# Patient Record
Sex: FEMALE | Race: WHITE | NOT HISPANIC OR LATINO | Employment: FULL TIME | URBAN - METROPOLITAN AREA
[De-identification: names, ages, dates, MRNs, and addresses within clinical notes are randomized per-mention and may not be internally consistent; named-entity substitution may affect disease eponyms.]

---

## 2017-03-01 ENCOUNTER — TRANSCRIBE ORDERS (OUTPATIENT)
Dept: ADMINISTRATIVE | Facility: HOSPITAL | Age: 57
End: 2017-03-01

## 2017-03-01 DIAGNOSIS — R10.9 ABDOMINAL PAIN, UNSPECIFIED LOCATION: ICD-10-CM

## 2017-03-01 DIAGNOSIS — R14.0 ABDOMINAL DISTENTION: Primary | ICD-10-CM

## 2017-03-10 ENCOUNTER — HOSPITAL ENCOUNTER (OUTPATIENT)
Dept: CT IMAGING | Facility: HOSPITAL | Age: 57
Discharge: HOME | End: 2017-03-10
Attending: INTERNAL MEDICINE | Admitting: INTERNAL MEDICINE

## 2017-03-10 DIAGNOSIS — R10.9 ABDOMINAL PAIN, UNSPECIFIED LOCATION: ICD-10-CM

## 2017-03-10 DIAGNOSIS — R14.0 ABDOMINAL DISTENTION: ICD-10-CM

## 2017-03-10 PROCEDURE — 74176 CT ABD & PELVIS W/O CONTRAST: CPT

## 2017-03-31 ENCOUNTER — OFFICE VISIT (OUTPATIENT)
Dept: SURGERY | Facility: CLINIC | Age: 57
End: 2017-03-31

## 2017-03-31 VITALS
WEIGHT: 163 LBS | RESPIRATION RATE: 14 BRPM | BODY MASS INDEX: 25.58 KG/M2 | OXYGEN SATURATION: 95 % | HEIGHT: 67 IN | DIASTOLIC BLOOD PRESSURE: 68 MMHG | TEMPERATURE: 97.7 F | SYSTOLIC BLOOD PRESSURE: 110 MMHG | HEART RATE: 94 BPM

## 2017-03-31 DIAGNOSIS — R11.2 NAUSEA AND VOMITING, INTRACTABILITY OF VOMITING NOT SPECIFIED, UNSPECIFIED VOMITING TYPE: ICD-10-CM

## 2017-03-31 DIAGNOSIS — R10.32 LEFT LOWER QUADRANT PAIN: Primary | ICD-10-CM

## 2017-03-31 PROCEDURE — 99203 OFFICE O/P NEW LOW 30 MIN: CPT | Performed by: SURGERY

## 2017-03-31 NOTE — PROGRESS NOTES
PATIENT INFORMATION  Akila Amezcua   - 1960    CHIEF COMPLAINT    GB CONS, IMAGING DONE LAST YEAR AT [facility],   VOMITING, DIARRHEA and left lower quadrant abdominal pain ×1 week  Referral from Dr. Castrejon    HISTORY OF PRESENT ILLNESS  HPI  Patient was as the office today for complaints of left lower quadrant abdominal pain, vomiting and diarrhea ×1 week.  She reports the symptoms were acute in onset her pain is in the left lower quadrant radiating to her back associated with nausea and nonbilious emesis.  She denies any fevers but thinks she may have had some chills lately.  Denies any change in bowel movements melena or hematochezia.  Denies urinary symptoms, recent travel or sick contacts.  She reports poor appetite.  She reports she had similar symptoms in 2016 and was seen at [facility] in [place].  She was admitted to the hospital for 3 days and treated for multiple problems.  CT scan abdomen and pelvis with IV contrast done at that time was reviewed by me please note I only have the report I have no axis to the images.  The CT scan shows diffuse thickening of the bronchial walls concerning for bronchitis, pleural nodule, cholelithiasis with a dilated common bile duct of up to 9 mm and bilateral renal cysts and left sided diverticulosis.    Patient has also had ongoing right upper quadrant abdominal pain with indigestion and bloating particularly after eating fried fatty or greasy foods.  However she reports that this pain in the left lower quadrant is certainly different from the one that she experiences from her biliary colic.  She has never had a colonoscopy before.      REVIEW OF SYSTEMS  Review of Systems   Constitutional: Positive for appetite change and chills.   Respiratory: Negative.    Cardiovascular: Negative.    Gastrointestinal: Positive for abdominal pain, nausea and vomiting.   Genitourinary: Negative.    Musculoskeletal: Positive for back pain.   Skin: Negative.   "  Neurological: Negative.    Hematological: Negative.    Psychiatric/Behavioral: Negative.          ACTIVE PROBLEMS  Patient Active Problem List    Diagnosis   • Left lower quadrant pain [R10.32]   • Nausea and vomiting [R11.2]         PAST MEDICAL HISTORY  Past Medical History:   Diagnosis Date   • Arthritis    • DJD (degenerative joint disease)    • Rheumatoid arthritis          SURGICAL HISTORY  Past Surgical History:   Procedure Laterality Date   • APPENDECTOMY     • ESOPHAGEAL ATRESIA REPAIR     • PLEURAL BIOPSY     • PLEURAL SCARIFICATION           FAMILY HISTORY  Family History   Problem Relation Age of Onset   • Lung cancer Father          SOCIAL HISTORY  Social History     Occupational History   • Not on file.     Social History Main Topics   • Smoking status: Current Every Day Smoker     Packs/day: 0.50   • Smokeless tobacco: Not on file   • Alcohol use No   • Drug use: No   • Sexual activity: Yes         CURRENT MEDICATIONS    Current Outpatient Prescriptions:   •  albuterol (PROVENTIL) (2.5 MG/3ML) 0.083% nebulizer solution, Take 2.5 mg by nebulization Every 4 (Four) Hours As Needed for wheezing., Disp: , Rfl:   •  folic acid (FOLVITE) 1 MG tablet, Take 1 mg by mouth Daily., Disp: , Rfl:   •  guaiFENesin (MUCINEX) 600 MG 12 hr tablet, Take 1 tablet by mouth 2 (Two) Times a Day., Disp: 30 tablet, Rfl: 0  •  HYDROcodone-acetaminophen (NORCO) 5-325 MG per tablet, Take 1 tablet by mouth Every 4 (Four) Hours As Needed for moderate pain (4-6)., Disp: 20 tablet, Rfl: 0  •  ipratropium-albuterol (DUO-NEB) 0.5-2.5 mg/mL nebulizer, Take 3 mL by nebulization Every 4 (Four) Hours As Needed for wheezing., Disp: , Rfl:   •  NON FORMULARY, 1 tablet/day. For RA, Disp: , Rfl:   •  NON FORMULARY, 2 L/min. Oxygen per nc prn, Disp: , Rfl:     ALLERGIES  Penicillins    VITALS  Vitals:    03/31/17 1118   BP: 110/68   Pulse: 94   Resp: 14   Temp: 97.7 °F (36.5 °C)   SpO2: 95%   Weight: 163 lb (73.9 kg)   Height: 67\" (170.2 cm) "       LAST RESULTS   Admission on 10/09/2016, Discharged on 10/09/2016   Component Date Value Ref Range Status   • Glucose 10/09/2016 102* 65 - 99 mg/dL Final   • BUN 10/09/2016 9  6 - 20 mg/dL Final   • Creatinine 10/09/2016 0.80  0.57 - 1.00 mg/dL Final   • Sodium 10/09/2016 145  136 - 145 mmol/L Final   • Potassium 10/09/2016 4.0  3.5 - 5.2 mmol/L Final   • Chloride 10/09/2016 104  98 - 107 mmol/L Final   • CO2 10/09/2016 25.5  22.0 - 29.0 mmol/L Final   • Calcium 10/09/2016 9.3  8.6 - 10.5 mg/dL Final   • Total Protein 10/09/2016 6.9  6.0 - 8.5 g/dL Final   • Albumin 10/09/2016 3.90  3.50 - 5.20 g/dL Final   • ALT (SGPT) 10/09/2016 14  5 - 33 U/L Final   • AST (SGOT) 10/09/2016 14  5 - 32 U/L Final   • Alkaline Phosphatase 10/09/2016 81  40 - 129 U/L Final   • Total Bilirubin 10/09/2016 0.5  0.2 - 1.2 mg/dL Final   • eGFR Non African Amer 10/09/2016 75  >60 mL/min/1.73 Final   • Globulin 10/09/2016 3.0  gm/dL Final   • A/G Ratio 10/09/2016 1.3  g/dL Final   • BUN/Creatinine Ratio 10/09/2016 11.3  7.0 - 25.0 Final   • Anion Gap 10/09/2016 15.5  mmol/L Final   • Protime 10/09/2016 13.2  12.1 - 15.0 Seconds Final   • INR 10/09/2016 1.03  0.90 - 1.10 Final   • PTT 10/09/2016 26.6  24.3 - 38.1 seconds Final   • D-Dimer, Quantitative 10/09/2016 <0.30  0.00 - 0.46 MCGFEU/mL Final   • proBNP 10/09/2016 271.6* 5.0 - 125.0 pg/mL Final   • Troponin T 10/09/2016 <0.010  0.000 - 0.030 ng/mL Final   • Blood Culture 10/09/2016 No growth at 5 days   Final   • Lactate 10/09/2016 1.0  0.5 - 2.0 mmol/L Final   • WBC 10/09/2016 10.65  4.80 - 10.80 10*3/mm3 Final   • RBC 10/09/2016 4.67  4.20 - 5.40 10*6/mm3 Final   • Hemoglobin 10/09/2016 14.2  12.0 - 16.0 g/dL Final   • Hematocrit 10/09/2016 43.0  37.0 - 47.0 % Final   • MCV 10/09/2016 92.1  81.0 - 99.0 fL Final   • MCH 10/09/2016 30.4  27.0 - 31.0 pg Final   • MCHC 10/09/2016 33.0  31.0 - 37.0 g/dL Final   • RDW 10/09/2016 15.8* 11.5 - 14.5 % Final   • RDW-SD 10/09/2016 52.6   37.0 - 54.0 fl Final   • MPV 10/09/2016 9.5  7.4 - 10.4 fL Final   • Platelets 10/09/2016 287  140 - 500 10*3/mm3 Final   • Neutrophil % 10/09/2016 63.4  45.0 - 70.0 % Final   • Lymphocyte % 10/09/2016 23.5  20.0 - 45.0 % Final   • Monocyte % 10/09/2016 8.2* 3.0 - 8.0 % Final   • Eosinophil % 10/09/2016 3.6  0.0 - 4.0 % Final   • Basophil % 10/09/2016 0.8  0.0 - 2.0 % Final   • Immature Grans % 10/09/2016 0.5  0.0 - 0.5 % Final   • Neutrophils, Absolute 10/09/2016 6.77  1.50 - 8.30 10*3/mm3 Final   • Lymphocytes, Absolute 10/09/2016 2.50  0.60 - 4.80 10*3/mm3 Final   • Monocytes, Absolute 10/09/2016 0.87  0.00 - 1.00 10*3/mm3 Final   • Eosinophils, Absolute 10/09/2016 0.38* 0.10 - 0.30 10*3/mm3 Final   • Basophils, Absolute 10/09/2016 0.08  0.00 - 0.20 10*3/mm3 Final   • Immature Grans, Absolute 10/09/2016 0.05* 0.00 - 0.03 10*3/mm3 Final   • nRBC 10/09/2016 0.0  0.0 - 0.0 /100 WBC Final   • Influenza A Ag, EIA 10/09/2016 Negative  Negative Final   • Influenza B Ag, EIA 10/09/2016 Negative  Negative Final   • Troponin T 10/09/2016 <0.010  0.000 - 0.030 ng/mL Final     No results found.    PHYSICAL EXAM  Physical Exam   Constitutional: She is oriented to person, place, and time. She appears well-developed and well-nourished.   Eyes: No scleral icterus.   Cardiovascular: Normal rate, regular rhythm and normal heart sounds.    Pulmonary/Chest: Breath sounds normal.   Abdominal:   Soft, nondistended, non-tender,, positive bowel sounds in all 4 quadrants.  No peritoneal signs noted.   Musculoskeletal: She exhibits no edema.   Neurological: She is alert and oriented to person, place, and time.   Skin: Skin is warm and dry.   Psychiatric: She has a normal mood and affect. Her behavior is normal.   Nursing note and vitals reviewed.      ASSESSMENT  Abdominal pain -- left lower quadrant  Diverticulosis-question diverticulitis   Will check a CT scan abdomen and pelvis with oral and IV contrast  Will check a CBC, CMP and  urinalysis.      Patient with known diagnosis of cholelithiasis.  Discussed with her that first we need to evaluate the etiology of the left lower quadrant pain before we consider cholecystectomy.  Additionally on CT scan done in September 2016 CBD was dilated 9 mm - I don't have any other records available from that hospital admission nor  any lab results.  Will try and get those records and may need to consider further evaluation of the gallbladder and biliary system.      PLAN  Follow-up after testing is completed.  Patient was advised to call the office sooner should she have worsening symptoms or go to the nearest emergency room.

## 2017-04-06 ENCOUNTER — APPOINTMENT (OUTPATIENT)
Dept: CT IMAGING | Facility: HOSPITAL | Age: 57
End: 2017-04-06
Attending: SURGERY

## 2017-04-10 ENCOUNTER — HOSPITAL ENCOUNTER (EMERGENCY)
Facility: HOSPITAL | Age: 57
Discharge: HOME | End: 2017-04-10
Attending: EMERGENCY MEDICINE | Admitting: EMERGENCY MEDICINE

## 2017-04-10 ENCOUNTER — APPOINTMENT (OUTPATIENT)
Dept: CT IMAGING | Facility: HOSPITAL | Age: 57
End: 2017-04-10

## 2017-04-10 VITALS
OXYGEN SATURATION: 98 % | SYSTOLIC BLOOD PRESSURE: 144 MMHG | RESPIRATION RATE: 18 BRPM | HEART RATE: 87 BPM | TEMPERATURE: 98.1 F | BODY MASS INDEX: 30.73 KG/M2 | DIASTOLIC BLOOD PRESSURE: 69 MMHG | WEIGHT: 180 LBS | HEIGHT: 64 IN

## 2017-04-10 DIAGNOSIS — K42.9 PERIUMBILICAL HERNIA: ICD-10-CM

## 2017-04-10 DIAGNOSIS — R18.8 CIRRHOSIS OF LIVER WITH ASCITES, UNSPECIFIED HEPATIC CIRRHOSIS TYPE (HCC): ICD-10-CM

## 2017-04-10 DIAGNOSIS — K43.9 VENTRAL HERNIA WITHOUT OBSTRUCTION OR GANGRENE: Primary | ICD-10-CM

## 2017-04-10 DIAGNOSIS — R59.0 LYMPHADENOPATHY, ABDOMINAL: ICD-10-CM

## 2017-04-10 DIAGNOSIS — K74.60 CIRRHOSIS OF LIVER WITH ASCITES, UNSPECIFIED HEPATIC CIRRHOSIS TYPE (HCC): ICD-10-CM

## 2017-04-10 LAB
ALBUMIN SERPL-MCNC: 3.5 G/DL (ref 3.2–4.8)
ALBUMIN/GLOB SERPL: 0.8 G/DL (ref 1.5–2.5)
ALP SERPL-CCNC: 92 U/L (ref 25–100)
ALT SERPL W P-5'-P-CCNC: 19 U/L (ref 7–40)
ANION GAP SERPL CALCULATED.3IONS-SCNC: 3 MMOL/L (ref 3–11)
APTT PPP: 28.1 SECONDS (ref 24–31)
AST SERPL-CCNC: 37 U/L (ref 0–33)
BACTERIA UR QL AUTO: ABNORMAL /HPF
BASOPHILS # BLD AUTO: 0.01 10*3/MM3 (ref 0–0.2)
BASOPHILS NFR BLD AUTO: 0.1 % (ref 0–1)
BILIRUB SERPL-MCNC: 1.9 MG/DL (ref 0.3–1.2)
BILIRUB UR QL STRIP: ABNORMAL
BUN BLD-MCNC: 8 MG/DL (ref 9–23)
BUN/CREAT SERPL: 16 (ref 7–25)
CALCIUM SPEC-SCNC: 8.7 MG/DL (ref 8.7–10.4)
CHLORIDE SERPL-SCNC: 107 MMOL/L (ref 99–109)
CLARITY UR: CLEAR
CO2 SERPL-SCNC: 30 MMOL/L (ref 20–31)
COLOR UR: ABNORMAL
CREAT BLD-MCNC: 0.5 MG/DL (ref 0.6–1.3)
DEPRECATED RDW RBC AUTO: 52.5 FL (ref 37–54)
EOSINOPHIL # BLD AUTO: 0.07 10*3/MM3 (ref 0.1–0.3)
EOSINOPHIL NFR BLD AUTO: 0.9 % (ref 0–3)
ERYTHROCYTE [DISTWIDTH] IN BLOOD BY AUTOMATED COUNT: 14.5 % (ref 11.3–14.5)
GFR SERPL CREATININE-BSD FRML MDRD: 129 ML/MIN/1.73
GLOBULIN UR ELPH-MCNC: 4.6 GM/DL
GLUCOSE BLD-MCNC: 102 MG/DL (ref 70–100)
GLUCOSE UR STRIP-MCNC: NEGATIVE MG/DL
HCT VFR BLD AUTO: 38.4 % (ref 34.5–44)
HGB BLD-MCNC: 12.6 G/DL (ref 11.5–15.5)
HGB UR QL STRIP.AUTO: ABNORMAL
HYALINE CASTS UR QL AUTO: ABNORMAL /LPF
IMM GRANULOCYTES # BLD: 0.02 10*3/MM3 (ref 0–0.03)
IMM GRANULOCYTES NFR BLD: 0.3 % (ref 0–0.6)
INR PPP: 1.16
KETONES UR QL STRIP: NEGATIVE
LEUKOCYTE ESTERASE UR QL STRIP.AUTO: ABNORMAL
LIPASE SERPL-CCNC: 28 U/L (ref 6–51)
LYMPHOCYTES # BLD AUTO: 2.2 10*3/MM3 (ref 0.6–4.8)
LYMPHOCYTES NFR BLD AUTO: 29.1 % (ref 24–44)
MCH RBC QN AUTO: 32.6 PG (ref 27–31)
MCHC RBC AUTO-ENTMCNC: 32.8 G/DL (ref 32–36)
MCV RBC AUTO: 99.5 FL (ref 80–99)
MONOCYTES # BLD AUTO: 0.74 10*3/MM3 (ref 0–1)
MONOCYTES NFR BLD AUTO: 9.8 % (ref 0–12)
NEUTROPHILS # BLD AUTO: 4.51 10*3/MM3 (ref 1.5–8.3)
NEUTROPHILS NFR BLD AUTO: 59.8 % (ref 41–71)
NITRITE UR QL STRIP: NEGATIVE
PH UR STRIP.AUTO: 7 [PH] (ref 5–8)
PLATELET # BLD AUTO: 118 10*3/MM3 (ref 150–450)
PMV BLD AUTO: 10.9 FL (ref 6–12)
POTASSIUM BLD-SCNC: 3.4 MMOL/L (ref 3.5–5.5)
PROT SERPL-MCNC: 8.1 G/DL (ref 5.7–8.2)
PROT UR QL STRIP: ABNORMAL
PROTHROMBIN TIME: 12.7 SECONDS (ref 9.6–11.5)
RBC # BLD AUTO: 3.86 10*6/MM3 (ref 3.89–5.14)
RBC # UR: ABNORMAL /HPF
REF LAB TEST METHOD: ABNORMAL
SODIUM BLD-SCNC: 140 MMOL/L (ref 132–146)
SP GR UR STRIP: 1.02 (ref 1–1.03)
SQUAMOUS #/AREA URNS HPF: ABNORMAL /HPF
UROBILINOGEN UR QL STRIP: ABNORMAL
WBC NRBC COR # BLD: 7.55 10*3/MM3 (ref 3.5–10.8)
WBC UR QL AUTO: ABNORMAL /HPF

## 2017-04-10 PROCEDURE — 99284 EMERGENCY DEPT VISIT MOD MDM: CPT

## 2017-04-10 PROCEDURE — 74177 CT ABD & PELVIS W/CONTRAST: CPT

## 2017-04-10 PROCEDURE — 0 IOPAMIDOL 61 % SOLUTION: Performed by: EMERGENCY MEDICINE

## 2017-04-10 PROCEDURE — 85730 THROMBOPLASTIN TIME PARTIAL: CPT | Performed by: PHYSICIAN ASSISTANT

## 2017-04-10 PROCEDURE — 80053 COMPREHEN METABOLIC PANEL: CPT | Performed by: PHYSICIAN ASSISTANT

## 2017-04-10 PROCEDURE — 85025 COMPLETE CBC W/AUTO DIFF WBC: CPT | Performed by: PHYSICIAN ASSISTANT

## 2017-04-10 PROCEDURE — 83690 ASSAY OF LIPASE: CPT | Performed by: PHYSICIAN ASSISTANT

## 2017-04-10 PROCEDURE — 87086 URINE CULTURE/COLONY COUNT: CPT | Performed by: PHYSICIAN ASSISTANT

## 2017-04-10 PROCEDURE — 81001 URINALYSIS AUTO W/SCOPE: CPT | Performed by: PHYSICIAN ASSISTANT

## 2017-04-10 PROCEDURE — 85610 PROTHROMBIN TIME: CPT | Performed by: PHYSICIAN ASSISTANT

## 2017-04-10 RX ORDER — SODIUM CHLORIDE 0.9 % (FLUSH) 0.9 %
10 SYRINGE (ML) INJECTION AS NEEDED
Status: DISCONTINUED | OUTPATIENT
Start: 2017-04-10 | End: 2017-04-10 | Stop reason: HOSPADM

## 2017-04-10 RX ADMIN — IOPAMIDOL 90 ML: 612 INJECTION, SOLUTION INTRAVENOUS at 13:58

## 2017-04-10 RX ADMIN — Medication 10 ML: at 13:08

## 2017-04-10 NOTE — DISCHARGE INSTRUCTIONS
Rest.  Follow up with Dr. Le and also call for follow up appointment with Dr Salcido.  Return to ER if worse pain, fever, vomiting or other concerns.

## 2017-04-10 NOTE — ED PROVIDER NOTES
Subjective   HPI Comments: 56-year-old female presents to emergency department with ongoing complaints of periumbilical pain and atender fluid collection in the right upper abdomen.  The fluid collection in the right upper abdomen is been present for 1-2 years but is now getting worse. The pain in the periumbilical region is been present for about a month.  The patient states that she's had recent fatigue, intermittent fevers, exertional shortness of breath  The patient had a CT scan of her abdomen and pelvis last month by Dr. Junior, who sees the patient for hidradenitis.  The patient states that she was told that the CT scan was normal.  She states that she wants to know what the source of her pain is and wants to know more about this fluid collection in her right upper abdomen.  Past medical history of cholecystectomy 8 years ago in [place].  She smokes a half pack cigarettes daily.  No alcohol or drug use.  No associated nausea or vomiting.  She has occasional diarrhea but none currently.  No urinary symptoms.    Patient is a 56 y.o. female presenting with abdominal pain.   History provided by:  Patient  Abdominal Pain   Pain location:  Periumbilical and RUQ  Pain quality: aching    Pain radiates to:  Does not radiate  Pain severity:  Moderate  Onset quality:  Gradual  Duration: The fluid collection in the right upper quadrant is been present for 1-2 years.  He.  Umbilical pain is been present for 1 month.  Timing:  Constant  Progression:  Worsening  Chronicity:  Chronic  Context: not alcohol use    Relieved by:  Nothing  Worsened by:  Movement  Ineffective treatments:  None tried  Associated symptoms: chills, diarrhea (occasional), fatigue, fever (intermittent), nausea (Mild) and shortness of breath (exertional)    Associated symptoms: no anorexia, no belching, no chest pain, no cough, no dysuria, no hematuria, no melena, no sore throat and no vomiting        Review of Systems   Constitutional: Positive for  chills, fatigue and fever (intermittent).   HENT: Negative for congestion, ear pain, nosebleeds, rhinorrhea and sore throat.    Eyes: Negative for pain, discharge and visual disturbance.   Respiratory: Positive for shortness of breath (exertional). Negative for cough and wheezing.    Cardiovascular: Negative for chest pain, palpitations and leg swelling.   Gastrointestinal: Positive for abdominal pain, diarrhea (occasional) and nausea (Mild). Negative for anorexia, blood in stool, melena and vomiting.   Endocrine: Negative.    Genitourinary: Negative for dysuria, hematuria and urgency.   Musculoskeletal: Negative for arthralgias and back pain.   Skin: Negative for pallor and rash.   Allergic/Immunologic: Negative for immunocompromised state.   Neurological: Negative for dizziness, speech difficulty, weakness and headaches.   Hematological: Negative for adenopathy. Does not bruise/bleed easily.   Psychiatric/Behavioral: Negative.        Past Medical History:   Diagnosis Date   • Diabetes mellitus    • Diverticulitis    • Fibromyalgia    • Hidradenitis        Allergies   Allergen Reactions   • Penicillins Itching       Past Surgical History:   Procedure Laterality Date   • BREAST BIOPSY     • BREAST CYST ASPIRATION     • CHOLECYSTECTOMY     • HYSTERECTOMY N/A    • WART REMOVAL         Family History   Problem Relation Age of Onset   • Breast cancer Neg Hx    • Endometrial cancer Neg Hx    • Ovarian cancer Neg Hx        Social History     Social History   • Marital status:      Spouse name: N/A   • Number of children: N/A   • Years of education: N/A     Social History Main Topics   • Smoking status: Current Every Day Smoker     Packs/day: 0.50   • Smokeless tobacco: None   • Alcohol use Yes      Comment: socially   • Drug use: None   • Sexual activity: Not Asked     Other Topics Concern   • None     Social History Narrative   • None           Objective   Physical Exam   Constitutional: She is oriented to  person, place, and time. She appears well-developed and well-nourished. No distress.   HENT:   Head: Normocephalic and atraumatic.   Nose: Nose normal.   Mouth/Throat: Oropharynx is clear and moist.   Eyes: Conjunctivae and EOM are normal. Pupils are equal, round, and reactive to light. Left eye exhibits no discharge. No scleral icterus.   Neck: Normal range of motion. Neck supple.   Cardiovascular: Normal rate, regular rhythm, normal heart sounds and intact distal pulses.    No murmur heard.  Pulmonary/Chest: Effort normal and breath sounds normal. No respiratory distress. She has no wheezes. She has no rales. She exhibits no tenderness.   Abdominal: Soft. Bowel sounds are normal. There is tenderness (moderate tenderness on palpation of what appears to be a fluid collection in the right upper abdomen.  Tenderness in the periumbilical region with a small operable umbilical hernia present.  Moderate abdominal distention.  Normal bowel sounds.). There is no rebound and no guarding.   Musculoskeletal: Normal range of motion. She exhibits no edema or tenderness.   Neurological: She is alert and oriented to person, place, and time.   Skin: Skin is warm and dry. No rash noted. She is not diaphoretic.   Psychiatric: She has a normal mood and affect.   Nursing note and vitals reviewed.      Procedures         ED Course  ED Course    3:10 PM  The patient is resting comfortably.  She appears in no acute distress.  CT scan of the abdomen and pelvis with IV contrast shows a ventral hernia in the region of her aforementioned fluid collection, as well as two umbilical hernias.  The hernias are fat containing and show no evidence of strangulation.  There is also some scalloping of her liver margins suggestive of cirrhosis.  There is some abdominal pelvic lymphadenopathy and malignancy cannot be ruled out.  I spoke with the patient about the above findings.  She is relieved to know about the hernias.  She states that she is  scheduled for a colonoscopy with Dr. Amando Le (GI).  I advised her to keep that appointment and also recommended that she speak with general surgery (Dr. Salcido) for follow-up of her hernias and her adenopathy.    Recent Results (from the past 24 hour(s))   Comprehensive Metabolic Panel    Collection Time: 04/10/17  1:08 PM   Result Value Ref Range    Glucose 102 (H) 70 - 100 mg/dL    BUN 8 (L) 9 - 23 mg/dL    Creatinine 0.50 (L) 0.60 - 1.30 mg/dL    Sodium 140 132 - 146 mmol/L    Potassium 3.4 (L) 3.5 - 5.5 mmol/L    Chloride 107 99 - 109 mmol/L    CO2 30.0 20.0 - 31.0 mmol/L    Calcium 8.7 8.7 - 10.4 mg/dL    Total Protein 8.1 5.7 - 8.2 g/dL    Albumin 3.50 3.20 - 4.80 g/dL    ALT (SGPT) 19 7 - 40 U/L    AST (SGOT) 37 (H) 0 - 33 U/L    Alkaline Phosphatase 92 25 - 100 U/L    Total Bilirubin 1.9 (H) 0.3 - 1.2 mg/dL    eGFR Non African Amer 129 >60 mL/min/1.73    Globulin 4.6 gm/dL    A/G Ratio 0.8 (L) 1.5 - 2.5 g/dL    BUN/Creatinine Ratio 16.0 7.0 - 25.0    Anion Gap 3.0 3.0 - 11.0 mmol/L   Lipase    Collection Time: 04/10/17  1:08 PM   Result Value Ref Range    Lipase 28 6 - 51 U/L   Protime-INR    Collection Time: 04/10/17  1:08 PM   Result Value Ref Range    Protime 12.7 (H) 9.6 - 11.5 Seconds    INR 1.16    aPTT    Collection Time: 04/10/17  1:08 PM   Result Value Ref Range    PTT 28.1 24.0 - 31.0 seconds   CBC Auto Differential    Collection Time: 04/10/17  1:08 PM   Result Value Ref Range    WBC 7.55 3.50 - 10.80 10*3/mm3    RBC 3.86 (L) 3.89 - 5.14 10*6/mm3    Hemoglobin 12.6 11.5 - 15.5 g/dL    Hematocrit 38.4 34.5 - 44.0 %    MCV 99.5 (H) 80.0 - 99.0 fL    MCH 32.6 (H) 27.0 - 31.0 pg    MCHC 32.8 32.0 - 36.0 g/dL    RDW 14.5 11.3 - 14.5 %    RDW-SD 52.5 37.0 - 54.0 fl    MPV 10.9 6.0 - 12.0 fL    Platelets 118 (L) 150 - 450 10*3/mm3    Neutrophil % 59.8 41.0 - 71.0 %    Lymphocyte % 29.1 24.0 - 44.0 %    Monocyte % 9.8 0.0 - 12.0 %    Eosinophil % 0.9 0.0 - 3.0 %    Basophil % 0.1 0.0 - 1.0 %     Immature Grans % 0.3 0.0 - 0.6 %    Neutrophils, Absolute 4.51 1.50 - 8.30 10*3/mm3    Lymphocytes, Absolute 2.20 0.60 - 4.80 10*3/mm3    Monocytes, Absolute 0.74 0.00 - 1.00 10*3/mm3    Eosinophils, Absolute 0.07 (L) 0.10 - 0.30 10*3/mm3    Basophils, Absolute 0.01 0.00 - 0.20 10*3/mm3    Immature Grans, Absolute 0.02 0.00 - 0.03 10*3/mm3   Urinalysis With / Culture If Indicated    Collection Time: 04/10/17  1:18 PM   Result Value Ref Range    Color, UA Dark Yellow (A) Yellow, Straw    Appearance, UA Clear Clear    pH, UA 7.0 5.0 - 8.0    Specific Gravity, UA 1.020 1.001 - 1.030    Glucose, UA Negative Negative    Ketones, UA Negative Negative    Bilirubin, UA Small (1+) (A) Negative    Blood, UA Small (1+) (A) Negative    Protein, UA 30 mg/dL (1+) (A) Negative    Leuk Esterase, UA Small (1+) (A) Negative    Nitrite, UA Negative Negative    Urobilinogen, UA 4.0 E.U./dL (A) 0.2 - 1.0 E.U./dL   Urinalysis, Microscopic Only    Collection Time: 04/10/17  1:18 PM   Result Value Ref Range    RBC, UA 13-20 (A) None Seen, 0-2 /HPF    WBC, UA 0-2 (A) None Seen /HPF    Bacteria, UA None Seen None Seen, Trace /HPF    Squamous Epithelial Cells, UA 3-6 (A) None Seen, 0-2 /HPF    Hyaline Casts, UA 0-6 0 - 6 /LPF    Methodology Automated Microscopy      Note: In addition to lab results from this visit, the labs listed above may include labs taken at another facility or during a different encounter within the last 24 hours. Please correlate lab times with ED admission and discharge times for further clarification of the services performed during this visit.    CT Abdomen Pelvis With Contrast    (Results Pending)     Vitals:    04/10/17 1152 04/10/17 1200 04/10/17 1230 04/10/17 1414   BP: 139/81 124/68 111/81 140/81   BP Location:       Patient Position:       Pulse: 90 93 85 84   Resp:  18 18 18   Temp:       TempSrc:       SpO2: 99% 98% 97% 98%   Weight:       Height:         Medications   sodium chloride 0.9 % flush 10 mL (10 mL  Intravenous Given 4/10/17 1308)   iopamidol (ISOVUE-300) 61 % injection 100 mL (90 mL Intravenous Given 4/10/17 1358)     ECG/EMG Results (last 24 hours)     ** No results found for the last 24 hours. **                      [facility]    Final diagnoses:   Ventral hernia without obstruction or gangrene   Periumbilical hernia   Cirrhosis of liver with ascites, unspecified hepatic cirrhosis type   Lymphadenopathy, abdominal            Tobias N RA[facility] Rob  04/10/17 2179

## 2017-04-12 ENCOUNTER — TELEPHONE (OUTPATIENT)
Dept: SURGERY | Facility: CLINIC | Age: 57
End: 2017-04-12

## 2017-04-12 LAB — BACTERIA SPEC AEROBE CULT: NORMAL

## 2017-04-12 NOTE — TELEPHONE ENCOUNTER
Her CT was approved - please co-ordinate with Jolanta and schedule and pt can follow up with me after CT Scan and labs are done. Poli

## 2017-04-17 ENCOUNTER — APPOINTMENT (OUTPATIENT)
Dept: LAB | Facility: HOSPITAL | Age: 57
End: 2017-04-17

## 2017-04-17 LAB
ALBUMIN SERPL-MCNC: 4.1 G/DL (ref 3.5–5.2)
ALBUMIN/GLOB SERPL: 1.3 G/DL
ALP SERPL-CCNC: 72 U/L (ref 40–129)
ALT SERPL W P-5'-P-CCNC: 8 U/L (ref 5–33)
ANION GAP SERPL CALCULATED.3IONS-SCNC: 9.6 MMOL/L
AST SERPL-CCNC: 12 U/L (ref 5–32)
BASOPHILS # BLD AUTO: 0.08 10*3/MM3 (ref 0–0.2)
BASOPHILS NFR BLD AUTO: 1 % (ref 0–2)
BILIRUB SERPL-MCNC: 0.6 MG/DL (ref 0.2–1.2)
BUN BLD-MCNC: 15 MG/DL (ref 6–20)
BUN/CREAT SERPL: 17.6 (ref 7–25)
CALCIUM SPEC-SCNC: 9.7 MG/DL (ref 8.6–10.5)
CHLORIDE SERPL-SCNC: 102 MMOL/L (ref 98–107)
CO2 SERPL-SCNC: 28.4 MMOL/L (ref 22–29)
CREAT BLD-MCNC: 0.85 MG/DL (ref 0.57–1)
DEPRECATED RDW RBC AUTO: 48.1 FL (ref 37–54)
EOSINOPHIL # BLD AUTO: 0.43 10*3/MM3 (ref 0.1–0.3)
EOSINOPHIL NFR BLD AUTO: 5.6 % (ref 0–4)
ERYTHROCYTE [DISTWIDTH] IN BLOOD BY AUTOMATED COUNT: 14.3 % (ref 11.5–14.5)
GFR SERPL CREATININE-BSD FRML MDRD: 69 ML/MIN/1.73
GLOBULIN UR ELPH-MCNC: 3.1 GM/DL
GLUCOSE BLD-MCNC: 97 MG/DL (ref 65–99)
HCT VFR BLD AUTO: 47.9 % (ref 37–47)
HGB BLD-MCNC: 15.6 G/DL (ref 12–16)
IMM GRANULOCYTES # BLD: 0.03 10*3/MM3 (ref 0–0.03)
IMM GRANULOCYTES NFR BLD: 0.4 % (ref 0–0.5)
LYMPHOCYTES # BLD AUTO: 2.01 10*3/MM3 (ref 0.6–4.8)
LYMPHOCYTES NFR BLD AUTO: 26 % (ref 20–45)
MCH RBC QN AUTO: 29.7 PG (ref 27–31)
MCHC RBC AUTO-ENTMCNC: 32.6 G/DL (ref 31–37)
MCV RBC AUTO: 91.1 FL (ref 81–99)
MONOCYTES # BLD AUTO: 0.53 10*3/MM3 (ref 0–1)
MONOCYTES NFR BLD AUTO: 6.9 % (ref 3–8)
NEUTROPHILS # BLD AUTO: 4.64 10*3/MM3 (ref 1.5–8.3)
NEUTROPHILS NFR BLD AUTO: 60.1 % (ref 45–70)
NRBC BLD MANUAL-RTO: 0 /100 WBC (ref 0–0)
PLATELET # BLD AUTO: 265 10*3/MM3 (ref 140–500)
PMV BLD AUTO: 9.3 FL (ref 7.4–10.4)
POTASSIUM BLD-SCNC: 4.8 MMOL/L (ref 3.5–5.2)
PROT SERPL-MCNC: 7.2 G/DL (ref 6–8.5)
RBC # BLD AUTO: 5.26 10*6/MM3 (ref 4.2–5.4)
SODIUM BLD-SCNC: 140 MMOL/L (ref 136–145)
WBC NRBC COR # BLD: 7.72 10*3/MM3 (ref 4.8–10.8)

## 2017-04-17 PROCEDURE — 36415 COLL VENOUS BLD VENIPUNCTURE: CPT | Performed by: SURGERY

## 2017-04-17 PROCEDURE — 80053 COMPREHEN METABOLIC PANEL: CPT | Performed by: SURGERY

## 2017-04-17 PROCEDURE — 85025 COMPLETE CBC W/AUTO DIFF WBC: CPT | Performed by: SURGERY

## 2017-04-26 ENCOUNTER — TELEPHONE (OUTPATIENT)
Dept: SURGERY | Facility: CLINIC | Age: 57
End: 2017-04-26

## 2017-06-07 ENCOUNTER — OFFICE VISIT (OUTPATIENT)
Dept: SURGERY | Facility: CLINIC | Age: 57
End: 2017-06-07

## 2017-06-07 VITALS
BODY MASS INDEX: 26.37 KG/M2 | HEIGHT: 67 IN | DIASTOLIC BLOOD PRESSURE: 70 MMHG | WEIGHT: 168 LBS | SYSTOLIC BLOOD PRESSURE: 116 MMHG

## 2017-06-07 DIAGNOSIS — Z12.11 COLON CANCER SCREENING: ICD-10-CM

## 2017-06-07 DIAGNOSIS — R19.7 DIARRHEA, UNSPECIFIED TYPE: ICD-10-CM

## 2017-06-07 DIAGNOSIS — R10.31 RIGHT LOWER QUADRANT ABDOMINAL PAIN: ICD-10-CM

## 2017-06-07 DIAGNOSIS — R10.11 RIGHT UPPER QUADRANT ABDOMINAL PAIN: Primary | ICD-10-CM

## 2017-06-07 DIAGNOSIS — R11.14 BILIOUS VOMITING WITH NAUSEA: ICD-10-CM

## 2017-06-07 PROCEDURE — 99213 OFFICE O/P EST LOW 20 MIN: CPT | Performed by: SURGERY

## 2017-06-07 NOTE — H&P
PATIENT INFORMATION  Akila Amezcua    - 1960    CHIEF COMPLAINT  RUQ AND RLQ ABDOMINAL PAIN, NAUSEA, VOMITING      HISTORY OF PRESENT ILLNESS  HPI  Patient presents to the office today for follow-up.  She is complaining of right upper quadrant and right lower quadrant abdominal pain.  She reports the pain is constant with acute exacerbation which in that situation it becomes sharp and stabbing.  It radiates to her right flank and right lower quadrant.  This associated with nausea and vomiting.  It is worse with spicy foods and spaghetti.  She reports symptoms have been ongoing for 2 months.  She had similar symptoms last year and was admitted at [facility].  CT scan was done during the hospitalization - I do not have the images to review but I have reviewed the report.  There is cholelithiasis, dilated CBD 9 mm and diverticulosis noted.  She was scheduled to have further testing including CT scan done through my office but her insurance company denied it.  She was able to get her labs done.  LFTs were normal.  She reports her bowel movements are regular however when she does have the flareup of pain she will experience diarrhea.  She denies melena or hematochezia.  She has an extensive pulmonary history.  She has had an appendectomy.  She's never had a colonoscopy before.  She's unaware of family history of colon cancer      REVIEW OF SYSTEMS  Review of Systems   Constitutional: Negative.    Respiratory: Negative.    Cardiovascular: Negative.    Gastrointestinal: Positive for abdominal pain, diarrhea, nausea and vomiting.   Musculoskeletal: Positive for back pain.   Neurological: Negative.    Hematological: Negative.    Psychiatric/Behavioral: Negative.          ACTIVE PROBLEMS  Patient Active Problem List    Diagnosis   • Right upper quadrant abdominal pain [R10.11]   • Left lower quadrant pain [R10.32]   • Nausea and vomiting [R11.2]         PAST MEDICAL HISTORY  Past Medical History:   Diagnosis  "Date   • Arthritis    • DJD (degenerative joint disease)    • Rheumatoid arthritis          SURGICAL HISTORY  Past Surgical History:   Procedure Laterality Date   • APPENDECTOMY     • ESOPHAGEAL ATRESIA REPAIR     • PLEURAL BIOPSY     • PLEURAL SCARIFICATION           FAMILY HISTORY  Family History   Problem Relation Age of Onset   • Lung cancer Father          SOCIAL HISTORY  Social History     Occupational History   • Not on file.     Social History Main Topics   • Smoking status: Current Every Day Smoker     Packs/day: 0.50   • Smokeless tobacco: Not on file   • Alcohol use No   • Drug use: No   • Sexual activity: Yes         CURRENT MEDICATIONS    Current Outpatient Prescriptions:   •  albuterol (PROVENTIL) (2.5 MG/3ML) 0.083% nebulizer solution, Take 2.5 mg by nebulization Every 4 (Four) Hours As Needed for wheezing., Disp: , Rfl:   •  folic acid (FOLVITE) 1 MG tablet, Take 1 mg by mouth Daily., Disp: , Rfl:   •  guaiFENesin (MUCINEX) 600 MG 12 hr tablet, Take 1 tablet by mouth 2 (Two) Times a Day., Disp: 30 tablet, Rfl: 0  •  HYDROcodone-acetaminophen (NORCO) 5-325 MG per tablet, Take 1 tablet by mouth Every 4 (Four) Hours As Needed for moderate pain (4-6)., Disp: 20 tablet, Rfl: 0  •  ipratropium-albuterol (DUO-NEB) 0.5-2.5 mg/mL nebulizer, Take 3 mL by nebulization Every 4 (Four) Hours As Needed for wheezing., Disp: , Rfl:   •  NON FORMULARY, 1 tablet/day. For RA, Disp: , Rfl:   •  NON FORMULARY, 2 L/min. Oxygen per nc prn, Disp: , Rfl:     ALLERGIES  Penicillins    VITALS  Vitals:    06/07/17 1159   BP: 116/70   Weight: 168 lb (76.2 kg)   Height: 67\" (170.2 cm)       LAST RESULTS   Office Visit on 03/31/2017   Component Date Value Ref Range Status   • Glucose 04/17/2017 97  65 - 99 mg/dL Final   • BUN 04/17/2017 15  6 - 20 mg/dL Final   • Creatinine 04/17/2017 0.85  0.57 - 1.00 mg/dL Final   • Sodium 04/17/2017 140  136 - 145 mmol/L Final   • Potassium 04/17/2017 4.8  3.5 - 5.2 mmol/L Final   • Chloride " 04/17/2017 102  98 - 107 mmol/L Final   • CO2 04/17/2017 28.4  22.0 - 29.0 mmol/L Final   • Calcium 04/17/2017 9.7  8.6 - 10.5 mg/dL Final   • Total Protein 04/17/2017 7.2  6.0 - 8.5 g/dL Final   • Albumin 04/17/2017 4.10  3.50 - 5.20 g/dL Final   • ALT (SGPT) 04/17/2017 8  5 - 33 U/L Final   • AST (SGOT) 04/17/2017 12  5 - 32 U/L Final   • Alkaline Phosphatase 04/17/2017 72  40 - 129 U/L Final   • Total Bilirubin 04/17/2017 0.6  0.2 - 1.2 mg/dL Final   • eGFR Non African Amer 04/17/2017 69  >60 mL/min/1.73 Final   • Globulin 04/17/2017 3.1  gm/dL Final   • A/G Ratio 04/17/2017 1.3  g/dL Final   • BUN/Creatinine Ratio 04/17/2017 17.6  7.0 - 25.0 Final   • Anion Gap 04/17/2017 9.6  mmol/L Final   • WBC 04/17/2017 7.72  4.80 - 10.80 10*3/mm3 Final   • RBC 04/17/2017 5.26  4.20 - 5.40 10*6/mm3 Final   • Hemoglobin 04/17/2017 15.6  12.0 - 16.0 g/dL Final   • Hematocrit 04/17/2017 47.9* 37.0 - 47.0 % Final   • MCV 04/17/2017 91.1  81.0 - 99.0 fL Final   • MCH 04/17/2017 29.7  27.0 - 31.0 pg Final   • MCHC 04/17/2017 32.6  31.0 - 37.0 g/dL Final   • RDW 04/17/2017 14.3  11.5 - 14.5 % Final   • RDW-SD 04/17/2017 48.1  37.0 - 54.0 fl Final   • MPV 04/17/2017 9.3  7.4 - 10.4 fL Final   • Platelets 04/17/2017 265  140 - 500 10*3/mm3 Final   • Neutrophil % 04/17/2017 60.1  45.0 - 70.0 % Final   • Lymphocyte % 04/17/2017 26.0  20.0 - 45.0 % Final   • Monocyte % 04/17/2017 6.9  3.0 - 8.0 % Final   • Eosinophil % 04/17/2017 5.6* 0.0 - 4.0 % Final   • Basophil % 04/17/2017 1.0  0.0 - 2.0 % Final   • Immature Grans % 04/17/2017 0.4  0.0 - 0.5 % Final   • Neutrophils, Absolute 04/17/2017 4.64  1.50 - 8.30 10*3/mm3 Final   • Lymphocytes, Absolute 04/17/2017 2.01  0.60 - 4.80 10*3/mm3 Final   • Monocytes, Absolute 04/17/2017 0.53  0.00 - 1.00 10*3/mm3 Final   • Eosinophils, Absolute 04/17/2017 0.43* 0.10 - 0.30 10*3/mm3 Final   • Basophils, Absolute 04/17/2017 0.08  0.00 - 0.20 10*3/mm3 Final   • Immature Grans, Absolute 04/17/2017  0.03  0.00 - 0.03 10*3/mm3 Final   • nRBC 04/17/2017 0.0  0.0 - 0.0 /100 WBC Final     No results found.    PHYSICAL EXAM  Physical Exam   Constitutional: She is oriented to person, place, and time. She appears well-developed and well-nourished.   HENT:   Head: Normocephalic and atraumatic.   Eyes: No scleral icterus.   Neck: Normal range of motion. Neck supple.   Cardiovascular: Normal rate, regular rhythm and normal heart sounds.    Pulmonary/Chest: Breath sounds normal.   Abdominal:   Soft, nondistended, subjective tenderness right upper quadrant and right lower quadrant.  Positive bowel sounds in all 4 quadrants.  Well-healed scars.   Musculoskeletal: She exhibits no edema.   Neurological: She is alert and oriented to person, place, and time.   Skin: Skin is warm and dry.   Psychiatric: She has a normal mood and affect. Her behavior is normal.   Nursing note and vitals reviewed.      ASSESSMENT  Abdominal pain-right upper quadrant and right lower quadrant  Nausea and vomiting  Diarrhea    Abnormal CT scan (2016) with diverticulosis cholelithiasis and dilated CBD 9 mm      PLAN  Will check ultrasound gallbladder    I have recommended to the patient that we should proceed with colonoscopy as part of her workup.  Procedure, risks, benefits, complications including but not limited to risk of bleeding, post-polypectomy syndrome, perforation requiring emergent additional procedures as well as cardiopulmonary complications have thoroughly been discussed with the patient understands and gives verbal informed consent.  Colonoscopy will be scheduled at [facility].  Bowel prep instructions were provided to the patient.  She'll have to stay off all NSAIDs and blood thinners for at least 5 days prior to the procedure.    Follow-up after colonoscopy and ultrasound.  Patient was advised to stay on a low fat, low acid bland diet.  The patient was advised to call the office sooner should she have worsening  symptoms or go to the nearest emergency room.

## 2017-06-07 NOTE — PROGRESS NOTES
PATIENT INFORMATION  Akila Amezcua    - 1960    CHIEF COMPLAINT  RUQ AND RLQ ABDOMINAL PAIN, NAUSEA, VOMITING      HISTORY OF PRESENT ILLNESS  HPI  Patient presents to the office today for follow-up.  She is complaining of right upper quadrant and right lower quadrant abdominal pain.  She reports the pain is constant with acute exacerbation which in that situation it becomes sharp and stabbing.  It radiates to her right flank and right lower quadrant.  This associated with nausea and vomiting.  It is worse with spicy foods and spaghetti.  She reports symptoms have been ongoing for 2 months.  She had similar symptoms last year and was admitted at [facility].  CT scan was done during the hospitalization - I do not have the images to review but I have reviewed the report.  There is cholelithiasis, dilated CBD 9 mm and diverticulosis noted.  She was scheduled to have further testing including CT scan done through my office but her insurance company denied it.  She was able to get her labs done.  LFTs were normal.  She reports her bowel movements are regular however when she does have the flareup of pain she will experience diarrhea.  She denies melena or hematochezia.  She has an extensive pulmonary history.  She has had an appendectomy.  She's never had a colonoscopy before.  She's unaware of family history of colon cancer      REVIEW OF SYSTEMS  Review of Systems   Constitutional: Negative.    Respiratory: Negative.    Cardiovascular: Negative.    Gastrointestinal: Positive for abdominal pain, diarrhea, nausea and vomiting.   Musculoskeletal: Positive for back pain.   Neurological: Negative.    Hematological: Negative.    Psychiatric/Behavioral: Negative.          ACTIVE PROBLEMS  Patient Active Problem List    Diagnosis   • Right upper quadrant abdominal pain [R10.11]   • Left lower quadrant pain [R10.32]   • Nausea and vomiting [R11.2]         PAST MEDICAL HISTORY  Past Medical History:   Diagnosis  "Date   • Arthritis    • DJD (degenerative joint disease)    • Rheumatoid arthritis          SURGICAL HISTORY  Past Surgical History:   Procedure Laterality Date   • APPENDECTOMY     • ESOPHAGEAL ATRESIA REPAIR     • PLEURAL BIOPSY     • PLEURAL SCARIFICATION           FAMILY HISTORY  Family History   Problem Relation Age of Onset   • Lung cancer Father          SOCIAL HISTORY  Social History     Occupational History   • Not on file.     Social History Main Topics   • Smoking status: Current Every Day Smoker     Packs/day: 0.50   • Smokeless tobacco: Not on file   • Alcohol use No   • Drug use: No   • Sexual activity: Yes         CURRENT MEDICATIONS    Current Outpatient Prescriptions:   •  albuterol (PROVENTIL) (2.5 MG/3ML) 0.083% nebulizer solution, Take 2.5 mg by nebulization Every 4 (Four) Hours As Needed for wheezing., Disp: , Rfl:   •  folic acid (FOLVITE) 1 MG tablet, Take 1 mg by mouth Daily., Disp: , Rfl:   •  guaiFENesin (MUCINEX) 600 MG 12 hr tablet, Take 1 tablet by mouth 2 (Two) Times a Day., Disp: 30 tablet, Rfl: 0  •  HYDROcodone-acetaminophen (NORCO) 5-325 MG per tablet, Take 1 tablet by mouth Every 4 (Four) Hours As Needed for moderate pain (4-6)., Disp: 20 tablet, Rfl: 0  •  ipratropium-albuterol (DUO-NEB) 0.5-2.5 mg/mL nebulizer, Take 3 mL by nebulization Every 4 (Four) Hours As Needed for wheezing., Disp: , Rfl:   •  NON FORMULARY, 1 tablet/day. For RA, Disp: , Rfl:   •  NON FORMULARY, 2 L/min. Oxygen per nc prn, Disp: , Rfl:     ALLERGIES  Penicillins    VITALS  Vitals:    06/07/17 1159   BP: 116/70   Weight: 168 lb (76.2 kg)   Height: 67\" (170.2 cm)       LAST RESULTS   Office Visit on 03/31/2017   Component Date Value Ref Range Status   • Glucose 04/17/2017 97  65 - 99 mg/dL Final   • BUN 04/17/2017 15  6 - 20 mg/dL Final   • Creatinine 04/17/2017 0.85  0.57 - 1.00 mg/dL Final   • Sodium 04/17/2017 140  136 - 145 mmol/L Final   • Potassium 04/17/2017 4.8  3.5 - 5.2 mmol/L Final   • Chloride " 04/17/2017 102  98 - 107 mmol/L Final   • CO2 04/17/2017 28.4  22.0 - 29.0 mmol/L Final   • Calcium 04/17/2017 9.7  8.6 - 10.5 mg/dL Final   • Total Protein 04/17/2017 7.2  6.0 - 8.5 g/dL Final   • Albumin 04/17/2017 4.10  3.50 - 5.20 g/dL Final   • ALT (SGPT) 04/17/2017 8  5 - 33 U/L Final   • AST (SGOT) 04/17/2017 12  5 - 32 U/L Final   • Alkaline Phosphatase 04/17/2017 72  40 - 129 U/L Final   • Total Bilirubin 04/17/2017 0.6  0.2 - 1.2 mg/dL Final   • eGFR Non African Amer 04/17/2017 69  >60 mL/min/1.73 Final   • Globulin 04/17/2017 3.1  gm/dL Final   • A/G Ratio 04/17/2017 1.3  g/dL Final   • BUN/Creatinine Ratio 04/17/2017 17.6  7.0 - 25.0 Final   • Anion Gap 04/17/2017 9.6  mmol/L Final   • WBC 04/17/2017 7.72  4.80 - 10.80 10*3/mm3 Final   • RBC 04/17/2017 5.26  4.20 - 5.40 10*6/mm3 Final   • Hemoglobin 04/17/2017 15.6  12.0 - 16.0 g/dL Final   • Hematocrit 04/17/2017 47.9* 37.0 - 47.0 % Final   • MCV 04/17/2017 91.1  81.0 - 99.0 fL Final   • MCH 04/17/2017 29.7  27.0 - 31.0 pg Final   • MCHC 04/17/2017 32.6  31.0 - 37.0 g/dL Final   • RDW 04/17/2017 14.3  11.5 - 14.5 % Final   • RDW-SD 04/17/2017 48.1  37.0 - 54.0 fl Final   • MPV 04/17/2017 9.3  7.4 - 10.4 fL Final   • Platelets 04/17/2017 265  140 - 500 10*3/mm3 Final   • Neutrophil % 04/17/2017 60.1  45.0 - 70.0 % Final   • Lymphocyte % 04/17/2017 26.0  20.0 - 45.0 % Final   • Monocyte % 04/17/2017 6.9  3.0 - 8.0 % Final   • Eosinophil % 04/17/2017 5.6* 0.0 - 4.0 % Final   • Basophil % 04/17/2017 1.0  0.0 - 2.0 % Final   • Immature Grans % 04/17/2017 0.4  0.0 - 0.5 % Final   • Neutrophils, Absolute 04/17/2017 4.64  1.50 - 8.30 10*3/mm3 Final   • Lymphocytes, Absolute 04/17/2017 2.01  0.60 - 4.80 10*3/mm3 Final   • Monocytes, Absolute 04/17/2017 0.53  0.00 - 1.00 10*3/mm3 Final   • Eosinophils, Absolute 04/17/2017 0.43* 0.10 - 0.30 10*3/mm3 Final   • Basophils, Absolute 04/17/2017 0.08  0.00 - 0.20 10*3/mm3 Final   • Immature Grans, Absolute 04/17/2017  0.03  0.00 - 0.03 10*3/mm3 Final   • nRBC 04/17/2017 0.0  0.0 - 0.0 /100 WBC Final     No results found.    PHYSICAL EXAM  Physical Exam   Constitutional: She is oriented to person, place, and time. She appears well-developed and well-nourished.   HENT:   Head: Normocephalic and atraumatic.   Eyes: No scleral icterus.   Neck: Normal range of motion. Neck supple.   Cardiovascular: Normal rate, regular rhythm and normal heart sounds.    Pulmonary/Chest: Breath sounds normal.   Abdominal:   Soft, nondistended, subjective tenderness right upper quadrant and right lower quadrant.  Positive bowel sounds in all 4 quadrants.  Well-healed scars.   Musculoskeletal: She exhibits no edema.   Neurological: She is alert and oriented to person, place, and time.   Skin: Skin is warm and dry.   Psychiatric: She has a normal mood and affect. Her behavior is normal.   Nursing note and vitals reviewed.      ASSESSMENT  Abdominal pain-right upper quadrant and right lower quadrant  Nausea and vomiting  Diarrhea    Abnormal CT scan (2016) with diverticulosis cholelithiasis and dilated CBD 9 mm      PLAN  Will check ultrasound gallbladder    I have recommended to the patient that we should proceed with colonoscopy as part of her workup.  Procedure, risks, benefits, complications including but not limited to risk of bleeding, post-polypectomy syndrome, perforation requiring emergent additional procedures as well as cardiopulmonary complications have thoroughly been discussed with the patient understands and gives verbal informed consent.  Colonoscopy will be scheduled at [facility].  Bowel prep instructions were provided to the patient.  She'll have to stay off all NSAIDs and blood thinners for at least 5 days prior to the procedure.    Follow-up after colonoscopy and ultrasound.  Patient was advised to stay on a low fat, low acid bland diet.  The patient was advised to call the office sooner should she have worsening  symptoms or go to the nearest emergency room.

## 2017-06-08 ENCOUNTER — HOSPITAL ENCOUNTER (OUTPATIENT)
Facility: HOSPITAL | Age: 57
Setting detail: HOSPITAL OUTPATIENT SURGERY
End: 2017-06-08
Attending: SURGERY | Admitting: SURGERY

## 2017-06-12 ENCOUNTER — APPOINTMENT (OUTPATIENT)
Dept: ULTRASOUND IMAGING | Facility: HOSPITAL | Age: 57
End: 2017-06-12
Attending: SURGERY

## 2017-06-12 RX ORDER — SODIUM CHLORIDE 0.9 % (FLUSH) 0.9 %
1-10 SYRINGE (ML) INJECTION AS NEEDED
Status: CANCELLED | OUTPATIENT
Start: 2017-06-12

## 2017-06-12 RX ORDER — LIDOCAINE HYDROCHLORIDE 10 MG/ML
0.5 INJECTION, SOLUTION EPIDURAL; INFILTRATION; INTRACAUDAL; PERINEURAL ONCE AS NEEDED
Status: CANCELLED | OUTPATIENT
Start: 2017-06-12

## 2017-06-12 RX ORDER — SODIUM CHLORIDE, SODIUM LACTATE, POTASSIUM CHLORIDE, CALCIUM CHLORIDE 600; 310; 30; 20 MG/100ML; MG/100ML; MG/100ML; MG/100ML
9 INJECTION, SOLUTION INTRAVENOUS CONTINUOUS
Status: CANCELLED | OUTPATIENT
Start: 2017-06-12

## 2017-06-13 ENCOUNTER — TELEPHONE (OUTPATIENT)
Dept: SURGERY | Facility: CLINIC | Age: 57
End: 2017-06-13

## 2017-06-13 NOTE — TELEPHONE ENCOUNTER
Patient was a no show for her colonoscopy today.  Please contact the patient and reschedule.  Also it appears that she was a no show for her ultrasound.  Please ask her if she would like to reschedule her ultrasound.

## 2017-06-13 NOTE — TELEPHONE ENCOUNTER
Patient has rescheduled her ultrasound to 6/20/17 and her colonoscopy has been rescheduled to 7/11/17 per patient request. To arrive at 11:00am.

## 2017-06-18 ENCOUNTER — HOSPITAL ENCOUNTER (EMERGENCY)
Facility: HOSPITAL | Age: 57
Discharge: HOME | End: 2017-06-19
Attending: EMERGENCY MEDICINE | Admitting: EMERGENCY MEDICINE

## 2017-06-18 DIAGNOSIS — R42 DIZZINESS: Primary | ICD-10-CM

## 2017-06-18 DIAGNOSIS — R10.11 ABDOMINAL PAIN, RUQ: ICD-10-CM

## 2017-06-18 LAB
ALBUMIN SERPL-MCNC: 3.8 G/DL (ref 3.5–5.2)
ALBUMIN/GLOB SERPL: 1.5 G/DL
ALP SERPL-CCNC: 68 U/L (ref 40–129)
ALT SERPL W P-5'-P-CCNC: 12 U/L (ref 5–33)
ANION GAP SERPL CALCULATED.3IONS-SCNC: 14.3 MMOL/L
AST SERPL-CCNC: 13 U/L (ref 5–32)
BASOPHILS # BLD AUTO: 0.07 10*3/MM3 (ref 0–0.2)
BASOPHILS NFR BLD AUTO: 1 % (ref 0–2)
BILIRUB SERPL-MCNC: 0.4 MG/DL (ref 0.2–1.2)
BUN BLD-MCNC: 6 MG/DL (ref 6–20)
BUN/CREAT SERPL: 8.1 (ref 7–25)
CALCIUM SPEC-SCNC: 8.5 MG/DL (ref 8.6–10.5)
CHLORIDE SERPL-SCNC: 106 MMOL/L (ref 98–107)
CO2 SERPL-SCNC: 23.7 MMOL/L (ref 22–29)
CREAT BLD-MCNC: 0.74 MG/DL (ref 0.57–1)
DEPRECATED RDW RBC AUTO: 46.7 FL (ref 37–54)
EOSINOPHIL # BLD AUTO: 0.32 10*3/MM3 (ref 0.1–0.3)
EOSINOPHIL NFR BLD AUTO: 4.5 % (ref 0–4)
ERYTHROCYTE [DISTWIDTH] IN BLOOD BY AUTOMATED COUNT: 14.6 % (ref 11.5–14.5)
GFR SERPL CREATININE-BSD FRML MDRD: 81 ML/MIN/1.73
GLOBULIN UR ELPH-MCNC: 2.6 GM/DL
GLUCOSE BLD-MCNC: 88 MG/DL (ref 65–99)
HCT VFR BLD AUTO: 43 % (ref 37–47)
HGB BLD-MCNC: 14.5 G/DL (ref 12–16)
IMM GRANULOCYTES # BLD: 0.02 10*3/MM3 (ref 0–0.03)
IMM GRANULOCYTES NFR BLD: 0.3 % (ref 0–0.5)
LIPASE SERPL-CCNC: 39 U/L (ref 13–60)
LYMPHOCYTES # BLD AUTO: 2.75 10*3/MM3 (ref 0.6–4.8)
LYMPHOCYTES NFR BLD AUTO: 38.4 % (ref 20–45)
MCH RBC QN AUTO: 29.8 PG (ref 27–31)
MCHC RBC AUTO-ENTMCNC: 33.7 G/DL (ref 31–37)
MCV RBC AUTO: 88.3 FL (ref 81–99)
MONOCYTES # BLD AUTO: 0.54 10*3/MM3 (ref 0–1)
MONOCYTES NFR BLD AUTO: 7.5 % (ref 3–8)
NEUTROPHILS # BLD AUTO: 3.46 10*3/MM3 (ref 1.5–8.3)
NEUTROPHILS NFR BLD AUTO: 48.3 % (ref 45–70)
NRBC BLD MANUAL-RTO: 0 /100 WBC (ref 0–0)
PLATELET # BLD AUTO: 274 10*3/MM3 (ref 140–500)
PMV BLD AUTO: 9.4 FL (ref 7.4–10.4)
POTASSIUM BLD-SCNC: 4 MMOL/L (ref 3.5–5.2)
PROT SERPL-MCNC: 6.4 G/DL (ref 6–8.5)
RBC # BLD AUTO: 4.87 10*6/MM3 (ref 4.2–5.4)
SODIUM BLD-SCNC: 144 MMOL/L (ref 136–145)
WBC NRBC COR # BLD: 7.16 10*3/MM3 (ref 4.8–10.8)

## 2017-06-18 PROCEDURE — 25010000002 KETOROLAC TROMETHAMINE PER 15 MG: Performed by: EMERGENCY MEDICINE

## 2017-06-18 PROCEDURE — 96374 THER/PROPH/DIAG INJ IV PUSH: CPT

## 2017-06-18 PROCEDURE — 83690 ASSAY OF LIPASE: CPT | Performed by: EMERGENCY MEDICINE

## 2017-06-18 PROCEDURE — 85025 COMPLETE CBC W/AUTO DIFF WBC: CPT | Performed by: EMERGENCY MEDICINE

## 2017-06-18 PROCEDURE — 99284 EMERGENCY DEPT VISIT MOD MDM: CPT

## 2017-06-18 PROCEDURE — 96361 HYDRATE IV INFUSION ADD-ON: CPT

## 2017-06-18 PROCEDURE — 93005 ELECTROCARDIOGRAM TRACING: CPT | Performed by: EMERGENCY MEDICINE

## 2017-06-18 PROCEDURE — 93010 ELECTROCARDIOGRAM REPORT: CPT | Performed by: INTERNAL MEDICINE

## 2017-06-18 PROCEDURE — 80053 COMPREHEN METABOLIC PANEL: CPT | Performed by: EMERGENCY MEDICINE

## 2017-06-18 PROCEDURE — 99282 EMERGENCY DEPT VISIT SF MDM: CPT | Performed by: EMERGENCY MEDICINE

## 2017-06-18 RX ORDER — SODIUM CHLORIDE 0.9 % (FLUSH) 0.9 %
10 SYRINGE (ML) INJECTION AS NEEDED
Status: DISCONTINUED | OUTPATIENT
Start: 2017-06-18 | End: 2017-06-19 | Stop reason: HOSPADM

## 2017-06-18 RX ORDER — KETOROLAC TROMETHAMINE 30 MG/ML
30 INJECTION, SOLUTION INTRAMUSCULAR; INTRAVENOUS ONCE
Status: COMPLETED | OUTPATIENT
Start: 2017-06-18 | End: 2017-06-18

## 2017-06-18 RX ADMIN — KETOROLAC TROMETHAMINE 30 MG: 30 INJECTION INTRAMUSCULAR; INTRAVENOUS at 23:42

## 2017-06-18 RX ADMIN — SODIUM CHLORIDE 1000 ML: 9 INJECTION, SOLUTION INTRAVENOUS at 23:42

## 2017-06-19 VITALS
HEART RATE: 90 BPM | HEIGHT: 66 IN | OXYGEN SATURATION: 85 % | RESPIRATION RATE: 24 BRPM | BODY MASS INDEX: 27 KG/M2 | DIASTOLIC BLOOD PRESSURE: 67 MMHG | TEMPERATURE: 98.4 F | WEIGHT: 168 LBS | SYSTOLIC BLOOD PRESSURE: 124 MMHG

## 2017-06-19 PROCEDURE — 96375 TX/PRO/DX INJ NEW DRUG ADDON: CPT

## 2017-06-19 PROCEDURE — 25010000002 MORPHINE PER 10 MG: Performed by: EMERGENCY MEDICINE

## 2017-06-19 PROCEDURE — 25010000002 ONDANSETRON PER 1 MG: Performed by: EMERGENCY MEDICINE

## 2017-06-19 RX ORDER — ONDANSETRON 4 MG/1
4 TABLET, ORALLY DISINTEGRATING ORAL EVERY 6 HOURS PRN
Qty: 20 TABLET | Refills: 0 | Status: SHIPPED | OUTPATIENT
Start: 2017-06-19 | End: 2019-11-13 | Stop reason: HOSPADM

## 2017-06-19 RX ORDER — ONDANSETRON 2 MG/ML
4 INJECTION INTRAMUSCULAR; INTRAVENOUS ONCE
Status: COMPLETED | OUTPATIENT
Start: 2017-06-19 | End: 2017-06-19

## 2017-06-19 RX ADMIN — MORPHINE SULFATE 4 MG: 4 INJECTION, SOLUTION INTRAMUSCULAR; INTRAVENOUS at 01:00

## 2017-06-19 RX ADMIN — ONDANSETRON 4 MG: 2 INJECTION, SOLUTION INTRAMUSCULAR; INTRAVENOUS at 00:58

## 2017-06-19 NOTE — ED NOTES
Dr. peña aware of pt's O2 sats. 85% - 90's at times. Pt states she usually runs that. Has O2 at home as needed. Pt hx of COPD and cont. To smoke     Catarina Arzola RN  06/19/17 0130

## 2017-06-19 NOTE — ED PROVIDER NOTES
Subjective   Patient is a 57 y.o. female presenting with dizziness.   History provided by:  Patient  Dizziness   Quality:  Head spinning  Severity:  Moderate  Onset quality:  Sudden  Progression:  Resolved  Chronicity:  New  Context: standing up    Context comment:  As described below.  Patient had just got up from sitting on the couch at time of onset of dizziness.  Relieved by:  None tried  Associated symptoms: nausea and vomiting    Associated symptoms: no chest pain, no headaches, no hearing loss, no palpitations, no shortness of breath, no syncope, no vision changes and no weakness    Vomiting:     Vomiting progression: nausea and vomiting or ongoing issue secondary to suspected gallbladder disease.    HPI Narrative:Ms. Amezcua is a 56 yo WF who presents secondary to dizziness. Patient is under the care of Dr. Liliana Monroy secondary to suspected gallstones.  Patient reports that she has not been able to eat or drink anything the past 2 days secondary to abdominal discomfort.  This evening patient stood up to take a shower when she felt dizzy and lost balance.  She fell onto her couch.  She did not sustain any injuries.  There was no loss of consciousness. Patient reports the dizziness is much improved.  She presents for evaluation.        Review of Systems   Constitutional: Negative.  Negative for appetite change, diaphoresis and fever.   HENT: Negative.  Negative for hearing loss.    Eyes: Negative.    Respiratory: Negative for cough, chest tightness, shortness of breath and wheezing.    Cardiovascular: Negative for chest pain, palpitations, leg swelling and syncope.   Gastrointestinal: Positive for nausea and vomiting.   Genitourinary: Negative.  Negative for difficulty urinating, flank pain, frequency and hematuria.   Musculoskeletal: Negative.    Skin: Negative.  Negative for color change, pallor and rash.   Neurological: Positive for dizziness. Negative for seizures, syncope, weakness and headaches.    Psychiatric/Behavioral: Negative.  Negative for agitation, behavioral problems and hallucinations.       Past Medical History:   Diagnosis Date   • Arthritis    • DJD (degenerative joint disease)    • Gallstones    • Injury of back     degenertive disc disease   • Rheumatoid arthritis        Allergies   Allergen Reactions   • Penicillins Anaphylaxis       Past Surgical History:   Procedure Laterality Date   • APPENDECTOMY     • ESOPHAGEAL ATRESIA REPAIR     • PLEURAL BIOPSY     • PLEURAL SCARIFICATION         Family History   Problem Relation Age of Onset   • Lung cancer Father        Social History     Social History   • Marital status:      Spouse name: N/A   • Number of children: N/A   • Years of education: N/A     Social History Main Topics   • Smoking status: Current Every Day Smoker     Packs/day: 1.00   • Smokeless tobacco: None   • Alcohol use Yes      Comment: prn   • Drug use: No   • Sexual activity: Yes     Other Topics Concern   • None     Social History Narrative   • None           Objective   Physical Exam   Constitutional: She is oriented to person, place, and time. She appears well-developed and well-nourished. No distress.   57-year-old white female laying in bed.  She appears in good overall health.  She has multiple tattoos on each forearm.  She complains of ongoing abdominal pain.   HENT:   Head: Normocephalic and atraumatic.   Right Ear: External ear normal.   Left Ear: External ear normal.   Nose: Nose normal.   Mouth/Throat: Oropharynx is clear and moist.   Eyes: Conjunctivae and EOM are normal. Pupils are equal, round, and reactive to light.   Neck: Normal range of motion. Neck supple.   Cardiovascular: Normal rate, regular rhythm, normal heart sounds and intact distal pulses.  Exam reveals no gallop and no friction rub.    No murmur heard.  Pulmonary/Chest: Effort normal and breath sounds normal.   Abdominal: Soft. Bowel sounds are normal. She exhibits no distension. There is  tenderness (mild) in the right upper quadrant. There is no rigidity, no rebound and no guarding.   Musculoskeletal: Normal range of motion.   Neurological: She is alert and oriented to person, place, and time.   Skin: Skin is warm and dry. She is not diaphoretic.   Psychiatric: She has a normal mood and affect. Her behavior is normal.   Nursing note and vitals reviewed.      ECG 12 Lead    Date/Time: 6/18/2017 11:23 PM  Performed by: ROSA M MACKEY  Authorized by: ROSA M MACKEY   Interpreted by physician  Comparison: compared with previous ECG from 10/9/2015  Similar to previous ECG  Rhythm: sinus rhythm  Rate: normal  QRS axis: normal  Conduction: non-specific intraventricular conduction delay  ST Segments: ST segments normal  T flattening: aVL and V2  Other findings comments: RSR' in V2  Clinical impression: non-specific ECG               ED Course  ED Course   Comment By Time   06/19/17  12:50 AM  Patient's dizziness  is greatly improved prior to my evaluation.  Will obtain full set of lab work, EKG and orthostatics.  Patient complains of ongoing abdominal pain as well as chronic back pain. Rosa M Mackey MD 06/19 0050   06/19/17  12:56 AM  Patient's workup is unremarkable.  Lab work and EKG are normal.  Orthostatics were also unremarkable.  I feel patient had postural hypotension.  Patient requesting additional pain medication.  We'll give her 1 dose of morphine and Zofran prior to discharge.  Recommend patient follow-up with Dr. Monroy as scheduled. Rosa M Mackey MD 06/19 0057      Labs Reviewed   COMPREHENSIVE METABOLIC PANEL - Abnormal; Notable for the following:        Result Value    Calcium 8.5 (*)     All other components within normal limits   CBC WITH AUTO DIFFERENTIAL - Abnormal; Notable for the following:     RDW 14.6 (*)     Eosinophil % 4.5 (*)     Eosinophils, Absolute 0.32 (*)     All other components within normal limits   LIPASE - Normal   CBC AND DIFFERENTIAL    Narrative:     The  following orders were created for panel order CBC & Differential.  Procedure                               Abnormality         Status                     ---------                               -----------         ------                     CBC Auto Differential[[id number]]        Abnormal            Final result                 Please view results for these tests on the individual orders.                 MDM  Number of Diagnoses or Management Options  Abdominal pain, RUQ: established and worsening  Dizziness: new and requires workup     Amount and/or Complexity of Data Reviewed  Clinical lab tests: ordered and reviewed  Tests in the medicine section of CPT®: ordered and reviewed    Risk of Complications, Morbidity, and/or Mortality  Presenting problems: low  Diagnostic procedures: moderate  Management options: low    Patient Progress  Patient progress: improved      Final diagnoses:   Dizziness   Abdominal pain, RUQ              Kingsley Fuentes MD  06/19/17 0124       Kingsley Fuentes MD  06/19/17 0224

## 2017-06-19 NOTE — DISCHARGE INSTRUCTIONS
Continue current medications.  Zofran as needed for nausea.  I suspect her dizziness is secondary to postural hypotension.  I have included information on this topic in her discharge paperwork.  I recommend you rise slowly from a sitting or lying position. Make sure you're drinking plenty of fluids.  Follow-up with PMD as above.  Follow-up with Dr. Blackwell is scheduled.  Return to ED for medical emergencies.

## 2017-06-20 ENCOUNTER — HOSPITAL ENCOUNTER (OUTPATIENT)
Dept: ULTRASOUND IMAGING | Facility: HOSPITAL | Age: 57
Discharge: HOME | End: 2017-06-20
Attending: SURGERY | Admitting: SURGERY

## 2017-06-20 ENCOUNTER — TELEPHONE (OUTPATIENT)
Dept: SURGERY | Facility: CLINIC | Age: 57
End: 2017-06-20

## 2017-06-20 ENCOUNTER — PREP FOR SURGERY (OUTPATIENT)
Dept: OTHER | Facility: HOSPITAL | Age: 57
End: 2017-06-20

## 2017-06-20 DIAGNOSIS — K80.00 CALCULUS OF GALLBLADDER WITH ACUTE CHOLECYSTITIS WITHOUT OBSTRUCTION: Primary | ICD-10-CM

## 2017-06-20 PROCEDURE — 76705 ECHO EXAM OF ABDOMEN: CPT

## 2017-06-20 RX ORDER — CLINDAMYCIN PHOSPHATE 900 MG/50ML
900 INJECTION INTRAVENOUS
Status: CANCELLED | OUTPATIENT
Start: 2017-06-20

## 2017-06-20 RX ORDER — LEVOFLOXACIN 5 MG/ML
500 INJECTION, SOLUTION INTRAVENOUS
Status: CANCELLED | OUTPATIENT
Start: 2017-06-20

## 2017-06-21 ENCOUNTER — TELEPHONE (OUTPATIENT)
Dept: SURGERY | Facility: CLINIC | Age: 57
End: 2017-06-21

## 2017-06-22 ENCOUNTER — ANESTHESIA (OUTPATIENT)
Dept: PERIOP | Facility: HOSPITAL | Age: 57
End: 2017-06-22

## 2017-06-22 ENCOUNTER — ANESTHESIA EVENT (OUTPATIENT)
Dept: PERIOP | Facility: HOSPITAL | Age: 57
End: 2017-06-22

## 2017-06-22 ENCOUNTER — APPOINTMENT (OUTPATIENT)
Dept: GENERAL RADIOLOGY | Facility: HOSPITAL | Age: 57
End: 2017-06-22

## 2017-06-22 ENCOUNTER — HOSPITAL ENCOUNTER (OUTPATIENT)
Facility: HOSPITAL | Age: 57
Setting detail: OBSERVATION
Discharge: HOME | End: 2017-06-23
Attending: SURGERY | Admitting: SURGERY

## 2017-06-22 DIAGNOSIS — K80.00 CALCULUS OF GALLBLADDER WITH ACUTE CHOLECYSTITIS WITHOUT OBSTRUCTION: ICD-10-CM

## 2017-06-22 LAB
ALBUMIN SERPL-MCNC: 4.1 G/DL (ref 3.5–5.2)
ALBUMIN/GLOB SERPL: 1.6 G/DL
ALP SERPL-CCNC: 64 U/L (ref 40–129)
ALT SERPL W P-5'-P-CCNC: 12 U/L (ref 5–33)
AMYLASE SERPL-CCNC: 61 U/L (ref 28–100)
ANION GAP SERPL CALCULATED.3IONS-SCNC: 14.5 MMOL/L
AST SERPL-CCNC: 12 U/L (ref 5–32)
BACTERIA UR QL AUTO: ABNORMAL /HPF
BASOPHILS # BLD AUTO: 0.08 10*3/MM3 (ref 0–0.2)
BASOPHILS NFR BLD AUTO: 1 % (ref 0–2)
BILIRUB SERPL-MCNC: 0.7 MG/DL (ref 0.2–1.2)
BILIRUB UR QL STRIP: NEGATIVE
BUN BLD-MCNC: 14 MG/DL (ref 6–20)
BUN/CREAT SERPL: 22.2 (ref 7–25)
CALCIUM SPEC-SCNC: 9 MG/DL (ref 8.6–10.5)
CHLORIDE SERPL-SCNC: 101 MMOL/L (ref 98–107)
CLARITY UR: CLEAR
CO2 SERPL-SCNC: 22.5 MMOL/L (ref 22–29)
COLOR UR: YELLOW
CREAT BLD-MCNC: 0.63 MG/DL (ref 0.57–1)
DEPRECATED RDW RBC AUTO: 46.3 FL (ref 37–54)
EOSINOPHIL # BLD AUTO: 0.26 10*3/MM3 (ref 0.1–0.3)
EOSINOPHIL NFR BLD AUTO: 3.1 % (ref 0–4)
ERYTHROCYTE [DISTWIDTH] IN BLOOD BY AUTOMATED COUNT: 14.5 % (ref 11.5–14.5)
GFR SERPL CREATININE-BSD FRML MDRD: 98 ML/MIN/1.73
GLOBULIN UR ELPH-MCNC: 2.6 GM/DL
GLUCOSE BLD-MCNC: 98 MG/DL (ref 65–99)
GLUCOSE UR STRIP-MCNC: NEGATIVE MG/DL
HCT VFR BLD AUTO: 44.9 % (ref 37–47)
HGB BLD-MCNC: 15.2 G/DL (ref 12–16)
HGB UR QL STRIP.AUTO: ABNORMAL
HYALINE CASTS UR QL AUTO: ABNORMAL /LPF
IMM GRANULOCYTES # BLD: 0.02 10*3/MM3 (ref 0–0.03)
IMM GRANULOCYTES NFR BLD: 0.2 % (ref 0–0.5)
INR PPP: 0.96 (ref 0.9–1.1)
KETONES UR QL STRIP: NEGATIVE
LEUKOCYTE ESTERASE UR QL STRIP.AUTO: ABNORMAL
LIPASE SERPL-CCNC: 37 U/L (ref 13–60)
LYMPHOCYTES # BLD AUTO: 1.92 10*3/MM3 (ref 0.6–4.8)
LYMPHOCYTES NFR BLD AUTO: 22.8 % (ref 20–45)
MCH RBC QN AUTO: 29.6 PG (ref 27–31)
MCHC RBC AUTO-ENTMCNC: 33.9 G/DL (ref 31–37)
MCV RBC AUTO: 87.5 FL (ref 81–99)
MONOCYTES # BLD AUTO: 0.7 10*3/MM3 (ref 0–1)
MONOCYTES NFR BLD AUTO: 8.3 % (ref 3–8)
NEUTROPHILS # BLD AUTO: 5.44 10*3/MM3 (ref 1.5–8.3)
NEUTROPHILS NFR BLD AUTO: 64.6 % (ref 45–70)
NITRITE UR QL STRIP: NEGATIVE
NRBC BLD MANUAL-RTO: 0 /100 WBC (ref 0–0)
PH UR STRIP.AUTO: 6 [PH] (ref 4.5–8)
PLATELET # BLD AUTO: 246 10*3/MM3 (ref 140–500)
PMV BLD AUTO: 9.3 FL (ref 7.4–10.4)
POTASSIUM BLD-SCNC: 4 MMOL/L (ref 3.5–5.2)
PROT SERPL-MCNC: 6.7 G/DL (ref 6–8.5)
PROT UR QL STRIP: NEGATIVE
PROTHROMBIN TIME: 12.8 SECONDS (ref 12.1–15)
RBC # BLD AUTO: 5.13 10*6/MM3 (ref 4.2–5.4)
RBC # UR: ABNORMAL /HPF
REF LAB TEST METHOD: ABNORMAL
SODIUM BLD-SCNC: 138 MMOL/L (ref 136–145)
SP GR UR STRIP: 1.01 (ref 1–1.03)
SQUAMOUS #/AREA URNS HPF: ABNORMAL /HPF
UROBILINOGEN UR QL STRIP: ABNORMAL
WBC NRBC COR # BLD: 8.42 10*3/MM3 (ref 4.8–10.8)
WBC UR QL AUTO: ABNORMAL /HPF

## 2017-06-22 PROCEDURE — 85610 PROTHROMBIN TIME: CPT | Performed by: SURGERY

## 2017-06-22 PROCEDURE — 25010000002 FENTANYL CITRATE (PF) 100 MCG/2ML SOLUTION: Performed by: NURSE ANESTHETIST, CERTIFIED REGISTERED

## 2017-06-22 PROCEDURE — 85025 COMPLETE CBC W/AUTO DIFF WBC: CPT | Performed by: SURGERY

## 2017-06-22 PROCEDURE — 25010000002 HYDROMORPHONE PER 4 MG: Performed by: SURGERY

## 2017-06-22 PROCEDURE — 81001 URINALYSIS AUTO W/SCOPE: CPT | Performed by: SURGERY

## 2017-06-22 PROCEDURE — G0378 HOSPITAL OBSERVATION PER HR: HCPCS

## 2017-06-22 PROCEDURE — 0 IOPAMIDOL 61 % SOLUTION: Performed by: SURGERY

## 2017-06-22 PROCEDURE — 94799 UNLISTED PULMONARY SVC/PX: CPT

## 2017-06-22 PROCEDURE — 83690 ASSAY OF LIPASE: CPT | Performed by: SURGERY

## 2017-06-22 PROCEDURE — 80053 COMPREHEN METABOLIC PANEL: CPT | Performed by: SURGERY

## 2017-06-22 PROCEDURE — 25010000002 LEVOFLOXACIN PER 250 MG: Performed by: SURGERY

## 2017-06-22 PROCEDURE — 87086 URINE CULTURE/COLONY COUNT: CPT | Performed by: SURGERY

## 2017-06-22 PROCEDURE — 47562 LAPAROSCOPIC CHOLECYSTECTOMY: CPT | Performed by: SURGERY

## 2017-06-22 PROCEDURE — 25010000002 PROPOFOL 10 MG/ML EMULSION: Performed by: NURSE ANESTHETIST, CERTIFIED REGISTERED

## 2017-06-22 PROCEDURE — 94640 AIRWAY INHALATION TREATMENT: CPT

## 2017-06-22 PROCEDURE — 82150 ASSAY OF AMYLASE: CPT | Performed by: SURGERY

## 2017-06-22 PROCEDURE — 25010000002 MIDAZOLAM PER 1 MG: Performed by: NURSE ANESTHETIST, CERTIFIED REGISTERED

## 2017-06-22 PROCEDURE — 25010000002 PHENYLEPHRINE PER 1 ML: Performed by: NURSE ANESTHETIST, CERTIFIED REGISTERED

## 2017-06-22 PROCEDURE — 25010000002 SUCCINYLCHOLINE PER 20 MG: Performed by: NURSE ANESTHETIST, CERTIFIED REGISTERED

## 2017-06-22 PROCEDURE — 99202 OFFICE O/P NEW SF 15 MIN: CPT | Performed by: HOSPITALIST

## 2017-06-22 PROCEDURE — C1887 CATHETER, GUIDING: HCPCS | Performed by: SURGERY

## 2017-06-22 PROCEDURE — 71020 HC CHEST PA AND LATERAL: CPT

## 2017-06-22 PROCEDURE — 25010000002 ONDANSETRON PER 1 MG: Performed by: NURSE ANESTHETIST, CERTIFIED REGISTERED

## 2017-06-22 PROCEDURE — 25010000002 NEOSTIGMINE PER 0.5 MG: Performed by: NURSE ANESTHETIST, CERTIFIED REGISTERED

## 2017-06-22 PROCEDURE — 25010000002 DEXAMETHASONE PER 1 MG: Performed by: NURSE ANESTHETIST, CERTIFIED REGISTERED

## 2017-06-22 PROCEDURE — 99024 POSTOP FOLLOW-UP VISIT: CPT | Performed by: SURGERY

## 2017-06-22 RX ORDER — BUDESONIDE AND FORMOTEROL FUMARATE DIHYDRATE 80; 4.5 UG/1; UG/1
2 AEROSOL RESPIRATORY (INHALATION)
Status: DISCONTINUED | OUTPATIENT
Start: 2017-06-22 | End: 2017-06-23 | Stop reason: HOSPADM

## 2017-06-22 RX ORDER — MEPERIDINE HYDROCHLORIDE 25 MG/ML
12.5 INJECTION INTRAMUSCULAR; INTRAVENOUS; SUBCUTANEOUS
Status: DISCONTINUED | OUTPATIENT
Start: 2017-06-22 | End: 2017-06-22 | Stop reason: HOSPADM

## 2017-06-22 RX ORDER — ONDANSETRON 4 MG/1
4 TABLET, ORALLY DISINTEGRATING ORAL EVERY 6 HOURS PRN
Status: DISCONTINUED | OUTPATIENT
Start: 2017-06-22 | End: 2017-06-23 | Stop reason: HOSPADM

## 2017-06-22 RX ORDER — CLINDAMYCIN PHOSPHATE 900 MG/50ML
900 INJECTION INTRAVENOUS
Status: DISCONTINUED | OUTPATIENT
Start: 2017-06-22 | End: 2017-06-22 | Stop reason: HOSPADM

## 2017-06-22 RX ORDER — IBUPROFEN 600 MG/1
600 TABLET ORAL ONCE AS NEEDED
Status: DISCONTINUED | OUTPATIENT
Start: 2017-06-22 | End: 2017-06-22 | Stop reason: HOSPADM

## 2017-06-22 RX ORDER — SUCCINYLCHOLINE CHLORIDE 20 MG/ML
INJECTION INTRAMUSCULAR; INTRAVENOUS AS NEEDED
Status: DISCONTINUED | OUTPATIENT
Start: 2017-06-22 | End: 2017-06-22 | Stop reason: SURG

## 2017-06-22 RX ORDER — ONDANSETRON 2 MG/ML
4 INJECTION INTRAMUSCULAR; INTRAVENOUS EVERY 6 HOURS PRN
Status: DISCONTINUED | OUTPATIENT
Start: 2017-06-22 | End: 2017-06-23 | Stop reason: HOSPADM

## 2017-06-22 RX ORDER — ONDANSETRON 2 MG/ML
INJECTION INTRAMUSCULAR; INTRAVENOUS AS NEEDED
Status: DISCONTINUED | OUTPATIENT
Start: 2017-06-22 | End: 2017-06-22 | Stop reason: SURG

## 2017-06-22 RX ORDER — SODIUM CHLORIDE, SODIUM LACTATE, POTASSIUM CHLORIDE, CALCIUM CHLORIDE 600; 310; 30; 20 MG/100ML; MG/100ML; MG/100ML; MG/100ML
100 INJECTION, SOLUTION INTRAVENOUS CONTINUOUS
Status: DISCONTINUED | OUTPATIENT
Start: 2017-06-22 | End: 2017-06-23

## 2017-06-22 RX ORDER — MAGNESIUM HYDROXIDE 1200 MG/15ML
LIQUID ORAL AS NEEDED
Status: DISCONTINUED | OUTPATIENT
Start: 2017-06-22 | End: 2017-06-22 | Stop reason: HOSPADM

## 2017-06-22 RX ORDER — NALOXONE HCL 0.4 MG/ML
0.1 VIAL (ML) INJECTION
Status: DISCONTINUED | OUTPATIENT
Start: 2017-06-22 | End: 2017-06-23 | Stop reason: HOSPADM

## 2017-06-22 RX ORDER — DOCUSATE SODIUM 100 MG/1
100 CAPSULE, LIQUID FILLED ORAL 2 TIMES DAILY
Status: DISCONTINUED | OUTPATIENT
Start: 2017-06-22 | End: 2017-06-23 | Stop reason: HOSPADM

## 2017-06-22 RX ORDER — KETOROLAC TROMETHAMINE 30 MG/ML
INJECTION, SOLUTION INTRAMUSCULAR; INTRAVENOUS AS NEEDED
Status: DISCONTINUED | OUTPATIENT
Start: 2017-06-22 | End: 2017-06-22

## 2017-06-22 RX ORDER — PROMETHAZINE HYDROCHLORIDE 25 MG/1
25 TABLET ORAL ONCE AS NEEDED
Status: DISCONTINUED | OUTPATIENT
Start: 2017-06-22 | End: 2017-06-22 | Stop reason: HOSPADM

## 2017-06-22 RX ORDER — LEVOFLOXACIN 5 MG/ML
500 INJECTION, SOLUTION INTRAVENOUS
Status: DISCONTINUED | OUTPATIENT
Start: 2017-06-22 | End: 2017-06-22 | Stop reason: HOSPADM

## 2017-06-22 RX ORDER — LEVOFLOXACIN 5 MG/ML
500 INJECTION, SOLUTION INTRAVENOUS EVERY 24 HOURS
Status: COMPLETED | OUTPATIENT
Start: 2017-06-23 | End: 2017-06-23

## 2017-06-22 RX ORDER — MIDAZOLAM HYDROCHLORIDE 1 MG/ML
2 INJECTION INTRAMUSCULAR; INTRAVENOUS
Status: DISCONTINUED | OUTPATIENT
Start: 2017-06-22 | End: 2017-06-22 | Stop reason: HOSPADM

## 2017-06-22 RX ORDER — HYDROMORPHONE HCL 110MG/55ML
0.5 PATIENT CONTROLLED ANALGESIA SYRINGE INTRAVENOUS
Status: DISCONTINUED | OUTPATIENT
Start: 2017-06-22 | End: 2017-06-22 | Stop reason: HOSPADM

## 2017-06-22 RX ORDER — FAMOTIDINE 10 MG/ML
20 INJECTION, SOLUTION INTRAVENOUS
Status: DISCONTINUED | OUTPATIENT
Start: 2017-06-22 | End: 2017-06-22 | Stop reason: HOSPADM

## 2017-06-22 RX ORDER — FOLIC ACID 1 MG/1
1 TABLET ORAL EVERY MORNING
Status: DISCONTINUED | OUTPATIENT
Start: 2017-06-23 | End: 2017-06-23 | Stop reason: HOSPADM

## 2017-06-22 RX ORDER — SODIUM CHLORIDE 9 MG/ML
INJECTION, SOLUTION INTRAVENOUS AS NEEDED
Status: DISCONTINUED | OUTPATIENT
Start: 2017-06-22 | End: 2017-06-22 | Stop reason: HOSPADM

## 2017-06-22 RX ORDER — IPRATROPIUM BROMIDE AND ALBUTEROL SULFATE 2.5; .5 MG/3ML; MG/3ML
3 SOLUTION RESPIRATORY (INHALATION) EVERY 4 HOURS PRN
Status: DISCONTINUED | OUTPATIENT
Start: 2017-06-22 | End: 2017-06-23 | Stop reason: HOSPADM

## 2017-06-22 RX ORDER — FENTANYL CITRATE 50 UG/ML
INJECTION, SOLUTION INTRAMUSCULAR; INTRAVENOUS AS NEEDED
Status: DISCONTINUED | OUTPATIENT
Start: 2017-06-22 | End: 2017-06-22 | Stop reason: SURG

## 2017-06-22 RX ORDER — ALBUTEROL SULFATE 2.5 MG/3ML
2.5 SOLUTION RESPIRATORY (INHALATION) ONCE
Status: COMPLETED | OUTPATIENT
Start: 2017-06-22 | End: 2017-06-22

## 2017-06-22 RX ORDER — LIDOCAINE HYDROCHLORIDE 10 MG/ML
INJECTION, SOLUTION EPIDURAL; INFILTRATION; INTRACAUDAL; PERINEURAL
Status: DISPENSED
Start: 2017-06-22 | End: 2017-06-22

## 2017-06-22 RX ORDER — PROMETHAZINE HYDROCHLORIDE 25 MG/ML
12.5 INJECTION, SOLUTION INTRAMUSCULAR; INTRAVENOUS ONCE AS NEEDED
Status: DISCONTINUED | OUTPATIENT
Start: 2017-06-22 | End: 2017-06-22 | Stop reason: HOSPADM

## 2017-06-22 RX ORDER — ALBUTEROL SULFATE 2.5 MG/3ML
2.5 SOLUTION RESPIRATORY (INHALATION) 4 TIMES DAILY
Status: DISCONTINUED | OUTPATIENT
Start: 2017-06-22 | End: 2017-06-23 | Stop reason: HOSPADM

## 2017-06-22 RX ORDER — EPHEDRINE SULFATE 50 MG/ML
INJECTION, SOLUTION INTRAVENOUS AS NEEDED
Status: DISCONTINUED | OUTPATIENT
Start: 2017-06-22 | End: 2017-06-22 | Stop reason: SURG

## 2017-06-22 RX ORDER — ONDANSETRON 2 MG/ML
4 INJECTION INTRAMUSCULAR; INTRAVENOUS ONCE AS NEEDED
Status: DISCONTINUED | OUTPATIENT
Start: 2017-06-22 | End: 2017-06-22 | Stop reason: HOSPADM

## 2017-06-22 RX ORDER — SODIUM CHLORIDE, SODIUM LACTATE, POTASSIUM CHLORIDE, CALCIUM CHLORIDE 600; 310; 30; 20 MG/100ML; MG/100ML; MG/100ML; MG/100ML
INJECTION, SOLUTION INTRAVENOUS CONTINUOUS PRN
Status: DISCONTINUED | OUTPATIENT
Start: 2017-06-22 | End: 2017-06-22 | Stop reason: SURG

## 2017-06-22 RX ORDER — ACETAMINOPHEN 325 MG/1
650 TABLET ORAL EVERY 6 HOURS PRN
Status: DISCONTINUED | OUTPATIENT
Start: 2017-06-22 | End: 2017-06-23 | Stop reason: HOSPADM

## 2017-06-22 RX ORDER — ROCURONIUM BROMIDE 10 MG/ML
INJECTION, SOLUTION INTRAVENOUS AS NEEDED
Status: DISCONTINUED | OUTPATIENT
Start: 2017-06-22 | End: 2017-06-22 | Stop reason: SURG

## 2017-06-22 RX ORDER — SODIUM CHLORIDE 0.9 % (FLUSH) 0.9 %
1-10 SYRINGE (ML) INJECTION AS NEEDED
Status: DISCONTINUED | OUTPATIENT
Start: 2017-06-22 | End: 2017-06-22 | Stop reason: HOSPADM

## 2017-06-22 RX ORDER — HYDROCODONE BITARTRATE AND ACETAMINOPHEN 5; 325 MG/1; MG/1
1 TABLET ORAL ONCE AS NEEDED
Status: DISCONTINUED | OUTPATIENT
Start: 2017-06-22 | End: 2017-06-22 | Stop reason: HOSPADM

## 2017-06-22 RX ORDER — BUPIVACAINE HYDROCHLORIDE AND EPINEPHRINE 2.5; 5 MG/ML; UG/ML
INJECTION, SOLUTION EPIDURAL; INFILTRATION; INTRACAUDAL; PERINEURAL AS NEEDED
Status: DISCONTINUED | OUTPATIENT
Start: 2017-06-22 | End: 2017-06-22 | Stop reason: HOSPADM

## 2017-06-22 RX ORDER — ONDANSETRON 2 MG/ML
4 INJECTION INTRAMUSCULAR; INTRAVENOUS ONCE AS NEEDED
Status: COMPLETED | OUTPATIENT
Start: 2017-06-22 | End: 2017-06-22

## 2017-06-22 RX ORDER — LIDOCAINE HYDROCHLORIDE 20 MG/ML
INJECTION, SOLUTION INFILTRATION; PERINEURAL AS NEEDED
Status: DISCONTINUED | OUTPATIENT
Start: 2017-06-22 | End: 2017-06-22 | Stop reason: SURG

## 2017-06-22 RX ORDER — DIPHENHYDRAMINE HYDROCHLORIDE 50 MG/ML
12.5 INJECTION INTRAMUSCULAR; INTRAVENOUS
Status: DISCONTINUED | OUTPATIENT
Start: 2017-06-22 | End: 2017-06-22 | Stop reason: HOSPADM

## 2017-06-22 RX ORDER — ONDANSETRON 2 MG/ML
INJECTION INTRAMUSCULAR; INTRAVENOUS AS NEEDED
Status: DISCONTINUED | OUTPATIENT
Start: 2017-06-22 | End: 2017-06-22

## 2017-06-22 RX ORDER — PROPOFOL 10 MG/ML
VIAL (ML) INTRAVENOUS AS NEEDED
Status: DISCONTINUED | OUTPATIENT
Start: 2017-06-22 | End: 2017-06-22 | Stop reason: SURG

## 2017-06-22 RX ORDER — PROMETHAZINE HYDROCHLORIDE 25 MG/1
25 SUPPOSITORY RECTAL ONCE AS NEEDED
Status: DISCONTINUED | OUTPATIENT
Start: 2017-06-22 | End: 2017-06-22 | Stop reason: HOSPADM

## 2017-06-22 RX ORDER — ALBUTEROL SULFATE 2.5 MG/3ML
2.5 SOLUTION RESPIRATORY (INHALATION) ONCE AS NEEDED
Status: DISCONTINUED | OUTPATIENT
Start: 2017-06-22 | End: 2017-06-22 | Stop reason: HOSPADM

## 2017-06-22 RX ORDER — OXYCODONE HYDROCHLORIDE AND ACETAMINOPHEN 5; 325 MG/1; MG/1
1 TABLET ORAL EVERY 4 HOURS PRN
Status: DISCONTINUED | OUTPATIENT
Start: 2017-06-22 | End: 2017-06-23 | Stop reason: HOSPADM

## 2017-06-22 RX ORDER — ALBUTEROL SULFATE 2.5 MG/3ML
2.5 SOLUTION RESPIRATORY (INHALATION) EVERY 4 HOURS PRN
Status: DISCONTINUED | OUTPATIENT
Start: 2017-06-22 | End: 2017-06-22

## 2017-06-22 RX ORDER — DEXAMETHASONE SODIUM PHOSPHATE 4 MG/ML
8 INJECTION, SOLUTION INTRA-ARTICULAR; INTRALESIONAL; INTRAMUSCULAR; INTRAVENOUS; SOFT TISSUE ONCE AS NEEDED
Status: COMPLETED | OUTPATIENT
Start: 2017-06-22 | End: 2017-06-22

## 2017-06-22 RX ORDER — METOCLOPRAMIDE HYDROCHLORIDE 5 MG/ML
10 INJECTION INTRAMUSCULAR; INTRAVENOUS ONCE AS NEEDED
Status: DISCONTINUED | OUTPATIENT
Start: 2017-06-22 | End: 2017-06-22 | Stop reason: HOSPADM

## 2017-06-22 RX ORDER — MIDAZOLAM HYDROCHLORIDE 1 MG/ML
1 INJECTION INTRAMUSCULAR; INTRAVENOUS
Status: DISCONTINUED | OUTPATIENT
Start: 2017-06-22 | End: 2017-06-22 | Stop reason: HOSPADM

## 2017-06-22 RX ORDER — ONDANSETRON 4 MG/1
4 TABLET, FILM COATED ORAL EVERY 6 HOURS PRN
Status: DISCONTINUED | OUTPATIENT
Start: 2017-06-22 | End: 2017-06-23 | Stop reason: HOSPADM

## 2017-06-22 RX ORDER — PANTOPRAZOLE SODIUM 40 MG/10ML
40 INJECTION, POWDER, LYOPHILIZED, FOR SOLUTION INTRAVENOUS
Status: DISCONTINUED | OUTPATIENT
Start: 2017-06-23 | End: 2017-06-23 | Stop reason: HOSPADM

## 2017-06-22 RX ORDER — GLYCOPYRROLATE 0.2 MG/ML
INJECTION INTRAMUSCULAR; INTRAVENOUS AS NEEDED
Status: DISCONTINUED | OUTPATIENT
Start: 2017-06-22 | End: 2017-06-22 | Stop reason: SURG

## 2017-06-22 RX ADMIN — DOCUSATE SODIUM 100 MG: 100 CAPSULE, LIQUID FILLED ORAL at 18:18

## 2017-06-22 RX ADMIN — EPHEDRINE SULFATE 10 MG: 50 INJECTION INTRAMUSCULAR; INTRAVENOUS; SUBCUTANEOUS at 13:06

## 2017-06-22 RX ADMIN — EPHEDRINE SULFATE 10 MG: 50 INJECTION INTRAMUSCULAR; INTRAVENOUS; SUBCUTANEOUS at 13:05

## 2017-06-22 RX ADMIN — PHENYLEPHRINE HYDROCHLORIDE 100 MCG: 10 INJECTION INTRAVENOUS at 13:05

## 2017-06-22 RX ADMIN — EPHEDRINE SULFATE 10 MG: 50 INJECTION INTRAMUSCULAR; INTRAVENOUS; SUBCUTANEOUS at 13:10

## 2017-06-22 RX ADMIN — SODIUM CHLORIDE, POTASSIUM CHLORIDE, SODIUM LACTATE AND CALCIUM CHLORIDE: 600; 310; 30; 20 INJECTION, SOLUTION INTRAVENOUS at 12:40

## 2017-06-22 RX ADMIN — FAMOTIDINE 20 MG: 10 INJECTION INTRAVENOUS at 12:00

## 2017-06-22 RX ADMIN — CLINDAMYCIN IN 5 PERCENT DEXTROSE 900 MG: 18 INJECTION, SOLUTION INTRAVENOUS at 12:54

## 2017-06-22 RX ADMIN — GLYCOPYRROLATE 0.4 MG: 0.2 INJECTION INTRAMUSCULAR; INTRAVENOUS at 14:35

## 2017-06-22 RX ADMIN — HYDROMORPHONE HYDROCHLORIDE 1 MG: 1 INJECTION, SOLUTION INTRAMUSCULAR; INTRAVENOUS; SUBCUTANEOUS at 16:24

## 2017-06-22 RX ADMIN — ONDANSETRON 4 MG: 2 INJECTION, SOLUTION INTRAMUSCULAR; INTRAVENOUS at 13:50

## 2017-06-22 RX ADMIN — SODIUM CHLORIDE, POTASSIUM CHLORIDE, SODIUM LACTATE AND CALCIUM CHLORIDE 100 ML/HR: 600; 310; 30; 20 INJECTION, SOLUTION INTRAVENOUS at 15:58

## 2017-06-22 RX ADMIN — IPRATROPIUM BROMIDE AND ALBUTEROL SULFATE 3 ML: .5; 3 SOLUTION RESPIRATORY (INHALATION) at 23:17

## 2017-06-22 RX ADMIN — FENTANYL CITRATE 50 MCG: 50 INJECTION, SOLUTION INTRAMUSCULAR; INTRAVENOUS at 13:48

## 2017-06-22 RX ADMIN — MIDAZOLAM HYDROCHLORIDE 2 MG: 1 INJECTION, SOLUTION INTRAMUSCULAR; INTRAVENOUS at 12:44

## 2017-06-22 RX ADMIN — DEXAMETHASONE SODIUM PHOSPHATE 8 MG: 4 INJECTION, SOLUTION INTRA-ARTICULAR; INTRALESIONAL; INTRAMUSCULAR; INTRAVENOUS; SOFT TISSUE at 12:00

## 2017-06-22 RX ADMIN — ONDANSETRON 4 MG: 2 INJECTION, SOLUTION INTRAMUSCULAR; INTRAVENOUS at 12:00

## 2017-06-22 RX ADMIN — LIDOCAINE HYDROCHLORIDE 80 MG: 20 INJECTION, SOLUTION INFILTRATION; PERINEURAL at 13:01

## 2017-06-22 RX ADMIN — BUDESONIDE AND FORMOTEROL FUMARATE DIHYDRATE 2 PUFF: 80; 4.5 AEROSOL RESPIRATORY (INHALATION) at 23:17

## 2017-06-22 RX ADMIN — OXYCODONE HYDROCHLORIDE AND ACETAMINOPHEN 1 TABLET: 5; 325 TABLET ORAL at 18:18

## 2017-06-22 RX ADMIN — LEVOFLOXACIN 500 MG: 5 INJECTION, SOLUTION INTRAVENOUS at 12:39

## 2017-06-22 RX ADMIN — ALBUTEROL SULFATE 2.5 MG: 2.5 SOLUTION RESPIRATORY (INHALATION) at 11:50

## 2017-06-22 RX ADMIN — PROPOFOL 200 MG: 10 INJECTION, EMULSION INTRAVENOUS at 13:01

## 2017-06-22 RX ADMIN — METRONIDAZOLE 500 MG: 500 INJECTION, SOLUTION INTRAVENOUS at 17:15

## 2017-06-22 RX ADMIN — OXYCODONE HYDROCHLORIDE AND ACETAMINOPHEN 1 TABLET: 5; 325 TABLET ORAL at 22:43

## 2017-06-22 RX ADMIN — ROCURONIUM BROMIDE 30 MG: 10 INJECTION INTRAVENOUS at 13:01

## 2017-06-22 RX ADMIN — PHENYLEPHRINE HYDROCHLORIDE 100 MCG: 10 INJECTION INTRAVENOUS at 13:12

## 2017-06-22 RX ADMIN — FENTANYL CITRATE 50 MCG: 50 INJECTION, SOLUTION INTRAMUSCULAR; INTRAVENOUS at 14:45

## 2017-06-22 RX ADMIN — HYDROMORPHONE HYDROCHLORIDE 1 MG: 1 INJECTION, SOLUTION INTRAMUSCULAR; INTRAVENOUS; SUBCUTANEOUS at 20:35

## 2017-06-22 RX ADMIN — LEVOFLOXACIN 500 MG: 5 INJECTION, SOLUTION INTRAVENOUS at 11:30

## 2017-06-22 RX ADMIN — SUCCINYLCHOLINE CHLORIDE 100 MG: 20 INJECTION, SOLUTION INTRAMUSCULAR; INTRAVENOUS at 13:01

## 2017-06-22 RX ADMIN — FENTANYL CITRATE 50 MCG: 50 INJECTION, SOLUTION INTRAMUSCULAR; INTRAVENOUS at 13:01

## 2017-06-22 RX ADMIN — NEOSTIGMINE METHYLSULFATE 4 MG: 1 INJECTION, SOLUTION INTRAMUSCULAR; INTRAVENOUS; SUBCUTANEOUS at 14:35

## 2017-06-22 RX ADMIN — FENTANYL CITRATE 50 MCG: 50 INJECTION, SOLUTION INTRAMUSCULAR; INTRAVENOUS at 14:10

## 2017-06-22 NOTE — ANESTHESIA POSTPROCEDURE EVALUATION
Patient: Akila Amezcua    Procedure Summary     Date Anesthesia Start Anesthesia Stop Room / Location    06/22/17 1254 [id number] [facility] / [facility]       Procedure Diagnosis Surgeon Provider    LAPAROSCOPIC CHOLECYSTECTOMY, laparoscopic adhesiolysis requiring 45 minutes of operative time (N/A Abdomen) Calculus of gallbladder with acute cholecystitis without obstruction; Abdominal adhesions  (Calculus of gallbladder with acute cholecystitis without obstruction [K80.00]) MD Lizbeth Stern CRNA          Anesthesia Type: general  Last vitals  /89 (06/22/17 1448)    Temp 98.7 °F (37.1 °C) (06/22/17 1448)    Pulse 109 (06/22/17 1448)   Resp 16 (06/22/17 1448)    SpO2        Post Anesthesia Care and Evaluation    Patient location during evaluation: PACU  Patient participation: complete - patient participated  Level of consciousness: awake and alert  Pain score: 0  Pain management: adequate  Airway patency: patent  Anesthetic complications: No anesthetic complications  PONV Status: none  Cardiovascular status: acceptable  Respiratory status: acceptable  Hydration status: acceptable

## 2017-06-22 NOTE — ANESTHESIA PREPROCEDURE EVALUATION
Anesthesia Evaluation     Patient summary reviewed   no history of anesthetic complications:  NPO Solid Status: > 8 hours  NPO Liquid Status: > 8 hours     Airway   Mallampati: II  TM distance: >3 FB  Neck ROM: full  no difficulty expected  Dental    (+) lower dentures and upper dentures    Pulmonary - normal exam    breath sounds clear to auscultation  (+) a smoker (35 pck yr) Current,   COPD: blebs back in 89, no prob with pneumo since, duoneb BID, albuterol daily for SOB, took duoneb this am, CXR pulmonary emphysema.  Cardiovascular - normal exam  Exercise tolerance: good (4-7 METS)    Rhythm: regular  Rate: normal    (+) valvular problems/murmurs (asympt) murmur,     ROS comment: Dyspnea with exertion    Neuro/Psych  (+) seizures (6 months ago, no meds) well controlled,    GI/Hepatic/Renal/Endo    (+)  GERD (prilosec daily) well controlled,     Musculoskeletal     (+) back pain (ddd, norco 1 at bedtime),   Abdominal  - normal exam   Substance History      OB/GYN          Other   (+) arthritis (in fingers)                                   Anesthesia Plan    ASA 3     general     intravenous induction   Anesthetic plan and risks discussed with patient.  Use of blood products discussed with patient  Consented to blood products.

## 2017-06-23 VITALS
BODY MASS INDEX: 26.56 KG/M2 | DIASTOLIC BLOOD PRESSURE: 67 MMHG | RESPIRATION RATE: 20 BRPM | OXYGEN SATURATION: 95 % | HEART RATE: 87 BPM | WEIGHT: 165.25 LBS | SYSTOLIC BLOOD PRESSURE: 109 MMHG | HEIGHT: 66 IN | TEMPERATURE: 97.1 F

## 2017-06-23 LAB
ALBUMIN SERPL-MCNC: 3.7 G/DL (ref 3.5–5.2)
ALBUMIN/GLOB SERPL: 1.5 G/DL
ALP SERPL-CCNC: 68 U/L (ref 40–129)
ALT SERPL W P-5'-P-CCNC: 48 U/L (ref 5–33)
ANION GAP SERPL CALCULATED.3IONS-SCNC: 10.6 MMOL/L
AST SERPL-CCNC: 59 U/L (ref 5–32)
BASOPHILS # BLD AUTO: 0.02 10*3/MM3 (ref 0–0.2)
BASOPHILS NFR BLD AUTO: 0.2 % (ref 0–2)
BILIRUB SERPL-MCNC: 0.5 MG/DL (ref 0.2–1.2)
BUN BLD-MCNC: 9 MG/DL (ref 6–20)
BUN/CREAT SERPL: 13.4 (ref 7–25)
CALCIUM SPEC-SCNC: 8.7 MG/DL (ref 8.6–10.5)
CHLORIDE SERPL-SCNC: 104 MMOL/L (ref 98–107)
CO2 SERPL-SCNC: 26.4 MMOL/L (ref 22–29)
CREAT BLD-MCNC: 0.67 MG/DL (ref 0.57–1)
DEPRECATED RDW RBC AUTO: 49.2 FL (ref 37–54)
EOSINOPHIL # BLD AUTO: 0.01 10*3/MM3 (ref 0.1–0.3)
EOSINOPHIL NFR BLD AUTO: 0.1 % (ref 0–4)
ERYTHROCYTE [DISTWIDTH] IN BLOOD BY AUTOMATED COUNT: 14.6 % (ref 11.5–14.5)
GFR SERPL CREATININE-BSD FRML MDRD: 91 ML/MIN/1.73
GLOBULIN UR ELPH-MCNC: 2.4 GM/DL
GLUCOSE BLD-MCNC: 119 MG/DL (ref 65–99)
HCT VFR BLD AUTO: 40.9 % (ref 37–47)
HGB BLD-MCNC: 13.4 G/DL (ref 12–16)
IMM GRANULOCYTES # BLD: 0.04 10*3/MM3 (ref 0–0.03)
IMM GRANULOCYTES NFR BLD: 0.4 % (ref 0–0.5)
LYMPHOCYTES # BLD AUTO: 1.02 10*3/MM3 (ref 0.6–4.8)
LYMPHOCYTES NFR BLD AUTO: 10.5 % (ref 20–45)
MCH RBC QN AUTO: 30 PG (ref 27–31)
MCHC RBC AUTO-ENTMCNC: 32.8 G/DL (ref 31–37)
MCV RBC AUTO: 91.5 FL (ref 81–99)
MONOCYTES # BLD AUTO: 0.91 10*3/MM3 (ref 0–1)
MONOCYTES NFR BLD AUTO: 9.3 % (ref 3–8)
NEUTROPHILS # BLD AUTO: 7.75 10*3/MM3 (ref 1.5–8.3)
NEUTROPHILS NFR BLD AUTO: 79.5 % (ref 45–70)
NRBC BLD MANUAL-RTO: 0 /100 WBC (ref 0–0)
PLATELET # BLD AUTO: 235 10*3/MM3 (ref 140–500)
PMV BLD AUTO: 9.4 FL (ref 7.4–10.4)
POTASSIUM BLD-SCNC: 5 MMOL/L (ref 3.5–5.2)
PROT SERPL-MCNC: 6.1 G/DL (ref 6–8.5)
RBC # BLD AUTO: 4.47 10*6/MM3 (ref 4.2–5.4)
SODIUM BLD-SCNC: 141 MMOL/L (ref 136–145)
WBC NRBC COR # BLD: 9.75 10*3/MM3 (ref 4.8–10.8)

## 2017-06-23 PROCEDURE — 99024 POSTOP FOLLOW-UP VISIT: CPT | Performed by: SURGERY

## 2017-06-23 PROCEDURE — G0378 HOSPITAL OBSERVATION PER HR: HCPCS

## 2017-06-23 PROCEDURE — 25010000002 HYDROMORPHONE PER 4 MG: Performed by: SURGERY

## 2017-06-23 PROCEDURE — 80053 COMPREHEN METABOLIC PANEL: CPT | Performed by: SURGERY

## 2017-06-23 PROCEDURE — 25010000002 LEVOFLOXACIN PER 250 MG: Performed by: SURGERY

## 2017-06-23 PROCEDURE — 85025 COMPLETE CBC W/AUTO DIFF WBC: CPT | Performed by: SURGERY

## 2017-06-23 PROCEDURE — 94799 UNLISTED PULMONARY SVC/PX: CPT

## 2017-06-23 RX ORDER — SODIUM CHLORIDE 9 MG/ML
INJECTION, SOLUTION INTRAVENOUS
Status: DISCONTINUED
Start: 2017-06-23 | End: 2017-06-23 | Stop reason: HOSPADM

## 2017-06-23 RX ORDER — DOCUSATE SODIUM 100 MG/1
100 CAPSULE, LIQUID FILLED ORAL 2 TIMES DAILY PRN
Qty: 60 CAPSULE | Refills: 0 | Status: SHIPPED | OUTPATIENT
Start: 2017-06-23 | End: 2017-07-23

## 2017-06-23 RX ORDER — BUDESONIDE AND FORMOTEROL FUMARATE DIHYDRATE 80; 4.5 UG/1; UG/1
2 AEROSOL RESPIRATORY (INHALATION)
Qty: 1 INHALER | Refills: 1 | Status: ON HOLD | OUTPATIENT
Start: 2017-06-23 | End: 2019-11-06

## 2017-06-23 RX ORDER — SENNA AND DOCUSATE SODIUM 50; 8.6 MG/1; MG/1
1 TABLET, FILM COATED ORAL DAILY PRN
Qty: 30 TABLET | Refills: 0 | Status: SHIPPED | OUTPATIENT
Start: 2017-06-23 | End: 2017-07-23

## 2017-06-23 RX ORDER — METRONIDAZOLE 500 MG/1
500 TABLET ORAL 3 TIMES DAILY
Qty: 21 TABLET | Refills: 0 | Status: SHIPPED | OUTPATIENT
Start: 2017-06-23 | End: 2017-06-30

## 2017-06-23 RX ORDER — CIPROFLOXACIN 500 MG/1
500 TABLET, FILM COATED ORAL 2 TIMES DAILY
Qty: 14 TABLET | Refills: 0 | Status: SHIPPED | OUTPATIENT
Start: 2017-06-23 | End: 2017-06-30

## 2017-06-23 RX ORDER — ONDANSETRON 4 MG/1
4 TABLET, FILM COATED ORAL EVERY 6 HOURS PRN
Qty: 20 TABLET | Refills: 0 | Status: SHIPPED | OUTPATIENT
Start: 2017-06-23 | End: 2019-11-13 | Stop reason: HOSPADM

## 2017-06-23 RX ORDER — OXYCODONE HYDROCHLORIDE AND ACETAMINOPHEN 5; 325 MG/1; MG/1
1 TABLET ORAL EVERY 6 HOURS PRN
Qty: 40 TABLET | Refills: 0 | Status: SHIPPED | OUTPATIENT
Start: 2017-06-23 | End: 2019-11-13 | Stop reason: HOSPADM

## 2017-06-23 RX ADMIN — ALBUTEROL SULFATE 2.5 MG: 2.5 SOLUTION RESPIRATORY (INHALATION) at 07:58

## 2017-06-23 RX ADMIN — SODIUM CHLORIDE, POTASSIUM CHLORIDE, SODIUM LACTATE AND CALCIUM CHLORIDE 100 ML/HR: 600; 310; 30; 20 INJECTION, SOLUTION INTRAVENOUS at 01:25

## 2017-06-23 RX ADMIN — ACETAMINOPHEN 650 MG: 325 TABLET, FILM COATED ORAL at 12:42

## 2017-06-23 RX ADMIN — HYDROMORPHONE HYDROCHLORIDE 1 MG: 1 INJECTION, SOLUTION INTRAMUSCULAR; INTRAVENOUS; SUBCUTANEOUS at 05:02

## 2017-06-23 RX ADMIN — FOLIC ACID 1 MG: 1 TABLET ORAL at 06:12

## 2017-06-23 RX ADMIN — BUDESONIDE AND FORMOTEROL FUMARATE DIHYDRATE 2 PUFF: 80; 4.5 AEROSOL RESPIRATORY (INHALATION) at 08:02

## 2017-06-23 RX ADMIN — METRONIDAZOLE 500 MG: 500 INJECTION, SOLUTION INTRAVENOUS at 09:25

## 2017-06-23 RX ADMIN — PANTOPRAZOLE SODIUM 40 MG: 40 INJECTION, POWDER, FOR SOLUTION INTRAVENOUS at 06:13

## 2017-06-23 RX ADMIN — SODIUM CHLORIDE 500 ML: 9 INJECTION, SOLUTION INTRAVENOUS at 05:02

## 2017-06-23 RX ADMIN — LEVOFLOXACIN 500 MG: 500 INJECTION, SOLUTION INTRAVENOUS at 11:50

## 2017-06-23 RX ADMIN — METRONIDAZOLE 500 MG: 500 INJECTION, SOLUTION INTRAVENOUS at 01:25

## 2017-06-23 RX ADMIN — DOCUSATE SODIUM 100 MG: 100 CAPSULE, LIQUID FILLED ORAL at 09:23

## 2017-06-23 RX ADMIN — HYDROMORPHONE HYDROCHLORIDE 1 MG: 1 INJECTION, SOLUTION INTRAMUSCULAR; INTRAVENOUS; SUBCUTANEOUS at 00:37

## 2017-06-23 RX ADMIN — OXYCODONE HYDROCHLORIDE AND ACETAMINOPHEN 1 TABLET: 5; 325 TABLET ORAL at 09:33

## 2017-06-23 RX ADMIN — OXYCODONE HYDROCHLORIDE AND ACETAMINOPHEN 1 TABLET: 5; 325 TABLET ORAL at 03:40

## 2017-06-23 RX ADMIN — OXYCODONE HYDROCHLORIDE AND ACETAMINOPHEN 1 TABLET: 5; 325 TABLET ORAL at 13:39

## 2017-06-23 RX ADMIN — ALBUTEROL SULFATE 2.5 MG: 2.5 SOLUTION RESPIRATORY (INHALATION) at 12:01

## 2017-06-24 LAB — BACTERIA SPEC AEROBE CULT: NO GROWTH

## 2017-06-26 LAB
LAB AP CASE REPORT: NORMAL
Lab: NORMAL
PATH REPORT.FINAL DX SPEC: NORMAL

## 2017-07-10 ENCOUNTER — TELEPHONE (OUTPATIENT)
Dept: SURGERY | Facility: CLINIC | Age: 57
End: 2017-07-10

## 2018-02-06 ENCOUNTER — TRANSCRIBE ORDERS (OUTPATIENT)
Dept: ADMINISTRATIVE | Facility: HOSPITAL | Age: 58
End: 2018-02-06

## 2018-02-06 DIAGNOSIS — Z12.31 VISIT FOR SCREENING MAMMOGRAM: Primary | ICD-10-CM

## 2018-04-20 ENCOUNTER — HOSPITAL ENCOUNTER (OUTPATIENT)
Dept: MAMMOGRAPHY | Facility: HOSPITAL | Age: 58
Discharge: HOME | End: 2018-04-20
Attending: INTERNAL MEDICINE

## 2019-01-09 ENCOUNTER — OFFICE (OUTPATIENT)
Dept: URBAN - METROPOLITAN AREA CLINIC 4 | Facility: CLINIC | Age: 59
End: 2019-01-09

## 2019-03-13 ENCOUNTER — OFFICE (OUTPATIENT)
Dept: URBAN - METROPOLITAN AREA CLINIC 4 | Facility: CLINIC | Age: 59
End: 2019-03-13

## 2019-03-13 VITALS — HEIGHT: 63 IN | WEIGHT: 179 LBS

## 2019-03-13 DIAGNOSIS — K75.4 AUTOIMMUNE HEPATITIS: ICD-10-CM

## 2019-03-13 DIAGNOSIS — K74.60 UNSPECIFIED CIRRHOSIS OF LIVER: ICD-10-CM

## 2019-03-13 PROCEDURE — 99213 OFFICE O/P EST LOW 20 MIN: CPT | Performed by: NURSE PRACTITIONER

## 2019-03-28 ENCOUNTER — AMBULATORY SURGICAL CENTER (OUTPATIENT)
Dept: URBAN - METROPOLITAN AREA SURGERY 10 | Facility: SURGERY | Age: 59
End: 2019-03-28

## 2019-03-28 ENCOUNTER — OFFICE (OUTPATIENT)
Dept: URBAN - METROPOLITAN AREA PATHOLOGY 4 | Facility: PATHOLOGY | Age: 59
End: 2019-03-28

## 2019-03-28 DIAGNOSIS — R19.4 CHANGE IN BOWEL HABIT: ICD-10-CM

## 2019-03-28 DIAGNOSIS — R10.11 RIGHT UPPER QUADRANT PAIN: ICD-10-CM

## 2019-03-28 DIAGNOSIS — K57.90 DIVERTICULOSIS OF INTESTINE, PART UNSPECIFIED, WITHOUT PERFO: ICD-10-CM

## 2019-03-28 DIAGNOSIS — K62.1 RECTAL POLYP: ICD-10-CM

## 2019-03-28 DIAGNOSIS — Z86.010 PERSONAL HISTORY OF COLONIC POLYPS: ICD-10-CM

## 2019-03-28 DIAGNOSIS — R19.7 DIARRHEA, UNSPECIFIED: ICD-10-CM

## 2019-03-28 PROCEDURE — 88305 TISSUE EXAM BY PATHOLOGIST: CPT | Performed by: INTERNAL MEDICINE

## 2019-03-28 PROCEDURE — 45380 COLONOSCOPY AND BIOPSY: CPT | Mod: 59 | Performed by: INTERNAL MEDICINE

## 2019-03-28 PROCEDURE — 45385 COLONOSCOPY W/LESION REMOVAL: CPT | Performed by: INTERNAL MEDICINE

## 2019-06-28 ENCOUNTER — APPOINTMENT (OUTPATIENT)
Dept: CT IMAGING | Facility: HOSPITAL | Age: 59
End: 2019-06-28

## 2019-06-28 ENCOUNTER — HOSPITAL ENCOUNTER (EMERGENCY)
Facility: HOSPITAL | Age: 59
Discharge: HOME | End: 2019-06-28
Attending: EMERGENCY MEDICINE | Admitting: EMERGENCY MEDICINE

## 2019-06-28 VITALS
OXYGEN SATURATION: 96 % | DIASTOLIC BLOOD PRESSURE: 72 MMHG | RESPIRATION RATE: 16 BRPM | HEIGHT: 63 IN | BODY MASS INDEX: 33.66 KG/M2 | TEMPERATURE: 97.9 F | SYSTOLIC BLOOD PRESSURE: 135 MMHG | HEART RATE: 70 BPM | WEIGHT: 190 LBS

## 2019-06-28 DIAGNOSIS — R31.9 URINARY TRACT INFECTION WITH HEMATURIA, SITE UNSPECIFIED: Primary | ICD-10-CM

## 2019-06-28 DIAGNOSIS — K75.81 NASH (NONALCOHOLIC STEATOHEPATITIS): ICD-10-CM

## 2019-06-28 DIAGNOSIS — N39.0 URINARY TRACT INFECTION WITH HEMATURIA, SITE UNSPECIFIED: Primary | ICD-10-CM

## 2019-06-28 DIAGNOSIS — R10.32 ABDOMINAL PAIN, ACUTE, LEFT LOWER QUADRANT: ICD-10-CM

## 2019-06-28 DIAGNOSIS — N20.0 NEPHROLITHIASIS: ICD-10-CM

## 2019-06-28 LAB
ALBUMIN SERPL-MCNC: 3.3 G/DL (ref 3.5–5.2)
ALBUMIN/GLOB SERPL: 0.7 G/DL
ALP SERPL-CCNC: 105 U/L (ref 39–117)
ALT SERPL W P-5'-P-CCNC: 16 U/L (ref 1–33)
ANION GAP SERPL CALCULATED.3IONS-SCNC: 12 MMOL/L (ref 5–15)
AST SERPL-CCNC: 38 U/L (ref 1–32)
BACTERIA UR QL AUTO: ABNORMAL /HPF
BASOPHILS # BLD AUTO: 0.01 10*3/MM3 (ref 0–0.2)
BASOPHILS NFR BLD AUTO: 0.2 % (ref 0–1.5)
BILIRUB SERPL-MCNC: 1.1 MG/DL (ref 0.2–1.2)
BILIRUB UR QL STRIP: ABNORMAL
BUN BLD-MCNC: 10 MG/DL (ref 6–20)
BUN/CREAT SERPL: 15.6 (ref 7–25)
CALCIUM SPEC-SCNC: 8.6 MG/DL (ref 8.6–10.5)
CHLORIDE SERPL-SCNC: 106 MMOL/L (ref 98–107)
CLARITY UR: ABNORMAL
CO2 SERPL-SCNC: 22 MMOL/L (ref 22–29)
COD CRY URNS QL: ABNORMAL /HPF
COLOR UR: ABNORMAL
CREAT BLD-MCNC: 0.64 MG/DL (ref 0.57–1)
DEPRECATED RDW RBC AUTO: 50.7 FL (ref 37–54)
EOSINOPHIL # BLD AUTO: 0.12 10*3/MM3 (ref 0–0.4)
EOSINOPHIL NFR BLD AUTO: 2.3 % (ref 0.3–6.2)
ERYTHROCYTE [DISTWIDTH] IN BLOOD BY AUTOMATED COUNT: 14.5 % (ref 12.3–15.4)
GFR SERPL CREATININE-BSD FRML MDRD: 96 ML/MIN/1.73
GLOBULIN UR ELPH-MCNC: 4.5 GM/DL
GLUCOSE BLD-MCNC: 142 MG/DL (ref 65–99)
GLUCOSE UR STRIP-MCNC: ABNORMAL MG/DL
HCT VFR BLD AUTO: 37.8 % (ref 34–46.6)
HGB BLD-MCNC: 11.9 G/DL (ref 12–15.9)
HGB UR QL STRIP.AUTO: ABNORMAL
HOLD SPECIMEN: NORMAL
HOLD SPECIMEN: NORMAL
HYALINE CASTS UR QL AUTO: ABNORMAL /LPF
IMM GRANULOCYTES # BLD AUTO: 0.02 10*3/MM3 (ref 0–0.05)
IMM GRANULOCYTES NFR BLD AUTO: 0.4 % (ref 0–0.5)
KETONES UR QL STRIP: NEGATIVE
LEUKOCYTE ESTERASE UR QL STRIP.AUTO: ABNORMAL
LIPASE SERPL-CCNC: 32 U/L (ref 13–60)
LYMPHOCYTES # BLD AUTO: 1.4 10*3/MM3 (ref 0.7–3.1)
LYMPHOCYTES NFR BLD AUTO: 26.3 % (ref 19.6–45.3)
MCH RBC QN AUTO: 30.2 PG (ref 26.6–33)
MCHC RBC AUTO-ENTMCNC: 31.5 G/DL (ref 31.5–35.7)
MCV RBC AUTO: 95.9 FL (ref 79–97)
MONOCYTES # BLD AUTO: 0.56 10*3/MM3 (ref 0.1–0.9)
MONOCYTES NFR BLD AUTO: 10.5 % (ref 5–12)
NEUTROPHILS # BLD AUTO: 3.22 10*3/MM3 (ref 1.7–7)
NEUTROPHILS NFR BLD AUTO: 60.3 % (ref 42.7–76)
NITRITE UR QL STRIP: NEGATIVE
NRBC BLD AUTO-RTO: 0 /100 WBC (ref 0–0.2)
PH UR STRIP.AUTO: 5.5 [PH] (ref 5–8)
PLATELET # BLD AUTO: 102 10*3/MM3 (ref 140–450)
PMV BLD AUTO: 11.3 FL (ref 6–12)
POTASSIUM BLD-SCNC: 3.5 MMOL/L (ref 3.5–5.2)
PROT SERPL-MCNC: 7.8 G/DL (ref 6–8.5)
PROT UR QL STRIP: ABNORMAL
RBC # BLD AUTO: 3.94 10*6/MM3 (ref 3.77–5.28)
RBC # UR: ABNORMAL /HPF
REF LAB TEST METHOD: ABNORMAL
SODIUM BLD-SCNC: 140 MMOL/L (ref 136–145)
SP GR UR STRIP: 1.02 (ref 1–1.03)
SQUAMOUS #/AREA URNS HPF: ABNORMAL /HPF
UROBILINOGEN UR QL STRIP: ABNORMAL
WBC NRBC COR # BLD: 5.33 10*3/MM3 (ref 3.4–10.8)
WBC UR QL AUTO: ABNORMAL /HPF
WHOLE BLOOD HOLD SPECIMEN: NORMAL
WHOLE BLOOD HOLD SPECIMEN: NORMAL

## 2019-06-28 PROCEDURE — 87086 URINE CULTURE/COLONY COUNT: CPT | Performed by: PHYSICIAN ASSISTANT

## 2019-06-28 PROCEDURE — 74177 CT ABD & PELVIS W/CONTRAST: CPT

## 2019-06-28 PROCEDURE — 83690 ASSAY OF LIPASE: CPT | Performed by: EMERGENCY MEDICINE

## 2019-06-28 PROCEDURE — 25010000002 CEFTRIAXONE PER 250 MG: Performed by: PHYSICIAN ASSISTANT

## 2019-06-28 PROCEDURE — 81001 URINALYSIS AUTO W/SCOPE: CPT | Performed by: EMERGENCY MEDICINE

## 2019-06-28 PROCEDURE — 99284 EMERGENCY DEPT VISIT MOD MDM: CPT

## 2019-06-28 PROCEDURE — 80053 COMPREHEN METABOLIC PANEL: CPT | Performed by: EMERGENCY MEDICINE

## 2019-06-28 PROCEDURE — 85025 COMPLETE CBC W/AUTO DIFF WBC: CPT | Performed by: EMERGENCY MEDICINE

## 2019-06-28 PROCEDURE — 96375 TX/PRO/DX INJ NEW DRUG ADDON: CPT

## 2019-06-28 PROCEDURE — 25010000002 MORPHINE PER 10 MG: Performed by: EMERGENCY MEDICINE

## 2019-06-28 PROCEDURE — 25010000002 IOPAMIDOL 61 % SOLUTION: Performed by: EMERGENCY MEDICINE

## 2019-06-28 PROCEDURE — 96365 THER/PROPH/DIAG IV INF INIT: CPT

## 2019-06-28 PROCEDURE — 25010000002 ONDANSETRON PER 1 MG: Performed by: EMERGENCY MEDICINE

## 2019-06-28 RX ORDER — CHLORDIAZEPOXIDE HYDROCHLORIDE AND CLIDINIUM BROMIDE 5; 2.5 MG/1; MG/1
1 CAPSULE ORAL 2 TIMES DAILY
COMMUNITY

## 2019-06-28 RX ORDER — MORPHINE SULFATE 4 MG/ML
2 INJECTION, SOLUTION INTRAMUSCULAR; INTRAVENOUS ONCE
Status: COMPLETED | OUTPATIENT
Start: 2019-06-28 | End: 2019-06-28

## 2019-06-28 RX ORDER — SODIUM CHLORIDE 0.9 % (FLUSH) 0.9 %
10 SYRINGE (ML) INJECTION AS NEEDED
Status: DISCONTINUED | OUTPATIENT
Start: 2019-06-28 | End: 2019-06-28 | Stop reason: HOSPADM

## 2019-06-28 RX ORDER — SPIRONOLACTONE 100 MG/1
100 TABLET, FILM COATED ORAL DAILY
COMMUNITY

## 2019-06-28 RX ORDER — CIPROFLOXACIN 500 MG/1
500 TABLET, FILM COATED ORAL 2 TIMES DAILY
Qty: 20 TABLET | Refills: 0 | OUTPATIENT
Start: 2019-06-28 | End: 2019-07-30

## 2019-06-28 RX ORDER — NADOLOL 20 MG/1
20 TABLET ORAL DAILY
COMMUNITY

## 2019-06-28 RX ORDER — ONDANSETRON 2 MG/ML
4 INJECTION INTRAMUSCULAR; INTRAVENOUS ONCE
Status: COMPLETED | OUTPATIENT
Start: 2019-06-28 | End: 2019-06-28

## 2019-06-28 RX ADMIN — SODIUM CHLORIDE 1000 ML: 9 INJECTION, SOLUTION INTRAVENOUS at 13:00

## 2019-06-28 RX ADMIN — CEFTRIAXONE SODIUM 1 G: 1 INJECTION, POWDER, FOR SOLUTION INTRAMUSCULAR; INTRAVENOUS at 15:21

## 2019-06-28 RX ADMIN — MORPHINE SULFATE 2 MG: 4 INJECTION INTRAVENOUS at 13:02

## 2019-06-28 RX ADMIN — IOPAMIDOL 95 ML: 612 INJECTION, SOLUTION INTRAVENOUS at 13:57

## 2019-06-28 RX ADMIN — ONDANSETRON 4 MG: 2 INJECTION INTRAMUSCULAR; INTRAVENOUS at 13:01

## 2019-06-29 LAB — BACTERIA SPEC AEROBE CULT: NORMAL

## 2019-07-30 ENCOUNTER — HOSPITAL ENCOUNTER (EMERGENCY)
Facility: HOSPITAL | Age: 59
Discharge: HOME | End: 2019-07-30
Attending: EMERGENCY MEDICINE | Admitting: EMERGENCY MEDICINE

## 2019-07-30 ENCOUNTER — APPOINTMENT (OUTPATIENT)
Dept: CT IMAGING | Facility: HOSPITAL | Age: 59
End: 2019-07-30

## 2019-07-30 VITALS
WEIGHT: 190 LBS | DIASTOLIC BLOOD PRESSURE: 64 MMHG | HEIGHT: 63 IN | BODY MASS INDEX: 33.66 KG/M2 | OXYGEN SATURATION: 93 % | SYSTOLIC BLOOD PRESSURE: 115 MMHG | RESPIRATION RATE: 16 BRPM | TEMPERATURE: 97.9 F | HEART RATE: 85 BPM

## 2019-07-30 DIAGNOSIS — N20.0 LEFT NEPHROLITHIASIS: ICD-10-CM

## 2019-07-30 DIAGNOSIS — R10.9 LEFT FLANK PAIN: Primary | ICD-10-CM

## 2019-07-30 DIAGNOSIS — R31.9 HEMATURIA, UNSPECIFIED TYPE: ICD-10-CM

## 2019-07-30 LAB
ALBUMIN SERPL-MCNC: 3.1 G/DL (ref 3.5–5.2)
ALBUMIN/GLOB SERPL: 0.7 G/DL
ALP SERPL-CCNC: 117 U/L (ref 39–117)
ALT SERPL W P-5'-P-CCNC: 16 U/L (ref 1–33)
ANION GAP SERPL CALCULATED.3IONS-SCNC: 12 MMOL/L (ref 5–15)
AST SERPL-CCNC: 38 U/L (ref 1–32)
BACTERIA UR QL AUTO: ABNORMAL /HPF
BASOPHILS # BLD AUTO: 0.02 10*3/MM3 (ref 0–0.2)
BASOPHILS NFR BLD AUTO: 0.4 % (ref 0–1.5)
BILIRUB SERPL-MCNC: 0.7 MG/DL (ref 0.2–1.2)
BILIRUB UR QL STRIP: ABNORMAL
BUN BLD-MCNC: 9 MG/DL (ref 6–20)
BUN/CREAT SERPL: 10.3 (ref 7–25)
CALCIUM SPEC-SCNC: 8.6 MG/DL (ref 8.6–10.5)
CHLORIDE SERPL-SCNC: 106 MMOL/L (ref 98–107)
CLARITY UR: ABNORMAL
CO2 SERPL-SCNC: 22 MMOL/L (ref 22–29)
COD CRY URNS QL: ABNORMAL /HPF
COLOR UR: ABNORMAL
CREAT BLD-MCNC: 0.87 MG/DL (ref 0.57–1)
DEPRECATED RDW RBC AUTO: 51.7 FL (ref 37–54)
EOSINOPHIL # BLD AUTO: 0.14 10*3/MM3 (ref 0–0.4)
EOSINOPHIL NFR BLD AUTO: 2.8 % (ref 0.3–6.2)
ERYTHROCYTE [DISTWIDTH] IN BLOOD BY AUTOMATED COUNT: 14.4 % (ref 12.3–15.4)
GFR SERPL CREATININE-BSD FRML MDRD: 67 ML/MIN/1.73
GLOBULIN UR ELPH-MCNC: 4.6 GM/DL
GLUCOSE BLD-MCNC: 155 MG/DL (ref 65–99)
GLUCOSE UR STRIP-MCNC: ABNORMAL MG/DL
HCT VFR BLD AUTO: 39.2 % (ref 34–46.6)
HGB BLD-MCNC: 12.1 G/DL (ref 12–15.9)
HGB UR QL STRIP.AUTO: ABNORMAL
HOLD SPECIMEN: NORMAL
HOLD SPECIMEN: NORMAL
HYALINE CASTS UR QL AUTO: ABNORMAL /LPF
IMM GRANULOCYTES # BLD AUTO: 0.02 10*3/MM3 (ref 0–0.05)
IMM GRANULOCYTES NFR BLD AUTO: 0.4 % (ref 0–0.5)
KETONES UR QL STRIP: NEGATIVE
LEUKOCYTE ESTERASE UR QL STRIP.AUTO: ABNORMAL
LIPASE SERPL-CCNC: 30 U/L (ref 13–60)
LYMPHOCYTES # BLD AUTO: 1.17 10*3/MM3 (ref 0.7–3.1)
LYMPHOCYTES NFR BLD AUTO: 23.3 % (ref 19.6–45.3)
MCH RBC QN AUTO: 30 PG (ref 26.6–33)
MCHC RBC AUTO-ENTMCNC: 30.9 G/DL (ref 31.5–35.7)
MCV RBC AUTO: 97 FL (ref 79–97)
MONOCYTES # BLD AUTO: 0.41 10*3/MM3 (ref 0.1–0.9)
MONOCYTES NFR BLD AUTO: 8.2 % (ref 5–12)
NEUTROPHILS # BLD AUTO: 3.26 10*3/MM3 (ref 1.7–7)
NEUTROPHILS NFR BLD AUTO: 64.9 % (ref 42.7–76)
NITRITE UR QL STRIP: NEGATIVE
NRBC BLD AUTO-RTO: 0 /100 WBC (ref 0–0.2)
PH UR STRIP.AUTO: 6 [PH] (ref 5–8)
PLATELET # BLD AUTO: 96 10*3/MM3 (ref 140–450)
PMV BLD AUTO: 11.2 FL (ref 6–12)
POTASSIUM BLD-SCNC: 3.6 MMOL/L (ref 3.5–5.2)
PROT SERPL-MCNC: 7.7 G/DL (ref 6–8.5)
PROT UR QL STRIP: ABNORMAL
RBC # BLD AUTO: 4.04 10*6/MM3 (ref 3.77–5.28)
RBC # UR: ABNORMAL /HPF
REF LAB TEST METHOD: ABNORMAL
SODIUM BLD-SCNC: 140 MMOL/L (ref 136–145)
SP GR UR STRIP: 1.02 (ref 1–1.03)
SQUAMOUS #/AREA URNS HPF: ABNORMAL /HPF
UROBILINOGEN UR QL STRIP: ABNORMAL
WBC NRBC COR # BLD: 5.02 10*3/MM3 (ref 3.4–10.8)
WBC UR QL AUTO: ABNORMAL /HPF
WHOLE BLOOD HOLD SPECIMEN: NORMAL
WHOLE BLOOD HOLD SPECIMEN: NORMAL

## 2019-07-30 PROCEDURE — 25010000002 HYDROMORPHONE PER 4 MG: Performed by: EMERGENCY MEDICINE

## 2019-07-30 PROCEDURE — 85025 COMPLETE CBC W/AUTO DIFF WBC: CPT | Performed by: EMERGENCY MEDICINE

## 2019-07-30 PROCEDURE — 25010000002 KETOROLAC TROMETHAMINE PER 15 MG: Performed by: EMERGENCY MEDICINE

## 2019-07-30 PROCEDURE — 81001 URINALYSIS AUTO W/SCOPE: CPT | Performed by: EMERGENCY MEDICINE

## 2019-07-30 PROCEDURE — 25010000002 IOPAMIDOL 61 % SOLUTION: Performed by: EMERGENCY MEDICINE

## 2019-07-30 PROCEDURE — 80053 COMPREHEN METABOLIC PANEL: CPT | Performed by: EMERGENCY MEDICINE

## 2019-07-30 PROCEDURE — 96375 TX/PRO/DX INJ NEW DRUG ADDON: CPT

## 2019-07-30 PROCEDURE — 99284 EMERGENCY DEPT VISIT MOD MDM: CPT

## 2019-07-30 PROCEDURE — 83690 ASSAY OF LIPASE: CPT | Performed by: EMERGENCY MEDICINE

## 2019-07-30 PROCEDURE — 74177 CT ABD & PELVIS W/CONTRAST: CPT

## 2019-07-30 PROCEDURE — 25010000002 ONDANSETRON PER 1 MG: Performed by: PHYSICIAN ASSISTANT

## 2019-07-30 PROCEDURE — 96374 THER/PROPH/DIAG INJ IV PUSH: CPT

## 2019-07-30 RX ORDER — KETOROLAC TROMETHAMINE 10 MG/1
10 TABLET, FILM COATED ORAL EVERY 6 HOURS PRN
Qty: 12 TABLET | Refills: 0 | Status: SHIPPED | OUTPATIENT
Start: 2019-07-30 | End: 2020-11-06 | Stop reason: HOSPADM

## 2019-07-30 RX ORDER — SODIUM CHLORIDE 0.9 % (FLUSH) 0.9 %
10 SYRINGE (ML) INJECTION AS NEEDED
Status: DISCONTINUED | OUTPATIENT
Start: 2019-07-30 | End: 2019-07-30 | Stop reason: HOSPADM

## 2019-07-30 RX ORDER — KETOROLAC TROMETHAMINE 15 MG/ML
15 INJECTION, SOLUTION INTRAMUSCULAR; INTRAVENOUS ONCE
Status: COMPLETED | OUTPATIENT
Start: 2019-07-30 | End: 2019-07-30

## 2019-07-30 RX ORDER — HYDROMORPHONE HYDROCHLORIDE 1 MG/ML
0.5 INJECTION, SOLUTION INTRAMUSCULAR; INTRAVENOUS; SUBCUTANEOUS ONCE
Status: COMPLETED | OUTPATIENT
Start: 2019-07-30 | End: 2019-07-30

## 2019-07-30 RX ORDER — ONDANSETRON 2 MG/ML
4 INJECTION INTRAMUSCULAR; INTRAVENOUS ONCE
Status: COMPLETED | OUTPATIENT
Start: 2019-07-30 | End: 2019-07-30

## 2019-07-30 RX ORDER — CEFDINIR 300 MG/1
300 CAPSULE ORAL 2 TIMES DAILY
Qty: 20 CAPSULE | Refills: 0 | Status: ON HOLD | OUTPATIENT
Start: 2019-07-30 | End: 2020-11-06

## 2019-07-30 RX ADMIN — ONDANSETRON 4 MG: 2 INJECTION INTRAMUSCULAR; INTRAVENOUS at 11:35

## 2019-07-30 RX ADMIN — HYDROMORPHONE HYDROCHLORIDE 0.5 MG: 1 INJECTION, SOLUTION INTRAMUSCULAR; INTRAVENOUS; SUBCUTANEOUS at 13:17

## 2019-07-30 RX ADMIN — IOPAMIDOL 95 ML: 612 INJECTION, SOLUTION INTRAVENOUS at 12:07

## 2019-07-30 RX ADMIN — KETOROLAC TROMETHAMINE 15 MG: 15 INJECTION, SOLUTION INTRAMUSCULAR; INTRAVENOUS at 11:35

## 2019-09-11 ENCOUNTER — OFFICE (OUTPATIENT)
Dept: URBAN - METROPOLITAN AREA CLINIC 4 | Facility: CLINIC | Age: 59
End: 2019-09-11

## 2019-10-22 ENCOUNTER — OFFICE (OUTPATIENT)
Dept: URBAN - METROPOLITAN AREA CLINIC 4 | Facility: CLINIC | Age: 59
End: 2019-10-22
Payer: COMMERCIAL

## 2019-10-22 VITALS — HEIGHT: 63 IN | DIASTOLIC BLOOD PRESSURE: 73 MMHG | SYSTOLIC BLOOD PRESSURE: 145 MMHG | WEIGHT: 181 LBS

## 2019-10-22 DIAGNOSIS — R39.15 URGENCY OF URINATION: ICD-10-CM

## 2019-10-22 DIAGNOSIS — K74.60 UNSPECIFIED CIRRHOSIS OF LIVER: ICD-10-CM

## 2019-10-22 DIAGNOSIS — R19.7 DIARRHEA, UNSPECIFIED: ICD-10-CM

## 2019-10-22 DIAGNOSIS — R19.4 CHANGE IN BOWEL HABIT: ICD-10-CM

## 2019-10-22 DIAGNOSIS — K59.00 CONSTIPATION, UNSPECIFIED: ICD-10-CM

## 2019-10-22 DIAGNOSIS — Z90.49 ACQUIRED ABSENCE OF OTHER SPECIFIED PARTS OF DIGESTIVE TRACT: ICD-10-CM

## 2019-10-22 DIAGNOSIS — K75.4 AUTOIMMUNE HEPATITIS: ICD-10-CM

## 2019-10-22 DIAGNOSIS — R32 UNSPECIFIED URINARY INCONTINENCE: ICD-10-CM

## 2019-10-22 DIAGNOSIS — R15.2 FECAL URGENCY: ICD-10-CM

## 2019-10-22 PROCEDURE — 99214 OFFICE O/P EST MOD 30 MIN: CPT | Performed by: INTERNAL MEDICINE

## 2019-10-22 RX ORDER — BUSPIRONE HYDROCHLORIDE 10 MG/1
20 TABLET ORAL
Qty: 60 | Refills: 10 | Status: ACTIVE
Start: 2019-10-22

## 2019-10-22 RX ORDER — ALOSETRON HYDROCHLORIDE 1 MG/1
2 TABLET ORAL
Qty: 60 | Refills: 5 | Status: ACTIVE
Start: 2019-10-22

## 2019-11-06 ENCOUNTER — APPOINTMENT (OUTPATIENT)
Dept: GENERAL RADIOLOGY | Facility: HOSPITAL | Age: 59
End: 2019-11-06

## 2019-11-06 ENCOUNTER — HOSPITAL ENCOUNTER (INPATIENT)
Facility: HOSPITAL | Age: 59
LOS: 7 days | Discharge: HOME | End: 2019-11-13
Attending: EMERGENCY MEDICINE | Admitting: HOSPITALIST

## 2019-11-06 DIAGNOSIS — R09.02 HYPOXIA: ICD-10-CM

## 2019-11-06 DIAGNOSIS — A41.9 SEPSIS, DUE TO UNSPECIFIED ORGANISM, UNSPECIFIED WHETHER ACUTE ORGAN DYSFUNCTION PRESENT (HCC): Primary | ICD-10-CM

## 2019-11-06 DIAGNOSIS — J44.1 CHRONIC OBSTRUCTIVE PULMONARY DISEASE WITH ACUTE EXACERBATION (HCC): ICD-10-CM

## 2019-11-06 DIAGNOSIS — J18.9 COMMUNITY ACQUIRED PNEUMONIA, BILATERAL: ICD-10-CM

## 2019-11-06 LAB
ALBUMIN SERPL-MCNC: 4 G/DL (ref 3.5–5.2)
ALBUMIN/GLOB SERPL: 1.5 G/DL
ALP SERPL-CCNC: 175 U/L (ref 39–117)
ALT SERPL W P-5'-P-CCNC: 198 U/L (ref 1–33)
ANION GAP SERPL CALCULATED.3IONS-SCNC: 13.4 MMOL/L (ref 5–15)
APTT PPP: 32.1 SECONDS (ref 24.3–38.1)
ARTERIAL PATENCY WRIST A: POSITIVE
AST SERPL-CCNC: 155 U/L (ref 1–32)
ATMOSPHERIC PRESS: 745 MMHG
B PARAPERT DNA SPEC QL NAA+PROBE: NOT DETECTED
B PERT DNA SPEC QL NAA+PROBE: NOT DETECTED
BACTERIA UR QL AUTO: ABNORMAL /HPF
BASE EXCESS BLDA CALC-SCNC: -0.1 MMOL/L (ref 0–2)
BASOPHILS # BLD AUTO: 0.07 10*3/MM3 (ref 0–0.2)
BASOPHILS NFR BLD AUTO: 0.4 % (ref 0–1.5)
BDY SITE: ABNORMAL
BILIRUB SERPL-MCNC: 0.8 MG/DL (ref 0.2–1.2)
BILIRUB UR QL STRIP: ABNORMAL
BODY TEMPERATURE: 37 C
BUN BLD-MCNC: 9 MG/DL (ref 6–20)
BUN/CREAT SERPL: 11.7 (ref 7–25)
C PNEUM DNA NPH QL NAA+NON-PROBE: NOT DETECTED
CALCIUM SPEC-SCNC: 8.8 MG/DL (ref 8.6–10.5)
CHLORIDE SERPL-SCNC: 100 MMOL/L (ref 98–107)
CLARITY UR: ABNORMAL
CO2 SERPL-SCNC: 22.6 MMOL/L (ref 22–29)
COLOR UR: YELLOW
CREAT BLD-MCNC: 0.77 MG/DL (ref 0.57–1)
D-LACTATE SERPL-SCNC: 1.4 MMOL/L (ref 0.5–2)
D-LACTATE SERPL-SCNC: 3.8 MMOL/L (ref 0.5–2)
DEPRECATED RDW RBC AUTO: 49.7 FL (ref 37–54)
EOSINOPHIL # BLD AUTO: 0 10*3/MM3 (ref 0–0.4)
EOSINOPHIL NFR BLD AUTO: 0 % (ref 0.3–6.2)
ERYTHROCYTE [DISTWIDTH] IN BLOOD BY AUTOMATED COUNT: 14.2 % (ref 12.3–15.4)
FLUAV AG NPH QL: NEGATIVE
FLUAV H1 2009 PAND RNA NPH QL NAA+PROBE: NOT DETECTED
FLUAV H1 HA GENE NPH QL NAA+PROBE: NOT DETECTED
FLUAV H3 RNA NPH QL NAA+PROBE: NOT DETECTED
FLUAV SUBTYP SPEC NAA+PROBE: NOT DETECTED
FLUBV AG NPH QL IA: NEGATIVE
FLUBV RNA ISLT QL NAA+PROBE: NOT DETECTED
GAS FLOW AIRWAY: 40 LPM
GFR SERPL CREATININE-BSD FRML MDRD: 77 ML/MIN/1.73
GLOBULIN UR ELPH-MCNC: 2.7 GM/DL
GLUCOSE BLD-MCNC: 168 MG/DL (ref 65–99)
GLUCOSE UR STRIP-MCNC: NEGATIVE MG/DL
HADV DNA SPEC NAA+PROBE: NOT DETECTED
HCO3 BLDA-SCNC: 27.5 MMOL/L (ref 20–26)
HCOV 229E RNA SPEC QL NAA+PROBE: NOT DETECTED
HCOV HKU1 RNA SPEC QL NAA+PROBE: NOT DETECTED
HCOV NL63 RNA SPEC QL NAA+PROBE: NOT DETECTED
HCOV OC43 RNA SPEC QL NAA+PROBE: NOT DETECTED
HCT VFR BLD AUTO: 43.8 % (ref 34–46.6)
HGB BLD-MCNC: 14 G/DL (ref 12–15.9)
HGB BLDA-MCNC: 13.6 G/DL (ref 13.5–17.5)
HGB UR QL STRIP.AUTO: ABNORMAL
HMPV RNA NPH QL NAA+NON-PROBE: NOT DETECTED
HOLD SPECIMEN: NORMAL
HOROWITZ INDEX BLD+IHG-RTO: 60 %
HPIV1 RNA SPEC QL NAA+PROBE: NOT DETECTED
HPIV2 RNA SPEC QL NAA+PROBE: NOT DETECTED
HPIV3 RNA NPH QL NAA+PROBE: NOT DETECTED
HPIV4 P GENE NPH QL NAA+PROBE: NOT DETECTED
HYALINE CASTS UR QL AUTO: ABNORMAL /LPF
IMM GRANULOCYTES # BLD AUTO: 0.14 10*3/MM3 (ref 0–0.05)
IMM GRANULOCYTES NFR BLD AUTO: 0.8 % (ref 0–0.5)
INR PPP: 1.3 (ref 0.9–1.1)
KETONES UR QL STRIP: ABNORMAL
LEUKOCYTE ESTERASE UR QL STRIP.AUTO: NEGATIVE
LYMPHOCYTES # BLD AUTO: 0.8 10*3/MM3 (ref 0.7–3.1)
LYMPHOCYTES NFR BLD AUTO: 4.8 % (ref 19.6–45.3)
M PNEUMO IGG SER IA-ACNC: NOT DETECTED
MCH RBC QN AUTO: 30.1 PG (ref 26.6–33)
MCHC RBC AUTO-ENTMCNC: 32 G/DL (ref 31.5–35.7)
MCV RBC AUTO: 94.2 FL (ref 79–97)
MODALITY: ABNORMAL
MONOCYTES # BLD AUTO: 0.88 10*3/MM3 (ref 0.1–0.9)
MONOCYTES NFR BLD AUTO: 5.3 % (ref 5–12)
NEUTROPHILS # BLD AUTO: 14.84 10*3/MM3 (ref 1.7–7)
NEUTROPHILS NFR BLD AUTO: 88.7 % (ref 42.7–76)
NITRITE UR QL STRIP: NEGATIVE
NRBC BLD AUTO-RTO: 0 /100 WBC (ref 0–0.2)
PCO2 BLDA: 56.1 MM HG (ref 35–45)
PCO2 TEMP ADJ BLD: 56.1 MM HG (ref 35–45)
PH BLDA: 7.3 PH UNITS (ref 7.35–7.45)
PH UR STRIP.AUTO: 5.5 [PH] (ref 4.5–8)
PH, TEMP CORRECTED: 7.3 PH UNITS (ref 7.35–7.45)
PLATELET # BLD AUTO: 239 10*3/MM3 (ref 140–450)
PMV BLD AUTO: 9.6 FL (ref 6–12)
PO2 BLDA: 87.4 MM HG (ref 83–108)
PO2 TEMP ADJ BLD: 87.4 MM HG (ref 83–108)
POTASSIUM BLD-SCNC: 4.5 MMOL/L (ref 3.5–5.2)
PROCALCITONIN SERPL-MCNC: 4.18 NG/ML (ref 0.1–0.25)
PROT SERPL-MCNC: 6.7 G/DL (ref 6–8.5)
PROT UR QL STRIP: ABNORMAL
PROTHROMBIN TIME: 15.9 SECONDS (ref 12.1–15)
RBC # BLD AUTO: 4.65 10*6/MM3 (ref 3.77–5.28)
RBC # UR: ABNORMAL /HPF
REF LAB TEST METHOD: ABNORMAL
RHINOVIRUS RNA SPEC NAA+PROBE: NOT DETECTED
RSV RNA NPH QL NAA+NON-PROBE: DETECTED
S PNEUM AG SPEC QL LA: POSITIVE
SAO2 % BLDCOA: 96.6 % (ref 94–99)
SODIUM BLD-SCNC: 136 MMOL/L (ref 136–145)
SP GR UR STRIP: 1.04 (ref 1–1.03)
SQUAMOUS #/AREA URNS HPF: ABNORMAL /HPF
TROPONIN T SERPL-MCNC: 0.06 NG/ML (ref 0–0.03)
UROBILINOGEN UR QL STRIP: ABNORMAL
VENTILATOR MODE: ABNORMAL
WBC NRBC COR # BLD: 16.73 10*3/MM3 (ref 3.4–10.8)
WBC UR QL AUTO: ABNORMAL /HPF

## 2019-11-06 PROCEDURE — 94799 UNLISTED PULMONARY SVC/PX: CPT

## 2019-11-06 PROCEDURE — 94640 AIRWAY INHALATION TREATMENT: CPT

## 2019-11-06 PROCEDURE — 87205 SMEAR GRAM STAIN: CPT | Performed by: NURSE PRACTITIONER

## 2019-11-06 PROCEDURE — 84145 PROCALCITONIN (PCT): CPT | Performed by: EMERGENCY MEDICINE

## 2019-11-06 PROCEDURE — 36600 WITHDRAWAL OF ARTERIAL BLOOD: CPT

## 2019-11-06 PROCEDURE — 81001 URINALYSIS AUTO W/SCOPE: CPT | Performed by: NURSE PRACTITIONER

## 2019-11-06 PROCEDURE — 82803 BLOOD GASES ANY COMBINATION: CPT

## 2019-11-06 PROCEDURE — 85730 THROMBOPLASTIN TIME PARTIAL: CPT | Performed by: EMERGENCY MEDICINE

## 2019-11-06 PROCEDURE — 83605 ASSAY OF LACTIC ACID: CPT | Performed by: EMERGENCY MEDICINE

## 2019-11-06 PROCEDURE — 87186 SC STD MICRODIL/AGAR DIL: CPT | Performed by: NURSE PRACTITIONER

## 2019-11-06 PROCEDURE — 25010000002 LEVOFLOXACIN PER 250 MG: Performed by: EMERGENCY MEDICINE

## 2019-11-06 PROCEDURE — 87070 CULTURE OTHR SPECIMN AEROBIC: CPT | Performed by: NURSE PRACTITIONER

## 2019-11-06 PROCEDURE — 87804 INFLUENZA ASSAY W/OPTIC: CPT | Performed by: EMERGENCY MEDICINE

## 2019-11-06 PROCEDURE — 94761 N-INVAS EAR/PLS OXIMETRY MLT: CPT

## 2019-11-06 PROCEDURE — 25010000002 METHYLPREDNISOLONE PER 125 MG: Performed by: HOSPITALIST

## 2019-11-06 PROCEDURE — 0100U HC BIOFIRE FILMARRAY RESP PANEL 2: CPT | Performed by: NURSE PRACTITIONER

## 2019-11-06 PROCEDURE — 93010 ELECTROCARDIOGRAM REPORT: CPT | Performed by: INTERNAL MEDICINE

## 2019-11-06 PROCEDURE — 93005 ELECTROCARDIOGRAM TRACING: CPT | Performed by: EMERGENCY MEDICINE

## 2019-11-06 PROCEDURE — 87899 AGENT NOS ASSAY W/OPTIC: CPT | Performed by: NURSE PRACTITIONER

## 2019-11-06 PROCEDURE — 87040 BLOOD CULTURE FOR BACTERIA: CPT | Performed by: EMERGENCY MEDICINE

## 2019-11-06 PROCEDURE — 99284 EMERGENCY DEPT VISIT MOD MDM: CPT

## 2019-11-06 PROCEDURE — 71046 X-RAY EXAM CHEST 2 VIEWS: CPT

## 2019-11-06 PROCEDURE — 99284 EMERGENCY DEPT VISIT MOD MDM: CPT | Performed by: EMERGENCY MEDICINE

## 2019-11-06 PROCEDURE — 99223 1ST HOSP IP/OBS HIGH 75: CPT | Performed by: HOSPITALIST

## 2019-11-06 PROCEDURE — 80053 COMPREHEN METABOLIC PANEL: CPT | Performed by: EMERGENCY MEDICINE

## 2019-11-06 PROCEDURE — 85610 PROTHROMBIN TIME: CPT | Performed by: EMERGENCY MEDICINE

## 2019-11-06 PROCEDURE — 84484 ASSAY OF TROPONIN QUANT: CPT | Performed by: NURSE PRACTITIONER

## 2019-11-06 PROCEDURE — 85025 COMPLETE CBC W/AUTO DIFF WBC: CPT | Performed by: EMERGENCY MEDICINE

## 2019-11-06 RX ORDER — SODIUM CHLORIDE 0.9 % (FLUSH) 0.9 %
10 SYRINGE (ML) INJECTION AS NEEDED
Status: DISCONTINUED | OUTPATIENT
Start: 2019-11-06 | End: 2019-11-13 | Stop reason: HOSPADM

## 2019-11-06 RX ORDER — VENLAFAXINE 37.5 MG/1
37.5 TABLET ORAL 2 TIMES DAILY
Status: ON HOLD | COMMUNITY
End: 2021-11-29

## 2019-11-06 RX ORDER — IPRATROPIUM BROMIDE AND ALBUTEROL SULFATE 2.5; .5 MG/3ML; MG/3ML
3 SOLUTION RESPIRATORY (INHALATION) ONCE
Status: COMPLETED | OUTPATIENT
Start: 2019-11-06 | End: 2019-11-06

## 2019-11-06 RX ORDER — MELOXICAM 15 MG/1
15 TABLET ORAL DAILY
COMMUNITY
End: 2019-11-13 | Stop reason: HOSPADM

## 2019-11-06 RX ORDER — METHYLPREDNISOLONE SODIUM SUCCINATE 125 MG/2ML
60 INJECTION, POWDER, LYOPHILIZED, FOR SOLUTION INTRAMUSCULAR; INTRAVENOUS EVERY 6 HOURS
Status: DISCONTINUED | OUTPATIENT
Start: 2019-11-06 | End: 2019-11-09

## 2019-11-06 RX ORDER — LEVOFLOXACIN 5 MG/ML
750 INJECTION, SOLUTION INTRAVENOUS ONCE
Status: COMPLETED | OUTPATIENT
Start: 2019-11-06 | End: 2019-11-06

## 2019-11-06 RX ORDER — SODIUM CHLORIDE 9 MG/ML
150 INJECTION, SOLUTION INTRAVENOUS CONTINUOUS
Status: DISCONTINUED | OUTPATIENT
Start: 2019-11-06 | End: 2019-11-07

## 2019-11-06 RX ORDER — ACETAMINOPHEN 325 MG/1
650 TABLET ORAL EVERY 6 HOURS PRN
Status: DISCONTINUED | OUTPATIENT
Start: 2019-11-06 | End: 2019-11-07

## 2019-11-06 RX ORDER — LEVOFLOXACIN 5 MG/ML
750 INJECTION, SOLUTION INTRAVENOUS EVERY 24 HOURS
Status: DISCONTINUED | OUTPATIENT
Start: 2019-11-07 | End: 2019-11-10

## 2019-11-06 RX ORDER — ACETAMINOPHEN 500 MG
750 TABLET ORAL ONCE
Status: COMPLETED | OUTPATIENT
Start: 2019-11-06 | End: 2019-11-06

## 2019-11-06 RX ORDER — IPRATROPIUM BROMIDE AND ALBUTEROL SULFATE 2.5; .5 MG/3ML; MG/3ML
3 SOLUTION RESPIRATORY (INHALATION)
Status: DISCONTINUED | OUTPATIENT
Start: 2019-11-06 | End: 2019-11-12

## 2019-11-06 RX ORDER — NICOTINE 21 MG/24HR
1 PATCH, TRANSDERMAL 24 HOURS TRANSDERMAL
Status: DISCONTINUED | OUTPATIENT
Start: 2019-11-07 | End: 2019-11-13 | Stop reason: HOSPADM

## 2019-11-06 RX ADMIN — METHYLPREDNISOLONE SODIUM SUCCINATE 60 MG: 125 INJECTION, POWDER, FOR SOLUTION INTRAMUSCULAR; INTRAVENOUS at 21:10

## 2019-11-06 RX ADMIN — IPRATROPIUM BROMIDE AND ALBUTEROL SULFATE 3 ML: .5; 3 SOLUTION RESPIRATORY (INHALATION) at 16:24

## 2019-11-06 RX ADMIN — LEVOFLOXACIN 750 MG: 5 INJECTION, SOLUTION INTRAVENOUS at 16:38

## 2019-11-06 RX ADMIN — SODIUM CHLORIDE 100 ML/HR: 9 INJECTION, SOLUTION INTRAVENOUS at 18:04

## 2019-11-06 RX ADMIN — ACETAMINOPHEN 750 MG: 500 TABLET, FILM COATED ORAL at 16:37

## 2019-11-06 RX ADMIN — ACETAMINOPHEN 650 MG: 325 TABLET, FILM COATED ORAL at 20:12

## 2019-11-06 RX ADMIN — DOXYCYCLINE 100 MG: 100 INJECTION, POWDER, LYOPHILIZED, FOR SOLUTION INTRAVENOUS at 20:20

## 2019-11-06 RX ADMIN — SODIUM CHLORIDE 1000 ML: 9 INJECTION, SOLUTION INTRAVENOUS at 16:09

## 2019-11-06 RX ADMIN — AZTREONAM 2 G: 2 INJECTION, POWDER, LYOPHILIZED, FOR SOLUTION INTRAMUSCULAR; INTRAVENOUS at 20:20

## 2019-11-06 RX ADMIN — IPRATROPIUM BROMIDE AND ALBUTEROL SULFATE 3 ML: .5; 3 SOLUTION RESPIRATORY (INHALATION) at 19:49

## 2019-11-07 ENCOUNTER — APPOINTMENT (OUTPATIENT)
Dept: GENERAL RADIOLOGY | Facility: HOSPITAL | Age: 59
End: 2019-11-07

## 2019-11-07 LAB
ALBUMIN SERPL-MCNC: 3.2 G/DL (ref 3.5–5.2)
ALBUMIN/GLOB SERPL: 1 G/DL
ALP SERPL-CCNC: 136 U/L (ref 39–117)
ALT SERPL W P-5'-P-CCNC: 245 U/L (ref 1–33)
ANION GAP SERPL CALCULATED.3IONS-SCNC: 9.6 MMOL/L (ref 5–15)
AST SERPL-CCNC: 189 U/L (ref 1–32)
BACTERIA UR QL AUTO: ABNORMAL /HPF
BASOPHILS # BLD AUTO: 0.01 10*3/MM3 (ref 0–0.2)
BASOPHILS NFR BLD AUTO: 0.1 % (ref 0–1.5)
BILIRUB SERPL-MCNC: 0.7 MG/DL (ref 0.2–1.2)
BILIRUB UR QL STRIP: NEGATIVE
BUN BLD-MCNC: 9 MG/DL (ref 6–20)
BUN/CREAT SERPL: 18 (ref 7–25)
CALCIUM SPEC-SCNC: 8.3 MG/DL (ref 8.6–10.5)
CHLORIDE SERPL-SCNC: 106 MMOL/L (ref 98–107)
CLARITY UR: CLEAR
CO2 SERPL-SCNC: 24.4 MMOL/L (ref 22–29)
COLOR UR: YELLOW
CREAT BLD-MCNC: 0.5 MG/DL (ref 0.57–1)
DEPRECATED RDW RBC AUTO: 49.7 FL (ref 37–54)
EOSINOPHIL # BLD AUTO: 0 10*3/MM3 (ref 0–0.4)
EOSINOPHIL NFR BLD AUTO: 0 % (ref 0.3–6.2)
ERYTHROCYTE [DISTWIDTH] IN BLOOD BY AUTOMATED COUNT: 14.2 % (ref 12.3–15.4)
GFR SERPL CREATININE-BSD FRML MDRD: 127 ML/MIN/1.73
GLOBULIN UR ELPH-MCNC: 3.3 GM/DL
GLUCOSE BLD-MCNC: 127 MG/DL (ref 65–99)
GLUCOSE UR STRIP-MCNC: NEGATIVE MG/DL
HAV IGM SERPL QL IA: NORMAL
HBV CORE IGM SERPL QL IA: NORMAL
HBV SURFACE AG SERPL QL IA: NORMAL
HCT VFR BLD AUTO: 40.5 % (ref 34–46.6)
HCV AB SER DONR QL: NORMAL
HGB BLD-MCNC: 12.9 G/DL (ref 12–15.9)
HGB UR QL STRIP.AUTO: NEGATIVE
HYALINE CASTS UR QL AUTO: ABNORMAL /LPF
IMM GRANULOCYTES # BLD AUTO: 0.04 10*3/MM3 (ref 0–0.05)
IMM GRANULOCYTES NFR BLD AUTO: 0.3 % (ref 0–0.5)
KETONES UR QL STRIP: ABNORMAL
LEUKOCYTE ESTERASE UR QL STRIP.AUTO: NEGATIVE
LYMPHOCYTES # BLD AUTO: 0.92 10*3/MM3 (ref 0.7–3.1)
LYMPHOCYTES NFR BLD AUTO: 7.1 % (ref 19.6–45.3)
MCH RBC QN AUTO: 30.3 PG (ref 26.6–33)
MCHC RBC AUTO-ENTMCNC: 31.9 G/DL (ref 31.5–35.7)
MCV RBC AUTO: 95.1 FL (ref 79–97)
MONOCYTES # BLD AUTO: 0.32 10*3/MM3 (ref 0.1–0.9)
MONOCYTES NFR BLD AUTO: 2.5 % (ref 5–12)
NEUTROPHILS # BLD AUTO: 11.72 10*3/MM3 (ref 1.7–7)
NEUTROPHILS NFR BLD AUTO: 90 % (ref 42.7–76)
NITRITE UR QL STRIP: NEGATIVE
PH UR STRIP.AUTO: 6 [PH] (ref 4.5–8)
PLATELET # BLD AUTO: 194 10*3/MM3 (ref 140–450)
PMV BLD AUTO: 9.9 FL (ref 6–12)
POTASSIUM BLD-SCNC: 4.6 MMOL/L (ref 3.5–5.2)
PROCALCITONIN SERPL-MCNC: 4.8 NG/ML (ref 0.1–0.25)
PROT SERPL-MCNC: 6.5 G/DL (ref 6–8.5)
PROT UR QL STRIP: ABNORMAL
RBC # BLD AUTO: 4.26 10*6/MM3 (ref 3.77–5.28)
RBC # UR: ABNORMAL /HPF
REF LAB TEST METHOD: ABNORMAL
SODIUM BLD-SCNC: 140 MMOL/L (ref 136–145)
SP GR UR STRIP: 1.02 (ref 1–1.03)
SQUAMOUS #/AREA URNS HPF: ABNORMAL /HPF
TROPONIN T SERPL-MCNC: 0.02 NG/ML (ref 0–0.03)
UROBILINOGEN UR QL STRIP: ABNORMAL
WBC NRBC COR # BLD: 13.01 10*3/MM3 (ref 3.4–10.8)
WBC UR QL AUTO: ABNORMAL /HPF

## 2019-11-07 PROCEDURE — 25010000002 ENOXAPARIN PER 10 MG: Performed by: HOSPITALIST

## 2019-11-07 PROCEDURE — 25810000003 SODIUM CHLORIDE 0.9 % WITH KCL 20 MEQ 20-0.9 MEQ/L-% SOLUTION: Performed by: HOSPITALIST

## 2019-11-07 PROCEDURE — 94799 UNLISTED PULMONARY SVC/PX: CPT

## 2019-11-07 PROCEDURE — 80053 COMPREHEN METABOLIC PANEL: CPT | Performed by: HOSPITALIST

## 2019-11-07 PROCEDURE — 25010000002 LEVOFLOXACIN PER 250 MG: Performed by: NURSE PRACTITIONER

## 2019-11-07 PROCEDURE — 85025 COMPLETE CBC W/AUTO DIFF WBC: CPT | Performed by: NURSE PRACTITIONER

## 2019-11-07 PROCEDURE — 71045 X-RAY EXAM CHEST 1 VIEW: CPT

## 2019-11-07 PROCEDURE — 99233 SBSQ HOSP IP/OBS HIGH 50: CPT | Performed by: HOSPITALIST

## 2019-11-07 PROCEDURE — 84484 ASSAY OF TROPONIN QUANT: CPT | Performed by: HOSPITALIST

## 2019-11-07 PROCEDURE — 80074 ACUTE HEPATITIS PANEL: CPT | Performed by: HOSPITALIST

## 2019-11-07 PROCEDURE — 81001 URINALYSIS AUTO W/SCOPE: CPT | Performed by: HOSPITALIST

## 2019-11-07 PROCEDURE — 84145 PROCALCITONIN (PCT): CPT | Performed by: HOSPITALIST

## 2019-11-07 PROCEDURE — 25010000002 METHYLPREDNISOLONE PER 125 MG: Performed by: HOSPITALIST

## 2019-11-07 RX ORDER — SODIUM CHLORIDE AND POTASSIUM CHLORIDE 150; 900 MG/100ML; MG/100ML
75 INJECTION, SOLUTION INTRAVENOUS CONTINUOUS
Status: DISCONTINUED | OUTPATIENT
Start: 2019-11-07 | End: 2019-11-09

## 2019-11-07 RX ORDER — IPRATROPIUM BROMIDE AND ALBUTEROL SULFATE 2.5; .5 MG/3ML; MG/3ML
3 SOLUTION RESPIRATORY (INHALATION)
Status: DISCONTINUED | OUTPATIENT
Start: 2019-11-07 | End: 2019-11-13 | Stop reason: HOSPADM

## 2019-11-07 RX ORDER — IBUPROFEN 400 MG/1
400 TABLET ORAL EVERY 8 HOURS PRN
Status: DISCONTINUED | OUTPATIENT
Start: 2019-11-07 | End: 2019-11-10

## 2019-11-07 RX ORDER — L.ACID,PARA/B.BIFIDUM/S.THERM 8B CELL
1 CAPSULE ORAL DAILY
Status: DISCONTINUED | OUTPATIENT
Start: 2019-11-07 | End: 2019-11-13 | Stop reason: HOSPADM

## 2019-11-07 RX ADMIN — METHYLPREDNISOLONE SODIUM SUCCINATE 60 MG: 125 INJECTION, POWDER, FOR SOLUTION INTRAMUSCULAR; INTRAVENOUS at 18:45

## 2019-11-07 RX ADMIN — IPRATROPIUM BROMIDE AND ALBUTEROL SULFATE 3 ML: .5; 3 SOLUTION RESPIRATORY (INHALATION) at 13:52

## 2019-11-07 RX ADMIN — NICOTINE 1 PATCH: 21 PATCH, EXTENDED RELEASE TRANSDERMAL at 08:12

## 2019-11-07 RX ADMIN — IBUPROFEN 400 MG: 400 TABLET ORAL at 08:35

## 2019-11-07 RX ADMIN — SODIUM CHLORIDE 150 ML/HR: 9 INJECTION, SOLUTION INTRAVENOUS at 03:32

## 2019-11-07 RX ADMIN — IPRATROPIUM BROMIDE AND ALBUTEROL SULFATE 3 ML: .5; 3 SOLUTION RESPIRATORY (INHALATION) at 18:54

## 2019-11-07 RX ADMIN — DOXYCYCLINE 100 MG: 100 INJECTION, POWDER, LYOPHILIZED, FOR SOLUTION INTRAVENOUS at 06:49

## 2019-11-07 RX ADMIN — Medication 1 CAPSULE: at 12:00

## 2019-11-07 RX ADMIN — AZTREONAM 2 G: 2 INJECTION, POWDER, LYOPHILIZED, FOR SOLUTION INTRAMUSCULAR; INTRAVENOUS at 15:36

## 2019-11-07 RX ADMIN — AZTREONAM 2 G: 2 INJECTION, POWDER, LYOPHILIZED, FOR SOLUTION INTRAMUSCULAR; INTRAVENOUS at 06:49

## 2019-11-07 RX ADMIN — ACETAMINOPHEN 650 MG: 325 TABLET, FILM COATED ORAL at 02:05

## 2019-11-07 RX ADMIN — METHYLPREDNISOLONE SODIUM SUCCINATE 60 MG: 125 INJECTION, POWDER, FOR SOLUTION INTRAMUSCULAR; INTRAVENOUS at 06:43

## 2019-11-07 RX ADMIN — IPRATROPIUM BROMIDE AND ALBUTEROL SULFATE 3 ML: .5; 3 SOLUTION RESPIRATORY (INHALATION) at 07:17

## 2019-11-07 RX ADMIN — IBUPROFEN 400 MG: 400 TABLET ORAL at 20:08

## 2019-11-07 RX ADMIN — AZTREONAM 2 G: 2 INJECTION, POWDER, LYOPHILIZED, FOR SOLUTION INTRAMUSCULAR; INTRAVENOUS at 00:31

## 2019-11-07 RX ADMIN — AZTREONAM 2 G: 2 INJECTION, POWDER, LYOPHILIZED, FOR SOLUTION INTRAMUSCULAR; INTRAVENOUS at 22:32

## 2019-11-07 RX ADMIN — LEVOFLOXACIN 750 MG: 5 INJECTION, SOLUTION INTRAVENOUS at 15:34

## 2019-11-07 RX ADMIN — ENOXAPARIN SODIUM 40 MG: 40 INJECTION SUBCUTANEOUS at 08:12

## 2019-11-07 RX ADMIN — POTASSIUM CHLORIDE AND SODIUM CHLORIDE 125 ML/HR: 900; 150 INJECTION, SOLUTION INTRAVENOUS at 11:06

## 2019-11-07 RX ADMIN — METHYLPREDNISOLONE SODIUM SUCCINATE 60 MG: 125 INJECTION, POWDER, FOR SOLUTION INTRAMUSCULAR; INTRAVENOUS at 00:32

## 2019-11-07 RX ADMIN — POTASSIUM CHLORIDE AND SODIUM CHLORIDE 125 ML/HR: 900; 150 INJECTION, SOLUTION INTRAVENOUS at 22:09

## 2019-11-07 RX ADMIN — METHYLPREDNISOLONE SODIUM SUCCINATE 60 MG: 125 INJECTION, POWDER, FOR SOLUTION INTRAMUSCULAR; INTRAVENOUS at 13:04

## 2019-11-08 ENCOUNTER — APPOINTMENT (OUTPATIENT)
Dept: GENERAL RADIOLOGY | Facility: HOSPITAL | Age: 59
End: 2019-11-08

## 2019-11-08 LAB
ALBUMIN SERPL-MCNC: 2.9 G/DL (ref 3.5–5.2)
ALBUMIN/GLOB SERPL: 0.9 G/DL
ALP SERPL-CCNC: 112 U/L (ref 39–117)
ALT SERPL W P-5'-P-CCNC: 186 U/L (ref 1–33)
ANION GAP SERPL CALCULATED.3IONS-SCNC: 8.7 MMOL/L (ref 5–15)
AST SERPL-CCNC: 79 U/L (ref 1–32)
BASOPHILS # BLD AUTO: 0.02 10*3/MM3 (ref 0–0.2)
BASOPHILS NFR BLD AUTO: 0.2 % (ref 0–1.5)
BILIRUB SERPL-MCNC: 0.4 MG/DL (ref 0.2–1.2)
BUN BLD-MCNC: 14 MG/DL (ref 6–20)
BUN/CREAT SERPL: 28.6 (ref 7–25)
CALCIUM SPEC-SCNC: 8.4 MG/DL (ref 8.6–10.5)
CHLORIDE SERPL-SCNC: 108 MMOL/L (ref 98–107)
CO2 SERPL-SCNC: 24.3 MMOL/L (ref 22–29)
CREAT BLD-MCNC: 0.49 MG/DL (ref 0.57–1)
DEPRECATED RDW RBC AUTO: 49.1 FL (ref 37–54)
EOSINOPHIL # BLD AUTO: 0 10*3/MM3 (ref 0–0.4)
EOSINOPHIL NFR BLD AUTO: 0 % (ref 0.3–6.2)
ERYTHROCYTE [DISTWIDTH] IN BLOOD BY AUTOMATED COUNT: 14.2 % (ref 12.3–15.4)
GFR SERPL CREATININE-BSD FRML MDRD: 130 ML/MIN/1.73
GLOBULIN UR ELPH-MCNC: 3.1 GM/DL
GLUCOSE BLD-MCNC: 134 MG/DL (ref 65–99)
HCT VFR BLD AUTO: 36.8 % (ref 34–46.6)
HGB BLD-MCNC: 11.7 G/DL (ref 12–15.9)
IMM GRANULOCYTES # BLD AUTO: 0.09 10*3/MM3 (ref 0–0.05)
IMM GRANULOCYTES NFR BLD AUTO: 0.9 % (ref 0–0.5)
LYMPHOCYTES # BLD AUTO: 0.86 10*3/MM3 (ref 0.7–3.1)
LYMPHOCYTES NFR BLD AUTO: 8.6 % (ref 19.6–45.3)
MCH RBC QN AUTO: 29.7 PG (ref 26.6–33)
MCHC RBC AUTO-ENTMCNC: 31.8 G/DL (ref 31.5–35.7)
MCV RBC AUTO: 93.4 FL (ref 79–97)
MONOCYTES # BLD AUTO: 0.32 10*3/MM3 (ref 0.1–0.9)
MONOCYTES NFR BLD AUTO: 3.2 % (ref 5–12)
NEUTROPHILS # BLD AUTO: 8.69 10*3/MM3 (ref 1.7–7)
NEUTROPHILS NFR BLD AUTO: 87.1 % (ref 42.7–76)
NRBC BLD AUTO-RTO: 0.2 /100 WBC (ref 0–0.2)
PLATELET # BLD AUTO: 224 10*3/MM3 (ref 140–450)
PMV BLD AUTO: 10 FL (ref 6–12)
POTASSIUM BLD-SCNC: 4.5 MMOL/L (ref 3.5–5.2)
PROCALCITONIN SERPL-MCNC: 2.83 NG/ML (ref 0.1–0.25)
PROT SERPL-MCNC: 6 G/DL (ref 6–8.5)
RBC # BLD AUTO: 3.94 10*6/MM3 (ref 3.77–5.28)
SODIUM BLD-SCNC: 141 MMOL/L (ref 136–145)
WBC NRBC COR # BLD: 9.98 10*3/MM3 (ref 3.4–10.8)

## 2019-11-08 PROCEDURE — 25010000002 METHYLPREDNISOLONE PER 125 MG: Performed by: HOSPITALIST

## 2019-11-08 PROCEDURE — 84145 PROCALCITONIN (PCT): CPT | Performed by: HOSPITALIST

## 2019-11-08 PROCEDURE — 94799 UNLISTED PULMONARY SVC/PX: CPT

## 2019-11-08 PROCEDURE — 71045 X-RAY EXAM CHEST 1 VIEW: CPT

## 2019-11-08 PROCEDURE — 25810000003 SODIUM CHLORIDE 0.9 % WITH KCL 20 MEQ 20-0.9 MEQ/L-% SOLUTION: Performed by: HOSPITALIST

## 2019-11-08 PROCEDURE — 25010000002 ENOXAPARIN PER 10 MG: Performed by: HOSPITALIST

## 2019-11-08 PROCEDURE — 80053 COMPREHEN METABOLIC PANEL: CPT | Performed by: INTERNAL MEDICINE

## 2019-11-08 PROCEDURE — 93005 ELECTROCARDIOGRAM TRACING: CPT | Performed by: HOSPITALIST

## 2019-11-08 PROCEDURE — 99233 SBSQ HOSP IP/OBS HIGH 50: CPT | Performed by: HOSPITALIST

## 2019-11-08 PROCEDURE — 25010000002 LEVOFLOXACIN PER 250 MG: Performed by: NURSE PRACTITIONER

## 2019-11-08 PROCEDURE — 93010 ELECTROCARDIOGRAM REPORT: CPT | Performed by: INTERNAL MEDICINE

## 2019-11-08 PROCEDURE — 85025 COMPLETE CBC W/AUTO DIFF WBC: CPT | Performed by: NURSE PRACTITIONER

## 2019-11-08 RX ORDER — GUAIFENESIN 600 MG/1
600 TABLET, EXTENDED RELEASE ORAL EVERY 12 HOURS SCHEDULED
Status: DISCONTINUED | OUTPATIENT
Start: 2019-11-08 | End: 2019-11-13 | Stop reason: HOSPADM

## 2019-11-08 RX ORDER — LANOLIN ALCOHOL/MO/W.PET/CERES
3 CREAM (GRAM) TOPICAL NIGHTLY PRN
Status: DISCONTINUED | OUTPATIENT
Start: 2019-11-08 | End: 2019-11-09

## 2019-11-08 RX ORDER — IBUPROFEN 800 MG/1
TABLET ORAL
Status: COMPLETED
Start: 2019-11-08 | End: 2019-11-08

## 2019-11-08 RX ORDER — CHOLECALCIFEROL (VITAMIN D3) 125 MCG
CAPSULE ORAL
Status: COMPLETED
Start: 2019-11-08 | End: 2019-11-08

## 2019-11-08 RX ORDER — ACETAMINOPHEN 325 MG/1
650 TABLET ORAL EVERY 6 HOURS PRN
Status: DISCONTINUED | OUTPATIENT
Start: 2019-11-08 | End: 2019-11-13 | Stop reason: HOSPADM

## 2019-11-08 RX ORDER — GUAIFENESIN/DEXTROMETHORPHAN 100-10MG/5
5 SYRUP ORAL EVERY 4 HOURS PRN
Status: DISCONTINUED | OUTPATIENT
Start: 2019-11-08 | End: 2019-11-13 | Stop reason: HOSPADM

## 2019-11-08 RX ADMIN — AZTREONAM 2 G: 2 INJECTION, POWDER, LYOPHILIZED, FOR SOLUTION INTRAMUSCULAR; INTRAVENOUS at 23:30

## 2019-11-08 RX ADMIN — IBUPROFEN 400 MG: 400 TABLET ORAL at 19:44

## 2019-11-08 RX ADMIN — METHYLPREDNISOLONE SODIUM SUCCINATE 60 MG: 125 INJECTION, POWDER, FOR SOLUTION INTRAMUSCULAR; INTRAVENOUS at 14:43

## 2019-11-08 RX ADMIN — Medication 1 CAPSULE: at 10:11

## 2019-11-08 RX ADMIN — METHYLPREDNISOLONE SODIUM SUCCINATE 60 MG: 125 INJECTION, POWDER, FOR SOLUTION INTRAMUSCULAR; INTRAVENOUS at 06:34

## 2019-11-08 RX ADMIN — METHYLPREDNISOLONE SODIUM SUCCINATE 60 MG: 125 INJECTION, POWDER, FOR SOLUTION INTRAMUSCULAR; INTRAVENOUS at 19:43

## 2019-11-08 RX ADMIN — Medication 3 MG: at 01:01

## 2019-11-08 RX ADMIN — GUAIFENESIN AND DEXTROMETHORPHAN 5 ML: 100; 10 SYRUP ORAL at 19:44

## 2019-11-08 RX ADMIN — GUAIFENESIN 600 MG: 600 TABLET, EXTENDED RELEASE ORAL at 14:39

## 2019-11-08 RX ADMIN — GUAIFENESIN AND DEXTROMETHORPHAN 5 ML: 100; 10 SYRUP ORAL at 14:38

## 2019-11-08 RX ADMIN — METHYLPREDNISOLONE SODIUM SUCCINATE 60 MG: 125 INJECTION, POWDER, FOR SOLUTION INTRAMUSCULAR; INTRAVENOUS at 00:59

## 2019-11-08 RX ADMIN — MELATONIN 5 MG TABLET 3 MG: at 01:01

## 2019-11-08 RX ADMIN — POTASSIUM CHLORIDE AND SODIUM CHLORIDE 125 ML/HR: 900; 150 INJECTION, SOLUTION INTRAVENOUS at 16:55

## 2019-11-08 RX ADMIN — AZTREONAM 2 G: 2 INJECTION, POWDER, LYOPHILIZED, FOR SOLUTION INTRAMUSCULAR; INTRAVENOUS at 16:50

## 2019-11-08 RX ADMIN — LEVOFLOXACIN 750 MG: 5 INJECTION, SOLUTION INTRAVENOUS at 14:38

## 2019-11-08 RX ADMIN — POTASSIUM CHLORIDE AND SODIUM CHLORIDE 125 ML/HR: 900; 150 INJECTION, SOLUTION INTRAVENOUS at 06:52

## 2019-11-08 RX ADMIN — AZTREONAM 2 G: 2 INJECTION, POWDER, LYOPHILIZED, FOR SOLUTION INTRAMUSCULAR; INTRAVENOUS at 06:34

## 2019-11-08 RX ADMIN — ACETAMINOPHEN 650 MG: 325 TABLET, FILM COATED ORAL at 23:28

## 2019-11-08 RX ADMIN — IPRATROPIUM BROMIDE AND ALBUTEROL SULFATE 3 ML: .5; 3 SOLUTION RESPIRATORY (INHALATION) at 20:01

## 2019-11-08 RX ADMIN — Medication 3 MG: at 23:28

## 2019-11-08 RX ADMIN — ACETAMINOPHEN 650 MG: 325 TABLET, FILM COATED ORAL at 14:39

## 2019-11-08 RX ADMIN — ENOXAPARIN SODIUM 40 MG: 40 INJECTION SUBCUTANEOUS at 09:27

## 2019-11-08 RX ADMIN — NICOTINE 1 PATCH: 21 PATCH, EXTENDED RELEASE TRANSDERMAL at 09:27

## 2019-11-08 RX ADMIN — IPRATROPIUM BROMIDE AND ALBUTEROL SULFATE 3 ML: .5; 3 SOLUTION RESPIRATORY (INHALATION) at 07:20

## 2019-11-08 RX ADMIN — IPRATROPIUM BROMIDE AND ALBUTEROL SULFATE 3 ML: .5; 3 SOLUTION RESPIRATORY (INHALATION) at 13:45

## 2019-11-08 RX ADMIN — IBUPROFEN 400 MG: 800 TABLET ORAL at 05:31

## 2019-11-08 RX ADMIN — GUAIFENESIN 600 MG: 600 TABLET, EXTENDED RELEASE ORAL at 21:10

## 2019-11-08 RX ADMIN — IBUPROFEN 400 MG: 400 TABLET ORAL at 05:31

## 2019-11-08 RX ADMIN — MELATONIN TAB 5 MG 3 MG: 5 TAB at 23:28

## 2019-11-09 LAB
ALBUMIN SERPL-MCNC: 3.1 G/DL (ref 3.5–5.2)
ALBUMIN/GLOB SERPL: 1.3 G/DL
ALP SERPL-CCNC: 120 U/L (ref 39–117)
ALT SERPL W P-5'-P-CCNC: 175 U/L (ref 1–33)
ANION GAP SERPL CALCULATED.3IONS-SCNC: 8.4 MMOL/L (ref 5–15)
AST SERPL-CCNC: 70 U/L (ref 1–32)
BACTERIA SPEC RESP CULT: ABNORMAL
BACTERIA SPEC RESP CULT: ABNORMAL
BASOPHILS # BLD AUTO: 0.04 10*3/MM3 (ref 0–0.2)
BASOPHILS NFR BLD AUTO: 0.5 % (ref 0–1.5)
BILIRUB SERPL-MCNC: 0.5 MG/DL (ref 0.2–1.2)
BUN BLD-MCNC: 17 MG/DL (ref 6–20)
BUN/CREAT SERPL: 30.4 (ref 7–25)
CALCIUM SPEC-SCNC: 8.5 MG/DL (ref 8.6–10.5)
CHLORIDE SERPL-SCNC: 112 MMOL/L (ref 98–107)
CO2 SERPL-SCNC: 24.6 MMOL/L (ref 22–29)
CREAT BLD-MCNC: 0.56 MG/DL (ref 0.57–1)
DEPRECATED RDW RBC AUTO: 49.1 FL (ref 37–54)
EOSINOPHIL # BLD AUTO: 0 10*3/MM3 (ref 0–0.4)
EOSINOPHIL NFR BLD AUTO: 0 % (ref 0.3–6.2)
ERYTHROCYTE [DISTWIDTH] IN BLOOD BY AUTOMATED COUNT: 14.6 % (ref 12.3–15.4)
GFR SERPL CREATININE-BSD FRML MDRD: 112 ML/MIN/1.73
GLOBULIN UR ELPH-MCNC: 2.4 GM/DL
GLUCOSE BLD-MCNC: 139 MG/DL (ref 65–99)
GRAM STN SPEC: ABNORMAL
HCT VFR BLD AUTO: 35.5 % (ref 34–46.6)
HGB BLD-MCNC: 11.5 G/DL (ref 12–15.9)
IMM GRANULOCYTES # BLD AUTO: 0.45 10*3/MM3 (ref 0–0.05)
IMM GRANULOCYTES NFR BLD AUTO: 5.2 % (ref 0–0.5)
LYMPHOCYTES # BLD AUTO: 0.99 10*3/MM3 (ref 0.7–3.1)
LYMPHOCYTES NFR BLD AUTO: 11.5 % (ref 19.6–45.3)
MAGNESIUM SERPL-MCNC: 2 MG/DL (ref 1.6–2.6)
MCH RBC QN AUTO: 29.6 PG (ref 26.6–33)
MCHC RBC AUTO-ENTMCNC: 32.4 G/DL (ref 31.5–35.7)
MCV RBC AUTO: 91.5 FL (ref 79–97)
MONOCYTES # BLD AUTO: 0.31 10*3/MM3 (ref 0.1–0.9)
MONOCYTES NFR BLD AUTO: 3.6 % (ref 5–12)
NEUTROPHILS # BLD AUTO: 6.82 10*3/MM3 (ref 1.7–7)
NEUTROPHILS NFR BLD AUTO: 79.2 % (ref 42.7–76)
NRBC BLD AUTO-RTO: 0.7 /100 WBC (ref 0–0.2)
PHOSPHATE SERPL-MCNC: 1.4 MG/DL (ref 2.5–4.5)
PLATELET # BLD AUTO: 242 10*3/MM3 (ref 140–450)
PMV BLD AUTO: 9.4 FL (ref 6–12)
POTASSIUM BLD-SCNC: 4.6 MMOL/L (ref 3.5–5.2)
PROCALCITONIN SERPL-MCNC: 1.41 NG/ML (ref 0.1–0.25)
PROT SERPL-MCNC: 5.5 G/DL (ref 6–8.5)
RBC # BLD AUTO: 3.88 10*6/MM3 (ref 3.77–5.28)
SODIUM BLD-SCNC: 145 MMOL/L (ref 136–145)
WBC NRBC COR # BLD: 8.61 10*3/MM3 (ref 3.4–10.8)

## 2019-11-09 PROCEDURE — 83735 ASSAY OF MAGNESIUM: CPT | Performed by: HOSPITALIST

## 2019-11-09 PROCEDURE — 25010000002 LEVOFLOXACIN PER 250 MG: Performed by: NURSE PRACTITIONER

## 2019-11-09 PROCEDURE — 25010000002 ENOXAPARIN PER 10 MG: Performed by: HOSPITALIST

## 2019-11-09 PROCEDURE — 25010000002 METHYLPREDNISOLONE PER 40 MG: Performed by: INTERNAL MEDICINE

## 2019-11-09 PROCEDURE — 80053 COMPREHEN METABOLIC PANEL: CPT | Performed by: HOSPITALIST

## 2019-11-09 PROCEDURE — 84145 PROCALCITONIN (PCT): CPT | Performed by: HOSPITALIST

## 2019-11-09 PROCEDURE — 94799 UNLISTED PULMONARY SVC/PX: CPT

## 2019-11-09 PROCEDURE — 99232 SBSQ HOSP IP/OBS MODERATE 35: CPT | Performed by: HOSPITALIST

## 2019-11-09 PROCEDURE — 25010000002 METHYLPREDNISOLONE PER 125 MG: Performed by: HOSPITALIST

## 2019-11-09 PROCEDURE — 25810000003 SODIUM CHLORIDE 0.9 % WITH KCL 20 MEQ 20-0.9 MEQ/L-% SOLUTION: Performed by: HOSPITALIST

## 2019-11-09 PROCEDURE — 85025 COMPLETE CBC W/AUTO DIFF WBC: CPT | Performed by: NURSE PRACTITIONER

## 2019-11-09 PROCEDURE — 84100 ASSAY OF PHOSPHORUS: CPT | Performed by: HOSPITALIST

## 2019-11-09 RX ORDER — CHOLECALCIFEROL (VITAMIN D3) 125 MCG
5 CAPSULE ORAL NIGHTLY
Status: DISCONTINUED | OUTPATIENT
Start: 2019-11-09 | End: 2019-11-13 | Stop reason: HOSPADM

## 2019-11-09 RX ORDER — METHYLPREDNISOLONE SODIUM SUCCINATE 40 MG/ML
40 INJECTION, POWDER, LYOPHILIZED, FOR SOLUTION INTRAMUSCULAR; INTRAVENOUS EVERY 6 HOURS
Status: DISCONTINUED | OUTPATIENT
Start: 2019-11-09 | End: 2019-11-10

## 2019-11-09 RX ORDER — NITROGLYCERIN 0.4 MG/1
0.4 TABLET SUBLINGUAL
Status: DISCONTINUED | OUTPATIENT
Start: 2019-11-09 | End: 2019-11-13 | Stop reason: HOSPADM

## 2019-11-09 RX ADMIN — ACETAMINOPHEN 650 MG: 325 TABLET, FILM COATED ORAL at 22:30

## 2019-11-09 RX ADMIN — IPRATROPIUM BROMIDE AND ALBUTEROL SULFATE 3 ML: .5; 3 SOLUTION RESPIRATORY (INHALATION) at 19:47

## 2019-11-09 RX ADMIN — ACETAMINOPHEN 650 MG: 325 TABLET, FILM COATED ORAL at 08:16

## 2019-11-09 RX ADMIN — GUAIFENESIN AND DEXTROMETHORPHAN 5 ML: 100; 10 SYRUP ORAL at 20:17

## 2019-11-09 RX ADMIN — IPRATROPIUM BROMIDE AND ALBUTEROL SULFATE 3 ML: .5; 3 SOLUTION RESPIRATORY (INHALATION) at 07:05

## 2019-11-09 RX ADMIN — IBUPROFEN 400 MG: 400 TABLET ORAL at 20:17

## 2019-11-09 RX ADMIN — METHYLPREDNISOLONE SODIUM SUCCINATE 60 MG: 125 INJECTION, POWDER, FOR SOLUTION INTRAMUSCULAR; INTRAVENOUS at 06:40

## 2019-11-09 RX ADMIN — LEVOFLOXACIN 750 MG: 5 INJECTION, SOLUTION INTRAVENOUS at 14:32

## 2019-11-09 RX ADMIN — IPRATROPIUM BROMIDE AND ALBUTEROL SULFATE 3 ML: .5; 3 SOLUTION RESPIRATORY (INHALATION) at 13:57

## 2019-11-09 RX ADMIN — METHYLPREDNISOLONE SODIUM SUCCINATE 40 MG: 40 INJECTION, POWDER, FOR SOLUTION INTRAMUSCULAR; INTRAVENOUS at 13:15

## 2019-11-09 RX ADMIN — GUAIFENESIN AND DEXTROMETHORPHAN 5 ML: 100; 10 SYRUP ORAL at 14:32

## 2019-11-09 RX ADMIN — METHYLPREDNISOLONE SODIUM SUCCINATE 40 MG: 40 INJECTION, POWDER, FOR SOLUTION INTRAMUSCULAR; INTRAVENOUS at 18:30

## 2019-11-09 RX ADMIN — AZTREONAM 2 G: 2 INJECTION, POWDER, LYOPHILIZED, FOR SOLUTION INTRAMUSCULAR; INTRAVENOUS at 16:05

## 2019-11-09 RX ADMIN — MELATONIN TAB 5 MG 5 MG: 5 TAB at 20:17

## 2019-11-09 RX ADMIN — METHYLPREDNISOLONE SODIUM SUCCINATE 60 MG: 125 INJECTION, POWDER, FOR SOLUTION INTRAMUSCULAR; INTRAVENOUS at 01:49

## 2019-11-09 RX ADMIN — NICOTINE 1 PATCH: 21 PATCH, EXTENDED RELEASE TRANSDERMAL at 08:16

## 2019-11-09 RX ADMIN — GUAIFENESIN AND DEXTROMETHORPHAN 5 ML: 100; 10 SYRUP ORAL at 06:39

## 2019-11-09 RX ADMIN — POTASSIUM CHLORIDE AND SODIUM CHLORIDE 75 ML/HR: 900; 150 INJECTION, SOLUTION INTRAVENOUS at 20:21

## 2019-11-09 RX ADMIN — IBUPROFEN 400 MG: 400 TABLET ORAL at 04:13

## 2019-11-09 RX ADMIN — POTASSIUM CHLORIDE AND SODIUM CHLORIDE 75 ML/HR: 900; 150 INJECTION, SOLUTION INTRAVENOUS at 05:56

## 2019-11-09 RX ADMIN — Medication 1 CAPSULE: at 08:16

## 2019-11-09 RX ADMIN — GUAIFENESIN 600 MG: 600 TABLET, EXTENDED RELEASE ORAL at 20:17

## 2019-11-09 RX ADMIN — GUAIFENESIN 600 MG: 600 TABLET, EXTENDED RELEASE ORAL at 08:16

## 2019-11-09 RX ADMIN — ENOXAPARIN SODIUM 40 MG: 40 INJECTION SUBCUTANEOUS at 08:16

## 2019-11-09 RX ADMIN — GUAIFENESIN AND DEXTROMETHORPHAN 5 ML: 100; 10 SYRUP ORAL at 01:00

## 2019-11-09 RX ADMIN — AZTREONAM 2 G: 2 INJECTION, POWDER, LYOPHILIZED, FOR SOLUTION INTRAMUSCULAR; INTRAVENOUS at 06:41

## 2019-11-09 RX ADMIN — AZTREONAM 2 G: 2 INJECTION, POWDER, LYOPHILIZED, FOR SOLUTION INTRAMUSCULAR; INTRAVENOUS at 22:30

## 2019-11-10 LAB
ALBUMIN SERPL-MCNC: 2.9 G/DL (ref 3.5–5.2)
ALBUMIN/GLOB SERPL: 1.2 G/DL
ALP SERPL-CCNC: 111 U/L (ref 39–117)
ALT SERPL W P-5'-P-CCNC: 168 U/L (ref 1–33)
ANION GAP SERPL CALCULATED.3IONS-SCNC: 9.6 MMOL/L (ref 5–15)
AST SERPL-CCNC: 56 U/L (ref 1–32)
BASOPHILS # BLD AUTO: 0.09 10*3/MM3 (ref 0–0.2)
BASOPHILS NFR BLD AUTO: 0.9 % (ref 0–1.5)
BILIRUB SERPL-MCNC: 0.5 MG/DL (ref 0.2–1.2)
BUN BLD-MCNC: 16 MG/DL (ref 6–20)
BUN/CREAT SERPL: 29.1 (ref 7–25)
CALCIUM SPEC-SCNC: 8.5 MG/DL (ref 8.6–10.5)
CHLORIDE SERPL-SCNC: 107 MMOL/L (ref 98–107)
CO2 SERPL-SCNC: 26.4 MMOL/L (ref 22–29)
CREAT BLD-MCNC: 0.55 MG/DL (ref 0.57–1)
DEPRECATED RDW RBC AUTO: 49.1 FL (ref 37–54)
EOSINOPHIL # BLD AUTO: 0 10*3/MM3 (ref 0–0.4)
EOSINOPHIL NFR BLD AUTO: 0 % (ref 0.3–6.2)
ERYTHROCYTE [DISTWIDTH] IN BLOOD BY AUTOMATED COUNT: 14.6 % (ref 12.3–15.4)
GFR SERPL CREATININE-BSD FRML MDRD: 114 ML/MIN/1.73
GLOBULIN UR ELPH-MCNC: 2.4 GM/DL
GLUCOSE BLD-MCNC: 142 MG/DL (ref 65–99)
HCT VFR BLD AUTO: 35 % (ref 34–46.6)
HGB BLD-MCNC: 11.3 G/DL (ref 12–15.9)
IMM GRANULOCYTES # BLD AUTO: 0.89 10*3/MM3 (ref 0–0.05)
IMM GRANULOCYTES NFR BLD AUTO: 9.1 % (ref 0–0.5)
LYMPHOCYTES # BLD AUTO: 1.28 10*3/MM3 (ref 0.7–3.1)
LYMPHOCYTES NFR BLD AUTO: 13 % (ref 19.6–45.3)
MCH RBC QN AUTO: 29.5 PG (ref 26.6–33)
MCHC RBC AUTO-ENTMCNC: 32.3 G/DL (ref 31.5–35.7)
MCV RBC AUTO: 91.4 FL (ref 79–97)
MONOCYTES # BLD AUTO: 0.55 10*3/MM3 (ref 0.1–0.9)
MONOCYTES NFR BLD AUTO: 5.6 % (ref 5–12)
NEUTROPHILS # BLD AUTO: 7 10*3/MM3 (ref 1.7–7)
NEUTROPHILS NFR BLD AUTO: 71.4 % (ref 42.7–76)
NRBC BLD AUTO-RTO: 0.3 /100 WBC (ref 0–0.2)
PHOSPHATE SERPL-MCNC: 2.4 MG/DL (ref 2.5–4.5)
PLAT MORPH BLD: NORMAL
PLATELET # BLD AUTO: 256 10*3/MM3 (ref 140–450)
PMV BLD AUTO: 9.4 FL (ref 6–12)
POTASSIUM BLD-SCNC: 4.3 MMOL/L (ref 3.5–5.2)
PROCALCITONIN SERPL-MCNC: 0.66 NG/ML (ref 0.1–0.25)
PROT SERPL-MCNC: 5.3 G/DL (ref 6–8.5)
RBC # BLD AUTO: 3.83 10*6/MM3 (ref 3.77–5.28)
RBC MORPH BLD: NORMAL
SODIUM BLD-SCNC: 143 MMOL/L (ref 136–145)
WBC MORPH BLD: NORMAL
WBC NRBC COR # BLD: 9.81 10*3/MM3 (ref 3.4–10.8)

## 2019-11-10 PROCEDURE — 93010 ELECTROCARDIOGRAM REPORT: CPT | Performed by: INTERNAL MEDICINE

## 2019-11-10 PROCEDURE — 25010000002 METHYLPREDNISOLONE PER 40 MG: Performed by: INTERNAL MEDICINE

## 2019-11-10 PROCEDURE — 84100 ASSAY OF PHOSPHORUS: CPT | Performed by: HOSPITALIST

## 2019-11-10 PROCEDURE — 85025 COMPLETE CBC W/AUTO DIFF WBC: CPT | Performed by: NURSE PRACTITIONER

## 2019-11-10 PROCEDURE — 25010000002 ENOXAPARIN PER 10 MG

## 2019-11-10 PROCEDURE — 85007 BL SMEAR W/DIFF WBC COUNT: CPT | Performed by: NURSE PRACTITIONER

## 2019-11-10 PROCEDURE — 93005 ELECTROCARDIOGRAM TRACING: CPT | Performed by: HOSPITALIST

## 2019-11-10 PROCEDURE — 84145 PROCALCITONIN (PCT): CPT | Performed by: HOSPITALIST

## 2019-11-10 PROCEDURE — 93005 ELECTROCARDIOGRAM TRACING: CPT | Performed by: INTERNAL MEDICINE

## 2019-11-10 PROCEDURE — 63710000001 PREDNISONE PER 5 MG: Performed by: INTERNAL MEDICINE

## 2019-11-10 PROCEDURE — 80053 COMPREHEN METABOLIC PANEL: CPT | Performed by: HOSPITALIST

## 2019-11-10 PROCEDURE — 99232 SBSQ HOSP IP/OBS MODERATE 35: CPT | Performed by: HOSPITALIST

## 2019-11-10 PROCEDURE — 94799 UNLISTED PULMONARY SVC/PX: CPT

## 2019-11-10 PROCEDURE — 25010000002 ENOXAPARIN PER 10 MG: Performed by: HOSPITALIST

## 2019-11-10 RX ORDER — DILTIAZEM HYDROCHLORIDE 5 MG/ML
10 INJECTION INTRAVENOUS ONCE
Status: COMPLETED | OUTPATIENT
Start: 2019-11-11 | End: 2019-11-10

## 2019-11-10 RX ORDER — LEVOFLOXACIN 750 MG/1
750 TABLET ORAL EVERY 24 HOURS
Status: DISCONTINUED | OUTPATIENT
Start: 2019-11-10 | End: 2019-11-13 | Stop reason: HOSPADM

## 2019-11-10 RX ADMIN — IBUPROFEN 400 MG: 400 TABLET ORAL at 03:57

## 2019-11-10 RX ADMIN — IPRATROPIUM BROMIDE AND ALBUTEROL SULFATE 3 ML: .5; 3 SOLUTION RESPIRATORY (INHALATION) at 20:40

## 2019-11-10 RX ADMIN — MELATONIN TAB 5 MG 5 MG: 5 TAB at 21:36

## 2019-11-10 RX ADMIN — METHYLPREDNISOLONE SODIUM SUCCINATE 40 MG: 40 INJECTION, POWDER, FOR SOLUTION INTRAMUSCULAR; INTRAVENOUS at 06:30

## 2019-11-10 RX ADMIN — SODIUM PHOSPHATE, MONOBASIC, MONOHYDRATE 15 MMOL: 276; 142 INJECTION, SOLUTION INTRAVENOUS at 08:52

## 2019-11-10 RX ADMIN — ENOXAPARIN SODIUM 40 MG: 40 INJECTION SUBCUTANEOUS at 08:50

## 2019-11-10 RX ADMIN — GUAIFENESIN AND DEXTROMETHORPHAN 5 ML: 100; 10 SYRUP ORAL at 05:53

## 2019-11-10 RX ADMIN — Medication 1 CAPSULE: at 08:51

## 2019-11-10 RX ADMIN — DILTIAZEM HYDROCHLORIDE 10 MG: 5 INJECTION INTRAVENOUS at 23:34

## 2019-11-10 RX ADMIN — LEVOFLOXACIN 750 MG: 750 TABLET, FILM COATED ORAL at 15:09

## 2019-11-10 RX ADMIN — AZTREONAM 2 G: 2 INJECTION, POWDER, LYOPHILIZED, FOR SOLUTION INTRAMUSCULAR; INTRAVENOUS at 06:30

## 2019-11-10 RX ADMIN — SODIUM CHLORIDE, PRESERVATIVE FREE 10 ML: 5 INJECTION INTRAVENOUS at 08:50

## 2019-11-10 RX ADMIN — GUAIFENESIN 600 MG: 600 TABLET, EXTENDED RELEASE ORAL at 20:17

## 2019-11-10 RX ADMIN — IPRATROPIUM BROMIDE AND ALBUTEROL SULFATE 3 ML: .5; 3 SOLUTION RESPIRATORY (INHALATION) at 06:56

## 2019-11-10 RX ADMIN — IBUPROFEN 400 MG: 400 TABLET ORAL at 18:33

## 2019-11-10 RX ADMIN — NICOTINE 1 PATCH: 21 PATCH, EXTENDED RELEASE TRANSDERMAL at 08:52

## 2019-11-10 RX ADMIN — GUAIFENESIN AND DEXTROMETHORPHAN 5 ML: 100; 10 SYRUP ORAL at 12:31

## 2019-11-10 RX ADMIN — IPRATROPIUM BROMIDE AND ALBUTEROL SULFATE 3 ML: .5; 3 SOLUTION RESPIRATORY (INHALATION) at 13:26

## 2019-11-10 RX ADMIN — ENOXAPARIN SODIUM 80 MG: 100 INJECTION SUBCUTANEOUS at 23:41

## 2019-11-10 RX ADMIN — METHYLPREDNISOLONE SODIUM SUCCINATE 40 MG: 40 INJECTION, POWDER, FOR SOLUTION INTRAMUSCULAR; INTRAVENOUS at 01:34

## 2019-11-10 RX ADMIN — ACETAMINOPHEN 650 MG: 325 TABLET, FILM COATED ORAL at 21:36

## 2019-11-10 RX ADMIN — GUAIFENESIN 600 MG: 600 TABLET, EXTENDED RELEASE ORAL at 08:49

## 2019-11-10 RX ADMIN — PREDNISONE 50 MG: 10 TABLET ORAL at 09:11

## 2019-11-10 RX ADMIN — GUAIFENESIN AND DEXTROMETHORPHAN 5 ML: 100; 10 SYRUP ORAL at 21:36

## 2019-11-10 RX ADMIN — GUAIFENESIN AND DEXTROMETHORPHAN 5 ML: 100; 10 SYRUP ORAL at 01:34

## 2019-11-10 RX ADMIN — DILTIAZEM HYDROCHLORIDE 5 MG/HR: 100 INJECTION, POWDER, LYOPHILIZED, FOR SOLUTION INTRAVENOUS at 23:36

## 2019-11-11 ENCOUNTER — APPOINTMENT (OUTPATIENT)
Dept: CARDIOLOGY | Facility: HOSPITAL | Age: 59
End: 2019-11-11

## 2019-11-11 LAB
ALBUMIN SERPL-MCNC: 3.2 G/DL (ref 3.5–5.2)
ALBUMIN/GLOB SERPL: 1.4 G/DL
ALP SERPL-CCNC: 103 U/L (ref 39–117)
ALT SERPL W P-5'-P-CCNC: 167 U/L (ref 1–33)
ANION GAP SERPL CALCULATED.3IONS-SCNC: 10.6 MMOL/L (ref 5–15)
APTT PPP: 30.6 SECONDS (ref 24.3–38.1)
AST SERPL-CCNC: 56 U/L (ref 1–32)
BACTERIA SPEC AEROBE CULT: NORMAL
BACTERIA SPEC AEROBE CULT: NORMAL
BH CV ECHO MEAS - AO MAX PG (FULL): 40.2 MMHG
BH CV ECHO MEAS - AO MAX PG: 43.8 MMHG
BH CV ECHO MEAS - AO MEAN PG (FULL): 23.4 MMHG
BH CV ECHO MEAS - AO MEAN PG: 25.4 MMHG
BH CV ECHO MEAS - AO ROOT AREA (BSA CORRECTED): 1.6
BH CV ECHO MEAS - AO ROOT AREA: 7.5 CM^2
BH CV ECHO MEAS - AO ROOT DIAM: 3.1 CM
BH CV ECHO MEAS - AO V2 MAX: 324.4 CM/SEC
BH CV ECHO MEAS - AO V2 MEAN: 227.8 CM/SEC
BH CV ECHO MEAS - AO V2 VTI: 61.8 CM
BH CV ECHO MEAS - AVA(I,A): 1 CM^2
BH CV ECHO MEAS - AVA(I,D): 1 CM^2
BH CV ECHO MEAS - AVA(V,A): 0.93 CM^2
BH CV ECHO MEAS - AVA(V,D): 0.93 CM^2
BH CV ECHO MEAS - BSA(HAYCOCK): 2 M^2
BH CV ECHO MEAS - BSA: 1.9 M^2
BH CV ECHO MEAS - BZI_BMI: 27.7 KILOGRAMS/M^2
BH CV ECHO MEAS - BZI_METRIC_HEIGHT: 170.2 CM
BH CV ECHO MEAS - BZI_METRIC_WEIGHT: 80.3 KG
BH CV ECHO MEAS - EDV(CUBED): 103.2 ML
BH CV ECHO MEAS - EDV(MOD-SP2): 108 ML
BH CV ECHO MEAS - EDV(MOD-SP4): 109 ML
BH CV ECHO MEAS - EDV(TEICH): 101.9 ML
BH CV ECHO MEAS - EF(CUBED): 69.4 %
BH CV ECHO MEAS - EF(MOD-BP): 50 %
BH CV ECHO MEAS - EF(MOD-SP2): 49.5 %
BH CV ECHO MEAS - EF(MOD-SP4): 53.9 %
BH CV ECHO MEAS - EF(TEICH): 61 %
BH CV ECHO MEAS - ESV(CUBED): 31.6 ML
BH CV ECHO MEAS - ESV(MOD-SP2): 54.5 ML
BH CV ECHO MEAS - ESV(MOD-SP4): 50.2 ML
BH CV ECHO MEAS - ESV(TEICH): 39.7 ML
BH CV ECHO MEAS - FS: 32.6 %
BH CV ECHO MEAS - IVS/LVPW: 0.89
BH CV ECHO MEAS - IVSD: 0.89 CM
BH CV ECHO MEAS - LAT PEAK E' VEL: 6 CM/SEC
BH CV ECHO MEAS - LV DIASTOLIC VOL/BSA (35-75): 56.8 ML/M^2
BH CV ECHO MEAS - LV MASS(C)D: 152 GRAMS
BH CV ECHO MEAS - LV MASS(C)DI: 79.2 GRAMS/M^2
BH CV ECHO MEAS - LV MAX PG: 3.7 MMHG
BH CV ECHO MEAS - LV MEAN PG: 2 MMHG
BH CV ECHO MEAS - LV SYSTOLIC VOL/BSA (12-30): 26.1 ML/M^2
BH CV ECHO MEAS - LV V1 MAX: 95.6 CM/SEC
BH CV ECHO MEAS - LV V1 MEAN: 68.3 CM/SEC
BH CV ECHO MEAS - LV V1 VTI: 20.3 CM
BH CV ECHO MEAS - LVIDD: 4.7 CM
BH CV ECHO MEAS - LVIDS: 3.2 CM
BH CV ECHO MEAS - LVLD AP2: 7.6 CM
BH CV ECHO MEAS - LVLD AP4: 7 CM
BH CV ECHO MEAS - LVLS AP2: 7.2 CM
BH CV ECHO MEAS - LVLS AP4: 6.2 CM
BH CV ECHO MEAS - LVOT AREA (M): 3.1 CM^2
BH CV ECHO MEAS - LVOT AREA: 3.1 CM^2
BH CV ECHO MEAS - LVOT DIAM: 2 CM
BH CV ECHO MEAS - LVPWD: 1 CM
BH CV ECHO MEAS - MED PEAK E' VEL: 5 CM/SEC
BH CV ECHO MEAS - MV DEC SLOPE: 711 CM/SEC^2
BH CV ECHO MEAS - MV DEC TIME: 264 SEC
BH CV ECHO MEAS - MV E MAX VEL: 165 CM/SEC
BH CV ECHO MEAS - MV MAX PG: 17.8 MMHG
BH CV ECHO MEAS - MV MEAN PG: 8 MMHG
BH CV ECHO MEAS - MV P1/2T MAX VEL: 201 CM/SEC
BH CV ECHO MEAS - MV P1/2T: 82.8 MSEC
BH CV ECHO MEAS - MV V2 MAX: 211 CM/SEC
BH CV ECHO MEAS - MV V2 MEAN: 132 CM/SEC
BH CV ECHO MEAS - MV V2 VTI: 40.3 CM
BH CV ECHO MEAS - MVA P1/2T LCG: 1.1 CM^2
BH CV ECHO MEAS - MVA(P1/2T): 2.7 CM^2
BH CV ECHO MEAS - MVA(VTI): 1.6 CM^2
BH CV ECHO MEAS - RAP SYSTOLE: 3 MMHG
BH CV ECHO MEAS - RVSP: 32 MMHG
BH CV ECHO MEAS - SI(AO): 243 ML/M^2
BH CV ECHO MEAS - SI(CUBED): 37.3 ML/M^2
BH CV ECHO MEAS - SI(LVOT): 33.2 ML/M^2
BH CV ECHO MEAS - SI(MOD-SP2): 27.9 ML/M^2
BH CV ECHO MEAS - SI(MOD-SP4): 30.6 ML/M^2
BH CV ECHO MEAS - SI(TEICH): 32.4 ML/M^2
BH CV ECHO MEAS - SV(AO): 466.4 ML
BH CV ECHO MEAS - SV(CUBED): 71.6 ML
BH CV ECHO MEAS - SV(LVOT): 63.8 ML
BH CV ECHO MEAS - SV(MOD-SP2): 53.5 ML
BH CV ECHO MEAS - SV(MOD-SP4): 58.8 ML
BH CV ECHO MEAS - SV(TEICH): 62.1 ML
BH CV ECHO MEAS - TAPSE (>1.6): 2 CM
BH CV ECHO MEAS - TR MAX VEL: 272.3 CM/SEC
BH CV ECHO MEASUREMENTS AVERAGE E/E' RATIO: 30
BH CV XLRA - RV BASE: 3.6 CM
BH CV XLRA - TDI S': 8.6 CM/SEC
BILIRUB SERPL-MCNC: 0.5 MG/DL (ref 0.2–1.2)
BUN BLD-MCNC: 12 MG/DL (ref 6–20)
BUN/CREAT SERPL: 24.5 (ref 7–25)
CALCIUM SPEC-SCNC: 8.7 MG/DL (ref 8.6–10.5)
CHLORIDE SERPL-SCNC: 102 MMOL/L (ref 98–107)
CO2 SERPL-SCNC: 28.4 MMOL/L (ref 22–29)
CREAT BLD-MCNC: 0.49 MG/DL (ref 0.57–1)
DEPRECATED RDW RBC AUTO: 48.8 FL (ref 37–54)
ERYTHROCYTE [DISTWIDTH] IN BLOOD BY AUTOMATED COUNT: 14.6 % (ref 12.3–15.4)
GFR SERPL CREATININE-BSD FRML MDRD: 130 ML/MIN/1.73
GLOBULIN UR ELPH-MCNC: 2.3 GM/DL
GLUCOSE BLD-MCNC: 107 MG/DL (ref 65–99)
HCT VFR BLD AUTO: 37.4 % (ref 34–46.6)
HGB BLD-MCNC: 12.3 G/DL (ref 12–15.9)
INR PPP: 1.11 (ref 0.9–1.1)
LEFT ATRIUM VOLUME INDEX: 44 ML/M2
MCH RBC QN AUTO: 29.8 PG (ref 26.6–33)
MCHC RBC AUTO-ENTMCNC: 32.9 G/DL (ref 31.5–35.7)
MCV RBC AUTO: 90.6 FL (ref 79–97)
PHOSPHATE SERPL-MCNC: 2.4 MG/DL (ref 2.5–4.5)
PLATELET # BLD AUTO: 295 10*3/MM3 (ref 140–450)
PMV BLD AUTO: 9.3 FL (ref 6–12)
POTASSIUM BLD-SCNC: 3.7 MMOL/L (ref 3.5–5.2)
PROCALCITONIN SERPL-MCNC: 0.3 NG/ML (ref 0.1–0.25)
PROT SERPL-MCNC: 5.5 G/DL (ref 6–8.5)
PROTHROMBIN TIME: 14 SECONDS (ref 12.1–15)
RBC # BLD AUTO: 4.13 10*6/MM3 (ref 3.77–5.28)
SODIUM BLD-SCNC: 141 MMOL/L (ref 136–145)
TROPONIN T SERPL-MCNC: 0.02 NG/ML (ref 0–0.03)
TSH SERPL DL<=0.05 MIU/L-ACNC: 2.26 UIU/ML (ref 0.27–4.2)
WBC NRBC COR # BLD: 15.96 10*3/MM3 (ref 3.4–10.8)

## 2019-11-11 PROCEDURE — 85610 PROTHROMBIN TIME: CPT | Performed by: INTERNAL MEDICINE

## 2019-11-11 PROCEDURE — 93010 ELECTROCARDIOGRAM REPORT: CPT | Performed by: INTERNAL MEDICINE

## 2019-11-11 PROCEDURE — 84145 PROCALCITONIN (PCT): CPT | Performed by: HOSPITALIST

## 2019-11-11 PROCEDURE — 25010000002 PERFLUTREN (DEFINITY) 8.476 MG IN SODIUM CHLORIDE 0.9 % 10 ML INJECTION: Performed by: HOSPITALIST

## 2019-11-11 PROCEDURE — 99254 IP/OBS CNSLTJ NEW/EST MOD 60: CPT | Performed by: INTERNAL MEDICINE

## 2019-11-11 PROCEDURE — 84443 ASSAY THYROID STIM HORMONE: CPT | Performed by: INTERNAL MEDICINE

## 2019-11-11 PROCEDURE — 93306 TTE W/DOPPLER COMPLETE: CPT | Performed by: INTERNAL MEDICINE

## 2019-11-11 PROCEDURE — 84100 ASSAY OF PHOSPHORUS: CPT | Performed by: HOSPITALIST

## 2019-11-11 PROCEDURE — 85027 COMPLETE CBC AUTOMATED: CPT | Performed by: INTERNAL MEDICINE

## 2019-11-11 PROCEDURE — 93306 TTE W/DOPPLER COMPLETE: CPT

## 2019-11-11 PROCEDURE — 94799 UNLISTED PULMONARY SVC/PX: CPT

## 2019-11-11 PROCEDURE — 25010000002 AMIODARONE IN DEXTROSE 5% 360-4.14 MG/200ML-% SOLUTION: Performed by: INTERNAL MEDICINE

## 2019-11-11 PROCEDURE — 63710000001 PREDNISONE PER 1 MG: Performed by: INTERNAL MEDICINE

## 2019-11-11 PROCEDURE — 99231 SBSQ HOSP IP/OBS SF/LOW 25: CPT | Performed by: HOSPITALIST

## 2019-11-11 PROCEDURE — 25010000002 AMIODARONE IN DEXTROSE 5% 150-4.21 MG/100ML-% SOLUTION: Performed by: INTERNAL MEDICINE

## 2019-11-11 PROCEDURE — 85730 THROMBOPLASTIN TIME PARTIAL: CPT | Performed by: INTERNAL MEDICINE

## 2019-11-11 PROCEDURE — 63710000001 PREDNISONE PER 5 MG: Performed by: INTERNAL MEDICINE

## 2019-11-11 PROCEDURE — 93005 ELECTROCARDIOGRAM TRACING: CPT | Performed by: HOSPITALIST

## 2019-11-11 PROCEDURE — 80053 COMPREHEN METABOLIC PANEL: CPT | Performed by: HOSPITALIST

## 2019-11-11 PROCEDURE — 84484 ASSAY OF TROPONIN QUANT: CPT | Performed by: INTERNAL MEDICINE

## 2019-11-11 RX ORDER — DILTIAZEM HYDROCHLORIDE 60 MG/1
60 TABLET, FILM COATED ORAL EVERY 8 HOURS SCHEDULED
Status: DISCONTINUED | OUTPATIENT
Start: 2019-11-11 | End: 2019-11-12

## 2019-11-11 RX ORDER — AMIODARONE HYDROCHLORIDE 200 MG/1
400 TABLET ORAL 2 TIMES DAILY WITH MEALS
Status: DISCONTINUED | OUTPATIENT
Start: 2019-11-11 | End: 2019-11-13 | Stop reason: HOSPADM

## 2019-11-11 RX ORDER — TRAMADOL HYDROCHLORIDE 50 MG/1
TABLET ORAL
Status: COMPLETED
Start: 2019-11-11 | End: 2019-11-11

## 2019-11-11 RX ORDER — TRAMADOL HYDROCHLORIDE 50 MG/1
50 TABLET ORAL EVERY 8 HOURS PRN
Status: DISCONTINUED | OUTPATIENT
Start: 2019-11-11 | End: 2019-11-13 | Stop reason: HOSPADM

## 2019-11-11 RX ADMIN — IPRATROPIUM BROMIDE AND ALBUTEROL SULFATE 3 ML: .5; 3 SOLUTION RESPIRATORY (INHALATION) at 19:00

## 2019-11-11 RX ADMIN — LEVOFLOXACIN 750 MG: 750 TABLET, FILM COATED ORAL at 15:21

## 2019-11-11 RX ADMIN — AMIODARONE HYDROCHLORIDE 1 MG/MIN: 1.8 INJECTION, SOLUTION INTRAVENOUS at 00:46

## 2019-11-11 RX ADMIN — GUAIFENESIN 600 MG: 600 TABLET, EXTENDED RELEASE ORAL at 09:00

## 2019-11-11 RX ADMIN — GUAIFENESIN AND DEXTROMETHORPHAN 5 ML: 100; 10 SYRUP ORAL at 20:13

## 2019-11-11 RX ADMIN — DILTIAZEM HYDROCHLORIDE 60 MG: 60 TABLET, FILM COATED ORAL at 14:36

## 2019-11-11 RX ADMIN — AMIODARONE HYDROCHLORIDE 400 MG: 200 TABLET ORAL at 17:05

## 2019-11-11 RX ADMIN — NICOTINE 1 PATCH: 21 PATCH, EXTENDED RELEASE TRANSDERMAL at 10:01

## 2019-11-11 RX ADMIN — Medication 1 CAPSULE: at 09:01

## 2019-11-11 RX ADMIN — IPRATROPIUM BROMIDE AND ALBUTEROL SULFATE 3 ML: .5; 3 SOLUTION RESPIRATORY (INHALATION) at 14:02

## 2019-11-11 RX ADMIN — IPRATROPIUM BROMIDE AND ALBUTEROL SULFATE 3 ML: .5; 3 SOLUTION RESPIRATORY (INHALATION) at 07:15

## 2019-11-11 RX ADMIN — PERFLUTREN 2 ML: 6.52 INJECTION, SUSPENSION INTRAVENOUS at 10:00

## 2019-11-11 RX ADMIN — DILTIAZEM HYDROCHLORIDE 60 MG: 60 TABLET, FILM COATED ORAL at 21:00

## 2019-11-11 RX ADMIN — POTASSIUM PHOSPHATE, MONOBASIC AND POTASSIUM PHOSPHATE, DIBASIC 15 MMOL: 224; 236 INJECTION, SOLUTION INTRAVENOUS at 15:16

## 2019-11-11 RX ADMIN — GUAIFENESIN 600 MG: 600 TABLET, EXTENDED RELEASE ORAL at 20:13

## 2019-11-11 RX ADMIN — TRAMADOL HYDROCHLORIDE 50 MG: 50 TABLET, FILM COATED ORAL at 23:42

## 2019-11-11 RX ADMIN — AMIODARONE HYDROCHLORIDE 0.5 MG/MIN: 1.8 INJECTION, SOLUTION INTRAVENOUS at 06:46

## 2019-11-11 RX ADMIN — AMIODARONE HYDROCHLORIDE 150 MG: 1.5 INJECTION, SOLUTION INTRAVENOUS at 00:35

## 2019-11-11 RX ADMIN — MELATONIN TAB 5 MG 5 MG: 5 TAB at 23:24

## 2019-11-11 RX ADMIN — ACETAMINOPHEN 650 MG: 325 TABLET, FILM COATED ORAL at 08:09

## 2019-11-11 RX ADMIN — PREDNISONE 50 MG: 10 TABLET ORAL at 08:59

## 2019-11-11 RX ADMIN — RIVAROXABAN 20 MG: 20 TABLET, FILM COATED ORAL at 18:35

## 2019-11-11 RX ADMIN — DILTIAZEM HYDROCHLORIDE 10 MG/HR: 100 INJECTION, POWDER, LYOPHILIZED, FOR SOLUTION INTRAVENOUS at 08:09

## 2019-11-11 RX ADMIN — ACETAMINOPHEN 650 MG: 325 TABLET, FILM COATED ORAL at 14:38

## 2019-11-11 RX ADMIN — AMIODARONE HYDROCHLORIDE 400 MG: 200 TABLET ORAL at 09:58

## 2019-11-11 RX ADMIN — AMIODARONE HYDROCHLORIDE 1 MG/MIN: 1.8 INJECTION, SOLUTION INTRAVENOUS at 06:28

## 2019-11-11 RX ADMIN — ACETAMINOPHEN 650 MG: 325 TABLET, FILM COATED ORAL at 20:13

## 2019-11-12 ENCOUNTER — APPOINTMENT (OUTPATIENT)
Dept: GENERAL RADIOLOGY | Facility: HOSPITAL | Age: 59
End: 2019-11-12

## 2019-11-12 LAB
PHOSPHATE SERPL-MCNC: 3.4 MG/DL (ref 2.5–4.5)
PROCALCITONIN SERPL-MCNC: 0.17 NG/ML (ref 0.1–0.25)

## 2019-11-12 PROCEDURE — 99232 SBSQ HOSP IP/OBS MODERATE 35: CPT | Performed by: NURSE PRACTITIONER

## 2019-11-12 PROCEDURE — 84100 ASSAY OF PHOSPHORUS: CPT | Performed by: HOSPITALIST

## 2019-11-12 PROCEDURE — 97165 OT EVAL LOW COMPLEX 30 MIN: CPT

## 2019-11-12 PROCEDURE — 71045 X-RAY EXAM CHEST 1 VIEW: CPT

## 2019-11-12 PROCEDURE — 63710000001 PREDNISONE PER 1 MG: Performed by: INTERNAL MEDICINE

## 2019-11-12 PROCEDURE — 97161 PT EVAL LOW COMPLEX 20 MIN: CPT

## 2019-11-12 PROCEDURE — 94762 N-INVAS EAR/PLS OXIMTRY CONT: CPT

## 2019-11-12 PROCEDURE — 99232 SBSQ HOSP IP/OBS MODERATE 35: CPT | Performed by: HOSPITALIST

## 2019-11-12 PROCEDURE — 94799 UNLISTED PULMONARY SVC/PX: CPT

## 2019-11-12 PROCEDURE — 63710000001 PREDNISONE PER 5 MG: Performed by: INTERNAL MEDICINE

## 2019-11-12 PROCEDURE — 84145 PROCALCITONIN (PCT): CPT | Performed by: HOSPITALIST

## 2019-11-12 RX ORDER — BUDESONIDE 0.5 MG/2ML
INHALANT ORAL
Status: COMPLETED
Start: 2019-11-12 | End: 2019-11-12

## 2019-11-12 RX ORDER — BUDESONIDE 0.5 MG/2ML
0.5 INHALANT ORAL
Status: DISCONTINUED | OUTPATIENT
Start: 2019-11-12 | End: 2019-11-13 | Stop reason: HOSPADM

## 2019-11-12 RX ORDER — ARFORMOTEROL TARTRATE 15 UG/2ML
15 SOLUTION RESPIRATORY (INHALATION)
Status: DISCONTINUED | OUTPATIENT
Start: 2019-11-12 | End: 2019-11-13 | Stop reason: HOSPADM

## 2019-11-12 RX ORDER — PREDNISONE 20 MG/1
20 TABLET ORAL
Status: DISCONTINUED | OUTPATIENT
Start: 2019-11-13 | End: 2019-11-13 | Stop reason: HOSPADM

## 2019-11-12 RX ORDER — DILTIAZEM HYDROCHLORIDE 60 MG/1
60 TABLET, FILM COATED ORAL EVERY 6 HOURS SCHEDULED
Status: DISCONTINUED | OUTPATIENT
Start: 2019-11-12 | End: 2019-11-13

## 2019-11-12 RX ADMIN — AMIODARONE HYDROCHLORIDE 400 MG: 200 TABLET ORAL at 17:25

## 2019-11-12 RX ADMIN — ARFORMOTEROL TARTRATE 15 MCG: 15 SOLUTION RESPIRATORY (INHALATION) at 20:24

## 2019-11-12 RX ADMIN — GUAIFENESIN 600 MG: 600 TABLET, EXTENDED RELEASE ORAL at 21:01

## 2019-11-12 RX ADMIN — DILTIAZEM HYDROCHLORIDE 60 MG: 60 TABLET, FILM COATED ORAL at 17:25

## 2019-11-12 RX ADMIN — TRAMADOL HYDROCHLORIDE 50 MG: 50 TABLET, FILM COATED ORAL at 17:28

## 2019-11-12 RX ADMIN — PREDNISONE 50 MG: 10 TABLET ORAL at 08:53

## 2019-11-12 RX ADMIN — ACETAMINOPHEN 650 MG: 325 TABLET, FILM COATED ORAL at 05:34

## 2019-11-12 RX ADMIN — AMIODARONE HYDROCHLORIDE 400 MG: 200 TABLET ORAL at 08:53

## 2019-11-12 RX ADMIN — MELATONIN TAB 5 MG 5 MG: 5 TAB at 22:26

## 2019-11-12 RX ADMIN — NICOTINE 1 PATCH: 21 PATCH, EXTENDED RELEASE TRANSDERMAL at 08:59

## 2019-11-12 RX ADMIN — RIVAROXABAN 20 MG: 20 TABLET, FILM COATED ORAL at 17:25

## 2019-11-12 RX ADMIN — GUAIFENESIN 600 MG: 600 TABLET, EXTENDED RELEASE ORAL at 08:53

## 2019-11-12 RX ADMIN — BUDESONIDE 0.5 MG: 0.5 SUSPENSION RESPIRATORY (INHALATION) at 20:25

## 2019-11-12 RX ADMIN — Medication 1 CAPSULE: at 08:53

## 2019-11-12 RX ADMIN — IPRATROPIUM BROMIDE AND ALBUTEROL SULFATE 3 ML: .5; 3 SOLUTION RESPIRATORY (INHALATION) at 07:31

## 2019-11-12 RX ADMIN — DILTIAZEM HYDROCHLORIDE 60 MG: 60 TABLET, FILM COATED ORAL at 05:10

## 2019-11-12 RX ADMIN — TRAMADOL HYDROCHLORIDE 50 MG: 50 TABLET, FILM COATED ORAL at 08:57

## 2019-11-12 RX ADMIN — LEVOFLOXACIN 750 MG: 750 TABLET, FILM COATED ORAL at 14:28

## 2019-11-13 VITALS
TEMPERATURE: 98.6 F | WEIGHT: 179.6 LBS | HEIGHT: 67 IN | BODY MASS INDEX: 28.19 KG/M2 | DIASTOLIC BLOOD PRESSURE: 71 MMHG | OXYGEN SATURATION: 99 % | HEART RATE: 94 BPM | SYSTOLIC BLOOD PRESSURE: 104 MMHG | RESPIRATION RATE: 28 BRPM

## 2019-11-13 PROCEDURE — 63710000001 PREDNISONE PER 1 MG: Performed by: INTERNAL MEDICINE

## 2019-11-13 PROCEDURE — 99232 SBSQ HOSP IP/OBS MODERATE 35: CPT | Performed by: NURSE PRACTITIONER

## 2019-11-13 PROCEDURE — 94799 UNLISTED PULMONARY SVC/PX: CPT

## 2019-11-13 PROCEDURE — 94618 PULMONARY STRESS TESTING: CPT

## 2019-11-13 RX ORDER — NICOTINE 21 MG/24HR
1 PATCH, TRANSDERMAL 24 HOURS TRANSDERMAL
Qty: 28 PATCH | Refills: 0 | Status: SHIPPED | OUTPATIENT
Start: 2019-11-14 | End: 2019-12-12

## 2019-11-13 RX ORDER — GUAIFENESIN 600 MG/1
600 TABLET, EXTENDED RELEASE ORAL EVERY 12 HOURS SCHEDULED
Qty: 60 TABLET | Refills: 0 | Status: SHIPPED | OUTPATIENT
Start: 2019-11-13 | End: 2019-12-13

## 2019-11-13 RX ORDER — L.ACID,PARA/B.BIFIDUM/S.THERM 8B CELL
1 CAPSULE ORAL DAILY
Qty: 14 CAPSULE | Refills: 0 | Status: SHIPPED | OUTPATIENT
Start: 2019-11-14 | End: 2019-11-21

## 2019-11-13 RX ORDER — GUAIFENESIN/DEXTROMETHORPHAN 100-10MG/5
5 SYRUP ORAL EVERY 4 HOURS PRN
Start: 2019-11-13 | End: 2020-01-13 | Stop reason: HOSPADM

## 2019-11-13 RX ORDER — LEVOFLOXACIN 750 MG/1
750 TABLET ORAL EVERY 24 HOURS
Qty: 4 TABLET | Refills: 0 | Status: SHIPPED | OUTPATIENT
Start: 2019-11-13 | End: 2019-11-17

## 2019-11-13 RX ORDER — DILTIAZEM HYDROCHLORIDE 300 MG/1
300 CAPSULE, COATED, EXTENDED RELEASE ORAL NIGHTLY
Qty: 30 CAPSULE | Refills: 0 | OUTPATIENT
Start: 2019-11-13 | End: 2021-12-03 | Stop reason: HOSPADM

## 2019-11-13 RX ORDER — AMIODARONE HYDROCHLORIDE 400 MG/1
400 TABLET ORAL 2 TIMES DAILY WITH MEALS
Qty: 28 TABLET | Refills: 0 | Status: SHIPPED | OUTPATIENT
Start: 2019-11-13 | End: 2019-11-27

## 2019-11-13 RX ORDER — AMIODARONE HYDROCHLORIDE 400 MG/1
400 TABLET ORAL 2 TIMES DAILY WITH MEALS
Qty: 60 TABLET | Refills: 0 | Status: CANCELLED | OUTPATIENT
Start: 2019-11-13 | End: 2019-12-13

## 2019-11-13 RX ORDER — PREDNISONE 10 MG/1
TABLET ORAL
Qty: 12 TABLET | Refills: 0 | Status: SHIPPED | OUTPATIENT
Start: 2019-11-13 | End: 2019-11-21

## 2019-11-13 RX ADMIN — BUDESONIDE 0.5 MG: 0.5 SUSPENSION RESPIRATORY (INHALATION) at 10:02

## 2019-11-13 RX ADMIN — AMIODARONE HYDROCHLORIDE 400 MG: 200 TABLET ORAL at 09:03

## 2019-11-13 RX ADMIN — GUAIFENESIN AND DEXTROMETHORPHAN 5 ML: 100; 10 SYRUP ORAL at 11:23

## 2019-11-13 RX ADMIN — TRAMADOL HYDROCHLORIDE 50 MG: 50 TABLET, FILM COATED ORAL at 09:12

## 2019-11-13 RX ADMIN — GUAIFENESIN 600 MG: 600 TABLET, EXTENDED RELEASE ORAL at 09:03

## 2019-11-13 RX ADMIN — ARFORMOTEROL TARTRATE 15 MCG: 15 SOLUTION RESPIRATORY (INHALATION) at 10:07

## 2019-11-13 RX ADMIN — DILTIAZEM HYDROCHLORIDE 60 MG: 60 TABLET, FILM COATED ORAL at 00:57

## 2019-11-13 RX ADMIN — PREDNISONE 20 MG: 20 TABLET ORAL at 09:03

## 2019-11-13 RX ADMIN — DILTIAZEM HYDROCHLORIDE 60 MG: 60 TABLET, FILM COATED ORAL at 06:44

## 2019-11-13 RX ADMIN — Medication 1 CAPSULE: at 09:04

## 2019-11-13 RX ADMIN — NICOTINE 1 PATCH: 21 PATCH, EXTENDED RELEASE TRANSDERMAL at 09:04

## 2019-11-14 ENCOUNTER — READMISSION MANAGEMENT (OUTPATIENT)
Dept: CALL CENTER | Facility: HOSPITAL | Age: 59
End: 2019-11-14

## 2019-11-14 ENCOUNTER — TELEPHONE (OUTPATIENT)
Dept: CARDIOLOGY | Facility: CLINIC | Age: 59
End: 2019-11-14

## 2019-11-14 ENCOUNTER — TELEPHONE (OUTPATIENT)
Dept: FAMILY MEDICINE CLINIC | Facility: CLINIC | Age: 59
End: 2019-11-14

## 2019-11-14 NOTE — OUTREACH NOTE
Prep Survey      Responses   Facility patient discharged from?  [facility]   Is LACE score < 7 ?  No   Is patient eligible?  Yes   Discharge diagnosis  sepsis   Does the patient have one of the following disease processes/diagnoses(primary or secondary)?  Sepsis   Does the patient have Home health ordered?  No   Is there a DME ordered?  Yes   What DME was ordered?  [facility]   Comments regarding appointments  see AVS   Medication alerts for this patient  pacerone, cardizem, mucinex, robitussin, deltasone, xarelto    Prep survey completed?  Yes          Mare Rojas RN

## 2019-11-14 NOTE — TELEPHONE ENCOUNTER
I dont recall this patient from the old office. But she just got out of the hospital on several new medications one being xarelto. Apparently insurance is requiring a PA, patient called and asked us to do it at first I told her hospital would do it, but patient states they wont. I then instructed her to call the cardiologist office.

## 2019-11-14 NOTE — TELEPHONE ENCOUNTER
Pt called regarding Xarelt 20MG.    She is needing a PA for Xarelto.     I spoke to the pharmacy. They will give her a couple pill to last her until the PA is approved.    I spoke to the pt and she is aware.    Lyn Can you start the PA?    Thanks Adriana

## 2019-11-15 ENCOUNTER — READMISSION MANAGEMENT (OUTPATIENT)
Dept: CALL CENTER | Facility: HOSPITAL | Age: 59
End: 2019-11-15

## 2019-11-15 NOTE — OUTREACH NOTE
Sepsis Week 1 Survey      Responses   Facility patient discharged from?  [facility]   Does the patient have one of the following disease processes/diagnoses(primary or secondary)?  Sepsis   Is there a successful TCM telephone encounter documented?  No   Week 1 attempt successful?  No   Unsuccessful attempts  Attempt 1          Fidelina Merchant RN

## 2019-11-18 ENCOUNTER — READMISSION MANAGEMENT (OUTPATIENT)
Dept: CALL CENTER | Facility: HOSPITAL | Age: 59
End: 2019-11-18

## 2019-11-19 ENCOUNTER — OFFICE (OUTPATIENT)
Dept: URBAN - METROPOLITAN AREA CLINIC 4 | Facility: CLINIC | Age: 59
End: 2019-11-19
Payer: COMMERCIAL

## 2019-11-19 VITALS — DIASTOLIC BLOOD PRESSURE: 64 MMHG | WEIGHT: 184 LBS | HEIGHT: 63 IN | SYSTOLIC BLOOD PRESSURE: 122 MMHG

## 2019-11-19 DIAGNOSIS — K75.4 AUTOIMMUNE HEPATITIS: ICD-10-CM

## 2019-11-19 DIAGNOSIS — R15.1 FECAL SMEARING: ICD-10-CM

## 2019-11-19 DIAGNOSIS — Z90.49 ACQUIRED ABSENCE OF OTHER SPECIFIED PARTS OF DIGESTIVE TRACT: ICD-10-CM

## 2019-11-19 DIAGNOSIS — Z72.0 TOBACCO USE: ICD-10-CM

## 2019-11-19 DIAGNOSIS — K74.60 UNSPECIFIED CIRRHOSIS OF LIVER: ICD-10-CM

## 2019-11-19 DIAGNOSIS — R15.2 FECAL URGENCY: ICD-10-CM

## 2019-11-19 PROCEDURE — 99214 OFFICE O/P EST MOD 30 MIN: CPT | Performed by: NURSE PRACTITIONER

## 2019-11-19 RX ORDER — NADOLOL 20 MG/1
20 TABLET ORAL
Qty: 30 | Refills: 11 | Status: ACTIVE
Start: 2019-11-19

## 2019-11-19 RX ORDER — RIFAXIMIN 550 MG/1
TABLET ORAL
Qty: 60 | Refills: 11 | Status: ACTIVE
Start: 2019-11-19

## 2019-11-19 RX ORDER — SPIRONOLACTONE 100 MG/1
100 TABLET, FILM COATED ORAL
Qty: 30 | Refills: 11 | Status: ACTIVE
Start: 2019-11-19

## 2019-11-19 RX ORDER — CHLORDIAZEPOXIDE HYDROCHLORIDE AND CLIDINIUM BROMIDE 5; 2.5 MG/1; MG/1
CAPSULE ORAL
Qty: 60 | Refills: 6 | Status: ACTIVE

## 2019-11-19 NOTE — TELEPHONE ENCOUNTER
PA has been approved and I have faxed the approval to [facility] pharmacy and sent to scanning

## 2019-11-21 ENCOUNTER — READMISSION MANAGEMENT (OUTPATIENT)
Dept: CALL CENTER | Facility: HOSPITAL | Age: 59
End: 2019-11-21

## 2019-11-21 ENCOUNTER — OFFICE VISIT (OUTPATIENT)
Dept: FAMILY MEDICINE CLINIC | Facility: CLINIC | Age: 59
End: 2019-11-21

## 2019-11-21 VITALS
WEIGHT: 172.2 LBS | DIASTOLIC BLOOD PRESSURE: 70 MMHG | OXYGEN SATURATION: 94 % | BODY MASS INDEX: 27.03 KG/M2 | SYSTOLIC BLOOD PRESSURE: 110 MMHG | HEART RATE: 68 BPM | TEMPERATURE: 97.5 F | HEIGHT: 67 IN

## 2019-11-21 DIAGNOSIS — Z23 NEED FOR INFLUENZA VACCINATION: ICD-10-CM

## 2019-11-21 DIAGNOSIS — Z09 HOSPITAL DISCHARGE FOLLOW-UP: Primary | ICD-10-CM

## 2019-11-21 DIAGNOSIS — J18.9 PNEUMONIA OF BOTH LUNGS DUE TO INFECTIOUS ORGANISM, UNSPECIFIED PART OF LUNG: ICD-10-CM

## 2019-11-21 DIAGNOSIS — R19.5 POSITIVE COLORECTAL CANCER SCREENING USING COLOGUARD TEST: ICD-10-CM

## 2019-11-21 DIAGNOSIS — J41.1 MUCOPURULENT CHRONIC BRONCHITIS (HCC): ICD-10-CM

## 2019-11-21 DIAGNOSIS — A41.9 SEPSIS, DUE TO UNSPECIFIED ORGANISM, UNSPECIFIED WHETHER ACUTE ORGAN DYSFUNCTION PRESENT (HCC): ICD-10-CM

## 2019-11-21 LAB
ALBUMIN SERPL-MCNC: 4.2 G/DL (ref 3.5–5.2)
ALBUMIN/GLOB SERPL: 1.7 G/DL
ALP SERPL-CCNC: 72 U/L (ref 39–117)
ALT SERPL-CCNC: 17 U/L (ref 1–33)
AST SERPL-CCNC: 10 U/L (ref 1–32)
BASOPHILS # BLD AUTO: 0.05 10*3/MM3 (ref 0–0.2)
BASOPHILS NFR BLD AUTO: 0.5 % (ref 0–1.5)
BILIRUB SERPL-MCNC: 0.3 MG/DL (ref 0.2–1.2)
BUN SERPL-MCNC: 16 MG/DL (ref 6–20)
BUN/CREAT SERPL: 18.8 (ref 7–25)
CALCIUM SERPL-MCNC: 9.4 MG/DL (ref 8.6–10.5)
CHLORIDE SERPL-SCNC: 99 MMOL/L (ref 98–107)
CO2 SERPL-SCNC: 30 MMOL/L (ref 22–29)
CREAT SERPL-MCNC: 0.85 MG/DL (ref 0.57–1)
EOSINOPHIL # BLD AUTO: 0.26 10*3/MM3 (ref 0–0.4)
EOSINOPHIL NFR BLD AUTO: 2.7 % (ref 0.3–6.2)
ERYTHROCYTE [DISTWIDTH] IN BLOOD BY AUTOMATED COUNT: 13.6 % (ref 12.3–15.4)
GLOBULIN SER CALC-MCNC: 2.5 GM/DL
GLUCOSE SERPL-MCNC: 79 MG/DL (ref 65–99)
HCT VFR BLD AUTO: 37.7 % (ref 34–46.6)
HGB BLD-MCNC: 12.8 G/DL (ref 12–15.9)
IMM GRANULOCYTES # BLD AUTO: 0.12 10*3/MM3 (ref 0–0.05)
IMM GRANULOCYTES NFR BLD AUTO: 1.3 % (ref 0–0.5)
LYMPHOCYTES # BLD AUTO: 2.21 10*3/MM3 (ref 0.7–3.1)
LYMPHOCYTES NFR BLD AUTO: 23.3 % (ref 19.6–45.3)
MCH RBC QN AUTO: 30.5 PG (ref 26.6–33)
MCHC RBC AUTO-ENTMCNC: 34 G/DL (ref 31.5–35.7)
MCV RBC AUTO: 90 FL (ref 79–97)
MONOCYTES # BLD AUTO: 1.14 10*3/MM3 (ref 0.1–0.9)
MONOCYTES NFR BLD AUTO: 12 % (ref 5–12)
NEUTROPHILS # BLD AUTO: 5.69 10*3/MM3 (ref 1.7–7)
NEUTROPHILS NFR BLD AUTO: 60.2 % (ref 42.7–76)
NRBC BLD AUTO-RTO: 0.1 /100 WBC (ref 0–0.2)
PLATELET # BLD AUTO: 477 10*3/MM3 (ref 140–450)
POTASSIUM SERPL-SCNC: 4.6 MMOL/L (ref 3.5–5.2)
PROT SERPL-MCNC: 6.7 G/DL (ref 6–8.5)
RBC # BLD AUTO: 4.19 10*6/MM3 (ref 3.77–5.28)
SODIUM SERPL-SCNC: 139 MMOL/L (ref 136–145)
WBC # BLD AUTO: 9.47 10*3/MM3 (ref 3.4–10.8)

## 2019-11-21 PROCEDURE — 90471 IMMUNIZATION ADMIN: CPT | Performed by: NURSE PRACTITIONER

## 2019-11-21 PROCEDURE — 90686 IIV4 VACC NO PRSV 0.5 ML IM: CPT | Performed by: NURSE PRACTITIONER

## 2019-11-21 PROCEDURE — 99214 OFFICE O/P EST MOD 30 MIN: CPT | Performed by: NURSE PRACTITIONER

## 2019-11-21 RX ORDER — ECHINACEA PURPUREA EXTRACT 125 MG
1 TABLET ORAL AS NEEDED
Qty: 59 ML | Refills: 12 | Status: ON HOLD | OUTPATIENT
Start: 2019-11-21 | End: 2021-11-29

## 2019-11-21 RX ORDER — DEXTROMETHORPHAN HYDROBROMIDE AND PROMETHAZINE HYDROCHLORIDE 15; 6.25 MG/5ML; MG/5ML
5 SYRUP ORAL 4 TIMES DAILY PRN
Qty: 240 ML | Refills: 0 | Status: SHIPPED | OUTPATIENT
Start: 2019-11-21 | End: 2020-01-13 | Stop reason: HOSPADM

## 2019-11-21 RX ORDER — TRAMADOL HYDROCHLORIDE 50 MG/1
TABLET ORAL
Refills: 0 | COMMUNITY
Start: 2019-09-30 | End: 2020-01-23 | Stop reason: SDUPTHER

## 2019-11-21 RX ORDER — MELOXICAM 15 MG/1
15 TABLET ORAL DAILY
COMMUNITY
End: 2020-01-13 | Stop reason: HOSPADM

## 2019-11-21 RX ORDER — TRAZODONE HYDROCHLORIDE 50 MG/1
50-100 TABLET ORAL NIGHTLY
Qty: 60 TABLET | Refills: 1 | Status: SHIPPED | OUTPATIENT
Start: 2019-11-21 | End: 2020-04-13

## 2019-11-21 NOTE — OUTREACH NOTE
Sepsis Week 2 Survey      Responses   Facility patient discharged from?  [facility]   Does the patient have one of the following disease processes/diagnoses(primary or secondary)?  Sepsis   Week 2 attempt successful?  No   Unsuccessful attempts  Attempt 1          Genesis Valle RN

## 2019-11-21 NOTE — PROGRESS NOTES
Subjective   Akila Amezcua is a 59 y.o. female.     Chief Complaint   Patient presents with   • Follow-up     hospital        History of Present Illness     Patient is here today for hospital follow up.  She was admitted at [facility] on  and discharged on  for sepsis and pneumonia.    Hasn't smoked since she has been in the hospital.        Also was given oxygen at 2lpm in hospital.      CARDIOLOGY : has to reschedule.     PULMONOLOGY:2019    Since being home has just been feeling okay, just weak.    Having some dizziness after taking AM meds.  Only last about 1 hour.      Also wanted to discuss sleep medication.     And wanted something for cough.    Forgot to bring BM, but daughter will bring it this afternoon.      Didn't get involved with PT or home health.       Breathing has been better since she has been home.        The following portions of the patient's history were reviewed and updated as appropriate: allergies, current medications, past family history, past medical history, past social history, past surgical history and problem list.    Past Medical History:   Diagnosis Date   • Anxiety    • Arthritis    • COPD (chronic obstructive pulmonary disease) (CMS/HCC)     LUNG BLEBS   • DJD (degenerative joint disease)    • Gallstones     SCHEDULED FOR SX   • GERD (gastroesophageal reflux disease)    • Injury of back     degenertive disc disease   • Murmur     BENIGN   • Panic attacks    • Rheumatoid arthritis (CMS/HCC)    • Seizures (CMS/HCC)     LAST ONE 2017       Past Surgical History:   Procedure Laterality Date   • APPENDECTOMY     •  SECTION     • CHOLECYSTECTOMY N/A 2017    Procedure: LAPAROSCOPIC CHOLECYSTECTOMY, laparoscopic adhesiolysis requiring 45 minutes of operative time;  Surgeon: Liliana Monroy MD;  Location: [facility];  Service:    • ESOPHAGEAL ATRESIA REPAIR     • PLEURAL BIOPSY     • PLEURAL SCARIFICATION         Family History   Problem Relation Age  of Onset   • Lung cancer Father        Social History     Socioeconomic History   • Marital status:      Spouse name: Not on file   • Number of children: Not on file   • Years of education: Not on file   • Highest education level: Not on file   Tobacco Use   • Smoking status: Current Every Day Smoker     Packs/day: 1.00     Years: 35.00     Pack years: 35.00   Substance and Sexual Activity   • Alcohol use: No   • Drug use: No   • Sexual activity: Defer         Current Outpatient Medications:   •  albuterol (PROVENTIL) (2.5 MG/3ML) 0.083% nebulizer solution, Take 2.5 mg by nebulization Every 4 (Four) Hours As Needed for wheezing., Disp: , Rfl:   •  amiodarone (PACERONE) 400 MG tablet, Take 1 tablet by mouth 2 (Two) Times a Day With Meals for 14 days., Disp: 28 tablet, Rfl: 0  •  dilTIAZem CD (CARDIZEM CD) 300 MG 24 hr capsule, Take 1 capsule by mouth Every Night for 30 days., Disp: 30 capsule, Rfl: 0  •  Fluticasone Furoate-Vilanterol (BREO ELLIPTA IN), Inhale 1 puff Daily., Disp: , Rfl:   •  guaiFENesin (MUCINEX) 600 MG 12 hr tablet, Take 1 tablet by mouth Every 12 (Twelve) Hours for 30 days., Disp: 60 tablet, Rfl: 0  •  guaiFENesin-dextromethorphan (ROBITUSSIN DM) 100-10 MG/5ML syrup, Take 5 mL by mouth Every 4 (Four) Hours As Needed for Cough., Disp: , Rfl:   •  ipratropium-albuterol (DUO-NEB) 0.5-2.5 mg/mL nebulizer, Take 3 mL by nebulization Every 4 (Four) Hours As Needed for wheezing., Disp: , Rfl:   •  meloxicam (MOBIC) 15 MG tablet, Take 15 mg by mouth Daily., Disp: , Rfl:   •  nicotine (NICODERM CQ) 21 MG/24HR patch, Place 1 patch on the skin as directed by provider Daily for 28 days., Disp: 28 patch, Rfl: 0  •  rivaroxaban (XARELTO) 20 MG tablet, Take 1 tablet by mouth Daily With Dinner., Disp: 30 tablet, Rfl: 0  •  traMADol (ULTRAM) 50 MG tablet, TAKE 1 TABLET BY MOUTH 3 TIMES A DAY AS NEEDED FOR PAIN, Disp: , Rfl: 0  •  venlafaxine (EFFEXOR) 37.5 MG tablet, Take 37.5 mg by mouth 2 (Two) Times a  "Day., Disp: , Rfl:   •  Misc. Devices (BATH BENCH WITH BACK) misc, 1 Units Daily As Needed (shower)., Disp: 1 each, Rfl: 1  •  promethazine-dextromethorphan (PROMETHAZINE-DM) 6.25-15 MG/5ML syrup, Take 5 mL by mouth 4 (Four) Times a Day As Needed for Cough., Disp: 240 mL, Rfl: 0  •  sodium chloride (OCEAN NASAL SPRAY) 0.65 % nasal spray, 1 spray into the nostril(s) as directed by provider As Needed for Congestion., Disp: 59 mL, Rfl: 12  •  traZODone (DESYREL) 50 MG tablet, Take 1-2 tablets by mouth Every Night., Disp: 60 tablet, Rfl: 1    Review of Systems   Constitutional: Positive for fatigue. Negative for fever.   Respiratory: Positive for cough, shortness of breath and wheezing.    Cardiovascular: Negative for chest pain and leg swelling.   Gastrointestinal: Negative for abdominal pain, constipation, diarrhea, nausea and vomiting.   Genitourinary: Negative for dysuria and urgency.   Skin: Negative.    Neurological: Positive for weakness (denies falls).   Psychiatric/Behavioral: Positive for sleep disturbance. Negative for suicidal ideas and depressed mood. The patient is nervous/anxious.        Objective   Vitals:    11/21/19 0906   BP: 110/70   Pulse: 68   Temp: 97.5 °F (36.4 °C)   SpO2: 94%   Weight: 78.1 kg (172 lb 3.2 oz)   Height: 170.2 cm (67\")     Body mass index is 26.97 kg/m².  Physical Exam   Constitutional: She is oriented to person, place, and time. She appears well-developed and well-nourished.   Cardiovascular: Normal rate, regular rhythm, normal heart sounds and intact distal pulses.   Pulmonary/Chest: Effort normal. She has decreased breath sounds. She has wheezes in the right lower field and the left lower field.   Neurological: She is alert and oriented to person, place, and time.   Psychiatric: She has a normal mood and affect. Her speech is normal and behavior is normal. Judgment and thought content normal. Cognition and memory are normal.         Assessment/Plan   Akila was seen today for " follow-up.    Diagnoses and all orders for this visit:    Hospital discharge follow-up  -     CBC & Differential  -     Comprehensive metabolic panel    Pneumonia of both lungs due to infectious organism, unspecified part of lung  -     CBC & Differential  -     Comprehensive metabolic panel    Sepsis, due to unspecified organism, unspecified whether acute organ dysfunction present (CMS/[facility])  -     CBC & Differential  -     Comprehensive metabolic panel    Mucopurulent chronic bronchitis (CMS/[facility])  -     CBC & Differential  -     Comprehensive metabolic panel    Need for influenza vaccination    Other orders  -     promethazine-dextromethorphan (PROMETHAZINE-DM) 6.25-15 MG/5ML syrup; Take 5 mL by mouth 4 (Four) Times a Day As Needed for Cough.  -     traZODone (DESYREL) 50 MG tablet; Take 1-2 tablets by mouth Every Night.  -     Misc. Devices (BATH BENCH WITH BACK) misc; 1 Units Daily As Needed (shower).  -     sodium chloride (OCEAN NASAL SPRAY) 0.65 % nasal spray; 1 spray into the nostril(s) as directed by provider As Needed for Congestion.  -     Fluarix/Fluzone/Afluria Quad>6 Months      Will trial trazodone prn sleep.    Bath bench ordered for prn home use. Discussed PT versus home health, but patient declines need.  Has a lot of family support.      Okay for flu shot today.    Discussed using nasal spray for dryness with oxygen. DO NOT use vaseline.       Follow up as needed.   Will call with results of labs.      Keep follow up with cardiology and pulmonology.           Patient Instructions   Home Oxygen Use, Adult  When a medical condition keeps you from getting enough oxygen, your health care provider may instruct you to take extra oxygen at home. Your health care provider will let you know:  · When to take oxygen.  · For how long to take oxygen.  · How quickly oxygen should be delivered (flow rate), in liters per minute (LPM or L/M).  Home oxygen can be given through:  · A mask.  · A nasal cannula. This is  a device or tube that goes in the nostrils.  · A transtracheal catheter. This is a small, flexible tube placed in the trachea.  · A tracheostomy. This is a surgically made opening in the trachea.  These devices are connected with tubing to an oxygen source, such as:  · A tank. Tanks hold oxygen in gas form. They must be replaced when the oxygen is used up.  · A liquid oxygen device. This holds oxygen in liquid form. It must be replaced when the oxygen is used up.  · An oxygen concentrator machine. This filters oxygen in the room. It uses electricity, so you must have a backup cylinder of oxygen in case the power goes out.  Supplies needed:  To use oxygen, you will need:  · A mask, nasal cannula, transtracheal catheter, or tracheostomy.  · An oxygen tank, a liquid oxygen device, or an oxygen concentrator.  · The tape that your health care provider recommends (optional).  If you use a transtracheal catheter and your prescribed flow rate is 1 LPM or greater, you will also need a humidifier.  Risks and complications  · Fire. This can happen if the oxygen is exposed to a heat source, flame, or spark.  · Injury to skin. This can happen if liquid oxygen touches your skin.  · Organ damage. This can happen if you get too little oxygen.  How to use oxygen  Your health care provider or a representative from your medical device company will show you how to use your oxygen device. Follow her or his instructions. The instructions may look something like this:  1. Wash your hands.  2. If you use an oxygen concentrator, make sure it is plugged in.  3. Place one end of the tube into the port on the tank, device, or machine.  4. Place the mask over your nose and mouth. Or, place the nasal cannula and secure it with tape if instructed. If you use a tracheostomy or transtracheal catheter, connect it to the oxygen source as directed.  5. Make sure the liter-flow setting on the machine is at the level prescribed by your health care  "provider.  6. Turn on the machine or adjust the knob on the tank or device to the correct liter-flow setting.  7. When you are done, turn off and unplug the machine, or turn the knob to OFF.  How to clean and care for the oxygen supplies  Nasal cannula  · Clean it with a warm, wet cloth daily or as needed.  · Wash it with a liquid soap once a week.  · Rinse it thoroughly once or twice a week.  · Replace it every 2-4 weeks.  · If you have an infection, such as a cold or pneumonia, change the cannula when you get better.  Mask  · Replace it every 2-4 weeks.  · If you have an infection, such as a cold or pneumonia, change the mask when you get better.  Humidifier bottle  · Wash the bottle between each refill:  ? Wash it with soap and warm water.  ? Rinse it thoroughly.  ? Disinfect it and its top.  ? Air-dry it.  · Make sure it is dry before you refill it.  Oxygen concentrator  · Clean the air filter at least twice a week according to directions from your home medical equipment and service company.  · Wipe down the cabinet every day. To do this:  ? Unplug the unit.  ? Wipe down the cabinet with a damp cloth.  ? Dry the cabinet.  Other equipment  · Change any extra tubing every 1-3 months.  · Follow instructions from your health care provider about taking care of any other equipment.  Safety tips  Fire safety tips    · Keep your oxygen and oxygen supplies at least 5 ft away from sources of heat, flames, and lehman at all times.  · Do not allow smoking near your oxygen. Put up \"no smoking\" signs in your home. Avoid smoking areas when in public.  · Do not use materials that can burn (are flammable) while you use oxygen.  · When you go to a restaurant with portable oxygen, ask to be seated in the nonsmoking section.  · Keep a fire extinguisher close by. Let your fire department know that you have oxygen in your home.  · Test your home smoke detectors regularly.  Traveling  · Secure your oxygen tank in the vehicle so that " it does not move around. Follow instructions from your medical device company about how to safely secure your tank.  · Make sure you have enough oxygen for the amount of time you will be away from home.  · If you are planning air travel, contact the airline to find out if they allow the use of an approved portable oxygen concentrator. You may also need documents from your health care provider and medical device company before you travel.  General safety tips  · If you use an oxygen cylinder, make sure it is in a stand or secured to an object that will not move (fixed object).  · If you use liquid oxygen, make sure its container is kept upright.  · If you use an oxygen concentrator:  ? Tell your electric company. Make sure you are given priority service in the event that your power goes out.  ? Avoid using extension cords, if possible.  Follow these instructions at home:  · Use oxygen only as told by your health care provider.  · Do not use alcohol or other drugs that make you relax (sedating drugs) unless instructed. They can slow down your breathing rate and make it hard to get in enough oxygen.  · Know how and when to order a refill of oxygen.  · Always keep a spare tank of oxygen. Plan ahead for holidays when you may not be able to get a prescription filled.  · Use water-based lubricants on your lips or nostrils. Do not use oil-based products like petroleum jelly.  · To prevent skin irritation on your cheeks or behind your ears, tuck some gauze under the tubing.  Contact a health care provider if:  · You get headaches often.  · You have shortness of breath.  · You have a lasting cough.  · You have anxiety.  · You are sleepy all the time.  · You develop an illness that affects your breathing.  · You cannot exercise at your regular level.  · You are restless.  · You have difficult or irregular breathing, and it is getting worse.  · You have a fever.  · You have persistent redness under your nose.  Get help right  away if:  · You are confused.  · You have blue lips or fingernails.  · You are struggling to breathe.  Summary  · Your health care provider or a representative from your medical device company will show you how to use your oxygen device. Follow her or his instructions.  · If you use an oxygen concentrator, make sure it is plugged in.  · Make sure the liter-flow setting on the machine is at the level prescribed by your health care provider.  · Keep your oxygen and oxygen supplies at least 5 ft away from sources of heat, flames, and lehman at all times.  This information is not intended to replace advice given to you by your health care provider. Make sure you discuss any questions you have with your health care provider.  Document Released: 03/09/2005 Document Revised: 06/06/2019 Document Reviewed: 07/11/2017  [employer] Interactive Patient Education © 2019 [employer]      COPD and Physical Activity  Chronic obstructive pulmonary disease (COPD) is a long-term (chronic) condition that affects the lungs. COPD is a general term that can be used to describe many different lung problems that cause lung swelling (inflammation) and limit airflow, including chronic bronchitis and emphysema.  The main symptom of COPD is shortness of breath, which makes it harder to do even simple tasks. This can also make it harder to exercise and be active. Talk with your health care provider about treatments to help you breathe better and actions you can take to prevent breathing problems during physical activity.  What are the benefits of exercising with COPD?  Exercising regularly is an important part of a healthy lifestyle. You can still exercise and do physical activities even though you have COPD. Exercise and physical activity improve your shortness of breath by increasing blood flow (circulation). This causes your heart to pump more oxygen through your body. Moderate exercise can improve your:  · Oxygen use.  · Energy  level.  · Shortness of breath.  · Strength in your breathing muscles.  · Heart health.  · Sleep.  · Self-esteem and feelings of self-worth.  · Depression, stress, and anxiety levels.  Exercise can benefit everyone with COPD. The severity of your disease may affect how hard you can exercise, especially at first, but everyone can benefit. Talk with your health care provider about how much exercise is safe for you, and which activities and exercises are safe for you.  What actions can I take to prevent breathing problems during physical activity?  · Sign up for a pulmonary rehabilitation program. This type of program may include:  ? Education about lung diseases.  ? Exercise classes that teach you how to exercise and be more active while improving your breathing. This usually involves:  § Exercise using your lower extremities, such as a stationary bicycle.  § About 30 minutes of exercise, 2 to 5 times per week, for 6 to 12 weeks  § Strength training, such as push ups or leg lifts.  ? Nutrition education.  ? Group classes in which you can talk with others who also have COPD and learn ways to manage stress.  · If you use an oxygen tank, you should use it while you exercise. Work with your health care provider to adjust your oxygen for your physical activity. Your resting flow rate is different from your flow rate during physical activity.  · While you are exercising:  ? Take slow breaths.  ? Pace yourself and do not try to go too fast.  ? Purse your lips while breathing out. Pursing your lips is similar to a kissing or whistling position.  ? If doing exercise that uses a quick burst of effort, such as weight lifting:  § Breathe in before starting the exercise.  § Breathe out during the hardest part of the exercise (such as raising the weights).  Where to find support  You can find support for exercising with COPD from:  · Your health care provider.  · A pulmonary rehabilitation program.  · Your local health department or  community health programs.  · Support groups, online or in-person. Your health care provider may be able to recommend support groups.  Where to find more information  You can find more information about exercising with COPD from:  · American Lung Association: lung.org.  · COPD Foundation: copdfoundation.org.  Contact a health care provider if:  · Your symptoms get worse.  · You have chest pain.  · You have nausea.  · You have a fever.  · You have trouble talking or catching your breath.  · You want to start a new exercise program or a new activity.  Summary  · COPD is a general term that can be used to describe many different lung problems that cause lung swelling (inflammation) and limit airflow. This includes chronic bronchitis and emphysema.  · Exercise and physical activity improve your shortness of breath by increasing blood flow (circulation). This causes your heart to provide more oxygen to your body.  · Contact your health care provider before starting any exercise program or new activity. Ask your health care provider what exercises and activities are safe for you.  This information is not intended to replace advice given to you by your health care provider. Make sure you discuss any questions you have with your health care provider.  Document Released: 01/10/2019 Document Revised: 01/10/2019 Document Reviewed: 01/10/2019  [employer] Interactive Patient Education © 2019 [employer]

## 2019-11-21 NOTE — PATIENT INSTRUCTIONS
Home Oxygen Use, Adult  When a medical condition keeps you from getting enough oxygen, your health care provider may instruct you to take extra oxygen at home. Your health care provider will let you know:  · When to take oxygen.  · For how long to take oxygen.  · How quickly oxygen should be delivered (flow rate), in liters per minute (LPM or L/M).  Home oxygen can be given through:  · A mask.  · A nasal cannula. This is a device or tube that goes in the nostrils.  · A transtracheal catheter. This is a small, flexible tube placed in the trachea.  · A tracheostomy. This is a surgically made opening in the trachea.  These devices are connected with tubing to an oxygen source, such as:  · A tank. Tanks hold oxygen in gas form. They must be replaced when the oxygen is used up.  · A liquid oxygen device. This holds oxygen in liquid form. It must be replaced when the oxygen is used up.  · An oxygen concentrator machine. This filters oxygen in the room. It uses electricity, so you must have a backup cylinder of oxygen in case the power goes out.  Supplies needed:  To use oxygen, you will need:  · A mask, nasal cannula, transtracheal catheter, or tracheostomy.  · An oxygen tank, a liquid oxygen device, or an oxygen concentrator.  · The tape that your health care provider recommends (optional).  If you use a transtracheal catheter and your prescribed flow rate is 1 LPM or greater, you will also need a humidifier.  Risks and complications  · Fire. This can happen if the oxygen is exposed to a heat source, flame, or spark.  · Injury to skin. This can happen if liquid oxygen touches your skin.  · Organ damage. This can happen if you get too little oxygen.  How to use oxygen  Your health care provider or a representative from your medical device company will show you how to use your oxygen device. Follow her or his instructions. The instructions may look something like this:  1. Wash your hands.  2. If you use an oxygen  "concentrator, make sure it is plugged in.  3. Place one end of the tube into the port on the tank, device, or machine.  4. Place the mask over your nose and mouth. Or, place the nasal cannula and secure it with tape if instructed. If you use a tracheostomy or transtracheal catheter, connect it to the oxygen source as directed.  5. Make sure the liter-flow setting on the machine is at the level prescribed by your health care provider.  6. Turn on the machine or adjust the knob on the tank or device to the correct liter-flow setting.  7. When you are done, turn off and unplug the machine, or turn the knob to OFF.  How to clean and care for the oxygen supplies  Nasal cannula  · Clean it with a warm, wet cloth daily or as needed.  · Wash it with a liquid soap once a week.  · Rinse it thoroughly once or twice a week.  · Replace it every 2-4 weeks.  · If you have an infection, such as a cold or pneumonia, change the cannula when you get better.  Mask  · Replace it every 2-4 weeks.  · If you have an infection, such as a cold or pneumonia, change the mask when you get better.  Humidifier bottle  · Wash the bottle between each refill:  ? Wash it with soap and warm water.  ? Rinse it thoroughly.  ? Disinfect it and its top.  ? Air-dry it.  · Make sure it is dry before you refill it.  Oxygen concentrator  · Clean the air filter at least twice a week according to directions from your home medical equipment and service company.  · Wipe down the cabinet every day. To do this:  ? Unplug the unit.  ? Wipe down the cabinet with a damp cloth.  ? Dry the cabinet.  Other equipment  · Change any extra tubing every 1-3 months.  · Follow instructions from your health care provider about taking care of any other equipment.  Safety tips  Fire safety tips    · Keep your oxygen and oxygen supplies at least 5 ft away from sources of heat, flames, and lehman at all times.  · Do not allow smoking near your oxygen. Put up \"no smoking\" signs in " your home. Avoid smoking areas when in public.  · Do not use materials that can burn (are flammable) while you use oxygen.  · When you go to a restaurant with portable oxygen, ask to be seated in the nonsmoking section.  · Keep a fire extinguisher close by. Let your fire department know that you have oxygen in your home.  · Test your home smoke detectors regularly.  Traveling  · Secure your oxygen tank in the vehicle so that it does not move around. Follow instructions from your medical device company about how to safely secure your tank.  · Make sure you have enough oxygen for the amount of time you will be away from home.  · If you are planning air travel, contact the airline to find out if they allow the use of an approved portable oxygen concentrator. You may also need documents from your health care provider and medical device company before you travel.  General safety tips  · If you use an oxygen cylinder, make sure it is in a stand or secured to an object that will not move (fixed object).  · If you use liquid oxygen, make sure its container is kept upright.  · If you use an oxygen concentrator:  ? Tell your electric company. Make sure you are given priority service in the event that your power goes out.  ? Avoid using extension cords, if possible.  Follow these instructions at home:  · Use oxygen only as told by your health care provider.  · Do not use alcohol or other drugs that make you relax (sedating drugs) unless instructed. They can slow down your breathing rate and make it hard to get in enough oxygen.  · Know how and when to order a refill of oxygen.  · Always keep a spare tank of oxygen. Plan ahead for holidays when you may not be able to get a prescription filled.  · Use water-based lubricants on your lips or nostrils. Do not use oil-based products like petroleum jelly.  · To prevent skin irritation on your cheeks or behind your ears, tuck some gauze under the tubing.  Contact a health care  provider if:  · You get headaches often.  · You have shortness of breath.  · You have a lasting cough.  · You have anxiety.  · You are sleepy all the time.  · You develop an illness that affects your breathing.  · You cannot exercise at your regular level.  · You are restless.  · You have difficult or irregular breathing, and it is getting worse.  · You have a fever.  · You have persistent redness under your nose.  Get help right away if:  · You are confused.  · You have blue lips or fingernails.  · You are struggling to breathe.  Summary  · Your health care provider or a representative from your medical device company will show you how to use your oxygen device. Follow her or his instructions.  · If you use an oxygen concentrator, make sure it is plugged in.  · Make sure the liter-flow setting on the machine is at the level prescribed by your health care provider.  · Keep your oxygen and oxygen supplies at least 5 ft away from sources of heat, flames, and lehman at all times.  This information is not intended to replace advice given to you by your health care provider. Make sure you discuss any questions you have with your health care provider.  Document Released: 03/09/2005 Document Revised: 06/06/2019 Document Reviewed: 07/11/2017  [employer] Interactive Patient Education © 2019 [employer]      COPD and Physical Activity  Chronic obstructive pulmonary disease (COPD) is a long-term (chronic) condition that affects the lungs. COPD is a general term that can be used to describe many different lung problems that cause lung swelling (inflammation) and limit airflow, including chronic bronchitis and emphysema.  The main symptom of COPD is shortness of breath, which makes it harder to do even simple tasks. This can also make it harder to exercise and be active. Talk with your health care provider about treatments to help you breathe better and actions you can take to prevent breathing problems during physical  activity.  What are the benefits of exercising with COPD?  Exercising regularly is an important part of a healthy lifestyle. You can still exercise and do physical activities even though you have COPD. Exercise and physical activity improve your shortness of breath by increasing blood flow (circulation). This causes your heart to pump more oxygen through your body. Moderate exercise can improve your:  · Oxygen use.  · Energy level.  · Shortness of breath.  · Strength in your breathing muscles.  · Heart health.  · Sleep.  · Self-esteem and feelings of self-worth.  · Depression, stress, and anxiety levels.  Exercise can benefit everyone with COPD. The severity of your disease may affect how hard you can exercise, especially at first, but everyone can benefit. Talk with your health care provider about how much exercise is safe for you, and which activities and exercises are safe for you.  What actions can I take to prevent breathing problems during physical activity?  · Sign up for a pulmonary rehabilitation program. This type of program may include:  ? Education about lung diseases.  ? Exercise classes that teach you how to exercise and be more active while improving your breathing. This usually involves:  § Exercise using your lower extremities, such as a stationary bicycle.  § About 30 minutes of exercise, 2 to 5 times per week, for 6 to 12 weeks  § Strength training, such as push ups or leg lifts.  ? Nutrition education.  ? Group classes in which you can talk with others who also have COPD and learn ways to manage stress.  · If you use an oxygen tank, you should use it while you exercise. Work with your health care provider to adjust your oxygen for your physical activity. Your resting flow rate is different from your flow rate during physical activity.  · While you are exercising:  ? Take slow breaths.  ? Pace yourself and do not try to go too fast.  ? Purse your lips while breathing out. Pursing your lips is  similar to a kissing or whistling position.  ? If doing exercise that uses a quick burst of effort, such as weight lifting:  § Breathe in before starting the exercise.  § Breathe out during the hardest part of the exercise (such as raising the weights).  Where to find support  You can find support for exercising with COPD from:  · Your health care provider.  · A pulmonary rehabilitation program.  · Your local health department or community health programs.  · Support groups, online or in-person. Your health care provider may be able to recommend support groups.  Where to find more information  You can find more information about exercising with COPD from:  · American Lung Association: lung.org.  · COPD Foundation: copdfoundation.org.  Contact a health care provider if:  · Your symptoms get worse.  · You have chest pain.  · You have nausea.  · You have a fever.  · You have trouble talking or catching your breath.  · You want to start a new exercise program or a new activity.  Summary  · COPD is a general term that can be used to describe many different lung problems that cause lung swelling (inflammation) and limit airflow. This includes chronic bronchitis and emphysema.  · Exercise and physical activity improve your shortness of breath by increasing blood flow (circulation). This causes your heart to provide more oxygen to your body.  · Contact your health care provider before starting any exercise program or new activity. Ask your health care provider what exercises and activities are safe for you.  This information is not intended to replace advice given to you by your health care provider. Make sure you discuss any questions you have with your health care provider.  Document Released: 01/10/2019 Document Revised: 01/10/2019 Document Reviewed: 01/10/2019  [employer] Interactive Patient Education © 2019 [employer]

## 2019-11-22 ENCOUNTER — READMISSION MANAGEMENT (OUTPATIENT)
Dept: CALL CENTER | Facility: HOSPITAL | Age: 59
End: 2019-11-22

## 2019-11-22 NOTE — OUTREACH NOTE
Sepsis Week 2 Survey      Responses   Facility patient discharged from?  [facility]   Does the patient have one of the following disease processes/diagnoses(primary or secondary)?  Sepsis   Week 2 attempt successful?  No   Unsuccessful attempts  Attempt 2          Kelsey Lopez RN

## 2019-11-26 ENCOUNTER — TRANSCRIBE ORDERS (OUTPATIENT)
Dept: DIABETES SERVICES | Facility: HOSPITAL | Age: 59
End: 2019-11-26

## 2019-11-26 DIAGNOSIS — E74.31 SUCRASE-ISOMALTASE DEFICIENCY: Primary | ICD-10-CM

## 2020-01-06 ENCOUNTER — HOSPITAL ENCOUNTER (INPATIENT)
Facility: HOSPITAL | Age: 60
LOS: 7 days | Discharge: HOME | End: 2020-01-13
Attending: EMERGENCY MEDICINE | Admitting: INTERNAL MEDICINE

## 2020-01-06 ENCOUNTER — APPOINTMENT (OUTPATIENT)
Dept: GENERAL RADIOLOGY | Facility: HOSPITAL | Age: 60
End: 2020-01-06

## 2020-01-06 DIAGNOSIS — R65.10 SIRS (SYSTEMIC INFLAMMATORY RESPONSE SYNDROME) (HCC): ICD-10-CM

## 2020-01-06 DIAGNOSIS — R09.02 COPD WITH HYPOXIA (HCC): ICD-10-CM

## 2020-01-06 DIAGNOSIS — J44.0 CHRONIC OBSTRUCTIVE PULMONARY DISEASE WITH ACUTE LOWER RESPIRATORY INFECTION (HCC): ICD-10-CM

## 2020-01-06 DIAGNOSIS — G47.34 NOCTURNAL HYPOXIA: ICD-10-CM

## 2020-01-06 DIAGNOSIS — J44.9 COPD WITH HYPOXIA (HCC): ICD-10-CM

## 2020-01-06 DIAGNOSIS — J18.9 PNEUMONIA OF RIGHT UPPER LOBE DUE TO INFECTIOUS ORGANISM: Primary | ICD-10-CM

## 2020-01-06 PROBLEM — R07.1 CHEST PAIN ON BREATHING: Status: ACTIVE | Noted: 2020-01-06

## 2020-01-06 PROBLEM — M06.9 RHEUMATOID ARTHRITIS INVOLVING MULTIPLE SITES (HCC): Status: ACTIVE | Noted: 2020-01-06

## 2020-01-06 LAB
ALBUMIN SERPL-MCNC: 3.4 G/DL (ref 3.5–5.2)
ALBUMIN/GLOB SERPL: 0.8 G/DL
ALP SERPL-CCNC: 159 U/L (ref 39–117)
ALT SERPL W P-5'-P-CCNC: 13 U/L (ref 1–33)
ANION GAP SERPL CALCULATED.3IONS-SCNC: 15.2 MMOL/L (ref 5–15)
APTT PPP: 38.3 SECONDS (ref 24.3–38.1)
AST SERPL-CCNC: 16 U/L (ref 1–32)
B PARAPERT DNA SPEC QL NAA+PROBE: NOT DETECTED
B PERT DNA SPEC QL NAA+PROBE: NOT DETECTED
BASOPHILS # BLD AUTO: 0.03 10*3/MM3 (ref 0–0.2)
BASOPHILS NFR BLD AUTO: 0.1 % (ref 0–1.5)
BILIRUB SERPL-MCNC: 1.6 MG/DL (ref 0.2–1.2)
BUN BLD-MCNC: 14 MG/DL (ref 6–20)
BUN/CREAT SERPL: 16.7 (ref 7–25)
C PNEUM DNA NPH QL NAA+NON-PROBE: NOT DETECTED
CALCIUM SPEC-SCNC: 9 MG/DL (ref 8.6–10.5)
CHLORIDE SERPL-SCNC: 98 MMOL/L (ref 98–107)
CO2 SERPL-SCNC: 19.8 MMOL/L (ref 22–29)
CREAT BLD-MCNC: 0.84 MG/DL (ref 0.57–1)
D-LACTATE SERPL-SCNC: 1.2 MMOL/L (ref 0.5–2)
DEPRECATED RDW RBC AUTO: 48.1 FL (ref 37–54)
EOSINOPHIL # BLD AUTO: 0.12 10*3/MM3 (ref 0–0.4)
EOSINOPHIL NFR BLD AUTO: 0.6 % (ref 0.3–6.2)
ERYTHROCYTE [DISTWIDTH] IN BLOOD BY AUTOMATED COUNT: 14.3 % (ref 12.3–15.4)
FLUAV AG NPH QL: NEGATIVE
FLUAV H1 2009 PAND RNA NPH QL NAA+PROBE: NOT DETECTED
FLUAV H1 HA GENE NPH QL NAA+PROBE: NOT DETECTED
FLUAV H3 RNA NPH QL NAA+PROBE: NOT DETECTED
FLUAV SUBTYP SPEC NAA+PROBE: NOT DETECTED
FLUBV AG NPH QL IA: NEGATIVE
FLUBV RNA ISLT QL NAA+PROBE: NOT DETECTED
GFR SERPL CREATININE-BSD FRML MDRD: 70 ML/MIN/1.73
GLOBULIN UR ELPH-MCNC: 4.3 GM/DL
GLUCOSE BLD-MCNC: 112 MG/DL (ref 65–99)
HADV DNA SPEC NAA+PROBE: NOT DETECTED
HCOV 229E RNA SPEC QL NAA+PROBE: NOT DETECTED
HCOV HKU1 RNA SPEC QL NAA+PROBE: NOT DETECTED
HCOV NL63 RNA SPEC QL NAA+PROBE: NOT DETECTED
HCOV OC43 RNA SPEC QL NAA+PROBE: NOT DETECTED
HCT VFR BLD AUTO: 38.9 % (ref 34–46.6)
HGB BLD-MCNC: 12.7 G/DL (ref 12–15.9)
HMPV RNA NPH QL NAA+NON-PROBE: NOT DETECTED
HOLD SPECIMEN: NORMAL
HOLD SPECIMEN: NORMAL
HPIV1 RNA SPEC QL NAA+PROBE: NOT DETECTED
HPIV2 RNA SPEC QL NAA+PROBE: NOT DETECTED
HPIV3 RNA NPH QL NAA+PROBE: NOT DETECTED
HPIV4 P GENE NPH QL NAA+PROBE: NOT DETECTED
IMM GRANULOCYTES # BLD AUTO: 0.09 10*3/MM3 (ref 0–0.05)
IMM GRANULOCYTES NFR BLD AUTO: 0.4 % (ref 0–0.5)
INR PPP: 1.22 (ref 0.9–1.1)
LYMPHOCYTES # BLD AUTO: 1.12 10*3/MM3 (ref 0.7–3.1)
LYMPHOCYTES NFR BLD AUTO: 5.3 % (ref 19.6–45.3)
M PNEUMO IGG SER IA-ACNC: NOT DETECTED
MCH RBC QN AUTO: 29.5 PG (ref 26.6–33)
MCHC RBC AUTO-ENTMCNC: 32.6 G/DL (ref 31.5–35.7)
MCV RBC AUTO: 90.3 FL (ref 79–97)
MONOCYTES # BLD AUTO: 1.74 10*3/MM3 (ref 0.1–0.9)
MONOCYTES NFR BLD AUTO: 8.2 % (ref 5–12)
NEUTROPHILS # BLD AUTO: 18.21 10*3/MM3 (ref 1.7–7)
NEUTROPHILS NFR BLD AUTO: 85.4 % (ref 42.7–76)
PLATELET # BLD AUTO: 301 10*3/MM3 (ref 140–450)
PMV BLD AUTO: 9.1 FL (ref 6–12)
POTASSIUM BLD-SCNC: 3.4 MMOL/L (ref 3.5–5.2)
PROCALCITONIN SERPL-MCNC: 1.63 NG/ML (ref 0.1–0.25)
PROT SERPL-MCNC: 7.7 G/DL (ref 6–8.5)
PROTHROMBIN TIME: 15.2 SECONDS (ref 12.1–15)
RBC # BLD AUTO: 4.31 10*6/MM3 (ref 3.77–5.28)
RHINOVIRUS RNA SPEC NAA+PROBE: NOT DETECTED
RSV RNA NPH QL NAA+NON-PROBE: NOT DETECTED
SODIUM BLD-SCNC: 133 MMOL/L (ref 136–145)
TROPONIN T SERPL-MCNC: <0.01 NG/ML (ref 0–0.03)
WBC NRBC COR # BLD: 21.31 10*3/MM3 (ref 3.4–10.8)
WHOLE BLOOD HOLD SPECIMEN: NORMAL
WHOLE BLOOD HOLD SPECIMEN: NORMAL

## 2020-01-06 PROCEDURE — 99223 1ST HOSP IP/OBS HIGH 75: CPT | Performed by: INTERNAL MEDICINE

## 2020-01-06 PROCEDURE — G0378 HOSPITAL OBSERVATION PER HR: HCPCS

## 2020-01-06 PROCEDURE — 63710000001 PREDNISONE PER 5 MG

## 2020-01-06 PROCEDURE — 25010000002 KETOROLAC TROMETHAMINE PER 15 MG: Performed by: INTERNAL MEDICINE

## 2020-01-06 PROCEDURE — 85025 COMPLETE CBC W/AUTO DIFF WBC: CPT | Performed by: EMERGENCY MEDICINE

## 2020-01-06 PROCEDURE — 85730 THROMBOPLASTIN TIME PARTIAL: CPT | Performed by: PHYSICIAN ASSISTANT

## 2020-01-06 PROCEDURE — 25010000002 VANCOMYCIN 1 G RECONSTITUTED SOLUTION: Performed by: PHYSICIAN ASSISTANT

## 2020-01-06 PROCEDURE — 99284 EMERGENCY DEPT VISIT MOD MDM: CPT | Performed by: EMERGENCY MEDICINE

## 2020-01-06 PROCEDURE — 93005 ELECTROCARDIOGRAM TRACING: CPT | Performed by: PHYSICIAN ASSISTANT

## 2020-01-06 PROCEDURE — 85610 PROTHROMBIN TIME: CPT | Performed by: PHYSICIAN ASSISTANT

## 2020-01-06 PROCEDURE — 93010 ELECTROCARDIOGRAM REPORT: CPT | Performed by: INTERNAL MEDICINE

## 2020-01-06 PROCEDURE — 25010000002 LEVOFLOXACIN PER 250 MG: Performed by: PHYSICIAN ASSISTANT

## 2020-01-06 PROCEDURE — 87804 INFLUENZA ASSAY W/OPTIC: CPT | Performed by: PHYSICIAN ASSISTANT

## 2020-01-06 PROCEDURE — 83605 ASSAY OF LACTIC ACID: CPT | Performed by: EMERGENCY MEDICINE

## 2020-01-06 PROCEDURE — 0100U HC BIOFIRE FILMARRAY RESP PANEL 2: CPT | Performed by: PHYSICIAN ASSISTANT

## 2020-01-06 PROCEDURE — 87040 BLOOD CULTURE FOR BACTERIA: CPT | Performed by: EMERGENCY MEDICINE

## 2020-01-06 PROCEDURE — 84145 PROCALCITONIN (PCT): CPT | Performed by: PHYSICIAN ASSISTANT

## 2020-01-06 PROCEDURE — 84484 ASSAY OF TROPONIN QUANT: CPT | Performed by: PHYSICIAN ASSISTANT

## 2020-01-06 PROCEDURE — 80053 COMPREHEN METABOLIC PANEL: CPT | Performed by: EMERGENCY MEDICINE

## 2020-01-06 PROCEDURE — 71045 X-RAY EXAM CHEST 1 VIEW: CPT

## 2020-01-06 PROCEDURE — 99284 EMERGENCY DEPT VISIT MOD MDM: CPT

## 2020-01-06 PROCEDURE — 25010000002 VANCOMYCIN PER 500 MG: Performed by: PHYSICIAN ASSISTANT

## 2020-01-06 PROCEDURE — 86481 TB AG RESPONSE T-CELL SUSP: CPT | Performed by: PHYSICIAN ASSISTANT

## 2020-01-06 RX ORDER — PREDNISONE 20 MG/1
40 TABLET ORAL
Status: DISCONTINUED | OUTPATIENT
Start: 2020-01-06 | End: 2020-01-07

## 2020-01-06 RX ORDER — TRAZODONE HYDROCHLORIDE 50 MG/1
50 TABLET ORAL NIGHTLY
Status: DISCONTINUED | OUTPATIENT
Start: 2020-01-06 | End: 2020-01-06

## 2020-01-06 RX ORDER — GUAIFENESIN 600 MG/1
600 TABLET, EXTENDED RELEASE ORAL EVERY 12 HOURS SCHEDULED
Status: DISCONTINUED | OUTPATIENT
Start: 2020-01-06 | End: 2020-01-13 | Stop reason: HOSPADM

## 2020-01-06 RX ORDER — ONDANSETRON 2 MG/ML
4 INJECTION INTRAMUSCULAR; INTRAVENOUS EVERY 6 HOURS PRN
Status: DISCONTINUED | OUTPATIENT
Start: 2020-01-06 | End: 2020-01-13 | Stop reason: HOSPADM

## 2020-01-06 RX ORDER — ALBUTEROL SULFATE 2.5 MG/3ML
2.5 SOLUTION RESPIRATORY (INHALATION) EVERY 4 HOURS PRN
Status: DISCONTINUED | OUTPATIENT
Start: 2020-01-06 | End: 2020-01-13 | Stop reason: HOSPADM

## 2020-01-06 RX ORDER — SENNA AND DOCUSATE SODIUM 50; 8.6 MG/1; MG/1
2 TABLET, FILM COATED ORAL NIGHTLY PRN
Status: DISCONTINUED | OUTPATIENT
Start: 2020-01-06 | End: 2020-01-13 | Stop reason: HOSPADM

## 2020-01-06 RX ORDER — SODIUM CHLORIDE, SODIUM LACTATE, POTASSIUM CHLORIDE, CALCIUM CHLORIDE 600; 310; 30; 20 MG/100ML; MG/100ML; MG/100ML; MG/100ML
150 INJECTION, SOLUTION INTRAVENOUS CONTINUOUS
Status: DISCONTINUED | OUTPATIENT
Start: 2020-01-06 | End: 2020-01-08

## 2020-01-06 RX ORDER — IPRATROPIUM BROMIDE AND ALBUTEROL SULFATE 2.5; .5 MG/3ML; MG/3ML
3 SOLUTION RESPIRATORY (INHALATION)
Status: DISCONTINUED | OUTPATIENT
Start: 2020-01-07 | End: 2020-01-07

## 2020-01-06 RX ORDER — KETOROLAC TROMETHAMINE 30 MG/ML
30 INJECTION, SOLUTION INTRAMUSCULAR; INTRAVENOUS EVERY 6 HOURS PRN
Status: DISPENSED | OUTPATIENT
Start: 2020-01-06 | End: 2020-01-11

## 2020-01-06 RX ORDER — LIDOCAINE 50 MG/G
1 PATCH TOPICAL DAILY PRN
Status: DISCONTINUED | OUTPATIENT
Start: 2020-01-06 | End: 2020-01-13 | Stop reason: HOSPADM

## 2020-01-06 RX ORDER — BENZONATATE 100 MG/1
200 CAPSULE ORAL 3 TIMES DAILY PRN
Status: DISCONTINUED | OUTPATIENT
Start: 2020-01-06 | End: 2020-01-13 | Stop reason: HOSPADM

## 2020-01-06 RX ORDER — VENLAFAXINE 37.5 MG/1
37.5 TABLET ORAL 2 TIMES DAILY WITH MEALS
Status: DISCONTINUED | OUTPATIENT
Start: 2020-01-06 | End: 2020-01-13 | Stop reason: HOSPADM

## 2020-01-06 RX ORDER — CHOLECALCIFEROL (VITAMIN D3) 125 MCG
5 CAPSULE ORAL NIGHTLY PRN
Status: DISCONTINUED | OUTPATIENT
Start: 2020-01-06 | End: 2020-01-13 | Stop reason: HOSPADM

## 2020-01-06 RX ORDER — PREDNISONE 10 MG/1
TABLET ORAL
Status: COMPLETED
Start: 2020-01-06 | End: 2020-01-06

## 2020-01-06 RX ORDER — TRAZODONE HYDROCHLORIDE 50 MG/1
100 TABLET ORAL NIGHTLY
Status: DISCONTINUED | OUTPATIENT
Start: 2020-01-06 | End: 2020-01-13 | Stop reason: HOSPADM

## 2020-01-06 RX ORDER — CALCIUM CARBONATE 200(500)MG
1 TABLET,CHEWABLE ORAL 2 TIMES DAILY PRN
Status: DISCONTINUED | OUTPATIENT
Start: 2020-01-06 | End: 2020-01-13 | Stop reason: HOSPADM

## 2020-01-06 RX ORDER — SODIUM CHLORIDE 0.9 % (FLUSH) 0.9 %
10 SYRINGE (ML) INJECTION AS NEEDED
Status: DISCONTINUED | OUTPATIENT
Start: 2020-01-06 | End: 2020-01-13 | Stop reason: HOSPADM

## 2020-01-06 RX ORDER — LEVOFLOXACIN 5 MG/ML
750 INJECTION, SOLUTION INTRAVENOUS ONCE
Status: COMPLETED | OUTPATIENT
Start: 2020-01-06 | End: 2020-01-06

## 2020-01-06 RX ORDER — LEVOFLOXACIN 5 MG/ML
750 INJECTION, SOLUTION INTRAVENOUS DAILY
Status: DISCONTINUED | OUTPATIENT
Start: 2020-01-07 | End: 2020-01-07

## 2020-01-06 RX ORDER — POTASSIUM CHLORIDE 20 MEQ/1
20 TABLET, EXTENDED RELEASE ORAL DAILY
Status: DISCONTINUED | OUTPATIENT
Start: 2020-01-06 | End: 2020-01-13 | Stop reason: HOSPADM

## 2020-01-06 RX ORDER — ONDANSETRON 4 MG/1
4 TABLET, FILM COATED ORAL EVERY 6 HOURS PRN
Status: DISCONTINUED | OUTPATIENT
Start: 2020-01-06 | End: 2020-01-13 | Stop reason: HOSPADM

## 2020-01-06 RX ORDER — KETOROLAC TROMETHAMINE 30 MG/ML
15 INJECTION, SOLUTION INTRAMUSCULAR; INTRAVENOUS EVERY 6 HOURS PRN
Status: DISPENSED | OUTPATIENT
Start: 2020-01-06 | End: 2020-01-11

## 2020-01-06 RX ORDER — BUDESONIDE AND FORMOTEROL FUMARATE DIHYDRATE 160; 4.5 UG/1; UG/1
2 AEROSOL RESPIRATORY (INHALATION)
Status: DISCONTINUED | OUTPATIENT
Start: 2020-01-06 | End: 2020-01-13 | Stop reason: HOSPADM

## 2020-01-06 RX ADMIN — SODIUM CHLORIDE, POTASSIUM CHLORIDE, SODIUM LACTATE AND CALCIUM CHLORIDE 1000 ML: 600; 310; 30; 20 INJECTION, SOLUTION INTRAVENOUS at 22:25

## 2020-01-06 RX ADMIN — SODIUM CHLORIDE, POTASSIUM CHLORIDE, SODIUM LACTATE AND CALCIUM CHLORIDE 1000 ML: 600; 310; 30; 20 INJECTION, SOLUTION INTRAVENOUS at 17:22

## 2020-01-06 RX ADMIN — TRAZODONE HYDROCHLORIDE 100 MG: 50 TABLET ORAL at 22:56

## 2020-01-06 RX ADMIN — LEVOFLOXACIN 750 MG: 5 INJECTION, SOLUTION INTRAVENOUS at 17:55

## 2020-01-06 RX ADMIN — SODIUM CHLORIDE, PRESERVATIVE FREE 10 ML: 5 INJECTION INTRAVENOUS at 22:22

## 2020-01-06 RX ADMIN — Medication 1500 MG: at 19:25

## 2020-01-06 RX ADMIN — MELATONIN TAB 5 MG 5 MG: 5 TAB at 22:22

## 2020-01-06 RX ADMIN — PREDNISONE 40 MG: 20 TABLET ORAL at 22:56

## 2020-01-06 RX ADMIN — VENLAFAXINE HYDROCHLORIDE 37.5 MG: 37.5 TABLET ORAL at 22:56

## 2020-01-06 RX ADMIN — SODIUM CHLORIDE, POTASSIUM CHLORIDE, SODIUM LACTATE AND CALCIUM CHLORIDE 150 ML/HR: 600; 310; 30; 20 INJECTION, SOLUTION INTRAVENOUS at 22:27

## 2020-01-06 RX ADMIN — BENZONATATE 200 MG: 100 CAPSULE ORAL at 22:22

## 2020-01-06 RX ADMIN — KETOROLAC TROMETHAMINE 30 MG: 30 INJECTION, SOLUTION INTRAMUSCULAR at 22:21

## 2020-01-06 RX ADMIN — PREDNISONE 40 MG: 10 TABLET ORAL at 22:56

## 2020-01-06 RX ADMIN — POTASSIUM CHLORIDE 20 MEQ: 20 TABLET, EXTENDED RELEASE ORAL at 17:56

## 2020-01-06 RX ADMIN — GUAIFENESIN 600 MG: 600 TABLET, EXTENDED RELEASE ORAL at 22:22

## 2020-01-06 NOTE — ED PROVIDER NOTES
Subjective   History of Present Illness  History of Present Illness    Chief complaint: Shortness of breath    Location: Generalized    Quality/Severity: Winded.  Moderate    Timing/Duration: 2 days.  Worsening    Modifying Factors: Worse with exertion.  Relieved with rest.    Associated Symptoms: Positive dry cough.  Positive fevers up to 102 with chills.  Denies nausea, vomiting, or diarrhea.  Denies chest pain.  Denies abdominal pain.  Denies rhinorrhea, nasal congestion, or sore throat.  Denies headache.    Narrative: 59-year-old female presents with shortness of breath as above.  She states that it feels like when she previously had pneumonia.  She was hospitalized with strep pneumoniae and RSV pneumonia, along with new onset A. fib, in November 2019.  She is anticoagulated on Xarelto.    TTE 11/11/19:  · Left ventricular systolic function is normal.  · Mild-to-moderate mitral valve regurgitation is present  · Mild tricuspid valve regurgitation is present.  · Left atrial cavity size is moderately dilated.  · Calculated EF = 50.0%.  · The valve appears trileaflet. The aortic valve is abnormal in structure. There is calcification of the aortic valve.Mild aortic valve regurgitation is present. Moderate aortic valve stenosis is present. On the short-axis images, the aortic valve is opening well. Concern that the gradient noted at the aortic valve may be due to a subaortic membrane, images not clear enough to assess this.    Review of Systems   Constitutional: Positive for chills, fatigue and fever.   HENT: Negative.  Negative for congestion, ear pain, rhinorrhea and sore throat.    Eyes: Negative.    Respiratory: Positive for cough and shortness of breath.    Cardiovascular: Negative.  Negative for chest pain and leg swelling.   Gastrointestinal: Negative.  Negative for abdominal pain, diarrhea and vomiting.   Genitourinary: Negative.  Negative for dysuria, frequency, hematuria and urgency.   Musculoskeletal:  Negative.    Skin: Negative.    Neurological: Negative.  Negative for dizziness, light-headedness and headaches.   Hematological: Bruises/bleeds easily.   Psychiatric/Behavioral: The patient is nervous/anxious.    All other systems reviewed and are negative.      Past Medical History:   Diagnosis Date   • Anxiety    • Arthritis    • COPD (chronic obstructive pulmonary disease) (CMS/HCC)     LUNG BLEBS   • DJD (degenerative joint disease)    • Gallstones     SCHEDULED FOR SX   • GERD (gastroesophageal reflux disease)    • Injury of back     degenertive disc disease   • Murmur     BENIGN   • Panic attacks    • Rheumatoid arthritis (CMS/HCC)    • Seizures (CMS/HCC)     LAST ONE 2017       Allergies   Allergen Reactions   • Penicillins Anaphylaxis       Past Surgical History:   Procedure Laterality Date   • APPENDECTOMY     •  SECTION     • CHOLECYSTECTOMY N/A 2017    Procedure: LAPAROSCOPIC CHOLECYSTECTOMY, laparoscopic adhesiolysis requiring 45 minutes of operative time;  Surgeon: Liliana Monroy MD;  Location: [facility];  Service:    • ESOPHAGEAL ATRESIA REPAIR     • PLEURAL BIOPSY     • PLEURAL SCARIFICATION         Family History   Problem Relation Age of Onset   • Lung cancer Father        Social History     Socioeconomic History   • Marital status:      Spouse name: Not on file   • Number of children: Not on file   • Years of education: Not on file   • Highest education level: Not on file   Tobacco Use   • Smoking status: Current Every Day Smoker     Packs/day: 1.00     Years: 35.00     Pack years: 35.00   Substance and Sexual Activity   • Alcohol use: No   • Drug use: No   • Sexual activity: Defer       Current Facility-Administered Medications:   •  lactated ringers bolus 1,000 mL, 1,000 mL, Intravenous, Once, Luann Pierre, PAJerC  •  sodium chloride 0.9 % flush 10 mL, 10 mL, Intravenous, PRN, Kingsley Fuentes MD    Current Outpatient Medications:   •  albuterol (PROVENTIL) (2.5  MG/3ML) 0.083% nebulizer solution, Take 2.5 mg by nebulization Every 4 (Four) Hours As Needed for wheezing., Disp: , Rfl:   •  Fluticasone Furoate-Vilanterol (BREO ELLIPTA IN), Inhale 1 puff Daily., Disp: , Rfl:   •  guaiFENesin-dextromethorphan (ROBITUSSIN DM) 100-10 MG/5ML syrup, Take 5 mL by mouth Every 4 (Four) Hours As Needed for Cough., Disp: , Rfl:   •  ipratropium-albuterol (DUO-NEB) 0.5-2.5 mg/mL nebulizer, Take 3 mL by nebulization Every 4 (Four) Hours As Needed for wheezing., Disp: , Rfl:   •  meloxicam (MOBIC) 15 MG tablet, Take 15 mg by mouth Daily., Disp: , Rfl:   •  Misc. Devices (BATH BENCH WITH BACK) misc, 1 Units Daily As Needed (shower)., Disp: 1 each, Rfl: 1  •  promethazine-dextromethorphan (PROMETHAZINE-DM) 6.25-15 MG/5ML syrup, Take 5 mL by mouth 4 (Four) Times a Day As Needed for Cough., Disp: 240 mL, Rfl: 0  •  rivaroxaban (XARELTO) 20 MG tablet, Take 1 tablet by mouth Daily With Dinner., Disp: 30 tablet, Rfl: 0  •  sodium chloride (OCEAN NASAL SPRAY) 0.65 % nasal spray, 1 spray into the nostril(s) as directed by provider As Needed for Congestion., Disp: 59 mL, Rfl: 12  •  traMADol (ULTRAM) 50 MG tablet, TAKE 1 TABLET BY MOUTH 3 TIMES A DAY AS NEEDED FOR PAIN, Disp: , Rfl: 0  •  traZODone (DESYREL) 50 MG tablet, Take 1-2 tablets by mouth Every Night., Disp: 60 tablet, Rfl: 1  •  venlafaxine (EFFEXOR) 37.5 MG tablet, Take 37.5 mg by mouth 2 (Two) Times a Day., Disp: , Rfl:         Objective   Physical Exam  Vitals:    01/06/20 1653   BP: 94/55   Pulse: 102   Resp: 26   Temp: 98.7 °F (37.1 °C)   SpO2: 94%     GENERAL: a/o x 4, NAD  SKIN: Warm pink and dry   HEENT:  PERRLA, EOM intact, conjunctiva normal, sclera clear  NECK: supple, trachea midline.  No JVD.  LUNGS: Diminished breath sounds on the right.  No accessory muscle use and no nasal flaring.  CARDIAC:  Regular rate and rhythm, S1-S2.  III/VI murmur best heard at apex. No rubs or gallops.  No peripheral edema.  Equal pulses  bilaterally.  ABDOMEN: Soft, nontender, nondistended.  No guarding or rebound tenderness.  Normal bowel sounds.  MUSCULOSKELETAL: Moves all extremities well.  No deformity.  NEURO: Cranial nerves II through XII grossly intact.  No gross focal deficits.  Alert.  Normal speech and motor.  PSYCH: Normal mood and affect      Procedures           ED Course  ED Course as of Jan 06 1840   Mon Jan 06, 2020   1728 1L LR given on arrival    [KY]   1737 Saw prelim CXR, RUL dense infiltrate.  Levaquin started (anaphylaxis to penicillin)   WBC(!): 21.31 [KY]   1747 KCl 20 mEq given   Potassium(!): 3.4 [KY]   1832 D/w Dr. Ferguson.  She wants to add Tspot and I will Call Dr. Franco, who is taking over    [KY]   1837 CONSULT  Discussed case with Dr franco  Reviewed history, exam, results and treatments.  Discussed concerns and plan of care. Dr Franco accepts pt to be admitted.  Add vanc.      [KY]   1839 Pt agrees    [KY]      ED Course User Index  [KY] Luann Pierre, BRYAN      EKG         EKG time / Interpretation time: 1710/1713  Rhythm/Rate: Normal sinus rhythm 99   OH: 146  QRS, axis: 43  QTc 477  ST and T waves: No elevation or depression.  EKG Tracing Interpreted Contemporaneously by me, independently viewed by me and MD.  Prior x-ray in November was atrial fibrillation.    Reviewed CXR. Independently viewed by me. Interpreted by radiologist. Discussed with pt and .  Xr Chest 1 View    Result Date: 1/6/2020  Narrative: CR Chest 1 Vw INDICATION: 59-year-old female with cough and shortness of air for 4 days. Current smoker. COMPARISON:  Chest 11/12/2019 FINDINGS: Single portable AP view(s) of the chest.  Dense infiltrate throughout the right upper lobe, concerning for pneumonia. Biapical pleural thickening. Subsegmental left mid lung atelectasis. Emphysema. No large pleural effusions. No pneumothorax. Heart size and mediastinum are within expected limits. Pulmonary vasculature unremarkable.     Impression: Dense  infiltrate throughout the right upper lobe, concerning for pneumonia. Follow-up to resolution is recommended. Signer Name: Arron Guan MD  Signed: 1/6/2020 5:43 PM  Workstation Name: [email or web address]  [facility]        Results for orders placed or performed during the hospital encounter of 01/06/20   Influenza Antigen, Rapid - Swab, Nasopharynx   Result Value Ref Range    Influenza A Ag, EIA Negative Negative    Influenza B Ag, EIA Negative Negative   Comprehensive Metabolic Panel   Result Value Ref Range    Glucose 112 (H) 65 - 99 mg/dL    BUN 14 6 - 20 mg/dL    Creatinine 0.84 0.57 - 1.00 mg/dL    Sodium 133 (L) 136 - 145 mmol/L    Potassium 3.4 (L) 3.5 - 5.2 mmol/L    Chloride 98 98 - 107 mmol/L    CO2 19.8 (L) 22.0 - 29.0 mmol/L    Calcium 9.0 8.6 - 10.5 mg/dL    Total Protein 7.7 6.0 - 8.5 g/dL    Albumin 3.40 (L) 3.50 - 5.20 g/dL    ALT (SGPT) 13 1 - 33 U/L    AST (SGOT) 16 1 - 32 U/L    Alkaline Phosphatase 159 (H) 39 - 117 U/L    Total Bilirubin 1.6 (H) 0.2 - 1.2 mg/dL    eGFR Non African Amer 70 >60 mL/min/1.73    Globulin 4.3 gm/dL    A/G Ratio 0.8 g/dL    BUN/Creatinine Ratio 16.7 7.0 - 25.0    Anion Gap 15.2 (H) 5.0 - 15.0 mmol/L   Lactic Acid, Plasma   Result Value Ref Range    Lactate 1.2 0.5 - 2.0 mmol/L   CBC Auto Differential   Result Value Ref Range    WBC 21.31 (H) 3.40 - 10.80 10*3/mm3    RBC 4.31 3.77 - 5.28 10*6/mm3    Hemoglobin 12.7 12.0 - 15.9 g/dL    Hematocrit 38.9 34.0 - 46.6 %    MCV 90.3 79.0 - 97.0 fL    MCH 29.5 26.6 - 33.0 pg    MCHC 32.6 31.5 - 35.7 g/dL    RDW 14.3 12.3 - 15.4 %    RDW-SD 48.1 37.0 - 54.0 fl    MPV 9.1 6.0 - 12.0 fL    Platelets 301 140 - 450 10*3/mm3    Neutrophil % 85.4 (H) 42.7 - 76.0 %    Lymphocyte % 5.3 (L) 19.6 - 45.3 %    Monocyte % 8.2 5.0 - 12.0 %    Eosinophil % 0.6 0.3 - 6.2 %    Basophil % 0.1 0.0 - 1.5 %    Immature Grans % 0.4 0.0 - 0.5 %    Neutrophils, Absolute 18.21 (H) 1.70 - 7.00 10*3/mm3    Lymphocytes, Absolute 1.12  0.70 - 3.10 10*3/mm3    Monocytes, Absolute 1.74 (H) 0.10 - 0.90 10*3/mm3    Eosinophils, Absolute 0.12 0.00 - 0.40 10*3/mm3    Basophils, Absolute 0.03 0.00 - 0.20 10*3/mm3    Immature Grans, Absolute 0.09 (H) 0.00 - 0.05 10*3/mm3   Troponin   Result Value Ref Range    Troponin T <0.010 0.000-<0.030 ng/mL   Procalcitonin   Result Value Ref Range    Procalcitonin 1.63 (H) 0.10 - 0.25 ng/mL   Protime-INR   Result Value Ref Range    Protime 15.2 (H) 12.1 - 15.0 Seconds    INR 1.22 (H) 0.90 - 1.10   aPTT   Result Value Ref Range    PTT 38.3 (H) 24.3 - 38.1 seconds   Light Blue Top   Result Value Ref Range    Extra Tube hold for add-on    Green Top (Gel)   Result Value Ref Range    Extra Tube Hold for add-ons.    Lavender Top   Result Value Ref Range    Extra Tube hold for add-on    Gold Top - SST   Result Value Ref Range    Extra Tube Hold for add-ons.                  MDM  Number of Diagnoses or Management Options  Pneumonia of right upper lobe due to infectious organism (CMS/HCC): new and requires workup  SIRS (systemic inflammatory response syndrome) (CMS/HCC): new and requires workup     Amount and/or Complexity of Data Reviewed  Clinical lab tests: reviewed and ordered  Tests in the radiology section of CPT®: reviewed and ordered  Tests in the medicine section of CPT®: reviewed and ordered  Decide to obtain previous medical records or to obtain history from someone other than the patient: yes  Obtain history from someone other than the patient: yes  Review and summarize past medical records: yes  Discuss the patient with other providers: yes  Independent visualization of images, tracings, or specimens: yes    Risk of Complications, Morbidity, and/or Mortality  Presenting problems: moderate  Diagnostic procedures: moderate  Management options: moderate    Patient Progress  Patient progress: improved    My differential diagnosis for dyspnea includes but is not limited to:  Asthma, COPD, pneumonia, pulmonary embolus,  acute respiratory distress syndrome, pneumothorax, pleural effusion, pulmonary fibrosis, congestive heart failure, myocardial infarction, DKA, uremia, acidosis, sepsis, anemia, drug related, hyperventilation, CNS disease    Final diagnoses:   Pneumonia of right upper lobe due to infectious organism (CMS/HCC)   SIRS (systemic inflammatory response syndrome) (CMS/HCC)     Dictated utilizing Dragon dictation         Luann Pierre PA-C  01/06/20 1078

## 2020-01-07 ENCOUNTER — APPOINTMENT (OUTPATIENT)
Dept: GENERAL RADIOLOGY | Facility: HOSPITAL | Age: 60
End: 2020-01-07

## 2020-01-07 ENCOUNTER — APPOINTMENT (OUTPATIENT)
Dept: CT IMAGING | Facility: HOSPITAL | Age: 60
End: 2020-01-07

## 2020-01-07 LAB
ALBUMIN SERPL-MCNC: 2.3 G/DL (ref 3.5–5.2)
ALBUMIN/GLOB SERPL: 0.5 G/DL
ALP SERPL-CCNC: 137 U/L (ref 39–117)
ALT SERPL W P-5'-P-CCNC: 8 U/L (ref 1–33)
ANION GAP SERPL CALCULATED.3IONS-SCNC: 13.7 MMOL/L (ref 5–15)
ANION GAP SERPL CALCULATED.3IONS-SCNC: 17.5 MMOL/L (ref 5–15)
AST SERPL-CCNC: 11 U/L (ref 1–32)
B PARAPERT DNA SPEC QL NAA+PROBE: NOT DETECTED
B PERT DNA SPEC QL NAA+PROBE: NOT DETECTED
BASOPHILS # BLD AUTO: 0.01 10*3/MM3 (ref 0–0.2)
BASOPHILS NFR BLD AUTO: 0.1 % (ref 0–1.5)
BILIRUB SERPL-MCNC: 1.2 MG/DL (ref 0.2–1.2)
BUN BLD-MCNC: 11 MG/DL (ref 6–20)
BUN BLD-MCNC: 11 MG/DL (ref 6–20)
BUN/CREAT SERPL: 15.5 (ref 7–25)
BUN/CREAT SERPL: 16.7 (ref 7–25)
C PNEUM DNA NPH QL NAA+NON-PROBE: NOT DETECTED
CALCIUM SPEC-SCNC: 8.4 MG/DL (ref 8.6–10.5)
CALCIUM SPEC-SCNC: 8.5 MG/DL (ref 8.6–10.5)
CHLORIDE SERPL-SCNC: 103 MMOL/L (ref 98–107)
CHLORIDE SERPL-SCNC: 104 MMOL/L (ref 98–107)
CO2 SERPL-SCNC: 13.5 MMOL/L (ref 22–29)
CO2 SERPL-SCNC: 20.3 MMOL/L (ref 22–29)
CREAT BLD-MCNC: 0.66 MG/DL (ref 0.57–1)
CREAT BLD-MCNC: 0.71 MG/DL (ref 0.57–1)
DEPRECATED RDW RBC AUTO: 49.1 FL (ref 37–54)
EOSINOPHIL # BLD AUTO: 0.02 10*3/MM3 (ref 0–0.4)
EOSINOPHIL NFR BLD AUTO: 0.1 % (ref 0.3–6.2)
ERYTHROCYTE [DISTWIDTH] IN BLOOD BY AUTOMATED COUNT: 14.4 % (ref 12.3–15.4)
FLUAV H1 2009 PAND RNA NPH QL NAA+PROBE: NOT DETECTED
FLUAV H1 HA GENE NPH QL NAA+PROBE: NOT DETECTED
FLUAV H3 RNA NPH QL NAA+PROBE: NOT DETECTED
FLUAV SUBTYP SPEC NAA+PROBE: NOT DETECTED
FLUBV RNA ISLT QL NAA+PROBE: NOT DETECTED
GFR SERPL CREATININE-BSD FRML MDRD: 85 ML/MIN/1.73
GFR SERPL CREATININE-BSD FRML MDRD: 92 ML/MIN/1.73
GLOBULIN UR ELPH-MCNC: 4.4 GM/DL
GLUCOSE BLD-MCNC: 137 MG/DL (ref 65–99)
GLUCOSE BLD-MCNC: 139 MG/DL (ref 65–99)
HADV DNA SPEC NAA+PROBE: NOT DETECTED
HCOV 229E RNA SPEC QL NAA+PROBE: NOT DETECTED
HCOV HKU1 RNA SPEC QL NAA+PROBE: NOT DETECTED
HCOV NL63 RNA SPEC QL NAA+PROBE: NOT DETECTED
HCOV OC43 RNA SPEC QL NAA+PROBE: NOT DETECTED
HCT VFR BLD AUTO: 34.8 % (ref 34–46.6)
HGB BLD-MCNC: 11 G/DL (ref 12–15.9)
HMPV RNA NPH QL NAA+NON-PROBE: NOT DETECTED
HPIV1 RNA SPEC QL NAA+PROBE: NOT DETECTED
HPIV2 RNA SPEC QL NAA+PROBE: NOT DETECTED
HPIV3 RNA NPH QL NAA+PROBE: NOT DETECTED
HPIV4 P GENE NPH QL NAA+PROBE: NOT DETECTED
IMM GRANULOCYTES # BLD AUTO: 0.19 10*3/MM3 (ref 0–0.05)
IMM GRANULOCYTES NFR BLD AUTO: 1 % (ref 0–0.5)
L PNEUMO1 AG UR QL IA: NEGATIVE
LYMPHOCYTES # BLD AUTO: 0.73 10*3/MM3 (ref 0.7–3.1)
LYMPHOCYTES NFR BLD AUTO: 3.7 % (ref 19.6–45.3)
M PNEUMO IGG SER IA-ACNC: NOT DETECTED
MAGNESIUM SERPL-MCNC: 1.6 MG/DL (ref 1.6–2.6)
MCH RBC QN AUTO: 28.9 PG (ref 26.6–33)
MCHC RBC AUTO-ENTMCNC: 31.6 G/DL (ref 31.5–35.7)
MCV RBC AUTO: 91.6 FL (ref 79–97)
MONOCYTES # BLD AUTO: 0.99 10*3/MM3 (ref 0.1–0.9)
MONOCYTES NFR BLD AUTO: 5.1 % (ref 5–12)
NEUTROPHILS # BLD AUTO: 17.65 10*3/MM3 (ref 1.7–7)
NEUTROPHILS NFR BLD AUTO: 90 % (ref 42.7–76)
PHOSPHATE SERPL-MCNC: 2.8 MG/DL (ref 2.5–4.5)
PLATELET # BLD AUTO: 269 10*3/MM3 (ref 140–450)
PMV BLD AUTO: 9.1 FL (ref 6–12)
POTASSIUM BLD-SCNC: 4 MMOL/L (ref 3.5–5.2)
POTASSIUM BLD-SCNC: 4.1 MMOL/L (ref 3.5–5.2)
PROCALCITONIN SERPL-MCNC: 1.06 NG/ML (ref 0.1–0.25)
PROT SERPL-MCNC: 6.7 G/DL (ref 6–8.5)
RBC # BLD AUTO: 3.8 10*6/MM3 (ref 3.77–5.28)
RHINOVIRUS RNA SPEC NAA+PROBE: NOT DETECTED
RSV RNA NPH QL NAA+NON-PROBE: NOT DETECTED
S PNEUM AG SPEC QL LA: NEGATIVE
SODIUM BLD-SCNC: 135 MMOL/L (ref 136–145)
SODIUM BLD-SCNC: 137 MMOL/L (ref 136–145)
VANCOMYCIN SERPL-MCNC: 11.4 MCG/ML (ref 5–40)
WBC NRBC COR # BLD: 19.59 10*3/MM3 (ref 3.4–10.8)

## 2020-01-07 PROCEDURE — 87899 AGENT NOS ASSAY W/OPTIC: CPT | Performed by: NURSE PRACTITIONER

## 2020-01-07 PROCEDURE — 94640 AIRWAY INHALATION TREATMENT: CPT

## 2020-01-07 PROCEDURE — 99232 SBSQ HOSP IP/OBS MODERATE 35: CPT | Performed by: NURSE PRACTITIONER

## 2020-01-07 PROCEDURE — 25010000002 VANCOMYCIN 1 G RECONSTITUTED SOLUTION 1 EACH VIAL: Performed by: INTERNAL MEDICINE

## 2020-01-07 PROCEDURE — 80202 ASSAY OF VANCOMYCIN: CPT | Performed by: INTERNAL MEDICINE

## 2020-01-07 PROCEDURE — 25010000002 LEVOFLOXACIN PER 250 MG: Performed by: INTERNAL MEDICINE

## 2020-01-07 PROCEDURE — 84100 ASSAY OF PHOSPHORUS: CPT | Performed by: INTERNAL MEDICINE

## 2020-01-07 PROCEDURE — 85025 COMPLETE CBC W/AUTO DIFF WBC: CPT | Performed by: INTERNAL MEDICINE

## 2020-01-07 PROCEDURE — 94799 UNLISTED PULMONARY SVC/PX: CPT

## 2020-01-07 PROCEDURE — 84145 PROCALCITONIN (PCT): CPT | Performed by: INTERNAL MEDICINE

## 2020-01-07 PROCEDURE — G0378 HOSPITAL OBSERVATION PER HR: HCPCS

## 2020-01-07 PROCEDURE — 83735 ASSAY OF MAGNESIUM: CPT | Performed by: INTERNAL MEDICINE

## 2020-01-07 PROCEDURE — 25010000002 ENOXAPARIN PER 10 MG: Performed by: INTERNAL MEDICINE

## 2020-01-07 PROCEDURE — 63710000001 PREDNISONE PER 1 MG: Performed by: INTERNAL MEDICINE

## 2020-01-07 PROCEDURE — 71101 X-RAY EXAM UNILAT RIBS/CHEST: CPT

## 2020-01-07 PROCEDURE — 25010000002 METHYLPREDNISOLONE PER 40 MG: Performed by: NURSE PRACTITIONER

## 2020-01-07 PROCEDURE — 25010000002 VANCOMYCIN PER 500 MG: Performed by: INTERNAL MEDICINE

## 2020-01-07 PROCEDURE — 80053 COMPREHEN METABOLIC PANEL: CPT | Performed by: INTERNAL MEDICINE

## 2020-01-07 PROCEDURE — 71250 CT THORAX DX C-: CPT

## 2020-01-07 PROCEDURE — 0100U HC BIOFIRE FILMARRAY RESP PANEL 2: CPT | Performed by: INTERNAL MEDICINE

## 2020-01-07 PROCEDURE — 25010000002 KETOROLAC TROMETHAMINE PER 15 MG: Performed by: INTERNAL MEDICINE

## 2020-01-07 RX ORDER — METHYLPREDNISOLONE SODIUM SUCCINATE 40 MG/ML
40 INJECTION, POWDER, LYOPHILIZED, FOR SOLUTION INTRAMUSCULAR; INTRAVENOUS EVERY 6 HOURS
Status: DISCONTINUED | OUTPATIENT
Start: 2020-01-07 | End: 2020-01-08

## 2020-01-07 RX ORDER — IPRATROPIUM BROMIDE AND ALBUTEROL SULFATE 2.5; .5 MG/3ML; MG/3ML
3 SOLUTION RESPIRATORY (INHALATION)
Status: DISCONTINUED | OUTPATIENT
Start: 2020-01-07 | End: 2020-01-08

## 2020-01-07 RX ORDER — LEVOFLOXACIN 5 MG/ML
750 INJECTION, SOLUTION INTRAVENOUS EVERY 24 HOURS
Status: DISCONTINUED | OUTPATIENT
Start: 2020-01-07 | End: 2020-01-09

## 2020-01-07 RX ADMIN — PREDNISONE 40 MG: 20 TABLET ORAL at 08:40

## 2020-01-07 RX ADMIN — IPRATROPIUM BROMIDE AND ALBUTEROL SULFATE 3 ML: .5; 3 SOLUTION RESPIRATORY (INHALATION) at 14:49

## 2020-01-07 RX ADMIN — VANCOMYCIN HYDROCHLORIDE 1500 MG: 1 INJECTION, POWDER, LYOPHILIZED, FOR SOLUTION INTRAVENOUS at 22:19

## 2020-01-07 RX ADMIN — IPRATROPIUM BROMIDE AND ALBUTEROL SULFATE 3 ML: .5; 3 SOLUTION RESPIRATORY (INHALATION) at 23:12

## 2020-01-07 RX ADMIN — BUDESONIDE AND FORMOTEROL FUMARATE DIHYDRATE 2 PUFF: 160; 4.5 AEROSOL RESPIRATORY (INHALATION) at 18:39

## 2020-01-07 RX ADMIN — ENOXAPARIN SODIUM 40 MG: 40 INJECTION SUBCUTANEOUS at 16:13

## 2020-01-07 RX ADMIN — METHYLPREDNISOLONE SODIUM SUCCINATE 40 MG: 40 INJECTION, POWDER, FOR SOLUTION INTRAMUSCULAR; INTRAVENOUS at 22:19

## 2020-01-07 RX ADMIN — BUDESONIDE AND FORMOTEROL FUMARATE DIHYDRATE 2 PUFF: 160; 4.5 AEROSOL RESPIRATORY (INHALATION) at 07:54

## 2020-01-07 RX ADMIN — POTASSIUM CHLORIDE 20 MEQ: 20 TABLET, EXTENDED RELEASE ORAL at 08:40

## 2020-01-07 RX ADMIN — METHYLPREDNISOLONE SODIUM SUCCINATE 40 MG: 40 INJECTION, POWDER, FOR SOLUTION INTRAMUSCULAR; INTRAVENOUS at 16:13

## 2020-01-07 RX ADMIN — METHYLPREDNISOLONE SODIUM SUCCINATE 40 MG: 40 INJECTION, POWDER, FOR SOLUTION INTRAMUSCULAR; INTRAVENOUS at 10:08

## 2020-01-07 RX ADMIN — IPRATROPIUM BROMIDE AND ALBUTEROL SULFATE 3 ML: .5; 3 SOLUTION RESPIRATORY (INHALATION) at 11:21

## 2020-01-07 RX ADMIN — GUAIFENESIN 600 MG: 600 TABLET, EXTENDED RELEASE ORAL at 20:26

## 2020-01-07 RX ADMIN — BUDESONIDE AND FORMOTEROL FUMARATE DIHYDRATE 2 PUFF: 160; 4.5 AEROSOL RESPIRATORY (INHALATION) at 00:20

## 2020-01-07 RX ADMIN — KETOROLAC TROMETHAMINE 30 MG: 30 INJECTION, SOLUTION INTRAMUSCULAR at 03:43

## 2020-01-07 RX ADMIN — TRAZODONE HYDROCHLORIDE 100 MG: 50 TABLET ORAL at 20:26

## 2020-01-07 RX ADMIN — VANCOMYCIN HYDROCHLORIDE 1500 MG: 1 INJECTION, POWDER, LYOPHILIZED, FOR SOLUTION INTRAVENOUS at 10:03

## 2020-01-07 RX ADMIN — KETOROLAC TROMETHAMINE 30 MG: 30 INJECTION, SOLUTION INTRAMUSCULAR at 16:13

## 2020-01-07 RX ADMIN — KETOROLAC TROMETHAMINE 30 MG: 30 INJECTION, SOLUTION INTRAMUSCULAR at 22:18

## 2020-01-07 RX ADMIN — GUAIFENESIN 600 MG: 600 TABLET, EXTENDED RELEASE ORAL at 08:40

## 2020-01-07 RX ADMIN — SODIUM CHLORIDE, POTASSIUM CHLORIDE, SODIUM LACTATE AND CALCIUM CHLORIDE 150 ML/HR: 600; 310; 30; 20 INJECTION, SOLUTION INTRAVENOUS at 20:24

## 2020-01-07 RX ADMIN — LEVOFLOXACIN 750 MG: 5 INJECTION, SOLUTION INTRAVENOUS at 18:05

## 2020-01-07 RX ADMIN — IPRATROPIUM BROMIDE AND ALBUTEROL SULFATE 3 ML: .5; 3 SOLUTION RESPIRATORY (INHALATION) at 18:39

## 2020-01-07 RX ADMIN — VENLAFAXINE HYDROCHLORIDE 37.5 MG: 37.5 TABLET ORAL at 18:04

## 2020-01-07 RX ADMIN — IPRATROPIUM BROMIDE AND ALBUTEROL SULFATE 3 ML: .5; 3 SOLUTION RESPIRATORY (INHALATION) at 00:17

## 2020-01-07 RX ADMIN — VENLAFAXINE HYDROCHLORIDE 37.5 MG: 37.5 TABLET ORAL at 08:40

## 2020-01-07 RX ADMIN — KETOROLAC TROMETHAMINE 30 MG: 30 INJECTION, SOLUTION INTRAMUSCULAR at 09:51

## 2020-01-07 RX ADMIN — IPRATROPIUM BROMIDE AND ALBUTEROL SULFATE 3 ML: .5; 3 SOLUTION RESPIRATORY (INHALATION) at 07:54

## 2020-01-07 NOTE — CONSULTS
CONSULT NOTE    Patient Identification:  Akila Amezcua  59 y.o.  female  1960  [id number]            Requesting physician: Dr. Franco    Reason for Consultation: COPD chronic hypoxic respiratory failure    CC: Fell and hit her chest    History of Present Illness:  Patient is a 59-year-old female with COPD and a recent prior admission with RSV and exacerbation of COPD.  She also has a history of chronic hypoxic respiratory failure.  She presented to the emergency room after she fell hitting her right hip.  She is found of a negative respiratory viral panel this admission however procalcitonin is up at 1.63.  Is also found to have a leukocytosis to 21 chest x-ray has no suggestion of a rib fracture however does show a lobar infiltrate.  She was admitted and treated sepsis and treatment of her pneumonia and COPD.  The patient is awake alert.  She feels her breathing is about the same when she was admitted less than 24 hours ago.  She has no hemoptysis.  She does have some chest pain from where she fell.  She has actually stopped smoking from last admission.    Review of Systems:  CONSTITUTIONAL:  Denies fevers or chills  EYE:  No new vision changes  EAR:  No change in hearing  CARDIAC: Positive for rib pain post fall  PULMONARY: Positive productive cough positive shortness of breath  GI:  No diarrhea, hematemesis or hematochezia,  RENAL:  No dysuria or urinary frequency  MUSCULOSKELETAL: Chest pain post fall  ENDOCRINE:  No heat or cold intolerance  INTEGUMENTARY: No skin rashes  NEUROLOGICAL:  No dizziness or confusion.  No seizure activity  PSYCHIATRIC:  No new anxiety or depression  12 system review of systems performed and all else negative    Past Medical History:   Diagnosis Date   • Anxiety    • Arthritis    • COPD (chronic obstructive pulmonary disease) (CMS/[facility])     LUNG BLEBS   • DJD (degenerative joint disease)    • Gallstones     SCHEDULED FOR SX   • GERD (gastroesophageal reflux disease)     • Injury of back     degenertive disc disease   • Murmur     BENIGN   • Panic attacks    • Rheumatoid arthritis (CMS/HCC)    • Seizures (CMS/HCC)     LAST ONE 2017       Past Surgical History:   Procedure Laterality Date   • APPENDECTOMY     •  SECTION     • CHOLECYSTECTOMY N/A 2017    Procedure: LAPAROSCOPIC CHOLECYSTECTOMY, laparoscopic adhesiolysis requiring 45 minutes of operative time;  Surgeon: Liliana Monroy MD;  Location: [facility];  Service:    • ESOPHAGEAL ATRESIA REPAIR     • PLEURAL BIOPSY     • PLEURAL SCARIFICATION          Medications Prior to Admission   Medication Sig Dispense Refill Last Dose   • albuterol (PROVENTIL) (2.5 MG/3ML) 0.083% nebulizer solution Take 2.5 mg by nebulization Every 4 (Four) Hours As Needed for wheezing.   Taking   • Fluticasone Furoate-Vilanterol (BREO ELLIPTA IN) Inhale 1 puff Daily.   Taking   • ipratropium-albuterol (DUO-NEB) 0.5-2.5 mg/mL nebulizer Take 3 mL by nebulization Every 4 (Four) Hours As Needed for wheezing.   Taking   • meloxicam (MOBIC) 15 MG tablet Take 15 mg by mouth Daily.   Taking   • Misc. Devices (BATH BENCH WITH BACK) misc 1 Units Daily As Needed (shower). 1 each 1    • traZODone (DESYREL) 50 MG tablet Take 1-2 tablets by mouth Every Night. 60 tablet 1    • venlafaxine (EFFEXOR) 37.5 MG tablet Take 37.5 mg by mouth 2 (Two) Times a Day.   Taking   • guaiFENesin-dextromethorphan (ROBITUSSIN DM) 100-10 MG/5ML syrup Take 5 mL by mouth Every 4 (Four) Hours As Needed for Cough.   Taking   • promethazine-dextromethorphan (PROMETHAZINE-DM) 6.25-15 MG/5ML syrup Take 5 mL by mouth 4 (Four) Times a Day As Needed for Cough. 240 mL 0    • rivaroxaban (XARELTO) 20 MG tablet Take 1 tablet by mouth Daily With Dinner. 30 tablet 0 Taking   • sodium chloride (OCEAN NASAL SPRAY) 0.65 % nasal spray 1 spray into the nostril(s) as directed by provider As Needed for Congestion. 59 mL 12    • traMADol (ULTRAM) 50 MG tablet TAKE 1 TABLET BY MOUTH 3 TIMES  "A DAY AS NEEDED FOR PAIN  0 Taking       Allergies   Allergen Reactions   • Penicillins Anaphylaxis       Social History     Socioeconomic History   • Marital status:      Spouse name: Not on file   • Number of children: Not on file   • Years of education: Not on file   • Highest education level: Not on file   Tobacco Use   • Smoking status: Current Every Day Smoker     Packs/day: 1.00     Years: 35.00     Pack years: 35.00   Substance and Sexual Activity   • Alcohol use: No   • Drug use: No   • Sexual activity: Defer       Family History   Problem Relation Age of Onset   • Lung cancer Father        Physical Exam:  BP 97/64 (BP Location: Right arm, Patient Position: Lying)   Pulse 97   Temp 98 °F (36.7 °C) (Oral)   Resp 20   Ht 170.2 cm (67\")   Wt 79.6 kg (175 lb 6.4 oz)   SpO2 96%   BMI 27.47 kg/m²   Body mass index is 27.47 kg/m².   General appearance: Patient is awake alert she is in no acute distress, conversant   Eyes: anicteric sclerae, moist conjunctivae; no lid-lag; PERRLA  HENT: Atraumatic; oropharynx clear with moist mucous membranes and no mucosal ulcerations; normal hard and soft palate  Neck: Trachea midline; FROM, supple, no thyromegaly or lymphadenopathy  Lungs: Crackles at the right base no expiratory wheeze at present time, with normal respiratory effort and no intercostal retractions  CV: RRR, no MRGs   Abdomen: Soft, non-tender; no masses or HSM  Extremities: No peripheral edema or extremity lymphadenopathy  Skin: Normal temperature, turgor and texture; no rash, ulcers or subcutaneous nodules  Psych: Appropriate affect, alert and oriented to person, place and time    LABS:  Results from last 7 days   Lab Units 01/07/20  0635 01/06/20  1709   WBC 10*3/mm3 19.59* 21.31*   HEMOGLOBIN g/dL 11.0* 12.7   PLATELETS 10*3/mm3 269 301     Results from last 7 days   Lab Units 01/07/20  0635 01/07/20  0430 01/06/20  1709   SODIUM mmol/L 137 135* 133*   POTASSIUM mmol/L 4.0 4.1 3.4*   CHLORIDE " mmol/L 103 104 98   CO2 mmol/L 20.3* 13.5* 19.8*   BUN mg/dL 11 11 14   CREATININE mg/dL 0.66 0.71 0.84   GLUCOSE mg/dL 139* 137* 112*   CALCIUM mg/dL 8.4* 8.5* 9.0   MAGNESIUM mg/dL  --  1.6  --    PHOSPHORUS mg/dL  --  2.8  --    Estimated Creatinine Clearance: 102.1 mL/min (by C-G formula based on SCr of 0.66 mg/dL).    Imaging: I personally visualized the images of scans/x-rays performed within last 3 days.  Imaging Results (Most Recent)     Procedure Component Value Units Date/Time    XR Ribs Right With PA Chest [[id number]] Collected:  01/07/20 0854     Updated:  01/07/20 0859    Narrative:       XR RIBS RIGHT W PA CHEST-: 1/7/2020 8:09 AM     INDICATION:   59-year-old female with right sided rib pain after coughing 3 days     COMPARISON:   01/06/2020.     FINDINGS:  PA view of the chest and oblique views of the right ribs. Stable cardiac  silhouette and scoliosis. Biapical pleural thickening persists. There  are postoperative changes projecting over the lower and upper lobes on  the right and there is dense masslike consolidation throughout the right  upper lobe. Patchy opacities in the mid and lower lung zones do not  appear appreciably changed. There is no pneumothorax. Chronic blunting  of the CP angles.     Chronic appearing rib deformity on the right involving the posterior  right sixth and seventh ribs. No distinct acute appearing fracture.       Impression:          1. No evidence of acute rib fracture or pneumothorax.  2. No other significant interval change. Imaging follow-up to clearing  recommended for the masslike consolidation in the right upper lobe.     This report was finalized on 1/7/2020 8:57 AM by Dr. Petr Jalloh MD.       XR Chest 1 View [[id number]] Collected:  01/06/20 1743     Updated:  01/06/20 1745    Narrative:       CR Chest 1 Vw    INDICATION:   59-year-old female with cough and shortness of air for 4 days. Current smoker.     COMPARISON:    Chest 11/12/2019    FINDINGS:  Single  portable AP view(s) of the chest.  Dense infiltrate throughout the right upper lobe, concerning for pneumonia. Biapical pleural thickening. Subsegmental left mid lung atelectasis. Emphysema. No large pleural effusions. No pneumothorax. Heart size  and mediastinum are within expected limits. Pulmonary vasculature unremarkable.      Impression:       Dense infiltrate throughout the right upper lobe, concerning for pneumonia. Follow-up to resolution is recommended.    Signer Name: Arron Guan MD   Signed: 1/6/2020 5:43 PM   Workstation Name: [email or web address]    [facility]          Assessment / Recommendations:  Sepsis  Pneumonia community-acquired bacterial  Exacerbation of COPD  Leukocytosis  DDD  A. fib RVR  RA  Anxiety  Former tobacco abuse  Acute hypoxic respiratory failure    We will go ahead and order a CT scan of the chest.  She has significant pain after fall.  I would like to go ahead and rule out a rib fracture with a CT scan as well as get a better view of the pneumonia that we are dealing with.  There is a potential that she may have some lung contusion.  CT scan may be help us delineate this.  Her procalcitonin is up.  Follow microbiology  Continue empiric antibiotics  Continue steroids as well as bronchodilators in the form of DuoNeb's.  Wean oxygen as able  Incentive spirometer  Flutter valve  Discussed the care with referring physician team        Oc Ko MD  [facility]  01/07/20  1:30 PM

## 2020-01-07 NOTE — PROGRESS NOTES
"SERVICE: [facility] HOSPITALIST    CONSULTANTS: Pulmonary    CHIEF COMPLAINT: Follow-up sepsis, right upper lobe pneumonia    SUBJECTIVE: Patient reports she does not feel any better since admission.  She notes continued cough that is nonproductive.  She complains of right-sided chest pain with breathing.  She does not feel that she is wheezing.  She feels general malaise, fatigue.  She otherwise denies f/c/n/v/d/abdominal pain or other new concerns.    OBJECTIVE:    BP 97/64 (BP Location: Right arm, Patient Position: Lying)   Pulse 97   Temp 98 °F (36.7 °C) (Oral)   Resp 20   Ht 170.2 cm (67\")   Wt 79.6 kg (175 lb 6.4 oz)   SpO2 96%   BMI 27.47 kg/m²     MEDS/LABS REVIEWED AND ORDERED    budesonide-formoterol 2 puff Inhalation BID - RT   enoxaparin 40 mg Subcutaneous Q24H   guaiFENesin 600 mg Oral Q12H   ipratropium-albuterol 3 mL Nebulization Q4H - RT   levoFLOXacin 750 mg Intravenous Q24H   methylPREDNISolone sodium succinate 40 mg Intravenous Q6H   potassium chloride 20 mEq Oral Daily   traZODone 100 mg Oral Nightly   vancomycin 1,500 mg Intravenous Q12H   venlafaxine 37.5 mg Oral BID With Meals     Physical Exam   Constitutional: She is oriented to person, place, and time. She appears well-nourished.   Appears older than stated age   HENT:   Head: Normocephalic and atraumatic.   Eyes: Pupils are equal, round, and reactive to light. EOM are normal.   Cardiovascular: Normal rate and regular rhythm.   Pulmonary/Chest: Effort normal.   Diminished throughout, bilateral bases and right middle lobe posteriorly with crackles, right greater than left   Abdominal: Soft. Bowel sounds are normal. She exhibits no distension. There is no tenderness. There is no guarding.   Musculoskeletal: She exhibits no edema.   Neurological: She is alert and oriented to person, place, and time.   Skin: Skin is warm and dry. No erythema.   Extensive tattooing of skin   Psychiatric: She has a normal mood and affect. " Her behavior is normal.   Vitals reviewed.    LAB/DIAGNOSTICS:    Lab Results (last 24 hours)     Procedure Component Value Units Date/Time    Respiratory Panel, PCR - Swab, Nasopharynx [[id number]]  (Normal) Collected:  01/07/20 0714    Specimen:  Swab from Nasopharynx Updated:  01/07/20 1211     ADENOVIRUS, PCR Not Detected     Coronavirus 229E Not Detected     Coronavirus HKU1 Not Detected     Coronavirus NL63 Not Detected     Coronavirus OC43 Not Detected     Human Metapneumovirus Not Detected     Human Rhinovirus/Enterovirus Not Detected     Influenza B PCR Not Detected     Parainfluenza Virus 1 Not Detected     Parainfluenza Virus 2 Not Detected     Parainfluenza Virus 3 Not Detected     Parainfluenza Virus 4 Not Detected     Bordetella pertussis pcr Not Detected     Influenza A H1 2009 PCR Not Detected     Chlamydophila pneumoniae PCR Not Detected     Mycoplasma pneumo by PCR Not Detected     Influenza A PCR Not Detected     Influenza A H3 Not Detected     Influenza A H1 Not Detected     RSV, PCR Not Detected     Bordetella parapertussis PCR Not Detected    Legionella Antigen, Urine - Urine, Urine, Clean Catch [[id number]]  (Normal) Collected:  01/07/20 1145    Specimen:  Urine, Clean Catch Updated:  01/07/20 1206     LEGIONELLA ANTIGEN, URINE Negative    S. Pneumo Ag Urine or CSF - Urine, Urine, Clean Catch [[id number]]  (Normal) Collected:  01/07/20 1145    Specimen:  Urine, Clean Catch Updated:  01/07/20 1206     Strep Pneumo Ag Negative    Basic Metabolic Panel [[id number]]  (Abnormal) Collected:  01/07/20 0635    Specimen:  Blood Updated:  01/07/20 1020     Glucose 139 mg/dL      BUN 11 mg/dL      Creatinine 0.66 mg/dL      Sodium 137 mmol/L      Potassium 4.0 mmol/L      Chloride 103 mmol/L      CO2 20.3 mmol/L      Calcium 8.4 mg/dL      eGFR Non African Amer 92 mL/min/1.73      BUN/Creatinine Ratio 16.7     Anion Gap 13.7 mmol/L     Narrative:       GFR Normal >60  Chronic Kidney Disease <60  Kidney  Failure <15      CBC & Differential [[id number]] Collected:  01/07/20 0635    Specimen:  Blood Updated:  01/07/20 0642    Narrative:       The following orders were created for panel order CBC & Differential.  Procedure                               Abnormality         Status                     ---------                               -----------         ------                     CBC Auto Differential[[id number]]        Abnormal            Final result                 Please view results for these tests on the individual orders.    CBC Auto Differential [[id number]]  (Abnormal) Collected:  01/07/20 0635    Specimen:  Blood Updated:  01/07/20 0642     WBC 19.59 10*3/mm3      RBC 3.80 10*6/mm3      Hemoglobin 11.0 g/dL      Hematocrit 34.8 %      MCV 91.6 fL      MCH 28.9 pg      MCHC 31.6 g/dL      RDW 14.4 %      RDW-SD 49.1 fl      MPV 9.1 fL      Platelets 269 10*3/mm3      Neutrophil % 90.0 %      Lymphocyte % 3.7 %      Monocyte % 5.1 %      Eosinophil % 0.1 %      Basophil % 0.1 %      Immature Grans % 1.0 %      Neutrophils, Absolute 17.65 10*3/mm3      Lymphocytes, Absolute 0.73 10*3/mm3      Monocytes, Absolute 0.99 10*3/mm3      Eosinophils, Absolute 0.02 10*3/mm3      Basophils, Absolute 0.01 10*3/mm3      Immature Grans, Absolute 0.19 10*3/mm3     Comprehensive Metabolic Panel [[id number]]  (Abnormal) Collected:  01/07/20 0430    Specimen:  Blood Updated:  01/07/20 0621     Glucose 137 mg/dL      BUN 11 mg/dL      Creatinine 0.71 mg/dL      Sodium 135 mmol/L      Potassium 4.1 mmol/L      Chloride 104 mmol/L      CO2 13.5 mmol/L      Calcium 8.5 mg/dL      Total Protein 6.7 g/dL      Albumin 2.30 g/dL      ALT (SGPT) 8 U/L      AST (SGOT) 11 U/L      Comment: Specimen hemolyzed.  Results may be affected.        Alkaline Phosphatase 137 U/L      Total Bilirubin 1.2 mg/dL      eGFR Non African Amer 85 mL/min/1.73      Globulin 4.4 gm/dL      A/G Ratio 0.5 g/dL      BUN/Creatinine Ratio 15.5     Anion Gap 17.5  mmol/L     Narrative:       GFR Normal >60  Chronic Kidney Disease <60  Kidney Failure <15      Phosphorus [[id number]]  (Normal) Collected:  01/07/20 0430    Specimen:  Blood Updated:  01/07/20 0620     Phosphorus 2.8 mg/dL     Magnesium [[id number]]  (Normal) Collected:  01/07/20 0430    Specimen:  Blood Updated:  01/07/20 0620     Magnesium 1.6 mg/dL     Procalcitonin [[id number]]  (Abnormal) Collected:  01/07/20 0430    Specimen:  Blood Updated:  01/07/20 0616     Procalcitonin 1.06 ng/mL     Vancomycin, Random [[id number]]  (Normal) Collected:  01/07/20 0430    Specimen:  Blood Updated:  01/07/20 0613     Vancomycin Random 11.40 mcg/mL     TSPOT [[id number]] Collected:  01/06/20 2045    Specimen:  Blood Updated:  01/06/20 2045    Respiratory Panel, PCR - Swab, Nasopharynx [[id number]]  (Normal) Collected:  01/06/20 1712    Specimen:  Swab from Nasopharynx Updated:  01/06/20 2037     ADENOVIRUS, PCR Not Detected     Coronavirus 229E Not Detected     Coronavirus HKU1 Not Detected     Coronavirus NL63 Not Detected     Coronavirus OC43 Not Detected     Human Metapneumovirus Not Detected     Human Rhinovirus/Enterovirus Not Detected     Influenza B PCR Not Detected     Parainfluenza Virus 1 Not Detected     Parainfluenza Virus 2 Not Detected     Parainfluenza Virus 3 Not Detected     Parainfluenza Virus 4 Not Detected     Bordetella pertussis pcr Not Detected     Influenza A H1 2009 PCR Not Detected     Chlamydophila pneumoniae PCR Not Detected     Mycoplasma pneumo by PCR Not Detected     Influenza A PCR Not Detected     Influenza A H3 Not Detected     Influenza A H1 Not Detected     RSV, PCR Not Detected     Bordetella parapertussis PCR Not Detected    Victor Draw [[id number]] Collected:  01/06/20 1709    Specimen:  Blood Updated:  01/06/20 1816    Narrative:       The following orders were created for panel order Victor Draw.  Procedure                               Abnormality         Status                      ---------                               -----------         ------                     Light Blue Top[[id number]]                                   Final result               Green Top (Gel)[[id number]]                                  Final result               Lavender Top[[id number]]                                     Final result               Gold Top - SST[[id number]]                                   Final result                 Please view results for these tests on the individual orders.    Green Top (Gel) [[id number]] Collected:  01/06/20 1709    Specimen:  Blood Updated:  01/06/20 1816     Extra Tube Hold for add-ons.     Comment: Auto resulted.       Gold Top - SST [[id number]] Collected:  01/06/20 1709    Specimen:  Blood Updated:  01/06/20 1816     Extra Tube Hold for add-ons.     Comment: Auto resulted.       Light Blue Top [[id number]] Collected:  01/06/20 1709    Specimen:  Blood Updated:  01/06/20 1816     Extra Tube hold for add-on     Comment: Auto resulted       Lavender Top [[id number]] Collected:  01/06/20 1709    Specimen:  Blood Updated:  01/06/20 1816     Extra Tube hold for add-on     Comment: Auto resulted       Procalcitonin [[id number]]  (Abnormal) Collected:  01/06/20 1709    Specimen:  Blood Updated:  01/06/20 1746     Procalcitonin 1.63 ng/mL     Comprehensive Metabolic Panel [[id number]]  (Abnormal) Collected:  01/06/20 1709    Specimen:  Blood Updated:  01/06/20 1741     Glucose 112 mg/dL      BUN 14 mg/dL      Creatinine 0.84 mg/dL      Sodium 133 mmol/L      Potassium 3.4 mmol/L      Chloride 98 mmol/L      CO2 19.8 mmol/L      Calcium 9.0 mg/dL      Total Protein 7.7 g/dL      Albumin 3.40 g/dL      ALT (SGPT) 13 U/L      AST (SGOT) 16 U/L      Alkaline Phosphatase 159 U/L      Total Bilirubin 1.6 mg/dL      eGFR Non African Amer 70 mL/min/1.73      Globulin 4.3 gm/dL      A/G Ratio 0.8 g/dL      BUN/Creatinine Ratio 16.7     Anion Gap 15.2 mmol/L     Narrative:       GFR Normal  >60  Chronic Kidney Disease <60  Kidney Failure <15      Troponin [[id number]]  (Normal) Collected:  01/06/20 1709    Specimen:  Blood Updated:  01/06/20 1740     Troponin T <0.010 ng/mL     Narrative:       Troponin T Reference Range:  <= 0.03 ng/mL-   Negative for AMI  >0.03 ng/mL-     Abnormal for myocardial necrosis.  Clinicians would have to utilize clinical acumen, EKG, Troponin and serial changes to determine if it is an Acute Myocardial Infarction or myocardial injury due to an underlying chronic condition.     Blood Culture - Blood, Wrist, Left [[id number]] Collected:  01/06/20 1723    Specimen:  Blood from Wrist, Left Updated:  01/06/20 1737    Lactic Acid, Plasma [[id number]]  (Normal) Collected:  01/06/20 1709    Specimen:  Blood Updated:  01/06/20 1735     Lactate 1.2 mmol/L     Protime-INR [[id number]]  (Abnormal) Collected:  01/06/20 1709    Specimen:  Blood Updated:  01/06/20 1730     Protime 15.2 Seconds      INR 1.22    Narrative:       Therapeutic Ranges for INR: 2.0-3.0 (PT 20-30)                              2.5-3.5 (PT 25-34)    aPTT [[id number]]  (Abnormal) Collected:  01/06/20 1709    Specimen:  Blood Updated:  01/06/20 1730     PTT 38.3 seconds     Narrative:       PTT = The equivalent PTT values for the therapeutic range of heparin levels at 0.1 to 0.7 U/ml are 53 to 110 seconds.      Influenza Antigen, Rapid - Swab, Nasopharynx [[id number]]  (Normal) Collected:  01/06/20 1708    Specimen:  Swab from Nasopharynx Updated:  01/06/20 1730     Influenza A Ag, EIA Negative     Influenza B Ag, EIA Negative    CBC & Differential [[id number]] Collected:  01/06/20 1709    Specimen:  Blood Updated:  01/06/20 1721    Narrative:       The following orders were created for panel order CBC & Differential.  Procedure                               Abnormality         Status                     ---------                               -----------         ------                     CBC Auto Differential[[id number]]         Abnormal            Final result                 Please view results for these tests on the individual orders.    CBC Auto Differential [[id number]]  (Abnormal) Collected:  01/06/20 1709    Specimen:  Blood Updated:  01/06/20 1721     WBC 21.31 10*3/mm3      RBC 4.31 10*6/mm3      Hemoglobin 12.7 g/dL      Hematocrit 38.9 %      MCV 90.3 fL      MCH 29.5 pg      MCHC 32.6 g/dL      RDW 14.3 %      RDW-SD 48.1 fl      MPV 9.1 fL      Platelets 301 10*3/mm3      Neutrophil % 85.4 %      Lymphocyte % 5.3 %      Monocyte % 8.2 %      Eosinophil % 0.6 %      Basophil % 0.1 %      Immature Grans % 0.4 %      Neutrophils, Absolute 18.21 10*3/mm3      Lymphocytes, Absolute 1.12 10*3/mm3      Monocytes, Absolute 1.74 10*3/mm3      Eosinophils, Absolute 0.12 10*3/mm3      Basophils, Absolute 0.03 10*3/mm3      Immature Grans, Absolute 0.09 10*3/mm3     Blood Culture - Blood, Arm, Right [[id number]] Collected:  01/06/20 1709    Specimen:  Blood from Arm, Right Updated:  01/06/20 1715        ECG 12 Lead   Preliminary Result   HEART RATE= 99  bpm   RR Interval= 604  ms   SD Interval= 146  ms   P Horizontal Axis= 10  deg   P Front Axis= 56  deg   QRSD Interval= 101  ms   QT Interval= 371  ms   QRS Axis= 43  deg   T Wave Axis= 56  deg   - NORMAL ECG -   Sinus rhythm   Electronically Signed By:    Date and Time of Study: 2020-01-06 17:10:29        Results for orders placed during the hospital encounter of 11/06/19   Adult Transthoracic Echo Complete W/ Cont if Necessary Per Protocol    Narrative · Left ventricular systolic function is normal.  · Mild-to-moderate mitral valve regurgitation is present  · Mild tricuspid valve regurgitation is present.  · Left atrial cavity size is moderately dilated.  · Calculated EF = 50.0%.  · The valve appears trileaflet. The aortic valve is abnormal in structure.   There is calcification of the aortic valve.Mild aortic valve regurgitation   is present. Moderate aortic valve stenosis is present.  "On the short-axis   images, the aortic valve is opening well. Concern that the gradient noted   at the aortic valve may be due to a subaortic membrane, images not clear   enough to assess this.        Xr Ribs Right With Pa Chest    Result Date: 1/7/2020   1. No evidence of acute rib fracture or pneumothorax. 2. No other significant interval change. Imaging follow-up to clearing recommended for the masslike consolidation in the right upper lobe.  This report was finalized on 1/7/2020 8:57 AM by Dr. Petr Jalloh MD.      Xr Chest 1 View    Result Date: 1/6/2020  Dense infiltrate throughout the right upper lobe, concerning for pneumonia. Follow-up to resolution is recommended. Signer Name: Arron Guan MD  Signed: 1/6/2020 5:43 PM  Workstation Name: [email or web address]  [facility]    ASSESSMENT/PLAN:  Sepsis 2/2 RUL pneumonia:  AHR F:  HAG MA:  AE bullous COPD:  Right-sided chest pain:   Steroid-induced leukocytosis: Pulmonary consulted  Check strep pneumo and Legionella antigens  RVP negative, awaiting sputum culture, T spot  Procalcitonin positive, WBC C trending down  Continue Vanco, Levaquin pending cultures  Change prednisone to IV Solu-Medrol  Change duo nebs every 4 hours  Continue Acapella, I-S  Likely needs CT chest, await further specialist input    DDD with chronic pain: No current acute issues on Tylenol, Toradol, lidocaine patches    History of A. fib RVR: Currently NSR  Patient notes she ran out of medication and did not refill it    Rheumatoid arthritis: No current medications for treatment    Anxiety:  History of possible seizure:  No acute issues on home trazodone and Effexor    Elevated alkaline phosphatase: Unclear significance, no abdominal concerns, recheck in a.m.    Electrolyte imbalance: Add replacement protocols    PLAN FOR DISPOSITION: Home when able    TOYA Camejo  Hospitalist, [facility]  01/07/20  7:24 AM    \"Dictated utilizing Dragon " "dictation\"    "

## 2020-01-07 NOTE — NURSING NOTE
Discharge Planning Assessment  JOHN Hammonds     Patient Name: Akila Amezcua  MRN: [id number]  Today's Date: 1/7/2020    Admit Date: 1/6/2020    Discharge Needs Assessment     Row Name 01/07/20 1037       Living Environment    Lives With  spouse    Name(s) of Who Lives With Patient  [place] -     Current Living Arrangements  home/apartment/condo    Primary Care Provided by  self    Provides Primary Care For  no one    Family Caregiver if Needed  spouse    Quality of Family Relationships  helpful;involved;supportive    Able to Return to Prior Arrangements  yes       Transition Planning    Patient/Family Anticipates Transition to  home with family    Transportation Anticipated  family or friend will provide       Discharge Needs Assessment    Readmission Within the Last 30 Days  no previous admission in last 30 days    Concerns to be Addressed  denies needs/concerns at this time    Equipment Currently Used at Home  oxygen;respiratory supplies;cane, straight    Anticipated Changes Related to Illness  -- Possible increase in oxygen needs    Equipment Needed After Discharge  none        Discharge Plan     Row Name 01/07/20 1111       Plan    Plan Comments  Spoke with Mrs Amezcua at bedside.  She and her  live in a home in [place].  Facesheet verified and updated.  She ambulates with a cane at home and uses a walker for longer distances.  She wears oxygen @ 2l/min/nc prn as provided by [facility].  Verified oxygen order per Didi at [facility].  She is independent with her ADL's.  Her daughter drives her wherever she needs to go.  Her pharmacy is [facility] in [place] and there are no issues with paying for her prescriptins.  She has an advanced directive on file here at the hospital.  Plan is home when stable.  Will continue to follow    Row Name 01/07/20 1041       Plan    Plan  Home when stable    Patient/Family in Agreement with Plan  yes        Destination      Coordination has not been started for this  encounter.      Durable Medical Equipment      Coordination has not been started for this encounter.      Dialysis/Infusion      Coordination has not been started for this encounter.      Home Medical Care      Coordination has not been started for this encounter.      Therapy      Coordination has not been started for this encounter.      Community Resources      Coordination has not been started for this encounter.          Demographic Summary     Row Name 01/07/20 1036       General Information    Arrived From  home    Referral Source  admission list    Reason for Consult  discharge planning    Preferred Language  English     Used During This Interaction  no       Contact Information    Permission Granted to Share Info With          Functional Status    No documentation.       Psychosocial    No documentation.       Abuse/Neglect    No documentation.       Legal    No documentation.       Substance Abuse    No documentation.       Patient Forms    No documentation.           Fidelina Nuno RN

## 2020-01-07 NOTE — H&P
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    The right sided pain is felt in her scapula, top ridge of her shoulder, lateral chest wall, and anterior chest. Worse w/ cough or deep breath, unable to get her full breath due to pain.  She had been breathing well prior to this. Some chills, no definitive fevers. Has had some sputum, but hard to cough up due to pain. No Hemoptysis or hematemesis. No Night sweats. No unintentional wt loss.     She was admitted this Fall from  to  for PNA w/ RSV, w/ an admission complicated by Afib w/ RVR. She was discharged w/ 2L Home o2 which she now uses PRN when she feels SOA. (Does not have a pulse ox at home). Has stopped taking Xarelto, Amiodarone and Cardizem. PCP note does not indicate if they were stopped there. Was supposed to have follow up with Cardiology, and Pulm. Do not see any follow up notes  in the system.    ROS negative for LE edema, palpitations, left sided chest pain, diaphoresis, skin changes.      Past Medical History:   Diagnosis Date   • Anxiety    • Arthritis    • COPD (chronic obstructive pulmonary disease) (CMS/HCC)     LUNG BLEBS   • DJD (degenerative joint disease)    • Gallstones     SCHEDULED FOR SX   • GERD (gastroesophageal reflux disease)    • Injury of back     degenertive disc disease   • Murmur     BENIGN   • Panic attacks    • Rheumatoid arthritis (CMS/HCC)    • Seizures (CMS/HCC)     LAST ONE 2017     Past Surgical History:   Procedure Laterality Date   • APPENDECTOMY     •  SECTION     • CHOLECYSTECTOMY N/A 2017    Procedure: LAPAROSCOPIC CHOLECYSTECTOMY, laparoscopic adhesiolysis requiring 45 minutes of operative time;   Surgeon: Liliana Monroy MD;  Location: [facility];  Service:    • ESOPHAGEAL ATRESIA REPAIR     • PLEURAL BIOPSY     • PLEURAL SCARIFICATION       Family History   Problem Relation Age of Onset   • Lung cancer Father      Social History     Tobacco Use   • Smoking status: Current Every Day Smoker     Packs/day: 1.00     Years: 35.00     Pack years: 35.00   Substance Use Topics   • Alcohol use: No   • Drug use: No     Medications Prior to Admission   Medication Sig Dispense Refill Last Dose   • albuterol (PROVENTIL) (2.5 MG/3ML) 0.083% nebulizer solution Take 2.5 mg by nebulization Every 4 (Four) Hours As Needed for wheezing.   Taking   • Fluticasone Furoate-Vilanterol (BREO ELLIPTA IN) Inhale 1 puff Daily.   Taking   • ipratropium-albuterol (DUO-NEB) 0.5-2.5 mg/mL nebulizer Take 3 mL by nebulization Every 4 (Four) Hours As Needed for wheezing.   Taking   • meloxicam (MOBIC) 15 MG tablet Take 15 mg by mouth Daily.   Taking   • Misc. Devices (BATH BENCH WITH BACK) misc 1 Units Daily As Needed (shower). 1 each 1    • traZODone (DESYREL) 50 MG tablet Take 1-2 tablets by mouth Every Night. 60 tablet 1    • venlafaxine (EFFEXOR) 37.5 MG tablet Take 37.5 mg by mouth 2 (Two) Times a Day.   Taking   • guaiFENesin-dextromethorphan (ROBITUSSIN DM) 100-10 MG/5ML syrup Take 5 mL by mouth Every 4 (Four) Hours As Needed for Cough.   Taking   • promethazine-dextromethorphan (PROMETHAZINE-DM) 6.25-15 MG/5ML syrup Take 5 mL by mouth 4 (Four) Times a Day As Needed for Cough. 240 mL 0    • rivaroxaban (XARELTO) 20 MG tablet Take 1 tablet by mouth Daily With Dinner. 30 tablet 0 Taking   • sodium chloride (OCEAN NASAL SPRAY) 0.65 % nasal spray 1 spray into the nostril(s) as directed by provider As Needed for Congestion. 59 mL 12    • traMADol (ULTRAM) 50 MG tablet TAKE 1 TABLET BY MOUTH 3 TIMES A DAY AS NEEDED FOR PAIN  0 Taking     Allergies:  Penicillins  Debilities: As per HPI and Physical Exam.     REVIEW OF SYSTEMS:  Please see  "the above history of present illness for pertinent positives and negatives.  The remainder of the patient's systems have been reviewed and are negative.    Vital Signs  Temp:  [98.7 °F (37.1 °C)-99.1 °F (37.3 °C)] 99.1 °F (37.3 °C)  Heart Rate:  [] 104  Resp:  [22-26] 22  BP: ()/(55-71) 111/70    Flowsheet Rows      First Filed Value   Admission Height  170.2 cm (67\") Documented at 01/06/2020 1653   Admission Weight  81.2 kg (179 lb) Documented at 01/06/2020 1653           Physical Exam:  Physical Exam   Constitutional: She is oriented to person, place, and time. No distress.   Thin, age appropriate appearing middle aged woman, sitting up in bed, in acute pain, but NAD.   Swallow breathing   HENT:   Head: Normocephalic and atraumatic.   Right Ear: External ear normal.   Left Ear: External ear normal.   Nose: Nose normal.   Eyes: Pupils are equal, round, and reactive to light. Conjunctivae and EOM are normal. No scleral icterus.   Neck: No JVD present. No tracheal deviation present.   Cardiovascular: Normal rate, regular rhythm and normal heart sounds. Exam reveals no friction rub.   No murmur heard.  Pulmonary/Chest: No stridor. No respiratory distress. She has no wheezes. She has rales.   Swallow effort, no accessory muscle use  Ronchi in Rt posterior upper field  Diminished to minimal bs in all other fields.   Abdominal: Soft. Bowel sounds are normal. She exhibits no distension and no mass. There is no tenderness. There is no guarding.   Musculoskeletal: Normal range of motion. She exhibits no edema.   B/l ulnar radiculopathy   Lymphadenopathy:     She has no cervical adenopathy.   Neurological: She is alert and oriented to person, place, and time. No cranial nerve deficit. She exhibits normal muscle tone.   Skin: Skin is warm and dry. She is not diaphoretic. No erythema.   Psychiatric: Her behavior is normal. Thought content normal.   Slightly axious but full affect, deferred mood   Nursing note and " vitals reviewed.       Results Review:    I reviewed the patient's new clinical results.  Lab Results (most recent)     Procedure Component Value Units Date/Time    TSPOT [[id number]] Collected:  01/06/20 2045    Specimen:  Blood Updated:  01/06/20 2045    Respiratory Panel, PCR - Swab, Nasopharynx [[id number]]  (Normal) Collected:  01/06/20 1712    Specimen:  Swab from Nasopharynx Updated:  01/06/20 2037     ADENOVIRUS, PCR Not Detected     Coronavirus 229E Not Detected     Coronavirus HKU1 Not Detected     Coronavirus NL63 Not Detected     Coronavirus OC43 Not Detected     Human Metapneumovirus Not Detected     Human Rhinovirus/Enterovirus Not Detected     Influenza B PCR Not Detected     Parainfluenza Virus 1 Not Detected     Parainfluenza Virus 2 Not Detected     Parainfluenza Virus 3 Not Detected     Parainfluenza Virus 4 Not Detected     Bordetella pertussis pcr Not Detected     Influenza A H1 2009 PCR Not Detected     Chlamydophila pneumoniae PCR Not Detected     Mycoplasma pneumo by PCR Not Detected     Influenza A PCR Not Detected     Influenza A H3 Not Detected     Influenza A H1 Not Detected     RSV, PCR Not Detected     Bordetella parapertussis PCR Not Detected    [facility] Draw [[id number]] Collected:  01/06/20 1709    Specimen:  Blood Updated:  01/06/20 1816    Narrative:       The following orders were created for panel order [facility]coll Draw.  Procedure                               Abnormality         Status                     ---------                               -----------         ------                     Light Blue Top[[id number]]                                   Final result               Green Top (Gel)[[id number]]                                  Final result               Lavender Top[[id number]]                                     Final result               Gold Top - [facility][[id number]]                                   Final result                 Please view results for these tests on the  individual orders.    Green Top (Gel) [[id number]] Collected:  01/06/20 1709    Specimen:  Blood Updated:  01/06/20 1816     Extra Tube Hold for add-ons.     Comment: Auto resulted.       Gold Top - [facility]ST [[id number]] Collected:  01/06/20 1709    Specimen:  Blood Updated:  01/06/20 1816     Extra Tube Hold for add-ons.     Comment: Auto resulted.       Light Blue Top [[id number]] Collected:  01/06/20 1709    Specimen:  Blood Updated:  01/06/20 1816     Extra Tube hold for add-on     Comment: Auto resulted       Lavender Top [[id number]] Collected:  01/06/20 1709    Specimen:  Blood Updated:  01/06/20 1816     Extra Tube hold for add-on     Comment: Auto resulted       Procalcitonin [[id number]]  (Abnormal) Collected:  01/06/20 1709    Specimen:  Blood Updated:  01/06/20 1746     Procalcitonin 1.63 ng/mL     Comprehensive Metabolic Panel [[id number]]  (Abnormal) Collected:  01/06/20 1709    Specimen:  Blood Updated:  01/06/20 1741     Glucose 112 mg/dL      BUN 14 mg/dL      Creatinine 0.84 mg/dL      Sodium 133 mmol/L      Potassium 3.4 mmol/L      Chloride 98 mmol/L      CO2 19.8 mmol/L      Calcium 9.0 mg/dL      Total Protein 7.7 g/dL      Albumin 3.40 g/dL      ALT (SGPT) 13 U/L      AST (SGOT) 16 U/L      Alkaline Phosphatase 159 U/L      Total Bilirubin 1.6 mg/dL      eGFR Non African Amer 70 mL/min/1.73      Globulin 4.3 gm/dL      A/G Ratio 0.8 g/dL      BUN/Creatinine Ratio 16.7     Anion Gap 15.2 mmol/L     Narrative:       GFR Normal >60  Chronic Kidney Disease <60  Kidney Failure <15      Troponin [[id number]]  (Normal) Collected:  01/06/20 1709    Specimen:  Blood Updated:  01/06/20 1740     Troponin T <0.010 ng/mL     Narrative:       Troponin T Reference Range:  <= 0.03 ng/mL-   Negative for AMI  >0.03 ng/mL-     Abnormal for myocardial necrosis.  Clinicians would have to utilize clinical acumen, EKG, Troponin and serial changes to determine if it is an Acute Myocardial Infarction or myocardial injury due to  an underlying chronic condition.     Blood Culture - Blood, Wrist, Left [[id number]] Collected:  01/06/20 1723    Specimen:  Blood from Wrist, Left Updated:  01/06/20 1737    Lactic Acid, Plasma [[id number]]  (Normal) Collected:  01/06/20 1709    Specimen:  Blood Updated:  01/06/20 1735     Lactate 1.2 mmol/L     Protime-INR [[id number]]  (Abnormal) Collected:  01/06/20 1709    Specimen:  Blood Updated:  01/06/20 1730     Protime 15.2 Seconds      INR 1.22    Narrative:       Therapeutic Ranges for INR: 2.0-3.0 (PT 20-30)                              2.5-3.5 (PT 25-34)    aPTT [[id number]]  (Abnormal) Collected:  01/06/20 1709    Specimen:  Blood Updated:  01/06/20 1730     PTT 38.3 seconds     Narrative:       PTT = The equivalent PTT values for the therapeutic range of heparin levels at 0.1 to 0.7 U/ml are 53 to 110 seconds.      Influenza Antigen, Rapid - Swab, Nasopharynx [[id number]]  (Normal) Collected:  01/06/20 1708    Specimen:  Swab from Nasopharynx Updated:  01/06/20 1730     Influenza A Ag, EIA Negative     Influenza B Ag, EIA Negative    CBC & Differential [[id number]] Collected:  01/06/20 1709    Specimen:  Blood Updated:  01/06/20 1721    Narrative:       The following orders were created for panel order CBC & Differential.  Procedure                               Abnormality         Status                     ---------                               -----------         ------                     CBC Auto Differential[[id number]]        Abnormal            Final result                 Please view results for these tests on the individual orders.    CBC Auto Differential [[id number]]  (Abnormal) Collected:  01/06/20 1709    Specimen:  Blood Updated:  01/06/20 1721     WBC 21.31 10*3/mm3      RBC 4.31 10*6/mm3      Hemoglobin 12.7 g/dL      Hematocrit 38.9 %      MCV 90.3 fL      MCH 29.5 pg      MCHC 32.6 g/dL      RDW 14.3 %      RDW-SD 48.1 fl      MPV 9.1 fL      Platelets 301 10*3/mm3      Neutrophil % 85.4  %      Lymphocyte % 5.3 %      Monocyte % 8.2 %      Eosinophil % 0.6 %      Basophil % 0.1 %      Immature Grans % 0.4 %      Neutrophils, Absolute 18.21 10*3/mm3      Lymphocytes, Absolute 1.12 10*3/mm3      Monocytes, Absolute 1.74 10*3/mm3      Eosinophils, Absolute 0.12 10*3/mm3      Basophils, Absolute 0.03 10*3/mm3      Immature Grans, Absolute 0.09 10*3/mm3     Blood Culture - Blood, Arm, Right [[id number]] Collected:  01/06/20 1709    Specimen:  Blood from Arm, Right Updated:  01/06/20 1715          Imaging Results (Most Recent)     Procedure Component Value Units Date/Time    XR Chest 1 View [[id number]] Collected:  01/06/20 1743     Updated:  01/06/20 1745    Narrative:       CR Chest 1 Vw    INDICATION:   59-year-old female with cough and shortness of air for 4 days. Current smoker.     COMPARISON:    Chest 11/12/2019    FINDINGS:  Single portable AP view(s) of the chest.  Dense infiltrate throughout the right upper lobe, concerning for pneumonia. Biapical pleural thickening. Subsegmental left mid lung atelectasis. Emphysema. No large pleural effusions. No pneumothorax. Heart size  and mediastinum are within expected limits. Pulmonary vasculature unremarkable.      Impression:       Dense infiltrate throughout the right upper lobe, concerning for pneumonia. Follow-up to resolution is recommended.    Signer Name: Arron Guan MD   Signed: 1/6/2020 5:43 PM   Workstation Name: [email or web address]    [facility]        Independent read of CXR image from today as compared to image from Noverember 2019, new opacity in the Rt Upper lobe, since prior, clearing of lower more dependent portions of lung    ECG/EMG Results (most recent)     Procedure Component Value Units Date/Time    ECG 12 Lead [[id number]] Collected:  01/06/20 1710     Updated:  01/06/20 1712    Narrative:       HEART RATE= 99  bpm  RR Interval= 604  ms  LA Interval= 146  ms  P Horizontal Axis= 10  deg  P Front Axis= 56  deg  QRSD  Interval= 101  ms  QT Interval= 371  ms  QRS Axis= 43  deg  T Wave Axis= 56  deg  - NORMAL ECG -  Sinus rhythm  Electronically Signed By:   Date and Time of Study: 2020-01-06 17:10:29          Assessment/Plan     Sepsis from Rt Upper Lobe PNA  -T 98.7, , RR 26, BP 94/55, SPO2 94% on RA, WBC 21.3, lactate 1.2, pro-Hector 1.6  - Got Levaquin in the ER, at risk for MDR gave dose of vanc as well  - Pulm consult to insure follow up, and need for further imaging  - Tessalon pearls for cough, Mucinex for secretions  - Currently on RA, Monitor on Continuous Pulse ox  - Monitor BP's Q2H overnight, got bolus in ER, and running on 150cc of LR    Bullous COPD  - Will treat as an AECOPD as well, not making good breath sounds  - Give 40mg Pred, duonebs, and prn albuterol, pharmacy sub for her home inhaler    Rt pleuritic chest pain  - Rib films in AM to r/o rib frx from coughing  - Cr same as when she was discharged in the fall  - Give Toradol for pain    Chronic pain from DDD  - Takes acetaminophen and occasional ibuprofen  -Have ordered acetaminophen, is on Toradol for the chest pain, will give heating pads topical menthol, and lidocaine patches for her chronic back pain    Prior Afib w/ RVR  - No longer taking Xarelto, Cardizem, or amiodarone  - Unclear if followed up with cards  - Monitor on Tele to make sure she does not recur    RA  - Has established w/ a rheumatologist, not on any medications currently, therefore not seriously immunocompromised due to DMARD    Anxiety, w/ ?h/o Seizure  - Restarted home Effexor, and trazodone    I discussed the patients findings and my recommendations with patient and .     Colten Franco MD  01/06/20  10:37 PM

## 2020-01-07 NOTE — PROGRESS NOTES
Pharmacokinetic Consult - Vancomycin Dosing    Akila Amezcua is a 59 y.o. female who has been consulted for vancomycin dosing for pneumonia / sepsis.     Relevant clinical data and objective history reviewed:  Lab Results   Component Value Date/Time    CREATININE 0.71 01/07/2020 04:30 AM    CREATININE 0.84 01/06/2020 05:09 PM    CREATININE 0.85 11/21/2019 09:44 AM    CREATININE 0.49 (L) 11/11/2019 04:28 AM    BUN 11 01/07/2020 04:30 AM    BUN 14 01/06/2020 05:09 PM    BUN 16 11/21/2019 09:44 AM    BUN 12 11/11/2019 04:28 AM   CrCl = 94.9 ml/min    Lab Results   Component Value Date/Time    WBC 19.59 (H) 01/07/2020 06:35 AM    WBC 9.47 11/21/2019 09:44 AM    HGB 11.0 (L) 01/07/2020 06:35 AM    HCT 34.8 01/07/2020 06:35 AM    MCV 91.6 01/07/2020 06:35 AM     01/07/2020 06:35 AM     Temp Readings from Last 3 Encounters:   01/07/20 97 °F (36.1 °C) (Oral)   11/21/19 97.5 °F (36.4 °C)   11/13/19 98.6 °F (37 °C) (Oral)     Weight: 79.6 kg (175 lb 6.4 oz)    Assessment/Plan  The patient will be started on Vancomycin 1500 mg every 12 hours. Serum creatinine will be ordered per policy. Plan for trough as patient approaches steady state, prior to the 4th dose.  Due to infection severity, will target a trough of 15-20 mcg/ml. Pharmacy will continue to follow the patient’s culture results and clinical progress daily.    Brooks Matthews, JOELLEN, PharmD

## 2020-01-07 NOTE — PLAN OF CARE
Patient stable on 2LNC which is home regimen.  Patient reports less SOB especially with exertion.    Respiratory panel negative and taken out of isolation.  Patient tolerating vancomycin

## 2020-01-07 NOTE — PLAN OF CARE
Continued Stay Note  JOHN Hammonds     Patient Name: Akila Amezcua  MRN: [id number]  Today's Date: 1/7/2020    Admit Date: 1/6/2020    Discharge Plan       Row Name 01/07/20 1111       Plan    Plan Comments  Spoke with Mrs Amezcua at bedside.  She and her  live in a home in [place].  Facesheet verified and updated.  She ambulates with a cane at home and uses a walker for longer distances.  She wears oxygen @ 2l/min/nc prn as provided by [employer].  Verified oxygen order per Did[facility] at [facility].  She is independent with her ADL's.  Her daughter drives her wherever she needs to go.  Her pharmacy is [facility] in [place] and there are no issues with paying for her prescriptins.  She has an advanced directive on file here at the hospital.  Plan is home when stable.  Will continue to follow      Row Name 01/07/20 1041       Plan    Plan  Home when stable    Patient/Family in Agreement with Plan  yes            Discharge Codes    No documentation.               Fidelina Nuno RN

## 2020-01-08 LAB
ANION GAP SERPL CALCULATED.3IONS-SCNC: 13.5 MMOL/L (ref 5–15)
BASOPHILS # BLD AUTO: 0.04 10*3/MM3 (ref 0–0.2)
BASOPHILS NFR BLD AUTO: 0.2 % (ref 0–1.5)
BUN BLD-MCNC: 18 MG/DL (ref 6–20)
BUN/CREAT SERPL: 24.3 (ref 7–25)
CALCIUM SPEC-SCNC: 8.9 MG/DL (ref 8.6–10.5)
CHLORIDE SERPL-SCNC: 107 MMOL/L (ref 98–107)
CO2 SERPL-SCNC: 19.5 MMOL/L (ref 22–29)
CREAT BLD-MCNC: 0.74 MG/DL (ref 0.57–1)
DEPRECATED RDW RBC AUTO: 49.9 FL (ref 37–54)
EOSINOPHIL # BLD AUTO: 0 10*3/MM3 (ref 0–0.4)
EOSINOPHIL NFR BLD AUTO: 0 % (ref 0.3–6.2)
ERYTHROCYTE [DISTWIDTH] IN BLOOD BY AUTOMATED COUNT: 15 % (ref 12.3–15.4)
GFR SERPL CREATININE-BSD FRML MDRD: 81 ML/MIN/1.73
GLUCOSE BLD-MCNC: 208 MG/DL (ref 65–99)
GLUCOSE BLDC GLUCOMTR-MCNC: 170 MG/DL (ref 70–130)
GLUCOSE BLDC GLUCOMTR-MCNC: 218 MG/DL (ref 70–130)
GLUCOSE BLDC GLUCOMTR-MCNC: 239 MG/DL (ref 70–130)
HBA1C MFR BLD: 5 % (ref 4.8–5.6)
HCT VFR BLD AUTO: 33.2 % (ref 34–46.6)
HGB BLD-MCNC: 10.9 G/DL (ref 12–15.9)
IMM GRANULOCYTES # BLD AUTO: 0.31 10*3/MM3 (ref 0–0.05)
IMM GRANULOCYTES NFR BLD AUTO: 1.4 % (ref 0–0.5)
LYMPHOCYTES # BLD AUTO: 0.81 10*3/MM3 (ref 0.7–3.1)
LYMPHOCYTES NFR BLD AUTO: 3.5 % (ref 19.6–45.3)
MCH RBC QN AUTO: 29.6 PG (ref 26.6–33)
MCHC RBC AUTO-ENTMCNC: 32.8 G/DL (ref 31.5–35.7)
MCV RBC AUTO: 90.2 FL (ref 79–97)
MONOCYTES # BLD AUTO: 0.95 10*3/MM3 (ref 0.1–0.9)
MONOCYTES NFR BLD AUTO: 4.2 % (ref 5–12)
NEUTROPHILS # BLD AUTO: 20.76 10*3/MM3 (ref 1.7–7)
NEUTROPHILS NFR BLD AUTO: 90.7 % (ref 42.7–76)
NRBC BLD AUTO-RTO: 0 /100 WBC (ref 0–0.2)
PLATELET # BLD AUTO: 357 10*3/MM3 (ref 140–450)
PMV BLD AUTO: 9.8 FL (ref 6–12)
POTASSIUM BLD-SCNC: 4.4 MMOL/L (ref 3.5–5.2)
RBC # BLD AUTO: 3.68 10*6/MM3 (ref 3.77–5.28)
SODIUM BLD-SCNC: 140 MMOL/L (ref 136–145)
TSPOT INTERPRETATION: NEGATIVE
TSPOT NIL CONTROL INTERPRETATION: NORMAL
TSPOT PANEL A: 0
TSPOT PANEL B: 0
TSPOT POS CONTROL INTERPRETATION: NORMAL
VANCOMYCIN SERPL-MCNC: 16.6 MCG/ML (ref 5–40)
WBC NRBC COR # BLD: 22.87 10*3/MM3 (ref 3.4–10.8)

## 2020-01-08 PROCEDURE — 25010000002 KETOROLAC TROMETHAMINE PER 15 MG: Performed by: INTERNAL MEDICINE

## 2020-01-08 PROCEDURE — 80048 BASIC METABOLIC PNL TOTAL CA: CPT | Performed by: NURSE PRACTITIONER

## 2020-01-08 PROCEDURE — 80202 ASSAY OF VANCOMYCIN: CPT | Performed by: INTERNAL MEDICINE

## 2020-01-08 PROCEDURE — 25010000002 ENOXAPARIN PER 10 MG: Performed by: INTERNAL MEDICINE

## 2020-01-08 PROCEDURE — 94762 N-INVAS EAR/PLS OXIMTRY CONT: CPT

## 2020-01-08 PROCEDURE — 83036 HEMOGLOBIN GLYCOSYLATED A1C: CPT | Performed by: NURSE PRACTITIONER

## 2020-01-08 PROCEDURE — 25010000002 VANCOMYCIN PER 500 MG: Performed by: INTERNAL MEDICINE

## 2020-01-08 PROCEDURE — 25010000002 METHYLPREDNISOLONE PER 40 MG: Performed by: INTERNAL MEDICINE

## 2020-01-08 PROCEDURE — 94799 UNLISTED PULMONARY SVC/PX: CPT

## 2020-01-08 PROCEDURE — 99232 SBSQ HOSP IP/OBS MODERATE 35: CPT | Performed by: NURSE PRACTITIONER

## 2020-01-08 PROCEDURE — G0378 HOSPITAL OBSERVATION PER HR: HCPCS

## 2020-01-08 PROCEDURE — 94640 AIRWAY INHALATION TREATMENT: CPT

## 2020-01-08 PROCEDURE — 25010000002 LEVOFLOXACIN PER 250 MG: Performed by: INTERNAL MEDICINE

## 2020-01-08 PROCEDURE — 82962 GLUCOSE BLOOD TEST: CPT

## 2020-01-08 PROCEDURE — 87205 SMEAR GRAM STAIN: CPT | Performed by: INTERNAL MEDICINE

## 2020-01-08 PROCEDURE — 87070 CULTURE OTHR SPECIMN AEROBIC: CPT | Performed by: INTERNAL MEDICINE

## 2020-01-08 PROCEDURE — 85025 COMPLETE CBC W/AUTO DIFF WBC: CPT | Performed by: NURSE PRACTITIONER

## 2020-01-08 PROCEDURE — 25010000002 METHYLPREDNISOLONE PER 40 MG: Performed by: NURSE PRACTITIONER

## 2020-01-08 PROCEDURE — 63710000001 INSULIN ASPART PER 5 UNITS: Performed by: NURSE PRACTITIONER

## 2020-01-08 PROCEDURE — 25010000002 VANCOMYCIN 1 G RECONSTITUTED SOLUTION 1 EACH VIAL: Performed by: INTERNAL MEDICINE

## 2020-01-08 PROCEDURE — 87081 CULTURE SCREEN ONLY: CPT | Performed by: NURSE PRACTITIONER

## 2020-01-08 RX ORDER — PREDNISONE 20 MG/1
40 TABLET ORAL
Status: DISCONTINUED | OUTPATIENT
Start: 2020-01-09 | End: 2020-01-13

## 2020-01-08 RX ORDER — NICOTINE POLACRILEX 4 MG
15 LOZENGE BUCCAL
Status: DISCONTINUED | OUTPATIENT
Start: 2020-01-08 | End: 2020-01-13 | Stop reason: HOSPADM

## 2020-01-08 RX ORDER — DEXTROSE MONOHYDRATE 25 G/50ML
25 INJECTION, SOLUTION INTRAVENOUS
Status: DISCONTINUED | OUTPATIENT
Start: 2020-01-08 | End: 2020-01-13 | Stop reason: HOSPADM

## 2020-01-08 RX ORDER — IPRATROPIUM BROMIDE AND ALBUTEROL SULFATE 2.5; .5 MG/3ML; MG/3ML
3 SOLUTION RESPIRATORY (INHALATION)
Status: DISCONTINUED | OUTPATIENT
Start: 2020-01-09 | End: 2020-01-13 | Stop reason: HOSPADM

## 2020-01-08 RX ORDER — METHYLPREDNISOLONE SODIUM SUCCINATE 40 MG/ML
40 INJECTION, POWDER, LYOPHILIZED, FOR SOLUTION INTRAMUSCULAR; INTRAVENOUS EVERY 8 HOURS
Status: COMPLETED | OUTPATIENT
Start: 2020-01-08 | End: 2020-01-08

## 2020-01-08 RX ADMIN — LEVOFLOXACIN 750 MG: 5 INJECTION, SOLUTION INTRAVENOUS at 18:43

## 2020-01-08 RX ADMIN — BUDESONIDE AND FORMOTEROL FUMARATE DIHYDRATE 2 PUFF: 160; 4.5 AEROSOL RESPIRATORY (INHALATION) at 20:12

## 2020-01-08 RX ADMIN — INSULIN ASPART 3 UNITS: 100 INJECTION, SOLUTION INTRAVENOUS; SUBCUTANEOUS at 20:42

## 2020-01-08 RX ADMIN — ENOXAPARIN SODIUM 40 MG: 40 INJECTION SUBCUTANEOUS at 17:39

## 2020-01-08 RX ADMIN — BUDESONIDE AND FORMOTEROL FUMARATE DIHYDRATE 2 PUFF: 160; 4.5 AEROSOL RESPIRATORY (INHALATION) at 07:56

## 2020-01-08 RX ADMIN — GUAIFENESIN 600 MG: 600 TABLET, EXTENDED RELEASE ORAL at 08:22

## 2020-01-08 RX ADMIN — VENLAFAXINE HYDROCHLORIDE 37.5 MG: 37.5 TABLET ORAL at 17:39

## 2020-01-08 RX ADMIN — KETOROLAC TROMETHAMINE 30 MG: 30 INJECTION, SOLUTION INTRAMUSCULAR at 04:35

## 2020-01-08 RX ADMIN — GUAIFENESIN 600 MG: 600 TABLET, EXTENDED RELEASE ORAL at 20:25

## 2020-01-08 RX ADMIN — KETOROLAC TROMETHAMINE 30 MG: 30 INJECTION, SOLUTION INTRAMUSCULAR at 10:39

## 2020-01-08 RX ADMIN — METHYLPREDNISOLONE SODIUM SUCCINATE 40 MG: 40 INJECTION, POWDER, FOR SOLUTION INTRAMUSCULAR; INTRAVENOUS at 10:43

## 2020-01-08 RX ADMIN — KETOROLAC TROMETHAMINE 30 MG: 30 INJECTION, SOLUTION INTRAMUSCULAR at 16:50

## 2020-01-08 RX ADMIN — INSULIN ASPART 2 UNITS: 100 INJECTION, SOLUTION INTRAVENOUS; SUBCUTANEOUS at 17:39

## 2020-01-08 RX ADMIN — VENLAFAXINE HYDROCHLORIDE 37.5 MG: 37.5 TABLET ORAL at 08:22

## 2020-01-08 RX ADMIN — HYDROCODONE BITARTRATE AND HOMATROPINE METHYLBROMIDE 5 ML: 5; 1.5 SOLUTION ORAL at 22:52

## 2020-01-08 RX ADMIN — KETOROLAC TROMETHAMINE 30 MG: 30 INJECTION, SOLUTION INTRAMUSCULAR at 22:51

## 2020-01-08 RX ADMIN — IPRATROPIUM BROMIDE AND ALBUTEROL SULFATE 3 ML: .5; 3 SOLUTION RESPIRATORY (INHALATION) at 16:26

## 2020-01-08 RX ADMIN — BENZONATATE 200 MG: 100 CAPSULE ORAL at 15:24

## 2020-01-08 RX ADMIN — IPRATROPIUM BROMIDE AND ALBUTEROL SULFATE 3 ML: .5; 3 SOLUTION RESPIRATORY (INHALATION) at 03:47

## 2020-01-08 RX ADMIN — VANCOMYCIN HYDROCHLORIDE 1500 MG: 1 INJECTION, POWDER, LYOPHILIZED, FOR SOLUTION INTRAVENOUS at 11:07

## 2020-01-08 RX ADMIN — POTASSIUM CHLORIDE 20 MEQ: 20 TABLET, EXTENDED RELEASE ORAL at 08:22

## 2020-01-08 RX ADMIN — IPRATROPIUM BROMIDE AND ALBUTEROL SULFATE 3 ML: .5; 3 SOLUTION RESPIRATORY (INHALATION) at 12:03

## 2020-01-08 RX ADMIN — METHYLPREDNISOLONE SODIUM SUCCINATE 40 MG: 40 INJECTION, POWDER, FOR SOLUTION INTRAMUSCULAR; INTRAVENOUS at 18:43

## 2020-01-08 RX ADMIN — INSULIN ASPART 3 UNITS: 100 INJECTION, SOLUTION INTRAVENOUS; SUBCUTANEOUS at 13:02

## 2020-01-08 RX ADMIN — BENZONATATE 200 MG: 100 CAPSULE ORAL at 20:44

## 2020-01-08 RX ADMIN — SODIUM CHLORIDE, POTASSIUM CHLORIDE, SODIUM LACTATE AND CALCIUM CHLORIDE 150 ML/HR: 600; 310; 30; 20 INJECTION, SOLUTION INTRAVENOUS at 04:39

## 2020-01-08 RX ADMIN — TRAZODONE HYDROCHLORIDE 100 MG: 50 TABLET ORAL at 20:25

## 2020-01-08 RX ADMIN — METHYLPREDNISOLONE SODIUM SUCCINATE 40 MG: 40 INJECTION, POWDER, FOR SOLUTION INTRAMUSCULAR; INTRAVENOUS at 04:36

## 2020-01-08 RX ADMIN — IPRATROPIUM BROMIDE AND ALBUTEROL SULFATE 3 ML: .5; 3 SOLUTION RESPIRATORY (INHALATION) at 07:47

## 2020-01-08 NOTE — PLAN OF CARE
Problem: Patient Care Overview  Goal: Plan of Care Review  Outcome: Ongoing (interventions implemented as appropriate)  Flowsheets (Taken 1/8/2020 0613)  Plan of Care Reviewed With: patient  Outcome Summary: Pt VS see measures, am labs pending. Pt on home dose of o2 @ 2 L. see measures, running round 95%. Pt ambulating well. See emar for iv pain med use.

## 2020-01-08 NOTE — PLAN OF CARE
Problem: Pain, Chronic (Adult)  Goal: Identify Related Risk Factors and Signs and Symptoms  Outcome: Ongoing (interventions implemented as appropriate)  Flowsheets (Taken 1/8/2020 1613)  Related Risk Factors (Chronic Pain): prolonged healing; disease process  Signs and Symptoms (Chronic Pain): verbalization of pain descriptors     Problem: Patient Care Overview  Goal: Plan of Care Review  Outcome: Ongoing (interventions implemented as appropriate)  Flowsheets  Taken 1/8/2020 1613  Progress: improving  Outcome Summary: vss, sputum and MRSA pending.  levaquin continued.  running sinus rhythm on telemetry and on 2L o2- which is home dose.  Taken 1/8/2020 1217  Plan of Care Reviewed With: patient     Problem: Patient Care Overview  Goal: Individualization and Mutuality  Outcome: Ongoing (interventions implemented as appropriate)  Flowsheets  Taken 1/8/2020 0613 by Deandra Arriaza RN  Patient Specific Goals (Include Timeframe): pain control, improve breathing  Taken 1/8/2020 1613 by Azucena Pires RN  Patient Specific Interventions: cough medication, iv antibiotics     Problem: Pneumonia (Adult)  Goal: Signs and Symptoms of Listed Potential Problems Will be Absent, Minimized or Managed (Pneumonia)  Outcome: Ongoing (interventions implemented as appropriate)  Flowsheets (Taken 1/8/2020 1613)  Problems Assessed (Pneumonia): all  Problems Present (Pneumonia): infection progression     Problem: Infection, Risk/Actual (Adult)  Goal: Identify Related Risk Factors and Signs and Symptoms  Outcome: Ongoing (interventions implemented as appropriate)  Flowsheets (Taken 1/8/2020 1613)  Related Risk Factors (Infection, Risk/Actual): chronic illness/condition  Signs and Symptoms (Infection, Risk/Actual): blood glucose changes; pain     Problem: Infection, Risk/Actual (Adult)  Goal: Infection Prevention/Resolution  Outcome: Ongoing (interventions implemented as appropriate)  Flowsheets (Taken 1/8/2020 1613)  Infection  Prevention/Resolution: making progress toward outcome

## 2020-01-08 NOTE — PROGRESS NOTES
"SERVICE: [facility] HOSPITALIST    CONSULTANTS: Pulmonary    CHIEF COMPLAINT: Follow-up right upper lobe pneumonia, AE COPD    SUBJECTIVE: Patient reports that she finally does feel better since admission, not much sleep overnight due to another patient in her room talking all night but feels that she would have slept otherwise.  She notes she is ambulating to bathroom without difficulty.  Her right sided chest pain has improved but is still present.  She otherwise denies f/c/n/v/d/abdominal pain or other new concerns.    OBJECTIVE:    /74 (BP Location: Left arm, Patient Position: Lying)   Pulse 93   Temp 98.2 °F (36.8 °C) (Oral)   Resp 20   Ht 170.2 cm (67\")   Wt 79.6 kg (175 lb 6.4 oz)   SpO2 92%   BMI 27.47 kg/m²     MEDS/LABS REVIEWED AND ORDERED    budesonide-formoterol 2 puff Inhalation BID - RT   enoxaparin 40 mg Subcutaneous Q24H   guaiFENesin 600 mg Oral Q12H   insulin aspart 0-7 Units Subcutaneous 4x Daily AC & at Bedtime   ipratropium-albuterol 3 mL Nebulization Q4H - RT   levoFLOXacin 750 mg Intravenous Q24H   methylPREDNISolone sodium succinate 40 mg Intravenous Q8H   potassium chloride 20 mEq Oral Daily   traZODone 100 mg Oral Nightly   venlafaxine 37.5 mg Oral BID With Meals     Physical Exam   Constitutional: She is oriented to person, place, and time. She appears well-developed and well-nourished.   Appears older than stated age   HENT:   Head: Normocephalic and atraumatic.   Eyes: Pupils are equal, round, and reactive to light. EOM are normal.   Cardiovascular: Normal rate and regular rhythm.   Pulmonary/Chest: Effort normal.   Diminished all fields, crackles right upper lobe and left lower lobe, occasional expiratory wheeze   Abdominal: Soft. Bowel sounds are normal. She exhibits no distension. There is no tenderness. There is no guarding.   Musculoskeletal: She exhibits no edema.   Neurological: She is alert and oriented to person, place, and time.   Skin: Skin is " warm and dry.   Extensive tattooing of all skin surfaces   Psychiatric: She has a normal mood and affect. Her behavior is normal.   Vitals reviewed.    LAB/DIAGNOSTICS:    Lab Results (last 24 hours)     Procedure Component Value Units Date/Time    Basic Metabolic Panel [[id number]]  (Abnormal) Collected:  01/08/20 0849    Specimen:  Blood Updated:  01/08/20 0927     Glucose 208 mg/dL      BUN 18 mg/dL      Creatinine 0.74 mg/dL      Sodium 140 mmol/L      Potassium 4.4 mmol/L      Chloride 107 mmol/L      CO2 19.5 mmol/L      Calcium 8.9 mg/dL      eGFR Non African Amer 81 mL/min/1.73      BUN/Creatinine Ratio 24.3     Anion Gap 13.5 mmol/L     Narrative:       GFR Normal >60  Chronic Kidney Disease <60  Kidney Failure <15      Vancomycin, Random [[id number]]  (Normal) Collected:  01/08/20 0849    Specimen:  Blood Updated:  01/08/20 0927     Vancomycin Random 16.60 mcg/mL     CBC & Differential [[id number]] Collected:  01/08/20 0849    Specimen:  Blood Updated:  01/08/20 0854    Narrative:       The following orders were created for panel order CBC & Differential.  Procedure                               Abnormality         Status                     ---------                               -----------         ------                     CBC Auto Differential[[id number]]        Abnormal            Final result                 Please view results for these tests on the individual orders.    CBC Auto Differential [[id number]]  (Abnormal) Collected:  01/08/20 0849    Specimen:  Blood Updated:  01/08/20 0854     WBC 22.87 10*3/mm3      RBC 3.68 10*6/mm3      Hemoglobin 10.9 g/dL      Hematocrit 33.2 %      MCV 90.2 fL      MCH 29.6 pg      MCHC 32.8 g/dL      RDW 15.0 %      RDW-SD 49.9 fl      MPV 9.8 fL      Platelets 357 10*3/mm3      Neutrophil % 90.7 %      Lymphocyte % 3.5 %      Monocyte % 4.2 %      Eosinophil % 0.0 %      Basophil % 0.2 %      Immature Grans % 1.4 %      Neutrophils, Absolute 20.76 10*3/mm3       Lymphocytes, Absolute 0.81 10*3/mm3      Monocytes, Absolute 0.95 10*3/mm3      Eosinophils, Absolute 0.00 10*3/mm3      Basophils, Absolute 0.04 10*3/mm3      Immature Grans, Absolute 0.31 10*3/mm3      nRBC 0.0 /100 WBC     Blood Culture - Blood, Wrist, Left [[id number]] Collected:  01/06/20 1723    Specimen:  Blood from Wrist, Left Updated:  01/07/20 1746     Blood Culture No growth at 24 hours    Blood Culture - Blood, Arm, Right [[id number]] Collected:  01/06/20 1709    Specimen:  Blood from Arm, Right Updated:  01/07/20 1730     Blood Culture No growth at 24 hours    Respiratory Panel, PCR - Swab, Nasopharynx [[id number]]  (Normal) Collected:  01/07/20 0714    Specimen:  Swab from Nasopharynx Updated:  01/07/20 1211     ADENOVIRUS, PCR Not Detected     Coronavirus 229E Not Detected     Coronavirus HKU1 Not Detected     Coronavirus NL63 Not Detected     Coronavirus OC43 Not Detected     Human Metapneumovirus Not Detected     Human Rhinovirus/Enterovirus Not Detected     Influenza B PCR Not Detected     Parainfluenza Virus 1 Not Detected     Parainfluenza Virus 2 Not Detected     Parainfluenza Virus 3 Not Detected     Parainfluenza Virus 4 Not Detected     Bordetella pertussis pcr Not Detected     Influenza A H1 2009 PCR Not Detected     Chlamydophila pneumoniae PCR Not Detected     Mycoplasma pneumo by PCR Not Detected     Influenza A PCR Not Detected     Influenza A H3 Not Detected     Influenza A H1 Not Detected     RSV, PCR Not Detected     Bordetella parapertussis PCR Not Detected    Legionella Antigen, Urine - Urine, Urine, Clean Catch [[id number]]  (Normal) Collected:  01/07/20 1145    Specimen:  Urine, Clean Catch Updated:  01/07/20 1206     LEGIONELLA ANTIGEN, URINE Negative    S. Pneumo Ag Urine or CSF - Urine, Urine, Clean Catch [[id number]]  (Normal) Collected:  01/07/20 1145    Specimen:  Urine, Clean Catch Updated:  01/07/20 1206     Strep Pneumo Ag Negative        ECG 12 Lead   Final Result   HEART  RATE= 99  bpm   RR Interval= 604  ms   MD Interval= 146  ms   P Horizontal Axis= 10  deg   P Front Axis= 56  deg   QRSD Interval= 101  ms   QT Interval= 371  ms   QRS Axis= 43  deg   T Wave Axis= 56  deg   - NORMAL ECG -   Sinus rhythm - new   Electronically Signed By: Navneet Ayon ([facility]) 07-Jan-2020 20:42:32   Date and Time of Study: 2020-01-06 17:10:29        Results for orders placed during the hospital encounter of 11/06/19   Adult Transthoracic Echo Complete W/ Cont if Necessary Per Protocol    Narrative · Left ventricular systolic function is normal.  · Mild-to-moderate mitral valve regurgitation is present  · Mild tricuspid valve regurgitation is present.  · Left atrial cavity size is moderately dilated.  · Calculated EF = 50.0%.  · The valve appears trileaflet. The aortic valve is abnormal in structure.   There is calcification of the aortic valve.Mild aortic valve regurgitation   is present. Moderate aortic valve stenosis is present. On the short-axis   images, the aortic valve is opening well. Concern that the gradient noted   at the aortic valve may be due to a subaortic membrane, images not clear   enough to assess this.        Xr Ribs Right With Pa Chest    Result Date: 1/7/2020   1. No evidence of acute rib fracture or pneumothorax. 2. No other significant interval change. Imaging follow-up to clearing recommended for the masslike consolidation in the right upper lobe.  This report was finalized on 1/7/2020 8:57 AM by Dr. Petr Jalloh MD.      Ct Chest Without Contrast    Result Date: 1/7/2020  Extensive airspace disease and consolidation right upper lobe compatible with lobar pneumonia which appears superimposed on background diffuse lung disease with emphysema and fibrosis. Patchy interstitial infiltrate midportion left lower lobe could represent background fibrosis but interstitial infiltrate not excluded. No consolidation. Signer Name: BERLIN Rojas MD  Signed: 1/7/2020 5:49 PM  Workstation  "[facility]  [facility]    Xr Chest 1 View    Result Date: 1/6/2020  Dense infiltrate throughout the right upper lobe, concerning for pneumonia. Follow-up to resolution is recommended. Signer Name: Arron Guan MD  Signed: 1/6/2020 5:43 PM  Workstation Name: [facility]  [facility]    ASSESSMENT/PLAN:  Sepsis 2/2 right upper lobe pneumonia:  AHR F:  AE bullous emphysema/COPD:  Right side pleuritic chest pain:  Steroid-induced leukocytosis:  Recent RSV infection, AE COPD  [facility]AG MA: Pulmonary following  CT chest confirms right upper lobe pneumonia, possible left lower lobe pneumonia  RVP, Legionella and strep pneumo negative  Awaiting sputum culture  Trend procalcitonin  Change Solu-Medrol to every 8 hours and de-escalate as able  Continue Levaquin, do not see need to continue vancomycin at this time  Continue I-S, Acapella, Mucinex, Symbicort, duo nebs, wean oxygen as tolerated  Check overnight oximetry tonight, walking oximetry tomorrow  Previously on oxygen as needed only  Monitor    Steroid-induced hyperglycemia: Check A1c add low-dose sliding scale insulin with Accu-Cheks    Normocytic anemia: Hemoglobin 10.9, likely some dilutional effect with IV hydration, DC IV fluids    RA: No DMARDS, no acute issues currently    Elevated alkaline phosphatase:    Electrolyte imbalance: Continue replacement protocols, continue daily potassium supplement    DDD with chronic pain: No current acute issues on Toradol, lidocaine patches, Tylenol    History of A. fib RVR: Nothing acute currently, NSR    Anxiety:  H/O possible seizure:  No current acute issues on Effexor and trazodone home doses    Right hip pain 2/2 ground-level fall: POA, no acute findings on x-ray    PLAN FOR DISPOSITION: Home when able    TOYA Camejo  Hospitalist, [facility]  01/08/20  7:24 AM    \"Dictated utilizing Dragon dictation\"    "

## 2020-01-08 NOTE — PROGRESS NOTES
"  Daily Progress Note.   [facility] MED SURG  1/8/2020    Patient:  Name:  Akila Amezcua  MRN:  [id number]  1960  59 y.o.  female         CC: Short of breath    Interval History:  Follow-up for pneumonia COPD acute exacerbation  Patient feels her breathing is better.  Less short of breath no hemoptysis.  Still some cough.  No lethargy.  She is awake alert conversant.  No acute events overnight chart reviewed discussed with referring physician team  ROS: No fever, no diarrhea, no chest pain  PMFSSH: no change    Physical Exam:  /75 (BP Location: Right arm, Patient Position: Lying)   Pulse 101   Temp 98 °F (36.7 °C) (Oral)   Resp 16   Ht 170.2 cm (67\")   Wt 79.6 kg (175 lb 6.4 oz)   SpO2 93%   BMI 27.47 kg/m²   Body mass index is 27.47 kg/m².    Intake/Output Summary (Last 24 hours) at 1/8/2020 1601  Last data filed at 1/8/2020 1500  Gross per 24 hour   Intake 3805 ml   Output --   Net 3805 ml     General appearance: Patient is awake alert she is in no acute distress, conversant   Eyes: anicteric sclerae, moist conjunctivae; no lid-lag; PERRLA  HENT: Atraumatic; oropharynx clear with moist mucous membranes and no mucosal ulcerations; normal hard and soft palate  Neck: Trachea midline; FROM, supple, no thyromegaly or lymphadenopathy  Lungs: Crackles at the right base no expiratory wheeze at present time, with normal respiratory effort and no intercostal retractions  CV: RRR, no MRGs   Abdomen: Soft, non-tender; no masses or HSM  Extremities: No peripheral edema or extremity lymphadenopathy  Skin: Normal temperature, turgor and texture; no rash, ulcers or subcutaneous nodules  Psych: Appropriate affect, alert and oriented to person, place and time     Data Review:  Notable Labs:  Results from last 7 days   Lab Units 01/08/20  0849 01/07/20  0635 01/06/20  1709   WBC 10*3/mm3 22.87* 19.59* 21.31*   HEMOGLOBIN g/dL 10.9* 11.0* 12.7   PLATELETS 10*3/mm3 357 269 301     Results from last 7 " days   Lab Units 01/08/20  0849 01/07/20  0635 01/07/20  0430 01/06/20  1709   SODIUM mmol/L 140 137 135* 133*   POTASSIUM mmol/L 4.4 4.0 4.1 3.4*   CHLORIDE mmol/L 107 103 104 98   CO2 mmol/L 19.5* 20.3* 13.5* 19.8*   BUN mg/dL 18 11 11 14   CREATININE mg/dL 0.74 0.66 0.71 0.84   GLUCOSE mg/dL 208* 139* 137* 112*   CALCIUM mg/dL 8.9 8.4* 8.5* 9.0   MAGNESIUM mg/dL  --   --  1.6  --    PHOSPHORUS mg/dL  --   --  2.8  --    Estimated Creatinine Clearance: 91.1 mL/min (by C-G formula based on SCr of 0.74 mg/dL).    Imaging:  Reviewed chest images personally from past 3 days    Scheduled meds:      budesonide-formoterol 2 puff Inhalation BID - RT   enoxaparin 40 mg Subcutaneous Q24H   guaiFENesin 600 mg Oral Q12H   insulin aspart 0-7 Units Subcutaneous 4x Daily AC & at Bedtime   ipratropium-albuterol 3 mL Nebulization Q4H - RT   levoFLOXacin 750 mg Intravenous Q24H   methylPREDNISolone sodium succinate 40 mg Intravenous Q8H   potassium chloride 20 mEq Oral Daily   traZODone 100 mg Oral Nightly   venlafaxine 37.5 mg Oral BID With Meals       ASSESSMENT  /  PLAN:  Pneumonia community-acquired bacterial  Exacerbation of COPD  Leukocytosis  DDD  A. fib RVR  RA  Anxiety  Former tobacco abuse  Acute hypoxic respiratory failure     CT scan reveals substantial infiltrate  Continue Levaquin  Methylprednisolone I will change to p.o. steroids  Wean oxygen as tolerated  Follow microbiology  Send MRSA swab  Continue scheduled DuoNebs  Good pulmonary hygiene since from her flutter valve  Discussed with referring physician team appreciate their excellent care for this patient       Oc Ko MD  [facility]  01/08/20  4:01 PM

## 2020-01-08 NOTE — PLAN OF CARE
Problem: Pneumonia (Adult)  Goal: Signs and Symptoms of Listed Potential Problems Will be Absent, Minimized or Managed (Pneumonia)  Outcome: Ongoing (interventions implemented as appropriate)     Pt bs are clear and diminished without distress at this time

## 2020-01-09 LAB
ANION GAP SERPL CALCULATED.3IONS-SCNC: 12.3 MMOL/L (ref 5–15)
BASOPHILS # BLD AUTO: 0.08 10*3/MM3 (ref 0–0.2)
BASOPHILS NFR BLD AUTO: 0.4 % (ref 0–1.5)
BUN BLD-MCNC: 21 MG/DL (ref 6–20)
BUN/CREAT SERPL: 27.3 (ref 7–25)
CALCIUM SPEC-SCNC: 9.3 MG/DL (ref 8.6–10.5)
CHLORIDE SERPL-SCNC: 111 MMOL/L (ref 98–107)
CO2 SERPL-SCNC: 20.7 MMOL/L (ref 22–29)
CREAT BLD-MCNC: 0.77 MG/DL (ref 0.57–1)
DEPRECATED RDW RBC AUTO: 53.5 FL (ref 37–54)
EOSINOPHIL # BLD AUTO: 0 10*3/MM3 (ref 0–0.4)
EOSINOPHIL NFR BLD AUTO: 0 % (ref 0.3–6.2)
ERYTHROCYTE [DISTWIDTH] IN BLOOD BY AUTOMATED COUNT: 15.4 % (ref 12.3–15.4)
FERRITIN SERPL-MCNC: 685 NG/ML (ref 13–150)
FOLATE SERPL-MCNC: 3.26 NG/ML (ref 4.78–24.2)
GFR SERPL CREATININE-BSD FRML MDRD: 77 ML/MIN/1.73
GLUCOSE BLD-MCNC: 121 MG/DL (ref 65–99)
GLUCOSE BLDC GLUCOMTR-MCNC: 124 MG/DL (ref 70–130)
GLUCOSE BLDC GLUCOMTR-MCNC: 142 MG/DL (ref 70–130)
GLUCOSE BLDC GLUCOMTR-MCNC: 154 MG/DL (ref 70–130)
GLUCOSE BLDC GLUCOMTR-MCNC: 169 MG/DL (ref 70–130)
HCT VFR BLD AUTO: 33.8 % (ref 34–46.6)
HGB BLD-MCNC: 10.6 G/DL (ref 12–15.9)
IMM GRANULOCYTES # BLD AUTO: 0.7 10*3/MM3 (ref 0–0.05)
IMM GRANULOCYTES NFR BLD AUTO: 3.4 % (ref 0–0.5)
IRON 24H UR-MRATE: 72 MCG/DL (ref 37–145)
IRON SATN MFR SERPL: 39 % (ref 20–50)
LYMPHOCYTES # BLD AUTO: 0.85 10*3/MM3 (ref 0.7–3.1)
LYMPHOCYTES NFR BLD AUTO: 4.1 % (ref 19.6–45.3)
MCH RBC QN AUTO: 29.6 PG (ref 26.6–33)
MCHC RBC AUTO-ENTMCNC: 31.4 G/DL (ref 31.5–35.7)
MCV RBC AUTO: 94.4 FL (ref 79–97)
MONOCYTES # BLD AUTO: 0.99 10*3/MM3 (ref 0.1–0.9)
MONOCYTES NFR BLD AUTO: 4.8 % (ref 5–12)
MRSA SPEC QL CULT: NORMAL
NEUTROPHILS # BLD AUTO: 17.94 10*3/MM3 (ref 1.7–7)
NEUTROPHILS NFR BLD AUTO: 87.3 % (ref 42.7–76)
NRBC BLD AUTO-RTO: 0 /100 WBC (ref 0–0.2)
PLATELET # BLD AUTO: 409 10*3/MM3 (ref 140–450)
PMV BLD AUTO: 10.1 FL (ref 6–12)
POTASSIUM BLD-SCNC: 4.6 MMOL/L (ref 3.5–5.2)
PROCALCITONIN SERPL-MCNC: 0.57 NG/ML (ref 0.1–0.25)
RBC # BLD AUTO: 3.58 10*6/MM3 (ref 3.77–5.28)
SODIUM BLD-SCNC: 144 MMOL/L (ref 136–145)
TIBC SERPL-MCNC: 187 MCG/DL (ref 298–536)
UIBC SERPL-MCNC: 115 MCG/DL (ref 112–346)
VIT B12 BLD-MCNC: 317 PG/ML (ref 211–946)
WBC NRBC COR # BLD: 20.56 10*3/MM3 (ref 3.4–10.8)

## 2020-01-09 PROCEDURE — 99232 SBSQ HOSP IP/OBS MODERATE 35: CPT | Performed by: NURSE PRACTITIONER

## 2020-01-09 PROCEDURE — 83540 ASSAY OF IRON: CPT | Performed by: NURSE PRACTITIONER

## 2020-01-09 PROCEDURE — 82746 ASSAY OF FOLIC ACID SERUM: CPT | Performed by: NURSE PRACTITIONER

## 2020-01-09 PROCEDURE — 84145 PROCALCITONIN (PCT): CPT | Performed by: NURSE PRACTITIONER

## 2020-01-09 PROCEDURE — 94799 UNLISTED PULMONARY SVC/PX: CPT

## 2020-01-09 PROCEDURE — 63710000001 INSULIN ASPART PER 5 UNITS: Performed by: NURSE PRACTITIONER

## 2020-01-09 PROCEDURE — 82962 GLUCOSE BLOOD TEST: CPT

## 2020-01-09 PROCEDURE — 25010000002 KETOROLAC TROMETHAMINE PER 15 MG: Performed by: INTERNAL MEDICINE

## 2020-01-09 PROCEDURE — 83550 IRON BINDING TEST: CPT | Performed by: NURSE PRACTITIONER

## 2020-01-09 PROCEDURE — 25010000002 ENOXAPARIN PER 10 MG: Performed by: INTERNAL MEDICINE

## 2020-01-09 PROCEDURE — 63710000001 PREDNISONE PER 1 MG: Performed by: INTERNAL MEDICINE

## 2020-01-09 PROCEDURE — 82607 VITAMIN B-12: CPT | Performed by: NURSE PRACTITIONER

## 2020-01-09 PROCEDURE — 85025 COMPLETE CBC W/AUTO DIFF WBC: CPT | Performed by: NURSE PRACTITIONER

## 2020-01-09 PROCEDURE — 82728 ASSAY OF FERRITIN: CPT | Performed by: NURSE PRACTITIONER

## 2020-01-09 PROCEDURE — 80048 BASIC METABOLIC PNL TOTAL CA: CPT | Performed by: NURSE PRACTITIONER

## 2020-01-09 RX ORDER — LEVOFLOXACIN 750 MG/1
750 TABLET ORAL EVERY 24 HOURS
Status: DISCONTINUED | OUTPATIENT
Start: 2020-01-09 | End: 2020-01-13 | Stop reason: HOSPADM

## 2020-01-09 RX ADMIN — BUDESONIDE AND FORMOTEROL FUMARATE DIHYDRATE 2 PUFF: 160; 4.5 AEROSOL RESPIRATORY (INHALATION) at 20:30

## 2020-01-09 RX ADMIN — TRAZODONE HYDROCHLORIDE 100 MG: 50 TABLET ORAL at 20:56

## 2020-01-09 RX ADMIN — IPRATROPIUM BROMIDE AND ALBUTEROL SULFATE 3 ML: .5; 3 SOLUTION RESPIRATORY (INHALATION) at 07:44

## 2020-01-09 RX ADMIN — HYDROCODONE BITARTRATE AND HOMATROPINE METHYLBROMIDE 5 ML: 5; 1.5 SOLUTION ORAL at 11:25

## 2020-01-09 RX ADMIN — HYDROCODONE BITARTRATE AND HOMATROPINE METHYLBROMIDE 5 ML: 5; 1.5 SOLUTION ORAL at 15:27

## 2020-01-09 RX ADMIN — HYDROCODONE BITARTRATE AND HOMATROPINE METHYLBROMIDE 5 ML: 5; 1.5 SOLUTION ORAL at 06:18

## 2020-01-09 RX ADMIN — KETOROLAC TROMETHAMINE 30 MG: 30 INJECTION, SOLUTION INTRAMUSCULAR at 18:24

## 2020-01-09 RX ADMIN — VENLAFAXINE HYDROCHLORIDE 37.5 MG: 37.5 TABLET ORAL at 18:18

## 2020-01-09 RX ADMIN — BUDESONIDE AND FORMOTEROL FUMARATE DIHYDRATE 2 PUFF: 160; 4.5 AEROSOL RESPIRATORY (INHALATION) at 07:52

## 2020-01-09 RX ADMIN — IPRATROPIUM BROMIDE AND ALBUTEROL SULFATE 3 ML: .5; 3 SOLUTION RESPIRATORY (INHALATION) at 12:30

## 2020-01-09 RX ADMIN — PREDNISONE 40 MG: 20 TABLET ORAL at 08:39

## 2020-01-09 RX ADMIN — VENLAFAXINE HYDROCHLORIDE 37.5 MG: 37.5 TABLET ORAL at 08:39

## 2020-01-09 RX ADMIN — ENOXAPARIN SODIUM 40 MG: 40 INJECTION SUBCUTANEOUS at 18:18

## 2020-01-09 RX ADMIN — INSULIN ASPART 2 UNITS: 100 INJECTION, SOLUTION INTRAVENOUS; SUBCUTANEOUS at 08:39

## 2020-01-09 RX ADMIN — KETOROLAC TROMETHAMINE 30 MG: 30 INJECTION, SOLUTION INTRAMUSCULAR at 06:18

## 2020-01-09 RX ADMIN — GUAIFENESIN 600 MG: 600 TABLET, EXTENDED RELEASE ORAL at 08:39

## 2020-01-09 RX ADMIN — KETOROLAC TROMETHAMINE 30 MG: 30 INJECTION, SOLUTION INTRAMUSCULAR at 12:21

## 2020-01-09 RX ADMIN — HYDROCODONE BITARTRATE AND HOMATROPINE METHYLBROMIDE 5 ML: 5; 1.5 SOLUTION ORAL at 20:53

## 2020-01-09 RX ADMIN — IPRATROPIUM BROMIDE AND ALBUTEROL SULFATE 3 ML: .5; 3 SOLUTION RESPIRATORY (INHALATION) at 20:30

## 2020-01-09 RX ADMIN — LEVOFLOXACIN 750 MG: 750 TABLET, FILM COATED ORAL at 18:18

## 2020-01-09 RX ADMIN — POTASSIUM CHLORIDE 20 MEQ: 20 TABLET, EXTENDED RELEASE ORAL at 08:39

## 2020-01-09 RX ADMIN — GUAIFENESIN 600 MG: 600 TABLET, EXTENDED RELEASE ORAL at 20:56

## 2020-01-09 RX ADMIN — INSULIN ASPART 2 UNITS: 100 INJECTION, SOLUTION INTRAVENOUS; SUBCUTANEOUS at 20:56

## 2020-01-09 NOTE — PLAN OF CARE
Problem: Pain, Chronic (Adult)  Goal: Identify Related Risk Factors and Signs and Symptoms  Outcome: Ongoing (interventions implemented as appropriate)  Flowsheets (Taken 1/8/2020 1613)  Related Risk Factors (Chronic Pain): prolonged healing;disease process  Signs and Symptoms (Chronic Pain): verbalization of pain descriptors     Problem: Pain, Chronic (Adult)  Goal: Acceptable Pain/Comfort Level and Functional Ability  Outcome: Ongoing (interventions implemented as appropriate)  Flowsheets (Taken 1/9/2020 1717)  Acceptable Pain/Comfort Level and Functional Ability: making progress toward outcome     Problem: Patient Care Overview  Goal: Plan of Care Review  Outcome: Ongoing (interventions implemented as appropriate)  Flowsheets  Taken 1/9/2020 1717  Progress: improving  Outcome Summary: pain control sufficient with current regemin.  patient is to have a walking ox test done to test need for oxygen, need already determined for night time.  procal trending down, vss.  Taken 1/9/2020 1602  Plan of Care Reviewed With: patient     Problem: Patient Care Overview  Goal: Individualization and Mutuality  Outcome: Ongoing (interventions implemented as appropriate)  Flowsheets  Taken 1/8/2020 0613 by Deandra Arriaza, RN  Patient Specific Goals (Include Timeframe): pain control, improve breathing  Taken 1/8/2020 1613 by Azucena Pires RN  Patient Specific Interventions: cough medication, iv antibiotics     Problem: Pneumonia (Adult)  Goal: Signs and Symptoms of Listed Potential Problems Will be Absent, Minimized or Managed (Pneumonia)  Outcome: Ongoing (interventions implemented as appropriate)  Flowsheets (Taken 1/8/2020 1613)  Problems Assessed (Pneumonia): all  Problems Present (Pneumonia): infection progression     Problem: Infection, Risk/Actual (Adult)  Goal: Identify Related Risk Factors and Signs and Symptoms  Outcome: Ongoing (interventions implemented as appropriate)  Flowsheets (Taken 1/8/2020 1613)  Related Risk  Factors (Infection, Risk/Actual): chronic illness/condition  Signs and Symptoms (Infection, Risk/Actual): blood glucose changes;pain     Problem: Infection, Risk/Actual (Adult)  Goal: Infection Prevention/Resolution  Outcome: Ongoing (interventions implemented as appropriate)  Flowsheets (Taken 1/9/2020 5478)  Infection Prevention/Resolution: making progress toward outcome

## 2020-01-09 NOTE — PAYOR COMM NOTE
"Teofilo Amezcua (59 y.o. Female)   ATTN: [employer] FAX#[phone]  REF: [id number]  RE: CLINICALS FOR INPATIENT AUTHORIZATION     Raine Dewitt  Utilization Review/Room Reservations  Phone:[phone] ,Juliet/[phone], [phone], [phone] or [phone]  Fax: [phone]  Email: [email or web address]  Please call, fax back, or email with authorization or any questions! Thanks!    Date of Birth Social Security Number Address Home Phone MRN    1960  [id number] [facility] [address] [phone] [id number]    Gnosticist Marital Status          None        Admission Date Admission Type Admitting Provider Attending Provider Department, Room/Bed    1/6/20 Emergency Colten Franco MD Steele, Jacquelene R, MD [facility] MED SURG, 1401/1    Discharge Date Discharge Disposition Discharge Destination                       Attending Provider:  Colten Franco MD    Allergies:  Penicillins    Isolation:  None   Infection:  None   Code Status:  CPR    Ht:  170.2 cm (67\")   Wt:  79.6 kg (175 lb 6.4 oz)    Admission Cmt:  None   Principal Problem:  Sepsis (CMS/HCC) [A41.9]                 Active Insurance as of 1/6/2020     Primary Coverage     Payor Plan Insurance Group Employer/Plan Group    [facility] [email or web address] [facility] [email or web address] [place]      Payor Plan Address Payor Plan Phone Number Payor Plan Fax Number Effective Dates    [address] [address]   1/1/2019 - None Entered    [place] [address]       Subscriber Name Subscriber Birth Date Member ID       TEOFILO AMEZCUA 1960 [id number]                 Emergency Contacts      (Rel.) Home Phone Work Phone Mobile Phone    French Amezcua (Spouse) [phone] -- --    Madelin Amezcua (Daughter) -- -- [phone]               History & Physical      Colten Franco MD at 01/06/20 2237          [facility] HOSPITALIST     Justo Browning, " APRN    CHIEF COMPLAINT: Rt chest/shoulder pain & Cough    HISTORY OF PRESENT ILLNESS:    60 yo WF w/ PMH of 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    The right sided pain is felt in her scapula, top ridge of her shoulder, lateral chest wall, and anterior chest. Worse w/ cough or deep breath, unable to get her full breath due to pain.  She had been breathing well prior to this. Some chills, no definitive fevers. Has had some sputum, but hard to cough up due to pain. No Hemoptysis or hematemesis. No Night sweats. No unintentional wt loss.     She was admitted this Fall from  to  for PNA w/ RSV, w/ an admission complicated by Afib w/ RVR. She was discharged w/ 2L Home o2 which she now uses PRN when she feels SOA. (Does not have a pulse ox at home). Has stopped taking Xarelto, Amiodarone and Cardizem. PCP note does not indicate if they were stopped there. Was supposed to have follow up with Cardiology, and Pulm. Do not see any follow up notes  in the system.    ROS negative for LE edema, palpitations, left sided chest pain, diaphoresis, skin changes.      Past Medical History:   Diagnosis Date   • Anxiety    • Arthritis    • COPD (chronic obstructive pulmonary disease) (CMS/HCC)     LUNG BLEBS   • DJD (degenerative joint disease)    • Gallstones     SCHEDULED FOR SX   • GERD (gastroesophageal reflux disease)    • Injury of back     degenertive disc disease   • Murmur     BENIGN   • Panic attacks    • Rheumatoid arthritis (CMS/HCC)    • Seizures (CMS/HCC)     LAST ONE 2017     Past Surgical History:   Procedure Laterality Date   • APPENDECTOMY     •  SECTION     • CHOLECYSTECTOMY N/A 2017    Procedure: LAPAROSCOPIC CHOLECYSTECTOMY, laparoscopic adhesiolysis requiring 45 minutes of operative time;  Surgeon: Liliana Monroy MD;  Location: [facility];  Service:     • ESOPHAGEAL ATRESIA REPAIR     • PLEURAL BIOPSY     • PLEURAL SCARIFICATION       Family History   Problem Relation Age of Onset   • Lung cancer Father      Social History     Tobacco Use   • Smoking status: Current Every Day Smoker     Packs/day: 1.00     Years: 35.00     Pack years: 35.00   Substance Use Topics   • Alcohol use: No   • Drug use: No     Medications Prior to Admission   Medication Sig Dispense Refill Last Dose   • albuterol (PROVENTIL) (2.5 MG/3ML) 0.083% nebulizer solution Take 2.5 mg by nebulization Every 4 (Four) Hours As Needed for wheezing.   Taking   • Fluticasone Furoate-Vilanterol (BREO ELLIPTA IN) Inhale 1 puff Daily.   Taking   • ipratropium-albuterol (DUO-NEB) 0.5-2.5 mg/mL nebulizer Take 3 mL by nebulization Every 4 (Four) Hours As Needed for wheezing.   Taking   • meloxicam (MOBIC) 15 MG tablet Take 15 mg by mouth Daily.   Taking   • Misc. Devices (BATH BENCH WITH BACK) misc 1 Units Daily As Needed (shower). 1 each 1    • traZODone (DESYREL) 50 MG tablet Take 1-2 tablets by mouth Every Night. 60 tablet 1    • venlafaxine (EFFEXOR) 37.5 MG tablet Take 37.5 mg by mouth 2 (Two) Times a Day.   Taking   • guaiFENesin-dextromethorphan (ROBITUSSIN DM) 100-10 MG/5ML syrup Take 5 mL by mouth Every 4 (Four) Hours As Needed for Cough.   Taking   • promethazine-dextromethorphan (PROMETHAZINE-DM) 6.25-15 MG/5ML syrup Take 5 mL by mouth 4 (Four) Times a Day As Needed for Cough. 240 mL 0    • rivaroxaban (XARELTO) 20 MG tablet Take 1 tablet by mouth Daily With Dinner. 30 tablet 0 Taking   • sodium chloride (OCEAN NASAL SPRAY) 0.65 % nasal spray 1 spray into the nostril(s) as directed by provider As Needed for Congestion. 59 mL 12    • traMADol (ULTRAM) 50 MG tablet TAKE 1 TABLET BY MOUTH 3 TIMES A DAY AS NEEDED FOR PAIN  0 Taking     Allergies:  Penicillins  Debilities: As per HPI and Physical Exam.     REVIEW OF SYSTEMS:  Please see the above history of present illness for pertinent positives and  "negatives.  The remainder of the patient's systems have been reviewed and are negative.    Vital Signs  Temp:  [98.7 °F (37.1 °C)-99.1 °F (37.3 °C)] 99.1 °F (37.3 °C)  Heart Rate:  [] 104  Resp:  [22-26] 22  BP: ()/(55-71) 111/70    Flowsheet Rows      First Filed Value   Admission Height  170.2 cm (67\") Documented at 01/06/2020 1653   Admission Weight  81.2 kg (179 lb) Documented at 01/06/2020 1653           Physical Exam:  Physical Exam   Constitutional: She is oriented to person, place, and time. No distress.   Thin, age appropriate appearing middle aged woman, sitting up in bed, in acute pain, but NAD.   Swallow breathing   HENT:   Head: Normocephalic and atraumatic.   Right Ear: External ear normal.   Left Ear: External ear normal.   Nose: Nose normal.   Eyes: Pupils are equal, round, and reactive to light. Conjunctivae and EOM are normal. No scleral icterus.   Neck: No JVD present. No tracheal deviation present.   Cardiovascular: Normal rate, regular rhythm and normal heart sounds. Exam reveals no friction rub.   No murmur heard.  Pulmonary/Chest: No stridor. No respiratory distress. She has no wheezes. She has rales.   Swallow effort, no accessory muscle use  Ronchi in Rt posterior upper field  Diminished to minimal bs in all other fields.   Abdominal: Soft. Bowel sounds are normal. She exhibits no distension and no mass. There is no tenderness. There is no guarding.   Musculoskeletal: Normal range of motion. She exhibits no edema.   B/l ulnar radiculopathy   Lymphadenopathy:     She has no cervical adenopathy.   Neurological: She is alert and oriented to person, place, and time. No cranial nerve deficit. She exhibits normal muscle tone.   Skin: Skin is warm and dry. She is not diaphoretic. No erythema.   Psychiatric: Her behavior is normal. Thought content normal.   Slightly axious but full affect, deferred mood   Nursing note and vitals reviewed.       Results Review:    I reviewed the patient's " new clinical results.  Lab Results (most recent)     Procedure Component Value Units Date/Time    TSPOT [[id number]] Collected:  01/06/20 2045    Specimen:  Blood Updated:  01/06/20 2045    Respiratory Panel, PCR - Swab, Nasopharynx [[id number]]  (Normal) Collected:  01/06/20 1712    Specimen:  Swab from Nasopharynx Updated:  01/06/20 2037     ADENOVIRUS, PCR Not Detected     Coronavirus 229E Not Detected     Coronavirus HKU1 Not Detected     Coronavirus NL63 Not Detected     Coronavirus OC43 Not Detected     Human Metapneumovirus Not Detected     Human Rhinovirus/Enterovirus Not Detected     Influenza B PCR Not Detected     Parainfluenza Virus 1 Not Detected     Parainfluenza Virus 2 Not Detected     Parainfluenza Virus 3 Not Detected     Parainfluenza Virus 4 Not Detected     Bordetella pertussis pcr Not Detected     Influenza A H1 2009 PCR Not Detected     Chlamydophila pneumoniae PCR Not Detected     Mycoplasma pneumo by PCR Not Detected     Influenza A PCR Not Detected     Influenza A H3 Not Detected     Influenza A H1 Not Detected     RSV, PCR Not Detected     Bordetella parapertussis PCR Not Detected    [place] Draw [[id number]] Collected:  01/06/20 1709    Specimen:  Blood Updated:  01/06/20 1816    Narrative:       The following orders were created for panel order [facility] [place] Draw.  Procedure                               Abnormality         Status                     ---------                               -----------         ------                     Light Blue Top[[id number]]                                   Final result               Green Top (Gel)[[id number]]                                  Final result               Lavender Top[[id number]]                                     Final result               Gold Top - [facility][[id number]]                                   Final result                 Please view results for these tests on the individual orders.    Green Top (Gel) [[id number]] Collected:  01/06/20 1709     Specimen:  Blood Updated:  01/06/20 1816     Extra Tube Hold for add-ons.     Comment: Auto resulted.       Gold Top - SST [[id number]] Collected:  01/06/20 1709    Specimen:  Blood Updated:  01/06/20 1816     Extra Tube Hold for add-ons.     Comment: Auto resulted.       Light Blue Top [[id number]] Collected:  01/06/20 1709    Specimen:  Blood Updated:  01/06/20 1816     Extra Tube hold for add-on     Comment: Auto resulted       Lavender Top [[id number]] Collected:  01/06/20 1709    Specimen:  Blood Updated:  01/06/20 1816     Extra Tube hold for add-on     Comment: Auto resulted       Procalcitonin [[id number]]  (Abnormal) Collected:  01/06/20 1709    Specimen:  Blood Updated:  01/06/20 1746     Procalcitonin 1.63 ng/mL     Comprehensive Metabolic Panel [[id number]]  (Abnormal) Collected:  01/06/20 1709    Specimen:  Blood Updated:  01/06/20 1741     Glucose 112 mg/dL      BUN 14 mg/dL      Creatinine 0.84 mg/dL      Sodium 133 mmol/L      Potassium 3.4 mmol/L      Chloride 98 mmol/L      CO2 19.8 mmol/L      Calcium 9.0 mg/dL      Total Protein 7.7 g/dL      Albumin 3.40 g/dL      ALT (SGPT) 13 U/L      AST (SGOT) 16 U/L      Alkaline Phosphatase 159 U/L      Total Bilirubin 1.6 mg/dL      eGFR Non African Amer 70 mL/min/1.73      Globulin 4.3 gm/dL      A/G Ratio 0.8 g/dL      BUN/Creatinine Ratio 16.7     Anion Gap 15.2 mmol/L     Narrative:       GFR Normal >60  Chronic Kidney Disease <60  Kidney Failure <15      Troponin [[id number]]  (Normal) Collected:  01/06/20 1709    Specimen:  Blood Updated:  01/06/20 1740     Troponin T <0.010 ng/mL     Narrative:       Troponin T Reference Range:  <= 0.03 ng/mL-   Negative for AMI  >0.03 ng/mL-     Abnormal for myocardial necrosis.  Clinicians would have to utilize clinical acumen, EKG, Troponin and serial changes to determine if it is an Acute Myocardial Infarction or myocardial injury due to an underlying chronic condition.     Blood Culture - Blood, Wrist, Left  [[id number]] Collected:  01/06/20 1723    Specimen:  Blood from Wrist, Left Updated:  01/06/20 1737    Lactic Acid, Plasma [[id number]]  (Normal) Collected:  01/06/20 1709    Specimen:  Blood Updated:  01/06/20 1735     Lactate 1.2 mmol/L     Protime-INR [[id number]]  (Abnormal) Collected:  01/06/20 1709    Specimen:  Blood Updated:  01/06/20 1730     Protime 15.2 Seconds      INR 1.22    Narrative:       Therapeutic Ranges for INR: 2.0-3.0 (PT 20-30)                              2.5-3.5 (PT 25-34)    aPTT [[id number]]  (Abnormal) Collected:  01/06/20 1709    Specimen:  Blood Updated:  01/06/20 1730     PTT 38.3 seconds     Narrative:       PTT = The equivalent PTT values for the therapeutic range of heparin levels at 0.1 to 0.7 U/ml are 53 to 110 seconds.      Influenza Antigen, Rapid - Swab, Nasopharynx [[id number]]  (Normal) Collected:  01/06/20 1708    Specimen:  Swab from Nasopharynx Updated:  01/06/20 1730     Influenza A Ag, EIA Negative     Influenza B Ag, EIA Negative    CBC & Differential [[id number]] Collected:  01/06/20 1709    Specimen:  Blood Updated:  01/06/20 1721    Narrative:       The following orders were created for panel order CBC & Differential.  Procedure                               Abnormality         Status                     ---------                               -----------         ------                     CBC Auto Differential[[id number]]        Abnormal            Final result                 Please view results for these tests on the individual orders.    CBC Auto Differential [[id number]]  (Abnormal) Collected:  01/06/20 1709    Specimen:  Blood Updated:  01/06/20 1721     WBC 21.31 10*3/mm3      RBC 4.31 10*6/mm3      Hemoglobin 12.7 g/dL      Hematocrit 38.9 %      MCV 90.3 fL      MCH 29.5 pg      MCHC 32.6 g/dL      RDW 14.3 %      RDW-SD 48.1 fl      MPV 9.1 fL      Platelets 301 10*3/mm3      Neutrophil % 85.4 %      Lymphocyte % 5.3 %      Monocyte % 8.2 %      Eosinophil % 0.6 %       Basophil % 0.1 %      Immature Grans % 0.4 %      Neutrophils, Absolute 18.21 10*3/mm3      Lymphocytes, Absolute 1.12 10*3/mm3      Monocytes, Absolute 1.74 10*3/mm3      Eosinophils, Absolute 0.12 10*3/mm3      Basophils, Absolute 0.03 10*3/mm3      Immature Grans, Absolute 0.09 10*3/mm3     Blood Culture - Blood, Arm, Right [[id number]] Collected:  01/06/20 1709    Specimen:  Blood from Arm, Right Updated:  01/06/20 1715          Imaging Results (Most Recent)     Procedure Component Value Units Date/Time    XR Chest 1 View [[id number]] Collected:  01/06/20 1743     Updated:  01/06/20 1745    Narrative:       CR Chest 1 Vw    INDICATION:   59-year-old female with cough and shortness of air for 4 days. Current smoker.     COMPARISON:    Chest 11/12/2019    FINDINGS:  Single portable AP view(s) of the chest.  Dense infiltrate throughout the right upper lobe, concerning for pneumonia. Biapical pleural thickening. Subsegmental left mid lung atelectasis. Emphysema. No large pleural effusions. No pneumothorax. Heart size  and mediastinum are within expected limits. Pulmonary vasculature unremarkable.      Impression:       Dense infiltrate throughout the right upper lobe, concerning for pneumonia. Follow-up to resolution is recommended.    Signer Name: Arron Guan MD   Signed: 1/6/2020 5:43 PM   Workstation Name: [email or web address]    [facility]        Independent read of CXR image from today as compared to image from Noverember 2019, new opacity in the Rt Upper lobe, since prior, clearing of lower more dependent portions of lung    ECG/EMG Results (most recent)     Procedure Component Value Units Date/Time    ECG 12 Lead [[id number]] Collected:  01/06/20 1710     Updated:  01/06/20 1712    Narrative:       HEART RATE= 99  bpm  RR Interval= 604  ms  VT Interval= 146  ms  P Horizontal Axis= 10  deg  P Front Axis= 56  deg  QRSD Interval= 101  ms  QT Interval= 371  ms  QRS Axis= 43  deg  T Wave  Axis= 56  deg  - NORMAL ECG -  Sinus rhythm  Electronically Signed By:   Date and Time of Study: 2020-01-06 17:10:29          Assessment/Plan     Sepsis from Rt Upper Lobe PNA  -T 98.7, , RR 26, BP 94/55, SPO2 94% on RA, WBC 21.3, lactate 1.2, pro-Hector 1.6  - Got Levaquin in the ER, at risk for MDR gave dose of vanc as well  - Pulm consult to insure follow up, and need for further imaging  - Tessalon pearls for cough, Mucinex for secretions  - Currently on RA, Monitor on Continuous Pulse ox  - Monitor BP's Q2H overnight, got bolus in ER, and running on 150cc of LR    Bullous COPD  - Will treat as an AECOPD as well, not making good breath sounds  - Give 40mg Pred, duonebs, and prn albuterol, pharmacy sub for her home inhaler    Rt pleuritic chest pain  - Rib films in AM to r/o rib frx from coughing  - Cr same as when she was discharged in the fall  - Give Toradol for pain    Chronic pain from DDD  - Takes acetaminophen and occasional ibuprofen  -Have ordered acetaminophen, is on Toradol for the chest pain, will give heating pads topical menthol, and lidocaine patches for her chronic back pain    Prior Afib w/ RVR  - No longer taking Xarelto, Cardizem, or amiodarone  - Unclear if followed up with cards  - Monitor on Tele to make sure she does not recur    RA  - Has established w/ a rheumatologist, not on any medications currently, therefore not seriously immunocompromised due to DMARD    Anxiety, w/ ?h/o Seizure  - Restarted home Effexor, and trazodone    I discussed the patients findings and my recommendations with patient and .     Colten Franco MD  01/06/20  10:37 PM            Electronically signed by Colten Franco MD at 01/06/20 2352       Vital Signs (last 2 days)     Date/Time   Temp   Temp src   Pulse   Resp   BP   Patient Position   SpO2    01/09/20 1237   --   --   92   20   --   --   (!) 89    01/09/20 1230   --   --   92   20   --   --   (!) 88    01/09/20 0744   --   --   95    20   --   --   (!) 89    01/09/20 0618   --   --   95   20   --   --   90    01/09/20 0535   98.6 (37)   Oral   104   20   122/82   Lying   --    01/09/20 0422   --   --   91   20   --   --   90    01/09/20 0001   --   --   101   20   --   --   90    01/08/20 2023   98.2 (36.8)   Oral   102   20   122/77   Lying   92    01/08/20 2012   --   --   98   20   --   --   --    01/08/20 1631   --   --   106   20   --   --   92    01/08/20 1626   --   --   97   20   --   --   92    01/08/20 1500   98 (36.7)   Oral   101   16   123/75   Lying   93    01/08/20 1214   --   --   99   20   --   --   92    01/08/20 1203   --   --   98   20   --   --   92    01/08/20 0800   --   --   --   --   --   --   92    01/08/20 0757   --   --   93   20   --   --   92    01/08/20 0756   --   --   93   20   --   --   92    01/08/20 0754   --   --   93   20   --   --   92    01/08/20 0747   --   --   96   21   --   --   93    01/08/20 0650   98.2 (36.8)   Oral   100   18   122/74   Lying   93    01/08/20 0350   --   --   92   18   --   --   --    01/08/20 0347   --   --   94   16   --   --   --    01/07/20 2317   --   --   93   16   --   --   --    01/07/20 2312   --   --   94   16   --   --   --    01/07/20 1958   98 (36.7)   Oral   96   18   102/63   Lying   95    01/07/20 1845   --   --   96   18   --   --   93    01/07/20 1839   --   --   92   18   --   --   94    01/07/20 1537   98.2 (36.8)   Oral   93   18   96/63   Lying   95    01/07/20 1456   --   --   93   20   --   --   93    01/07/20 1449   --   --   93   20   --   --   93    01/07/20 1150   98 (36.7)   Oral   97   20   97/64   Lying   96    01/07/20 1128   --   --   92   20   --   --   95    01/07/20 1121   --   --   92   20   --   --   95    01/07/20 0755   --   --   87   20   --   --   94    01/07/20 0635   --   --   --   --   97/65 recheck   Lying   94    BP: recheck at 01/07/20 0635    01/07/20 0626   97 (36.1)   Oral   87   20   (!) 74/51   Lying   92    01/07/20 0209   --    --   --   --   99/64   Lying   --    01/07/20 0154   --   --   100   --   --   Lying   93    01/07/20 0037   --   --   102   18   94/58   Lying   90    01/07/20 0027   --   --   89   20   --   --   --    01/07/20 0017   --   --   85   20   --   Lying   93            Lines, Drains & Airways    Active LDAs     Name:   Placement date:   Placement time:   Site:   Days:    Peripheral IV 01/06/20 1710 Right Antecubital   01/06/20    1710    Antecubital   2         Inactive LDAs     None                  Facility-Administered Medications as of 1/9/2020   Medication Dose Route Frequency Provider Last Rate Last Dose   • albuterol (PROVENTIL) nebulizer solution 0.083% 2.5 mg/3mL  2.5 mg Nebulization Q4H PRN Colten Franco MD       • benzonatate (TESSALON) capsule 200 mg  200 mg Oral TID PRN Colten Franco MD   200 mg at 01/08/20 2044   • budesonide-formoterol (SYMBICORT) 160-4.5 MCG/ACT inhaler 2 puff  2 puff Inhalation BID - RT Colten Franco MD   2 puff at 01/09/20 0752   • calcium carbonate (TUMS) chewable tablet 500 mg (200 mg elemental)  1 tablet Oral BID PRN Colten Franco MD       • dextrose (D50W) 25 g/ 50mL Intravenous Solution 25 g  25 g Intravenous Q15 Min PRN Ina Adamson APRN       • dextrose (GLUTOSE) oral gel 15 g  15 g Oral Q15 Min PRN Ina Adamson APRN       • enoxaparin (LOVENOX) syringe 40 mg  40 mg Subcutaneous Q24H Colten Franco MD   40 mg at 01/08/20 1739   • glucagon (GLUCAGEN) injection 1 mg  1 mg Subcutaneous Q15 Min PRN Ina Adamson APRN       • guaiFENesin (MUCINEX) 12 hr tablet 600 mg  600 mg Oral Q12H Colten Franco MD   600 mg at 01/09/20 0839   • HYDROcodone-homatropine (HYCODAN) 5-1.5 MG/5ML syrup 5 mL  5 mL Oral Q4H Ina Adamson APRN       • insulin aspart (novoLOG) injection 0-7 Units  0-7 Units Subcutaneous 4x Daily AC & at Bedtime Ina Adamson APRN   2 Units at 01/09/20 0839   • ipratropium-albuterol (DUO-NEB)  nebulizer solution 3 mL  3 mL Nebulization Q6H While Awake - RT José Patterson MD   3 mL at 01/09/20 1230   • ketorolac (TORADOL) injection 15 mg  15 mg Intravenous Q6H PRN Colten Franco MD       • ketorolac (TORADOL) injection 30 mg  30 mg Intravenous Q6H PRN Colten Franco MD   30 mg at 01/09/20 1221   • [COMPLETED] lactated ringers bolus 1,000 mL  1,000 mL Intravenous Once Luann Pierre PA-C   Stopped at 01/06/20 1925   • [COMPLETED] levoFLOXacin (LEVAQUIN) 750 mg/150 mL D5W (premix) (LEVAQUIN) 750 mg  750 mg Intravenous Once Luann Pierre PA-C   Stopped at 01/06/20 1921   • levoFLOXacin (LEVAQUIN) tablet 750 mg  750 mg Oral Q24H Ina Adamson APRN       • lidocaine (LIDODERM) 5 % 1 patch  1 patch Transdermal Daily PRN Colten Franco MD       • melatonin tablet 5 mg  5 mg Oral Nightly PRN Colten Franco MD   5 mg at 01/06/20 2222   • menthol (TOPICAL ANALGESIC) 2.5 % gel   Topical Q1H PRN Colten Franco MD       • [COMPLETED] methylPREDNISolone sodium succinate (SOLU-Medrol) injection 40 mg  40 mg Intravenous Q8H Oc Ko MD   40 mg at 01/08/20 1843   • ondansetron (ZOFRAN) tablet 4 mg  4 mg Oral Q6H PRN Colten Franco MD        Or   • ondansetron (ZOFRAN) injection 4 mg  4 mg Intravenous Q6H PRN Colten Franco MD       • potassium chloride (K-DUR,KLOR-CON) CR tablet 20 mEq  20 mEq Oral Daily Colten Franco MD   20 mEq at 01/09/20 0839   • predniSONE (DELTASONE) tablet 40 mg  40 mg Oral Daily With Breakfast Oc Ko MD   40 mg at 01/09/20 0839   • senna-docusate sodium (SENOKOT-S) 8.6-50 MG tablet 2 tablet  2 tablet Oral Nightly PRN Colten Franco MD       • sodium chloride 0.9 % flush 10 mL  10 mL Intravenous PRN Colten Franco MD   10 mL at 01/06/20 2222   • traZODone (DESYREL) tablet 100 mg  100 mg Oral Nightly Colten Franco MD   100 mg at 01/08/20 2025   • [COMPLETED] vancomycin 1500 mg/500  mL 0.9% NS IVPB ([facility])  20 mg/kg Intravenous Once Luann Pierre PA-C   Stopped at 01/07/20 1555   • venlafaxine (EFFEXOR) tablet 37.5 mg  37.5 mg Oral BID With Meals Colten Franco MD   37.5 mg at 01/09/20 0839     Blood Administration Record (From admission, onward)    None        Lab Results (last 48 hours)     Procedure Component Value Units Date/Time    Iron Profile [[id number]]  (Abnormal) Collected:  01/09/20 0315    Specimen:  Blood Updated:  01/09/20 1241     Iron 72 mcg/dL      Iron Saturation 39 %      UIBC 115 mcg/dL      TIBC 187 mcg/dL     POC Glucose Once [[id number]]  (Abnormal) Collected:  01/09/20 1225    Specimen:  Blood Updated:  01/09/20 1237     Glucose 142 mg/dL     Ferritin [[id number]]  (Abnormal) Collected:  01/09/20 0315    Specimen:  Blood Updated:  01/09/20 1227     Ferritin 685.00 ng/mL     Narrative:       Results may be falsely decreased if patient taking Biotin.      Folate [[id number]] Collected:  01/09/20 0315    Specimen:  Blood Updated:  01/09/20 1204    Vitamin B12 [[id number]] Collected:  01/09/20 0315    Specimen:  Blood Updated:  01/09/20 1204    MRSA Screen Culture - Swab, Nares [[id number]]  (Normal) Collected:  01/08/20 1438    Specimen:  Swab from Nares Updated:  01/09/20 1137     MRSA SCREEN CX No Methicillin Resistant Staphylococcus aureus isolated    Respiratory Culture - Sputum, Cough [[id number]] Collected:  01/08/20 1313    Specimen:  Sputum from Cough Updated:  01/09/20 1015     Respiratory Culture Rare Normal Respiratory Ivy     Gram Stain Moderate (3+) WBCs seen      Rare (1+) Epithelial cells seen      Few (2+) Gram positive cocci in pairs, chains and clusters    POC Glucose Once [[id number]]  (Abnormal) Collected:  01/09/20 0727    Specimen:  Blood Updated:  01/09/20 0740     Glucose 154 mg/dL     Procalcitonin [[id number]]  (Abnormal) Collected:  01/09/20 0315    Specimen:  Blood Updated:  01/09/20 0646     Procalcitonin 0.57 ng/mL     Narrative:        Results may be falsely decreased if patient taking Biotin.     Basic Metabolic Panel [[id number]]  (Abnormal) Collected:  01/09/20 0315    Specimen:  Blood Updated:  01/09/20 0615     Glucose 121 mg/dL      BUN 21 mg/dL      Creatinine 0.77 mg/dL      Sodium 144 mmol/L      Potassium 4.6 mmol/L      Chloride 111 mmol/L      CO2 20.7 mmol/L      Calcium 9.3 mg/dL      eGFR Non African Amer 77 mL/min/1.73      BUN/Creatinine Ratio 27.3     Anion Gap 12.3 mmol/L     Narrative:       GFR Normal >60  Chronic Kidney Disease <60  Kidney Failure <15      CBC & Differential [[id number]] Collected:  01/09/20 0315    Specimen:  Blood Updated:  01/09/20 0552    Narrative:       The following orders were created for panel order CBC & Differential.  Procedure                               Abnormality         Status                     ---------                               -----------         ------                     CBC Auto Differential[[id number]]        Abnormal            Final result                 Please view results for these tests on the individual orders.    CBC Auto Differential [[id number]]  (Abnormal) Collected:  01/09/20 0315    Specimen:  Blood Updated:  01/09/20 0552     WBC 20.56 10*3/mm3      RBC 3.58 10*6/mm3      Hemoglobin 10.6 g/dL      Hematocrit 33.8 %      MCV 94.4 fL      MCH 29.6 pg      MCHC 31.4 g/dL      RDW 15.4 %      RDW-SD 53.5 fl      MPV 10.1 fL      Platelets 409 10*3/mm3      Neutrophil % 87.3 %      Lymphocyte % 4.1 %      Monocyte % 4.8 %      Eosinophil % 0.0 %      Basophil % 0.4 %      Immature Grans % 3.4 %      Neutrophils, Absolute 17.94 10*3/mm3      Lymphocytes, Absolute 0.85 10*3/mm3      Monocytes, Absolute 0.99 10*3/mm3      Eosinophils, Absolute 0.00 10*3/mm3      Basophils, Absolute 0.08 10*3/mm3      Immature Grans, Absolute 0.70 10*3/mm3      nRBC 0.0 /100 WBC     POC Glucose Once [[id number]]  (Abnormal) Collected:  01/08/20 2032    Specimen:  Blood Updated:  01/08/20 2040      Glucose 218 mg/dL     Blood Culture - Blood, Wrist, Left [[id number]] Collected:  01/06/20 1723    Specimen:  Blood from Wrist, Left Updated:  01/08/20 1745     Blood Culture No growth at 2 days    POC Glucose Once [[id number]]  (Abnormal) Collected:  01/08/20 1730    Specimen:  Blood Updated:  01/08/20 1736     Glucose 170 mg/dL     Blood Culture - Blood, Arm, Right [[id number]] Collected:  01/06/20 1709    Specimen:  Blood from Arm, Right Updated:  01/08/20 1730     Blood Culture No growth at 2 days    POC Glucose Once [[id number]]  (Abnormal) Collected:  01/08/20 1251    Specimen:  Blood Updated:  01/08/20 1257     Glucose 239 mg/dL     Hemoglobin A1c [[id number]]  (Normal) Collected:  01/08/20 0849    Specimen:  Blood Updated:  01/08/20 1249     Hemoglobin A1C 5.00 %     Narrative:       Hemoglobin A1C Ranges:    Increased Risk for Diabetes  5.7% to 6.4%  Diabetes                     >= 6.5%  Diabetic Goal                < 7.0%    TSPOT [[id number]] Collected:  01/06/20 2045    Specimen:  Blood Updated:  01/08/20 1203     TSPOTTB Negative     T-SPOT Panel A 0     T-SPOT Panel B 0     TSPOT NIL CONTROL INTERP Passed     TSPOT POS CONTROL INTERP Passed    Basic Metabolic Panel [[id number]]  (Abnormal) Collected:  01/08/20 0849    Specimen:  Blood Updated:  01/08/20 0927     Glucose 208 mg/dL      BUN 18 mg/dL      Creatinine 0.74 mg/dL      Sodium 140 mmol/L      Potassium 4.4 mmol/L      Chloride 107 mmol/L      CO2 19.5 mmol/L      Calcium 8.9 mg/dL      eGFR Non African Amer 81 mL/min/1.73      BUN/Creatinine Ratio 24.3     Anion Gap 13.5 mmol/L     Narrative:       GFR Normal >60  Chronic Kidney Disease <60  Kidney Failure <15      Vancomycin, Random [[id number]]  (Normal) Collected:  01/08/20 0849    Specimen:  Blood Updated:  01/08/20 0927     Vancomycin Random 16.60 mcg/mL     CBC & Differential [[id number]] Collected:  01/08/20 0849    Specimen:  Blood Updated:  01/08/20 0854    Narrative:       The following  orders were created for panel order CBC & Differential.  Procedure                               Abnormality         Status                     ---------                               -----------         ------                     CBC Auto Differential[[id number]]        Abnormal            Final result                 Please view results for these tests on the individual orders.    CBC Auto Differential [[id number]]  (Abnormal) Collected:  01/08/20 0849    Specimen:  Blood Updated:  01/08/20 0854     WBC 22.87 10*3/mm3      RBC 3.68 10*6/mm3      Hemoglobin 10.9 g/dL      Hematocrit 33.2 %      MCV 90.2 fL      MCH 29.6 pg      MCHC 32.8 g/dL      RDW 15.0 %      RDW-SD 49.9 fl      MPV 9.8 fL      Platelets 357 10*3/mm3      Neutrophil % 90.7 %      Lymphocyte % 3.5 %      Monocyte % 4.2 %      Eosinophil % 0.0 %      Basophil % 0.2 %      Immature Grans % 1.4 %      Neutrophils, Absolute 20.76 10*3/mm3      Lymphocytes, Absolute 0.81 10*3/mm3      Monocytes, Absolute 0.95 10*3/mm3      Eosinophils, Absolute 0.00 10*3/mm3      Basophils, Absolute 0.04 10*3/mm3      Immature Grans, Absolute 0.31 10*3/mm3      nRBC 0.0 /100 WBC           Imaging Results (Last 48 Hours)     Procedure Component Value Units Date/Time    CT Chest Without Contrast [[id number]] Collected:  01/07/20 1749     Updated:  01/07/20 1751    Narrative:       CT Chest WO    INDICATION:   COPD exacerbation with lobar pneumonia. Systemic inflammatory response syndrome. History of recurrent pneumonia.    TECHNIQUE:   CT of the thorax without IV contrast. Coronal and sagittal reconstructions were obtained.  Radiation dose reduction techniques included automated exposure control or exposure modulation based on body size. Count of known CT and cardiac nuc med studies  performed in previous 12 months: 0.     COMPARISON:   Chest radiograph 1/6/2020 and 11/7/2019    FINDINGS:  Extensive airspace disease and consolidation right upper lobe which is new from  11/7/2019. Interval resolution of left upper lobe pneumonia when compared to the November study. Background diffuse lung disease with cystic changes and fibrosis  predominantly within the lung apices but also scattered areas of fibrosis within the lung bases. Surgical clips right lower lobe. No effusions. Trachea and bronchi are unremarkable. Prominent cystic changes also noted anterior right middle lobe and also  within the lingular segment. Patchy density midportion left lower lobe could represent focal scarring or interstitial infiltrate but no dense consolidation. This measures up to 5 cm.    Mild cardiomegaly. Aorta and pulmonary vessels are unremarkable. Mediastinal adenopathy most likely reactive due to chronic lung disease and pneumonia. Visualized upper abdomen unremarkable. Osseous structures and thoracic inlet appear normal.      Impression:       Extensive airspace disease and consolidation right upper lobe compatible with lobar pneumonia which appears superimposed on background diffuse lung disease with emphysema and fibrosis.    Patchy interstitial infiltrate midportion left lower lobe could represent background fibrosis but interstitial infiltrate not excluded. No consolidation.    Signer Name: BERLIN Rojas MD   Signed: 1/7/2020 5:49 PM   Workstation Name: [facility]-    [facility]        ECG/EMG Results (last 48 hours)     Procedure Component Value Units Date/Time    ECG 12 Lead [[id number]] Collected:  01/06/20 1710     Updated:  01/07/20 2042    Narrative:       HEART RATE= 99  bpm  RR Interval= 604  ms  ME Interval= 146  ms  P Horizontal Axis= 10  deg  P Front Axis= 56  deg  QRSD Interval= 101  ms  QT Interval= 371  ms  QRS Axis= 43  deg  T Wave Axis= 56  deg  - NORMAL ECG -  Sinus rhythm - new  Electronically Signed By: Navneet Ayon ([facility]) 07-Jan-2020 20:42:32  Date and Time of Study: 2020-01-06 17:10:29          Orders (last 48 hrs)      Start     Ordered    01/09/20  1900  levoFLOXacin (LEVAQUIN) tablet 750 mg  Every 24 Hours      01/09/20 0920    01/09/20 1525  HYDROcodone-homatropine (HYCODAN) 5-1.5 MG/5ML syrup 5 mL  Every 4 Hours      01/09/20 1149    01/09/20 1238  POC Glucose Once  Once      01/09/20 1225    01/09/20 1157  Iron Profile  Once      01/09/20 1157    01/09/20 1157  Ferritin  Once      01/09/20 1157    01/09/20 1157  Folate  Once      01/09/20 1157    01/09/20 1157  Vitamin B12  Once      01/09/20 1157    01/09/20 0953  Inpatient Admission  Once      01/09/20 0952    01/09/20 0800  Walking Oximetry  Once,   Status:  Canceled     Comments:  Please determine need at rest and with exertion, thank you    01/08/20 1157    01/09/20 0800  predniSONE (DELTASONE) tablet 40 mg  Daily With Breakfast      01/08/20 1603    01/09/20 0741  POC Glucose Once  Once      01/09/20 0727    01/09/20 0700  ipratropium-albuterol (DUO-NEB) nebulizer solution 3 mL  Every 6 Hours While Awake - RT      01/08/20 1806    01/09/20 0600  Procalcitonin  Morning Draw      01/08/20 0723    01/09/20 0600  CBC Auto Differential  PROCEDURE ONCE      01/09/20 0003    01/08/20 2230  Overnight Sleep Oximetry Study  Bedtime Once - RT     Comments:  Please place and document oxygen requirement for over 5 minutes of oxygen saturation 87% or below. Thank you    01/08/20 1157    01/08/20 2041  POC Glucose Once  Once      01/08/20 2032 01/08/20 2037  HYDROcodone-homatropine (HYCODAN) 5-1.5 MG/5ML syrup 5 mL  Every 4 Hours PRN,   Status:  Discontinued      01/08/20 2037 01/08/20 1843  methylPREDNISolone sodium succinate (SOLU-Medrol) injection 40 mg  Every 8 Hours      01/08/20 1156    01/08/20 1737  POC Glucose Once  Once      01/08/20 1730    01/08/20 1700  POC Glucose 4x Daily AC & at Bedtime  4 Times Daily Before Meals & at Bedtime      01/08/20 1153    01/08/20 1411  MRSA Screen Culture - Swab, Nares  Once      01/08/20 1410    01/08/20 1258  POC Glucose Once  Once      01/08/20 1251    01/08/20  1245  insulin aspart (novoLOG) injection 0-7 Units  4 Times Daily Before Meals & Nightly      01/08/20 1153    01/08/20 1154  Do NOT Hold Basal or Correction Scale Insulin When Patient is NPO, Hold Scheduled Mealtime (Bolus) Insulin if NPO  Continuous      01/08/20 1153    01/08/20 1154  Follow [facility] Hypoglycemia Standing Orders For Blood Glucose Less Than 70 mg/dL  Until Discontinued      01/08/20 1153 01/08/20 1154  Hypoglycemia Treatment - Alert Patient That is Not NPO and Can Safely Swallow  Until Discontinued     Comments:  Administer 4 oz Fruit Juice OR 4 oz Regular Soda OR 8 oz Milk OR 15-30 grams (1 tube) of Glucose Gel.  Recheck Blood Glucose 15 Minutes After Ingestion, Repeat Treatment & Continue to Recheck Blood Sugar Every 15 Minutes Until Blood Glucose is 70 mg/dL or Higher.  Once Blood Glucose is 70 mg/dL or Higher and if It Will Be more than 60 Minutes Until the Next Meal, Provide Appropriate Snack (Including Carbohydrate Food) Based on Meal Plan Order. Give Meal Tray As Soon As Possible.    01/08/20 1153    01/08/20 1154  Hypoglycemia Treatment - Patient Has IV Access - Unresponsive, NPO or Unable To Safely Swallow  Until Discontinued     Comments:  Administer 25g (50ml) D50W IV Push.  Recheck Blood Glucose 15 Minutes After Administration, if Blood Glucose Remains Less Than 70 mg/dl, Repeat Treatment   Recheck Blood Glucose 15 Minutes After 2nd Administration, if Blood Glucose Remains Less Than 70 mg/dL After 2nd Dose of D50W, Contact Provider for Further Treatment Orders & Consider Adding IVF With D5W for Maintenance    01/08/20 1153 01/08/20 1154  Hypoglycemia Treatment - Patient Without IV Access - Unresponsive, NPO or Unable To Safely Swallow  Until Discontinued     Comments:  Administer 1mg Glucagon SQ & Establish IV Access.  Turn Patient on Side - Nausea / Vomiting May Occur.  Recheck Blood Glucose 15 Minutes After Administration.  If Blood Glucose Remains Less Than 70, Administer 25g D50W  IV Push (50ml).  Recheck Blood Glucose 15 Minutes After Administration of D50W, if Blood Glucose Remains Less Than 70 mg/dl, Contact Provider for Further Treatment Orders & Consider Adding IVF With D5 for Maintenance    01/08/20 1153    01/08/20 1154  Hypoglycemia Treatment - Document Event & Patient Response to Interventions EMR, Document Medications on MAR  Continuous      01/08/20 1153    01/08/20 1154  Notify Provider - Hypoglycemia Treatment  Until Discontinued      01/08/20 1153    01/08/20 1154  Hemoglobin A1c  Once      01/08/20 1153    01/08/20 1153  dextrose (GLUTOSE) oral gel 15 g  Every 15 Minutes PRN      01/08/20 1153    01/08/20 1153  dextrose (D50W) 25 g/ 50mL Intravenous Solution 25 g  Every 15 Minutes PRN      01/08/20 1153    01/08/20 1153  glucagon (GLUCAGEN) injection 1 mg  Every 15 Minutes PRN      01/08/20 1153    01/08/20 1153  Respiratory Culture - Sputum, Cough  Once,   Status:  Canceled      01/08/20 1152    01/08/20 0900  Vancomycin, Random  Timed      01/07/20 1002    01/08/20 0724  CBC & Differential  Daily      01/08/20 0723    01/08/20 0724  Basic Metabolic Panel  Daily      01/08/20 0723    01/08/20 0724  CBC Auto Differential  PROCEDURE ONCE      01/08/20 0723    01/07/20 1900  levoFLOXacin (LEVAQUIN) 750 mg/150 mL D5W (premix) (LEVAQUIN) 750 mg  Every 24 Hours,   Status:  Discontinued      01/07/20 0713    01/07/20 1700  enoxaparin (LOVENOX) syringe 40 mg  Every 24 Hours      01/06/20 2141    01/07/20 1525  CT Chest Without Contrast  1 Time Imaging      01/07/20 1525    01/07/20 1130  ipratropium-albuterol (DUO-NEB) nebulizer solution 3 mL  Every 4 Hours - RT,   Status:  Discontinued      01/07/20 0949    01/07/20 1045  methylPREDNISolone sodium succinate (SOLU-Medrol) injection 40 mg  Every 6 Hours,   Status:  Discontinued      01/07/20 0949    01/07/20 1015  vancomycin (VANCOCIN) 1,500 mg in sodium chloride 0.9 % 500 mL IVPB  Every 12 Hours,   Status:  Discontinued      01/07/20  0918    01/07/20 0930  Oscillating Positive Expiratory Pressure (OPEP)  2 Times Daily - RT      01/06/20 2141 01/06/20 2330  venlafaxine (EFFEXOR) tablet 37.5 mg  2 Times Daily With Meals      01/06/20 2233 01/06/20 2330  traZODone (DESYREL) tablet 100 mg  Nightly      01/06/20 2235 01/06/20 2330  budesonide-formoterol (SYMBICORT) 160-4.5 MCG/ACT inhaler 2 puff  2 Times Daily - RT      01/06/20 2237 01/06/20 2301  menthol (TOPICAL ANALGESIC) 2.5 % gel  Every 1 Hour PRN      01/06/20 2301 01/06/20 2300  lidocaine (LIDODERM) 5 % 1 patch  Daily PRN      01/06/20 2300 01/06/20 2235  ketorolac (TORADOL) injection 15 mg  Every 6 Hours PRN      01/06/20 2235 01/06/20 2232  albuterol (PROVENTIL) nebulizer solution 0.083% 2.5 mg/3mL  Every 4 Hours PRN      01/06/20 2233 01/06/20 2230  lactated ringers infusion  Continuous,   Status:  Discontinued      01/06/20 2136 01/06/20 2230  guaiFENesin (MUCINEX) 12 hr tablet 600 mg  Every 12 Hours Scheduled      01/06/20 2141 01/06/20 2214  ketorolac (TORADOL) injection 30 mg  Every 6 Hours PRN      01/06/20 2214 01/06/20 2141  ondansetron (ZOFRAN) tablet 4 mg  Every 6 Hours PRN      01/06/20 2141 01/06/20 2141  ondansetron (ZOFRAN) injection 4 mg  Every 6 Hours PRN      01/06/20 2141 01/06/20 2140  benzonatate (TESSALON) capsule 200 mg  3 Times Daily PRN      01/06/20 2141 01/06/20 2140  melatonin tablet 5 mg  Nightly PRN      01/06/20 2141 01/06/20 2136  Pharmacy to dose vancomycin  Continuous PRN,   Status:  Discontinued      01/06/20 2136 01/06/20 2136  senna-docusate sodium (SENOKOT-S) 8.6-50 MG tablet 2 tablet  Nightly PRN      01/06/20 2141 01/06/20 2136  calcium carbonate (TUMS) chewable tablet 500 mg (200 mg elemental)  2 Times Daily PRN      01/06/20 2141 01/06/20 1840  vancomycin 1500 mg/500 mL 0.9% NS IVPB ([facility])  Once      01/06/20 1838 01/06/20 1750  potassium chloride (K-DUR,KLOR-CON) CR tablet 20 mEq  Daily       "01/06/20 1748    01/06/20 1657  sodium chloride 0.9 % flush 10 mL  As Needed      01/06/20 1659    Unscheduled  Oxygen Therapy- Nasal Cannula; 2 LPM; Titrate for SPO2: 92%, Greater Than or Equal To  Continuous PRN      01/06/20 1659    Unscheduled  Oxygen Therapy- Nasal Cannula; 1 LPM; Titrate for SPO2: Between, 90% - 95%  Continuous PRN      01/06/20 2136    Unscheduled  Apply Heat To Affected Area  As Needed     Comments:  Heating pad on for 30 minutes off for 30 minutes.    01/06/20 2300    --  SCANNED - TELEMETRY        01/06/20 0000              Ventilator/Non-Invasive Ventilation Settings (From admission, onward)    None        Operative/Procedure Notes (last 48 hours) (Notes from 01/07/20 1446 through 01/09/20 1446)    No notes of this type exist for this encounter.          Physician Progress Notes (last 48 hours) (Notes from 01/07/20 1446 through 01/09/20 1446)      Ina Adamson, TOYA at 01/09/20 0921     Attestation signed by Kevon Sarabia DO at 01/09/20 1405    I have reviewed the documentation above and agree.                    SERVICE: [facility] HOSPITALIST    CONSULTANTS: Pulmonary    CHIEF COMPLAINT: Follow-up right upper lobe pneumonia    SUBJECTIVE: The patient reports she feels that she has improved but had episode this morning when ambulating to restroom where she was unable to catch her breath, oxygen requirement went up, and she became quite anxious with the amount of dyspnea.  This has resolved however patient is concerned about returning home too soon.  She notes she produced sputum that was sent off yesterday.  She notes rib pain is somewhat improved with new medication    OBJECTIVE:    /82 (BP Location: Left arm, Patient Position: Lying)   Pulse 95   Temp 98.6 °F (37 °C) (Oral)   Resp 20   Ht 170.2 cm (67\")   Wt 79.6 kg (175 lb 6.4 oz)   SpO2 (!) 89%   BMI 27.47 kg/m²      MEDS/LABS REVIEWED AND ORDERED    budesonide-formoterol 2 puff Inhalation BID - " RT   enoxaparin 40 mg Subcutaneous Q24H   guaiFENesin 600 mg Oral Q12H   HYDROcodone-homatropine 5 mL Oral Q4H   insulin aspart 0-7 Units Subcutaneous 4x Daily AC & at Bedtime   ipratropium-albuterol 3 mL Nebulization Q6H While Awake - RT   levoFLOXacin 750 mg Oral Q24H   potassium chloride 20 mEq Oral Daily   predniSONE 40 mg Oral Daily With Breakfast   traZODone 100 mg Oral Nightly   venlafaxine 37.5 mg Oral BID With Meals     Physical Exam   Constitutional: She is oriented to person, place, and time. She appears well-nourished.   Appears older than stated age   HENT:   Head: Normocephalic and atraumatic.   Eyes: Pupils are equal, round, and reactive to light. EOM are normal.   Cardiovascular: Normal rate and regular rhythm.   Pulmonary/Chest: Effort normal.   Right upper lobe, left lower lobe crackles, diminished right lower lobe  Improved air movement but remains diminished   Abdominal: Soft. Bowel sounds are normal. She exhibits no distension. There is no tenderness. There is no guarding.   Musculoskeletal: She exhibits no edema.   Neurological: She is alert and oriented to person, place, and time.   Skin: Skin is warm and dry. No erythema.   Extensive tattooing of skin surfaces   Psychiatric: She has a normal mood and affect. Her behavior is normal.   Vitals reviewed.    LAB/DIAGNOSTICS:    Lab Results (last 24 hours)     Procedure Component Value Units Date/Time    MRSA Screen Culture - Swab, Nares [[id number]]  (Normal) Collected:  01/08/20 1438    Specimen:  Swab from Nares Updated:  01/09/20 1137     MRSA SCREEN CX No Methicillin Resistant Staphylococcus aureus isolated    Respiratory Culture - Sputum, Cough [[id number]] Collected:  01/08/20 1313    Specimen:  Sputum from Cough Updated:  01/09/20 1015     Respiratory Culture Rare Normal Respiratory Ivy     Gram Stain Moderate (3+) WBCs seen      Rare (1+) Epithelial cells seen      Few (2+) Gram positive cocci in pairs, chains and clusters    POC Glucose  Once [[id number]]  (Abnormal) Collected:  01/09/20 0727    Specimen:  Blood Updated:  01/09/20 0740     Glucose 154 mg/dL     Procalcitonin [[id number]]  (Abnormal) Collected:  01/09/20 0315    Specimen:  Blood Updated:  01/09/20 0646     Procalcitonin 0.57 ng/mL     Narrative:       Results may be falsely decreased if patient taking Biotin.     Basic Metabolic Panel [[id number]]  (Abnormal) Collected:  01/09/20 0315    Specimen:  Blood Updated:  01/09/20 0615     Glucose 121 mg/dL      BUN 21 mg/dL      Creatinine 0.77 mg/dL      Sodium 144 mmol/L      Potassium 4.6 mmol/L      Chloride 111 mmol/L      CO2 20.7 mmol/L      Calcium 9.3 mg/dL      eGFR Non African Amer 77 mL/min/1.73      BUN/Creatinine Ratio 27.3     Anion Gap 12.3 mmol/L     Narrative:       GFR Normal >60  Chronic Kidney Disease <60  Kidney Failure <15      CBC & Differential [[id number]] Collected:  01/09/20 0315    Specimen:  Blood Updated:  01/09/20 0552    Narrative:       The following orders were created for panel order CBC & Differential.  Procedure                               Abnormality         Status                     ---------                               -----------         ------                     CBC Auto Differential[[id number]]        Abnormal            Final result                 Please view results for these tests on the individual orders.    CBC Auto Differential [[id number]]  (Abnormal) Collected:  01/09/20 0315    Specimen:  Blood Updated:  01/09/20 0552     WBC 20.56 10*3/mm3      RBC 3.58 10*6/mm3      Hemoglobin 10.6 g/dL      Hematocrit 33.8 %      MCV 94.4 fL      MCH 29.6 pg      MCHC 31.4 g/dL      RDW 15.4 %      RDW-SD 53.5 fl      MPV 10.1 fL      Platelets 409 10*3/mm3      Neutrophil % 87.3 %      Lymphocyte % 4.1 %      Monocyte % 4.8 %      Eosinophil % 0.0 %      Basophil % 0.4 %      Immature Grans % 3.4 %      Neutrophils, Absolute 17.94 10*3/mm3      Lymphocytes, Absolute 0.85 10*3/mm3      Monocytes,  Absolute 0.99 10*3/mm3      Eosinophils, Absolute 0.00 10*3/mm3      Basophils, Absolute 0.08 10*3/mm3      Immature Grans, Absolute 0.70 10*3/mm3      nRBC 0.0 /100 WBC     POC Glucose Once [[id number]]  (Abnormal) Collected:  01/08/20 2032    Specimen:  Blood Updated:  01/08/20 2040     Glucose 218 mg/dL     Blood Culture - Blood, Wrist, Left [[id number]] Collected:  01/06/20 1723    Specimen:  Blood from Wrist, Left Updated:  01/08/20 1745     Blood Culture No growth at 2 days    POC Glucose Once [[id number]]  (Abnormal) Collected:  01/08/20 1730    Specimen:  Blood Updated:  01/08/20 1736     Glucose 170 mg/dL     Blood Culture - Blood, Arm, Right [[id number]] Collected:  01/06/20 1709    Specimen:  Blood from Arm, Right Updated:  01/08/20 1730     Blood Culture No growth at 2 days    POC Glucose Once [[id number]]  (Abnormal) Collected:  01/08/20 1251    Specimen:  Blood Updated:  01/08/20 1257     Glucose 239 mg/dL     Hemoglobin A1c [[id number]]  (Normal) Collected:  01/08/20 0849    Specimen:  Blood Updated:  01/08/20 1249     Hemoglobin A1C 5.00 %     Narrative:       Hemoglobin A1C Ranges:    Increased Risk for Diabetes  5.7% to 6.4%  Diabetes                     >= 6.5%  Diabetic Goal                < 7.0%    TSPOT [[id number]] Collected:  01/06/20 2045    Specimen:  Blood Updated:  01/08/20 1203     TSPOTTB Negative     T-SPOT Panel A 0     T-SPOT Panel B 0     TSPOT NIL CONTROL INTERP Passed     TSPOT POS CONTROL INTERP Passed        ECG 12 Lead   Final Result   HEART RATE= 99  bpm   RR Interval= 604  ms   MS Interval= 146  ms   P Horizontal Axis= 10  deg   P Front Axis= 56  deg   QRSD Interval= 101  ms   QT Interval= 371  ms   QRS Axis= 43  deg   T Wave Axis= 56  deg   - NORMAL ECG -   Sinus rhythm - new   Electronically Signed By: Navneet Ayon ([facility]) 07-Jan-2020 20:42:32   Date and Time of Study: 2020-01-06 17:10:29        Results for orders placed during the hospital encounter of 11/06/19   Adult  Transthoracic Echo Complete W/ Cont if Necessary Per Protocol    Narrative · Left ventricular systolic function is normal.  · Mild-to-moderate mitral valve regurgitation is present  · Mild tricuspid valve regurgitation is present.  · Left atrial cavity size is moderately dilated.  · Calculated EF = 50.0%.  · The valve appears trileaflet. The aortic valve is abnormal in structure.   There is calcification of the aortic valve.Mild aortic valve regurgitation   is present. Moderate aortic valve stenosis is present. On the short-axis   images, the aortic valve is opening well. Concern that the gradient noted   at the aortic valve may be due to a subaortic membrane, images not clear   enough to assess this.        Ct Chest Without Contrast    Result Date: 1/7/2020  Extensive airspace disease and consolidation right upper lobe compatible with lobar pneumonia which appears superimposed on background diffuse lung disease with emphysema and fibrosis. Patchy interstitial infiltrate midportion left lower lobe could represent background fibrosis but interstitial infiltrate not excluded. No consolidation. Signer Name: BERLIN Rojas MD  Signed: 1/7/2020 5:49 PM  Workstation Name: [facility]  [facility]    ASSESSMENT/PLAN:  Sepsis 2/2 right upper lobe pneumonia:  AHR F:  AECOPD/bullous emphysema:  Right side pleuritic chest pain:  Steroid-induced leukocytosis:  Recent RSV infection, AE COPD  HAG MA: Pulmonary following  Sputum culture pending, MRSA swab/RVP/Legionella/strep pneumo negative  Schedule Hycodan  Procalcitonin and WBC C trending down  IV Solu-Medrol now changed to oral prednisone  Continue Levaquin  Continue Acapella, I-S, Symbicort, duo nebs and wean oxygen as tolerated  Overnight oximetry showed need for 2 L with sleep  Walking oximetry possibly tomorrow if consideration for discharge  If no discharge will need repeat overnight as well     Steroid-induced hyperglycemia: A1c  "5.00%  Continue low-dose sliding scale insulin with Accu-Cheks     Normocytic anemia: Hemoglobin 10.6  No active blood loss noted, check anemia panels    RA: No DMARDS, no acute issues currently     Elevated alkaline phosphatase: Unclear significance, no abdominal complaints     Electrolyte imbalance: Continue replacement protocols, continue daily potassium supplement     DDD with chronic pain: Continues pain therapies, see above     History of A. fib RVR: Nothing acute currently, NSR     Anxiety:  H/O possible seizure:  No current acute issues on Effexor and trazodone home doses     Right hip pain 2/2 ground-level fall: POA, no acute findings on x-ray    PLAN FOR DISPOSITION: Home when able    TOYA Camejo  [facility], [facility]  01/09/20  9:21 AM    \"Dictated utilizing Dragon dictation\"      Electronically signed by Kevon Sarabia DO at 01/09/20 1409     Oc Ko MD at 01/09/20 0725                 No wheeze diminished breath sounds bilaterally prolonged expiratory phase        Daily Progress Note.   [facility] MED SURG  1/9/2020    Patient:  Name:  Akila Amezcua  MRN:  [id number]  1960  59 y.o.  female         CC: Short of breath    Interval History:  Follow-up for pneumonia COPD acute exacerbation  A little more short of breath this morning.  Cough without much sputum production no hemoptysis no high fevers overnight.  Well-appearing.  Can carry on conversation    ROS: No fever, no diarrhea, no chest pain  PMFSSH: no change    Physical Exam:  /82 (BP Location: Left arm, Patient Position: Lying)   Pulse 95   Temp 98.6 °F (37 °C) (Oral)   Resp 20   Ht 170.2 cm (67\")   Wt 79.6 kg (175 lb 6.4 oz)   SpO2 (!) 89%   BMI 27.47 kg/m²    Body mass index is 27.47 kg/m².    Intake/Output Summary (Last 24 hours) at 1/9/2020 0755  Last data filed at 1/9/2020 0700  Gross per 24 hour   Intake 2430 ml   Output --   Net 2430 ml     General appearance: Patient is " awake alert she is in no acute distress, conversant   Eyes: anicteric sclerae, moist conjunctivae; no lid-lag; PERRLA  HENT: Atraumatic; oropharynx clear with moist mucous membranes and no mucosal ulcerations; normal hard and soft palate  Neck: Trachea midline; FROM, supple, no thyromegaly or lymphadenopathy  Lungs: Crackles no expiratory wheeze at present time, with normal respiratory effort and no intercostal retractions  CV: RRR, no MRGs   Abdomen: Soft, non-tender; no masses or HSM  Extremities: No peripheral edema or extremity lymphadenopathy  Skin: Normal temperature, turgor and texture; no rash, ulcers or subcutaneous nodules  Psych: Appropriate affect, alert and oriented to person, place and time     Data Review:  Notable Labs:  Results from last 7 days   Lab Units 01/09/20  0315 01/08/20  0849 01/07/20  0635 01/06/20  1709   WBC 10*3/mm3 20.56* 22.87* 19.59* 21.31*   HEMOGLOBIN g/dL 10.6* 10.9* 11.0* 12.7   PLATELETS 10*3/mm3 409 357 269 301     Results from last 7 days   Lab Units 01/09/20  0315 01/08/20  0849 01/07/20  0635 01/07/20  0430 01/06/20  1709   SODIUM mmol/L 144 140 137 135* 133*   POTASSIUM mmol/L 4.6 4.4 4.0 4.1 3.4*   CHLORIDE mmol/L 111* 107 103 104 98   CO2 mmol/L 20.7* 19.5* 20.3* 13.5* 19.8*   BUN mg/dL 21* 18 11 11 14   CREATININE mg/dL 0.77 0.74 0.66 0.71 0.84   GLUCOSE mg/dL 121* 208* 139* 137* 112*   CALCIUM mg/dL 9.3 8.9 8.4* 8.5* 9.0   MAGNESIUM mg/dL  --   --   --  1.6  --    PHOSPHORUS mg/dL  --   --   --  2.8  --    Estimated Creatinine Clearance: 87.6 mL/min (by C-G formula based on SCr of 0.77 mg/dL).    Imaging:  Reviewed chest images personally from past 3 days    Scheduled meds:      budesonide-formoterol 2 puff Inhalation BID - RT   enoxaparin 40 mg Subcutaneous Q24H   guaiFENesin 600 mg Oral Q12H   insulin aspart 0-7 Units Subcutaneous 4x Daily AC & at Bedtime   ipratropium-albuterol 3 mL Nebulization Q6H While Awake - RT   levoFLOXacin 750 mg Intravenous Q24H   potassium  "chloride 20 mEq Oral Daily   predniSONE 40 mg Oral Daily With Breakfast   traZODone 100 mg Oral Nightly   venlafaxine 37.5 mg Oral BID With Meals       ASSESSMENT  /  PLAN:  Pneumonia community-acquired bacterial  Exacerbation of COPD  Leukocytosis  DDD  A. fib RVR  RA  Anxiety  Former tobacco abuse  Acute hypoxic respiratory failure      Her white blood cell count remains elevated she is on steroids.    Procalcitonin is coming down not completely negative at present.  I would continue antibiotics as well as steroids.    Prednisone form she has no wheezing I would continue her duo nebs.   CT scan reveals substantial infiltrate  Continue Levaquin  Prednisone continue  Wean oxygen as tolerated  Follow microbiology  Await MRSA swab  Continue scheduled DuoNebs  Good pulmonary hygiene since from her flutter valve  Discussed with referring physician team appreciate their excellent care for this patient       Oc Ko MD  [facility]  01/09/20  090x      Electronically signed by Oc Ko MD at 01/09/20 3955     Oc Ko MD at 01/08/20 1601            Daily Progress Note.   [facility] MED SURG  1/8/2020    Patient:  Name:  Akila Amezcua  MRN:  [id number]  1960  59 y.o.  female         CC: Short of breath    Interval History:  Follow-up for pneumonia COPD acute exacerbation  Patient feels her breathing is better.  Less short of breath no hemoptysis.  Still some cough.  No lethargy.  She is awake alert conversant.  No acute events overnight chart reviewed discussed with referring physician team  ROS: No fever, no diarrhea, no chest pain  PMFSSH: no change    Physical Exam:  /75 (BP Location: Right arm, Patient Position: Lying)   Pulse 101   Temp 98 °F (36.7 °C) (Oral)   Resp 16   Ht 170.2 cm (67\")   Wt 79.6 kg (175 lb 6.4 oz)   SpO2 93%   BMI 27.47 kg/m²    Body mass index is 27.47 kg/m².    Intake/Output Summary (Last 24 hours) at 1/8/2020 " 1601  Last data filed at 1/8/2020 1500  Gross per 24 hour   Intake 3805 ml   Output --   Net 3805 ml     General appearance: Patient is awake alert she is in no acute distress, conversant   Eyes: anicteric sclerae, moist conjunctivae; no lid-lag; PERRLA  HENT: Atraumatic; oropharynx clear with moist mucous membranes and no mucosal ulcerations; normal hard and soft palate  Neck: Trachea midline; FROM, supple, no thyromegaly or lymphadenopathy  Lungs: Crackles at the right base no expiratory wheeze at present time, with normal respiratory effort and no intercostal retractions  CV: RRR, no MRGs   Abdomen: Soft, non-tender; no masses or HSM  Extremities: No peripheral edema or extremity lymphadenopathy  Skin: Normal temperature, turgor and texture; no rash, ulcers or subcutaneous nodules  Psych: Appropriate affect, alert and oriented to person, place and time     Data Review:  Notable Labs:  Results from last 7 days   Lab Units 01/08/20  0849 01/07/20  0635 01/06/20  1709   WBC 10*3/mm3 22.87* 19.59* 21.31*   HEMOGLOBIN g/dL 10.9* 11.0* 12.7   PLATELETS 10*3/mm3 357 269 301     Results from last 7 days   Lab Units 01/08/20  0849 01/07/20  0635 01/07/20  0430 01/06/20  1709   SODIUM mmol/L 140 137 135* 133*   POTASSIUM mmol/L 4.4 4.0 4.1 3.4*   CHLORIDE mmol/L 107 103 104 98   CO2 mmol/L 19.5* 20.3* 13.5* 19.8*   BUN mg/dL 18 11 11 14   CREATININE mg/dL 0.74 0.66 0.71 0.84   GLUCOSE mg/dL 208* 139* 137* 112*   CALCIUM mg/dL 8.9 8.4* 8.5* 9.0   MAGNESIUM mg/dL  --   --  1.6  --    PHOSPHORUS mg/dL  --   --  2.8  --    Estimated Creatinine Clearance: 91.1 mL/min (by C-G formula based on SCr of 0.74 mg/dL).    Imaging:  Reviewed chest images personally from past 3 days    Scheduled meds:      budesonide-formoterol 2 puff Inhalation BID - RT   enoxaparin 40 mg Subcutaneous Q24H   guaiFENesin 600 mg Oral Q12H   insulin aspart 0-7 Units Subcutaneous 4x Daily AC & at Bedtime   ipratropium-albuterol 3 mL Nebulization Q4H - RT  "  levoFLOXacin 750 mg Intravenous Q24H   methylPREDNISolone sodium succinate 40 mg Intravenous Q8H   potassium chloride 20 mEq Oral Daily   traZODone 100 mg Oral Nightly   venlafaxine 37.5 mg Oral BID With Meals       ASSESSMENT  /  PLAN:  Pneumonia community-acquired bacterial  Exacerbation of COPD  Leukocytosis  DDD  A. fib RVR  RA  Anxiety  Former tobacco abuse  Acute hypoxic respiratory failure     CT scan reveals substantial infiltrate  Continue Levaquin  Methylprednisolone I will change to p.o. steroids  Wean oxygen as tolerated  Follow microbiology  Send MRSA swab  Continue scheduled DuoNebs  Good pulmonary hygiene since from her flutter valve  Discussed with referring physician team appreciate their excellent care for this patient       Oc oK MD  [facility]  01/08/20  4:01 PM        Electronically signed by Oc Ko MD at 01/08/20 5222     Ina Adamson APRN at 01/08/20 0787          SERVICE: [facility] HOSPITALIST    CONSULTANTS: Pulmonary    CHIEF COMPLAINT: Follow-up right upper lobe pneumonia, AE COPD    SUBJECTIVE: Patient reports that she finally does feel better since admission, not much sleep overnight due to another patient in her room talking all night but feels that she would have slept otherwise.  She notes she is ambulating to bathroom without difficulty.  Her right sided chest pain has improved but is still present.  She otherwise denies f/c/n/v/d/abdominal pain or other new concerns.    OBJECTIVE:    /74 (BP Location: Left arm, Patient Position: Lying)   Pulse 93   Temp 98.2 °F (36.8 °C) (Oral)   Resp 20   Ht 170.2 cm (67\")   Wt 79.6 kg (175 lb 6.4 oz)   SpO2 92%   BMI 27.47 kg/m²      MEDS/LABS REVIEWED AND ORDERED    budesonide-formoterol 2 puff Inhalation BID - RT   enoxaparin 40 mg Subcutaneous Q24H   guaiFENesin 600 mg Oral Q12H   insulin aspart 0-7 Units Subcutaneous 4x Daily AC & at Bedtime "   ipratropium-albuterol 3 mL Nebulization Q4H - RT   levoFLOXacin 750 mg Intravenous Q24H   methylPREDNISolone sodium succinate 40 mg Intravenous Q8H   potassium chloride 20 mEq Oral Daily   traZODone 100 mg Oral Nightly   venlafaxine 37.5 mg Oral BID With Meals     Physical Exam   Constitutional: She is oriented to person, place, and time. She appears well-developed and well-nourished.   Appears older than stated age   HENT:   Head: Normocephalic and atraumatic.   Eyes: Pupils are equal, round, and reactive to light. EOM are normal.   Cardiovascular: Normal rate and regular rhythm.   Pulmonary/Chest: Effort normal.   Diminished all fields, crackles right upper lobe and left lower lobe, occasional expiratory wheeze   Abdominal: Soft. Bowel sounds are normal. She exhibits no distension. There is no tenderness. There is no guarding.   Musculoskeletal: She exhibits no edema.   Neurological: She is alert and oriented to person, place, and time.   Skin: Skin is warm and dry.   Extensive tattooing of all skin surfaces   Psychiatric: She has a normal mood and affect. Her behavior is normal.   Vitals reviewed.    LAB/DIAGNOSTICS:    Lab Results (last 24 hours)     Procedure Component Value Units Date/Time    Basic Metabolic Panel [[id number]]  (Abnormal) Collected:  01/08/20 0849    Specimen:  Blood Updated:  01/08/20 0927     Glucose 208 mg/dL      BUN 18 mg/dL      Creatinine 0.74 mg/dL      Sodium 140 mmol/L      Potassium 4.4 mmol/L      Chloride 107 mmol/L      CO2 19.5 mmol/L      Calcium 8.9 mg/dL      eGFR Non African Amer 81 mL/min/1.73      BUN/Creatinine Ratio 24.3     Anion Gap 13.5 mmol/L     Narrative:       GFR Normal >60  Chronic Kidney Disease <60  Kidney Failure <15      Vancomycin, Random [[id number]]  (Normal) Collected:  01/08/20 0849    Specimen:  Blood Updated:  01/08/20 0927     Vancomycin Random 16.60 mcg/mL     CBC & Differential [[id number]] Collected:  01/08/20 0849    Specimen:  Blood Updated:   01/08/20 0854    Narrative:       The following orders were created for panel order CBC & Differential.  Procedure                               Abnormality         Status                     ---------                               -----------         ------                     CBC Auto Differential[[id number]]        Abnormal            Final result                 Please view results for these tests on the individual orders.    CBC Auto Differential [[id number]]  (Abnormal) Collected:  01/08/20 0849    Specimen:  Blood Updated:  01/08/20 0854     WBC 22.87 10*3/mm3      RBC 3.68 10*6/mm3      Hemoglobin 10.9 g/dL      Hematocrit 33.2 %      MCV 90.2 fL      MCH 29.6 pg      MCHC 32.8 g/dL      RDW 15.0 %      RDW-SD 49.9 fl      MPV 9.8 fL      Platelets 357 10*3/mm3      Neutrophil % 90.7 %      Lymphocyte % 3.5 %      Monocyte % 4.2 %      Eosinophil % 0.0 %      Basophil % 0.2 %      Immature Grans % 1.4 %      Neutrophils, Absolute 20.76 10*3/mm3      Lymphocytes, Absolute 0.81 10*3/mm3      Monocytes, Absolute 0.95 10*3/mm3      Eosinophils, Absolute 0.00 10*3/mm3      Basophils, Absolute 0.04 10*3/mm3      Immature Grans, Absolute 0.31 10*3/mm3      nRBC 0.0 /100 WBC     Blood Culture - Blood, Wrist, Left [[id number]] Collected:  01/06/20 1723    Specimen:  Blood from Wrist, Left Updated:  01/07/20 1746     Blood Culture No growth at 24 hours    Blood Culture - Blood, Arm, Right [[id number]] Collected:  01/06/20 1709    Specimen:  Blood from Arm, Right Updated:  01/07/20 1730     Blood Culture No growth at 24 hours    Respiratory Panel, PCR - Swab, Nasopharynx [[id number]]  (Normal) Collected:  01/07/20 0714    Specimen:  Swab from Nasopharynx Updated:  01/07/20 1211     ADENOVIRUS, PCR Not Detected     Coronavirus 229E Not Detected     Coronavirus HKU1 Not Detected     Coronavirus NL63 Not Detected     Coronavirus OC43 Not Detected     Human Metapneumovirus Not Detected     Human Rhinovirus/Enterovirus Not  Detected     Influenza B PCR Not Detected     Parainfluenza Virus 1 Not Detected     Parainfluenza Virus 2 Not Detected     Parainfluenza Virus 3 Not Detected     Parainfluenza Virus 4 Not Detected     Bordetella pertussis pcr Not Detected     Influenza A H1 2009 PCR Not Detected     Chlamydophila pneumoniae PCR Not Detected     Mycoplasma pneumo by PCR Not Detected     Influenza A PCR Not Detected     Influenza A H3 Not Detected     Influenza A H1 Not Detected     RSV, PCR Not Detected     Bordetella parapertussis PCR Not Detected    Legionella Antigen, Urine - Urine, Urine, Clean Catch [[id number]]  (Normal) Collected:  01/07/20 1145    Specimen:  Urine, Clean Catch Updated:  01/07/20 1206     LEGIONELLA ANTIGEN, URINE Negative    S. Pneumo Ag Urine or CSF - Urine, Urine, Clean Catch [[id number]]  (Normal) Collected:  01/07/20 1145    Specimen:  Urine, Clean Catch Updated:  01/07/20 1206     Strep Pneumo Ag Negative        ECG 12 Lead   Final Result   HEART RATE= 99  bpm   RR Interval= 604  ms   MN Interval= 146  ms   P Horizontal Axis= 10  deg   P Front Axis= 56  deg   QRSD Interval= 101  ms   QT Interval= 371  ms   QRS Axis= 43  deg   T Wave Axis= 56  deg   - NORMAL ECG -   Sinus rhythm - new   Electronically Signed By: Navneet Ayon ([facility]) 07-Jan-2020 20:42:32   Date and Time of Study: 2020-01-06 17:10:29        Results for orders placed during the hospital encounter of 11/06/19   Adult Transthoracic Echo Complete W/ Cont if Necessary Per Protocol    Narrative · Left ventricular systolic function is normal.  · Mild-to-moderate mitral valve regurgitation is present  · Mild tricuspid valve regurgitation is present.  · Left atrial cavity size is moderately dilated.  · Calculated EF = 50.0%.  · The valve appears trileaflet. The aortic valve is abnormal in structure.   There is calcification of the aortic valve.Mild aortic valve regurgitation   is present. Moderate aortic valve stenosis is present. On the short-axis    images, the aortic valve is opening well. Concern that the gradient noted   at the aortic valve may be due to a subaortic membrane, images not clear   enough to assess this.        Xr Ribs Right With Pa Chest    Result Date: 1/7/2020   1. No evidence of acute rib fracture or pneumothorax. 2. No other significant interval change. Imaging follow-up to clearing recommended for the masslike consolidation in the right upper lobe.  This report was finalized on 1/7/2020 8:57 AM by Dr. Petr Jalloh MD.      Ct Chest Without Contrast    Result Date: 1/7/2020  Extensive airspace disease and consolidation right upper lobe compatible with lobar pneumonia which appears superimposed on background diffuse lung disease with emphysema and fibrosis. Patchy interstitial infiltrate midportion left lower lobe could represent background fibrosis but interstitial infiltrate not excluded. No consolidation. Signer Name: BERLIN Rojas MD  Signed: 1/7/2020 5:49 PM  Workstation Name: [facility]  [facility]    Xr Chest 1 View    Result Date: 1/6/2020  Dense infiltrate throughout the right upper lobe, concerning for pneumonia. Follow-up to resolution is recommended. Signer Name: Arron Guan MD  Signed: 1/6/2020 5:43 PM  Workstation Name: [facility]  [facility]    ASSESSMENT/PLAN:  Sepsis 2/2 right upper lobe pneumonia:  AHR F:  AE bullous emphysema/COPD:  Right side pleuritic chest pain:  Steroid-induced leukocytosis:  Recent RSV infection, AE COPD  HAG MA: Pulmonary following  CT chest confirms right upper lobe pneumonia, possible left lower lobe pneumonia  RVP, Legionella and strep pneumo negative  Awaiting sputum culture  Trend procalcitonin  Change Solu-Medrol to every 8 hours and de-escalate as able  Continue Levaquin, do not see need to continue vancomycin at this time  Continue I-S, Acapella, Mucinex, Symbicort, duo nebs, wean oxygen as tolerated  Check overnight oximetry  "tonight, walking oximetry tomorrow  Previously on oxygen as needed only  Monitor    Steroid-induced hyperglycemia: Check A1c add low-dose sliding scale insulin with Accu-Cheks    Normocytic anemia: Hemoglobin 10.9, likely some dilutional effect with IV hydration, DC IV fluids    RA: No DMARDS, no acute issues currently    Elevated alkaline phosphatase:    Electrolyte imbalance: Continue replacement protocols, continue daily potassium supplement    DDD with chronic pain: No current acute issues on Toradol, lidocaine patches, Tylenol    History of A. fib RVR: Nothing acute currently, NSR    Anxiety:  H/O possible seizure:  No current acute issues on Effexor and trazodone home doses    Right hip pain 2/2 ground-level fall: POA, no acute findings on x-ray    PLAN FOR DISPOSITION: Home when able    TOYA Camejo  Hospitalist, [facility]  01/08/20  7:24 AM    \"Dictated utilizing Dragon dictation\"      Electronically signed by Ina Adamson APRN at 01/08/20 1158       Consult Notes (last 48 hours) (Notes from 01/07/20 1446 through 01/09/20 1446)    No notes of this type exist for this encounter.       "

## 2020-01-09 NOTE — PROGRESS NOTES
"       No wheeze diminished breath sounds bilaterally prolonged expiratory phase        Daily Progress Note.   [facility] MED SURG  1/9/2020    Patient:  Name:  Akila Amezcua  MRN:  [id number]  1960  59 y.o.  female         CC: Short of breath    Interval History:  Follow-up for pneumonia COPD acute exacerbation  A little more short of breath this morning.  Cough without much sputum production no hemoptysis no high fevers overnight.  Well-appearing.  Can carry on conversation    ROS: No fever, no diarrhea, no chest pain  PMFSSH: no change    Physical Exam:  /82 (BP Location: Left arm, Patient Position: Lying)   Pulse 95   Temp 98.6 °F (37 °C) (Oral)   Resp 20   Ht 170.2 cm (67\")   Wt 79.6 kg (175 lb 6.4 oz)   SpO2 (!) 89%   BMI 27.47 kg/m²   Body mass index is 27.47 kg/m².    Intake/Output Summary (Last 24 hours) at 1/9/2020 0755  Last data filed at 1/9/2020 0700  Gross per 24 hour   Intake 2430 ml   Output --   Net 2430 ml     General appearance: Patient is awake alert she is in no acute distress, conversant   Eyes: anicteric sclerae, moist conjunctivae; no lid-lag; PERRLA  HENT: Atraumatic; oropharynx clear with moist mucous membranes and no mucosal ulcerations; normal hard and soft palate  Neck: Trachea midline; FROM, supple, no thyromegaly or lymphadenopathy  Lungs: Crackles no expiratory wheeze at present time, with normal respiratory effort and no intercostal retractions  CV: RRR, no MRGs   Abdomen: Soft, non-tender; no masses or HSM  Extremities: No peripheral edema or extremity lymphadenopathy  Skin: Normal temperature, turgor and texture; no rash, ulcers or subcutaneous nodules  Psych: Appropriate affect, alert and oriented to person, place and time     Data Review:  Notable Labs:  Results from last 7 days   Lab Units 01/09/20  0315 01/08/20  0849 01/07/20  0635 01/06/20  1709   WBC 10*3/mm3 20.56* 22.87* 19.59* 21.31*   HEMOGLOBIN g/dL 10.6* 10.9* 11.0* 12.7   PLATELETS " 10*3/mm3 409 357 269 301     Results from last 7 days   Lab Units 01/09/20  0315 01/08/20  0849 01/07/20  0635 01/07/20  0430 01/06/20  1709   SODIUM mmol/L 144 140 137 135* 133*   POTASSIUM mmol/L 4.6 4.4 4.0 4.1 3.4*   CHLORIDE mmol/L 111* 107 103 104 98   CO2 mmol/L 20.7* 19.5* 20.3* 13.5* 19.8*   BUN mg/dL 21* 18 11 11 14   CREATININE mg/dL 0.77 0.74 0.66 0.71 0.84   GLUCOSE mg/dL 121* 208* 139* 137* 112*   CALCIUM mg/dL 9.3 8.9 8.4* 8.5* 9.0   MAGNESIUM mg/dL  --   --   --  1.6  --    PHOSPHORUS mg/dL  --   --   --  2.8  --    Estimated Creatinine Clearance: 87.6 mL/min (by C-G formula based on SCr of 0.77 mg/dL).    Imaging:  Reviewed chest images personally from past 3 days    Scheduled meds:      budesonide-formoterol 2 puff Inhalation BID - RT   enoxaparin 40 mg Subcutaneous Q24H   guaiFENesin 600 mg Oral Q12H   insulin aspart 0-7 Units Subcutaneous 4x Daily AC & at Bedtime   ipratropium-albuterol 3 mL Nebulization Q6H While Awake - RT   levoFLOXacin 750 mg Intravenous Q24H   potassium chloride 20 mEq Oral Daily   predniSONE 40 mg Oral Daily With Breakfast   traZODone 100 mg Oral Nightly   venlafaxine 37.5 mg Oral BID With Meals       ASSESSMENT  /  PLAN:  Pneumonia community-acquired bacterial  Exacerbation of COPD  Leukocytosis  DDD  A. fib RVR  RA  Anxiety  Former tobacco abuse  Acute hypoxic respiratory failure      Her white blood cell count remains elevated she is on steroids.    Procalcitonin is coming down not completely negative at present.  I would continue antibiotics as well as steroids.    Prednisone form she has no wheezing I would continue her duo nebs.   CT scan reveals substantial infiltrate  Continue Levaquin  Prednisone continue  Wean oxygen as tolerated  Follow microbiology  Await MRSA swab  Continue scheduled DuoNebs  Good pulmonary hygiene since from her flutter valve  Discussed with referring physician team appreciate their excellent care for this patient       Oc Ko,  MD [facility]  01/09/20  [id number]

## 2020-01-09 NOTE — PLAN OF CARE
Problem: Patient Care Overview  Goal: Individualization and Mutuality  Outcome: Ongoing (interventions implemented as appropriate)  Flowsheets  Taken 1/8/2020 0613 by Deandra Arriaza RN  Patient Specific Goals (Include Timeframe): pain control, improve breathing  Taken 1/8/2020 1613 by Azucena Pires RN  Patient Specific Interventions: cough medication, iv antibiotics     Problem: Patient Care Overview  Goal: Plan of Care Review  Flowsheets (Taken 1/9/2020 0627)  Progress: improving  Plan of Care Reviewed With: patient  Outcome Summary: VS see measures, Labs posted, WBC trending down. Pt reports pain control improved with tessalon pearls and cough syrup, see emar pt's use of prn pain meds. Pt had 02 sleep study overnight, see RT notes.

## 2020-01-09 NOTE — PROGRESS NOTES
"SERVICE: [facility] HOSPITALIST    CONSULTANTS: Pulmonary    CHIEF COMPLAINT: Follow-up right upper lobe pneumonia    SUBJECTIVE: The patient reports she feels that she has improved but had episode this morning when ambulating to restroom where she was unable to catch her breath, oxygen requirement went up, and she became quite anxious with the amount of dyspnea.  This has resolved however patient is concerned about returning home too soon.  She notes she produced sputum that was sent off yesterday.  She notes rib pain is somewhat improved with new medication    OBJECTIVE:    /82 (BP Location: Left arm, Patient Position: Lying)   Pulse 95   Temp 98.6 °F (37 °C) (Oral)   Resp 20   Ht 170.2 cm (67\")   Wt 79.6 kg (175 lb 6.4 oz)   SpO2 (!) 89%   BMI 27.47 kg/m²     MEDS/LABS REVIEWED AND ORDERED    budesonide-formoterol 2 puff Inhalation BID - RT   enoxaparin 40 mg Subcutaneous Q24H   guaiFENesin 600 mg Oral Q12H   HYDROcodone-homatropine 5 mL Oral Q4H   insulin aspart 0-7 Units Subcutaneous 4x Daily AC & at Bedtime   ipratropium-albuterol 3 mL Nebulization Q6H While Awake - RT   levoFLOXacin 750 mg Oral Q24H   potassium chloride 20 mEq Oral Daily   predniSONE 40 mg Oral Daily With Breakfast   traZODone 100 mg Oral Nightly   venlafaxine 37.5 mg Oral BID With Meals     Physical Exam   Constitutional: She is oriented to person, place, and time. She appears well-nourished.   Appears older than stated age   HENT:   Head: Normocephalic and atraumatic.   Eyes: Pupils are equal, round, and reactive to light. EOM are normal.   Cardiovascular: Normal rate and regular rhythm.   Pulmonary/Chest: Effort normal.   Right upper lobe, left lower lobe crackles, diminished right lower lobe  Improved air movement but remains diminished   Abdominal: Soft. Bowel sounds are normal. She exhibits no distension. There is no tenderness. There is no guarding.   Musculoskeletal: She exhibits no edema. "   Neurological: She is alert and oriented to person, place, and time.   Skin: Skin is warm and dry. No erythema.   Extensive tattooing of skin surfaces   Psychiatric: She has a normal mood and affect. Her behavior is normal.   Vitals reviewed.    LAB/DIAGNOSTICS:    Lab Results (last 24 hours)     Procedure Component Value Units Date/Time    MRSA Screen Culture - Swab, Nares [[id number]]  (Normal) Collected:  01/08/20 1438    Specimen:  Swab from Nares Updated:  01/09/20 1137     MRSA SCREEN CX No Methicillin Resistant Staphylococcus aureus isolated    Respiratory Culture - Sputum, Cough [[id number]] Collected:  01/08/20 1313    Specimen:  Sputum from Cough Updated:  01/09/20 1015     Respiratory Culture Rare Normal Respiratory Ivy     Gram Stain Moderate (3+) WBCs seen      Rare (1+) Epithelial cells seen      Few (2+) Gram positive cocci in pairs, chains and clusters    POC Glucose Once [[id number]]  (Abnormal) Collected:  01/09/20 0727    Specimen:  Blood Updated:  01/09/20 0740     Glucose 154 mg/dL     Procalcitonin [[id number]]  (Abnormal) Collected:  01/09/20 0315    Specimen:  Blood Updated:  01/09/20 0646     Procalcitonin 0.57 ng/mL     Narrative:       Results may be falsely decreased if patient taking Biotin.     Basic Metabolic Panel [[id number]]  (Abnormal) Collected:  01/09/20 0315    Specimen:  Blood Updated:  01/09/20 0615     Glucose 121 mg/dL      BUN 21 mg/dL      Creatinine 0.77 mg/dL      Sodium 144 mmol/L      Potassium 4.6 mmol/L      Chloride 111 mmol/L      CO2 20.7 mmol/L      Calcium 9.3 mg/dL      eGFR Non African Amer 77 mL/min/1.73      BUN/Creatinine Ratio 27.3     Anion Gap 12.3 mmol/L     Narrative:       GFR Normal >60  Chronic Kidney Disease <60  Kidney Failure <15      CBC & Differential [[id number]] Collected:  01/09/20 0315    Specimen:  Blood Updated:  01/09/20 0552    Narrative:       The following orders were created for panel order CBC & Differential.  Procedure                                Abnormality         Status                     ---------                               -----------         ------                     CBC Auto Differential[[id number]]        Abnormal            Final result                 Please view results for these tests on the individual orders.    CBC Auto Differential [[id number]]  (Abnormal) Collected:  01/09/20 0315    Specimen:  Blood Updated:  01/09/20 0552     WBC 20.56 10*3/mm3      RBC 3.58 10*6/mm3      Hemoglobin 10.6 g/dL      Hematocrit 33.8 %      MCV 94.4 fL      MCH 29.6 pg      MCHC 31.4 g/dL      RDW 15.4 %      RDW-SD 53.5 fl      MPV 10.1 fL      Platelets 409 10*3/mm3      Neutrophil % 87.3 %      Lymphocyte % 4.1 %      Monocyte % 4.8 %      Eosinophil % 0.0 %      Basophil % 0.4 %      Immature Grans % 3.4 %      Neutrophils, Absolute 17.94 10*3/mm3      Lymphocytes, Absolute 0.85 10*3/mm3      Monocytes, Absolute 0.99 10*3/mm3      Eosinophils, Absolute 0.00 10*3/mm3      Basophils, Absolute 0.08 10*3/mm3      Immature Grans, Absolute 0.70 10*3/mm3      nRBC 0.0 /100 WBC     POC Glucose Once [[id number]]  (Abnormal) Collected:  01/08/20 2032    Specimen:  Blood Updated:  01/08/20 2040     Glucose 218 mg/dL     Blood Culture - Blood, Wrist, Left [[id number]] Collected:  01/06/20 1723    Specimen:  Blood from Wrist, Left Updated:  01/08/20 1745     Blood Culture No growth at 2 days    POC Glucose Once [[id number]]  (Abnormal) Collected:  01/08/20 1730    Specimen:  Blood Updated:  01/08/20 1736     Glucose 170 mg/dL     Blood Culture - Blood, Arm, Right [[id number]] Collected:  01/06/20 1709    Specimen:  Blood from Arm, Right Updated:  01/08/20 1730     Blood Culture No growth at 2 days    POC Glucose Once [[id number]]  (Abnormal) Collected:  01/08/20 1251    Specimen:  Blood Updated:  01/08/20 1257     Glucose 239 mg/dL     Hemoglobin A1c [[id number]]  (Normal) Collected:  01/08/20 0849    Specimen:  Blood Updated:  01/08/20 1249     Hemoglobin  A1C 5.00 %     Narrative:       Hemoglobin A1C Ranges:    Increased Risk for Diabetes  5.7% to 6.4%  Diabetes                     >= 6.5%  Diabetic Goal                < 7.0%    TSPOT [[id number]] Collected:  01/06/20 2045    Specimen:  Blood Updated:  01/08/20 1203     TSPOTTB Negative     T-SPOT Panel A 0     T-SPOT Panel B 0     TSPOT NIL CONTROL INTERP Passed     TSPOT POS CONTROL INTERP Passed        ECG 12 Lead   Final Result   HEART RATE= 99  bpm   RR Interval= 604  ms   PA Interval= 146  ms   P Horizontal Axis= 10  deg   P Front Axis= 56  deg   QRSD Interval= 101  ms   QT Interval= 371  ms   QRS Axis= 43  deg   T Wave Axis= 56  deg   - NORMAL ECG -   Sinus rhythm - new   Electronically Signed By: Navneet Ayon ([facility]) 07-Jan-2020 20:42:32   Date and Time of Study: 2020-01-06 17:10:29        Results for orders placed during the hospital encounter of 11/06/19   Adult Transthoracic Echo Complete W/ Cont if Necessary Per Protocol    Narrative · Left ventricular systolic function is normal.  · Mild-to-moderate mitral valve regurgitation is present  · Mild tricuspid valve regurgitation is present.  · Left atrial cavity size is moderately dilated.  · Calculated EF = 50.0%.  · The valve appears trileaflet. The aortic valve is abnormal in structure.   There is calcification of the aortic valve.Mild aortic valve regurgitation   is present. Moderate aortic valve stenosis is present. On the short-axis   images, the aortic valve is opening well. Concern that the gradient noted   at the aortic valve may be due to a subaortic membrane, images not clear   enough to assess this.        Ct Chest Without Contrast    Result Date: 1/7/2020  Extensive airspace disease and consolidation right upper lobe compatible with lobar pneumonia which appears superimposed on background diffuse lung disease with emphysema and fibrosis. Patchy interstitial infiltrate midportion left lower lobe could represent background fibrosis but interstitial  "infiltrate not excluded. No consolidation. Signer Name: BERLIN Rojas MD  Signed: 1/7/2020 5:49 PM  Workstation Name: [facility]  [facility]    ASSESSMENT/PLAN:  Sepsis 2/2 right upper lobe pneumonia:  AHR F:  AECOPD/bullous emphysema:  Right side pleuritic chest pain:  Steroid-induced leukocytosis:  Recent RSV infection, AE COPD  HAG MA: Pulmonary following  Sputum culture pending, MRSA swab/RVP/Legionella/strep pneumo negative  Schedule Hycodan  Procalcitonin and WBC C trending down  IV Solu-Medrol now changed to oral prednisone  Continue Levaquin  Continue Acapella, I-S, Symbicort, duo nebs and wean oxygen as tolerated  Overnight oximetry showed need for 2 L with sleep  Walking oximetry possibly tomorrow if consideration for discharge  If no discharge will need repeat overnight as well     Steroid-induced hyperglycemia: A1c 5.00%  Continue low-dose sliding scale insulin with Accu-Cheks     Normocytic anemia: Hemoglobin 10.6  No active blood loss noted, check anemia panels    RA: No DMARDS, no acute issues currently     Elevated alkaline phosphatase: Unclear significance, no abdominal complaints     Electrolyte imbalance: Continue replacement protocols, continue daily potassium supplement     DDD with chronic pain: Continues pain therapies, see above     History of A. fib RVR: Nothing acute currently, NSR     Anxiety:  H/O possible seizure:  No current acute issues on Effexor and trazodone home doses     Right hip pain 2/2 ground-level fall: POA, no acute findings on x-ray    PLAN FOR DISPOSITION: Home when able    TOYA Camejo  Hospitalist, [facility]  01/09/20  9:21 AM    \"Dictated utilizing Dragon dictation\"    "

## 2020-01-09 NOTE — PLAN OF CARE
Problem: Pneumonia (Adult)  Goal: Signs and Symptoms of Listed Potential Problems Will be Absent, Minimized or Managed (Pneumonia)  Outcome: Ongoing (interventions implemented as appropriate)     Problem: Patient Care Overview  Goal: Individualization and Mutuality  Outcome: Ongoing (interventions implemented as appropriate)       Pt feels some shortness of breath on walking. Overnight POX in progress O2 at 1lpm due to sats 84-85%

## 2020-01-10 LAB
ANION GAP SERPL CALCULATED.3IONS-SCNC: 9.2 MMOL/L (ref 5–15)
BACTERIA SPEC RESP CULT: NORMAL
BASOPHILS # BLD AUTO: 0.02 10*3/MM3 (ref 0–0.2)
BASOPHILS NFR BLD AUTO: 0.1 % (ref 0–1.5)
BUN BLD-MCNC: 22 MG/DL (ref 6–20)
BUN/CREAT SERPL: 26.5 (ref 7–25)
CALCIUM SPEC-SCNC: 9 MG/DL (ref 8.6–10.5)
CHLORIDE SERPL-SCNC: 109 MMOL/L (ref 98–107)
CO2 SERPL-SCNC: 22.8 MMOL/L (ref 22–29)
CREAT BLD-MCNC: 0.83 MG/DL (ref 0.57–1)
DEPRECATED RDW RBC AUTO: 54.8 FL (ref 37–54)
EOSINOPHIL # BLD AUTO: 0.01 10*3/MM3 (ref 0–0.4)
EOSINOPHIL NFR BLD AUTO: 0.1 % (ref 0.3–6.2)
ERYTHROCYTE [DISTWIDTH] IN BLOOD BY AUTOMATED COUNT: 15.8 % (ref 12.3–15.4)
GFR SERPL CREATININE-BSD FRML MDRD: 71 ML/MIN/1.73
GLUCOSE BLD-MCNC: 102 MG/DL (ref 65–99)
GLUCOSE BLDC GLUCOMTR-MCNC: 107 MG/DL (ref 70–130)
GLUCOSE BLDC GLUCOMTR-MCNC: 127 MG/DL (ref 70–130)
GLUCOSE BLDC GLUCOMTR-MCNC: 153 MG/DL (ref 70–130)
GLUCOSE BLDC GLUCOMTR-MCNC: 88 MG/DL (ref 70–130)
GRAM STN SPEC: NORMAL
HCT VFR BLD AUTO: 34.3 % (ref 34–46.6)
HGB BLD-MCNC: 10.6 G/DL (ref 12–15.9)
IMM GRANULOCYTES # BLD AUTO: 1.84 10*3/MM3 (ref 0–0.05)
IMM GRANULOCYTES NFR BLD AUTO: 10 % (ref 0–0.5)
LYMPHOCYTES # BLD AUTO: 1.87 10*3/MM3 (ref 0.7–3.1)
LYMPHOCYTES NFR BLD AUTO: 10.1 % (ref 19.6–45.3)
MCH RBC QN AUTO: 29.2 PG (ref 26.6–33)
MCHC RBC AUTO-ENTMCNC: 30.9 G/DL (ref 31.5–35.7)
MCV RBC AUTO: 94.5 FL (ref 79–97)
MONOCYTES # BLD AUTO: 1.79 10*3/MM3 (ref 0.1–0.9)
MONOCYTES NFR BLD AUTO: 9.7 % (ref 5–12)
NEUTROPHILS # BLD AUTO: 12.91 10*3/MM3 (ref 1.7–7)
NEUTROPHILS NFR BLD AUTO: 70 % (ref 42.7–76)
NRBC BLD AUTO-RTO: 0.1 /100 WBC (ref 0–0.2)
PLATELET # BLD AUTO: 447 10*3/MM3 (ref 140–450)
PMV BLD AUTO: 9.5 FL (ref 6–12)
POTASSIUM BLD-SCNC: 4.2 MMOL/L (ref 3.5–5.2)
RBC # BLD AUTO: 3.63 10*6/MM3 (ref 3.77–5.28)
SODIUM BLD-SCNC: 141 MMOL/L (ref 136–145)
WBC NRBC COR # BLD: 18.44 10*3/MM3 (ref 3.4–10.8)

## 2020-01-10 PROCEDURE — 85025 COMPLETE CBC W/AUTO DIFF WBC: CPT | Performed by: NURSE PRACTITIONER

## 2020-01-10 PROCEDURE — 93010 ELECTROCARDIOGRAM REPORT: CPT | Performed by: INTERNAL MEDICINE

## 2020-01-10 PROCEDURE — 63710000001 PREDNISONE PER 1 MG: Performed by: INTERNAL MEDICINE

## 2020-01-10 PROCEDURE — 25010000002 KETOROLAC TROMETHAMINE PER 15 MG: Performed by: INTERNAL MEDICINE

## 2020-01-10 PROCEDURE — 80048 BASIC METABOLIC PNL TOTAL CA: CPT | Performed by: NURSE PRACTITIONER

## 2020-01-10 PROCEDURE — 94799 UNLISTED PULMONARY SVC/PX: CPT

## 2020-01-10 PROCEDURE — 63710000001 INSULIN ASPART PER 5 UNITS: Performed by: NURSE PRACTITIONER

## 2020-01-10 PROCEDURE — 25010000002 ENOXAPARIN PER 10 MG: Performed by: INTERNAL MEDICINE

## 2020-01-10 PROCEDURE — 99232 SBSQ HOSP IP/OBS MODERATE 35: CPT | Performed by: NURSE PRACTITIONER

## 2020-01-10 PROCEDURE — 93005 ELECTROCARDIOGRAM TRACING: CPT | Performed by: INTERNAL MEDICINE

## 2020-01-10 PROCEDURE — 82962 GLUCOSE BLOOD TEST: CPT

## 2020-01-10 RX ORDER — FOLIC ACID 1 MG/1
1 TABLET ORAL DAILY
Status: DISCONTINUED | OUTPATIENT
Start: 2020-01-10 | End: 2020-01-13 | Stop reason: HOSPADM

## 2020-01-10 RX ORDER — LANOLIN ALCOHOL/MO/W.PET/CERES
1000 CREAM (GRAM) TOPICAL DAILY
Status: DISCONTINUED | OUTPATIENT
Start: 2020-01-10 | End: 2020-01-13 | Stop reason: HOSPADM

## 2020-01-10 RX ADMIN — VENLAFAXINE HYDROCHLORIDE 37.5 MG: 37.5 TABLET ORAL at 08:14

## 2020-01-10 RX ADMIN — BUDESONIDE AND FORMOTEROL FUMARATE DIHYDRATE 2 PUFF: 160; 4.5 AEROSOL RESPIRATORY (INHALATION) at 19:43

## 2020-01-10 RX ADMIN — FOLIC ACID 1 MG: 1 TABLET ORAL at 17:18

## 2020-01-10 RX ADMIN — GUAIFENESIN 600 MG: 600 TABLET, EXTENDED RELEASE ORAL at 08:13

## 2020-01-10 RX ADMIN — POTASSIUM CHLORIDE 20 MEQ: 20 TABLET, EXTENDED RELEASE ORAL at 08:13

## 2020-01-10 RX ADMIN — PREDNISONE 40 MG: 20 TABLET ORAL at 08:13

## 2020-01-10 RX ADMIN — KETOROLAC TROMETHAMINE 30 MG: 30 INJECTION, SOLUTION INTRAMUSCULAR at 06:18

## 2020-01-10 RX ADMIN — GUAIFENESIN 600 MG: 600 TABLET, EXTENDED RELEASE ORAL at 20:48

## 2020-01-10 RX ADMIN — IPRATROPIUM BROMIDE AND ALBUTEROL SULFATE 3 ML: .5; 3 SOLUTION RESPIRATORY (INHALATION) at 19:41

## 2020-01-10 RX ADMIN — IPRATROPIUM BROMIDE AND ALBUTEROL SULFATE 3 ML: .5; 3 SOLUTION RESPIRATORY (INHALATION) at 12:56

## 2020-01-10 RX ADMIN — KETOROLAC TROMETHAMINE 30 MG: 30 INJECTION, SOLUTION INTRAMUSCULAR at 00:00

## 2020-01-10 RX ADMIN — TRAZODONE HYDROCHLORIDE 100 MG: 50 TABLET ORAL at 20:47

## 2020-01-10 RX ADMIN — VENLAFAXINE HYDROCHLORIDE 37.5 MG: 37.5 TABLET ORAL at 17:18

## 2020-01-10 RX ADMIN — HYDROCODONE BITARTRATE AND HOMATROPINE METHYLBROMIDE 5 ML: 5; 1.5 SOLUTION ORAL at 00:00

## 2020-01-10 RX ADMIN — HYDROCODONE BITARTRATE AND HOMATROPINE METHYLBROMIDE 5 ML: 5; 1.5 SOLUTION ORAL at 14:34

## 2020-01-10 RX ADMIN — BUDESONIDE AND FORMOTEROL FUMARATE DIHYDRATE 2 PUFF: 160; 4.5 AEROSOL RESPIRATORY (INHALATION) at 07:26

## 2020-01-10 RX ADMIN — IPRATROPIUM BROMIDE AND ALBUTEROL SULFATE 3 ML: .5; 3 SOLUTION RESPIRATORY (INHALATION) at 07:19

## 2020-01-10 RX ADMIN — CYANOCOBALAMIN TAB 1000 MCG 1000 MCG: 1000 TAB at 17:17

## 2020-01-10 RX ADMIN — ENOXAPARIN SODIUM 40 MG: 40 INJECTION SUBCUTANEOUS at 17:17

## 2020-01-10 RX ADMIN — BENZONATATE 200 MG: 100 CAPSULE ORAL at 08:13

## 2020-01-10 RX ADMIN — HYDROCODONE BITARTRATE AND HOMATROPINE METHYLBROMIDE 5 ML: 5; 1.5 SOLUTION ORAL at 03:20

## 2020-01-10 RX ADMIN — BENZONATATE 200 MG: 100 CAPSULE ORAL at 23:20

## 2020-01-10 RX ADMIN — HYDROCODONE BITARTRATE AND HOMATROPINE METHYLBROMIDE 5 ML: 5; 1.5 SOLUTION ORAL at 23:21

## 2020-01-10 RX ADMIN — INSULIN ASPART 2 UNITS: 100 INJECTION, SOLUTION INTRAVENOUS; SUBCUTANEOUS at 20:48

## 2020-01-10 RX ADMIN — HYDROCODONE BITARTRATE AND HOMATROPINE METHYLBROMIDE 5 ML: 5; 1.5 SOLUTION ORAL at 18:40

## 2020-01-10 RX ADMIN — HYDROCODONE BITARTRATE AND HOMATROPINE METHYLBROMIDE 5 ML: 5; 1.5 SOLUTION ORAL at 06:30

## 2020-01-10 RX ADMIN — BENZONATATE 200 MG: 100 CAPSULE ORAL at 00:00

## 2020-01-10 RX ADMIN — MELATONIN TAB 5 MG 5 MG: 5 TAB at 00:00

## 2020-01-10 RX ADMIN — KETOROLAC TROMETHAMINE 30 MG: 30 INJECTION, SOLUTION INTRAMUSCULAR at 18:40

## 2020-01-10 RX ADMIN — KETOROLAC TROMETHAMINE 30 MG: 30 INJECTION, SOLUTION INTRAMUSCULAR at 12:30

## 2020-01-10 RX ADMIN — LEVOFLOXACIN 750 MG: 750 TABLET, FILM COATED ORAL at 18:40

## 2020-01-10 RX ADMIN — HYDROCODONE BITARTRATE AND HOMATROPINE METHYLBROMIDE 5 ML: 5; 1.5 SOLUTION ORAL at 11:37

## 2020-01-10 NOTE — PROGRESS NOTES
"SERVICE: [facility] HOSPITALIST    CONSULTANTS:    CHIEF COMPLAINT:    SUBJECTIVE: Patient reports she continues to have extreme shortness of air anytime she ambulates even to the restroom.  She notes feeling that she is wheezing, continues with productive cough.  She otherwise denies f/c/chest pain/n/v/d/abdominal pain or other new concerns.    OBJECTIVE:    /80 (BP Location: Left arm, Patient Position: Lying)   Pulse 83   Temp 97.5 °F (36.4 °C) (Oral)   Resp 20   Ht 170.2 cm (67\")   Wt 79.6 kg (175 lb 6.4 oz)   SpO2 90%   BMI 27.47 kg/m²     MEDS/LABS REVIEWED AND ORDERED    budesonide-formoterol 2 puff Inhalation BID - RT   enoxaparin 40 mg Subcutaneous Q24H   folic acid (FOLVITE) IVPB 1 mg Intravenous Daily   guaiFENesin 600 mg Oral Q12H   HYDROcodone-homatropine 5 mL Oral Q4H   insulin aspart 0-7 Units Subcutaneous 4x Daily AC & at Bedtime   ipratropium-albuterol 3 mL Nebulization Q6H While Awake - RT   levoFLOXacin 750 mg Oral Q24H   potassium chloride 20 mEq Oral Daily   predniSONE 40 mg Oral Daily With Breakfast   traZODone 100 mg Oral Nightly   venlafaxine 37.5 mg Oral BID With Meals   cyanocobalamin 1,000 mcg Oral Daily     Physical Exam   Constitutional: She is oriented to person, place, and time. She appears well-nourished.   HENT:   Head: Normocephalic and atraumatic.   Eyes: Pupils are equal, round, and reactive to light. EOM are normal.   Cardiovascular: Normal rate and regular rhythm.   Pulmonary/Chest:   Diminished throughout, crackles RUL/LLL, occasional expiratory wheezes   Abdominal: Soft. Bowel sounds are normal. She exhibits no distension. There is no tenderness. There is no guarding.   Musculoskeletal: She exhibits no edema.   Neurological: She is alert and oriented to person, place, and time.   Skin: Skin is warm and dry. No erythema.   Extensive tattooing of skin   Psychiatric: She has a normal mood and affect.   Vitals reviewed.    LAB/DIAGNOSTICS:    Lab " Results (last 24 hours)     Procedure Component Value Units Date/Time    POC Glucose Once [[id number]]  (Normal) Collected:  01/10/20 1144    Specimen:  Blood Updated:  01/10/20 1205     Glucose 107 mg/dL     Respiratory Culture - Sputum, Cough [[id number]] Collected:  01/08/20 1313    Specimen:  Sputum from Cough Updated:  01/10/20 1008     Respiratory Culture Light growth (2+) Normal Respiratory Ivy     Gram Stain Moderate (3+) WBCs seen      Rare (1+) Epithelial cells seen      Few (2+) Gram positive cocci in pairs, chains and clusters    POC Glucose Once [[id number]]  (Normal) Collected:  01/10/20 0716    Specimen:  Blood Updated:  01/10/20 0725     Glucose 88 mg/dL     Basic Metabolic Panel [[id number]]  (Abnormal) Collected:  01/10/20 0319    Specimen:  Blood Updated:  01/10/20 0426     Glucose 102 mg/dL      BUN 22 mg/dL      Creatinine 0.83 mg/dL      Sodium 141 mmol/L      Potassium 4.2 mmol/L      Chloride 109 mmol/L      CO2 22.8 mmol/L      Calcium 9.0 mg/dL      eGFR Non African Amer 71 mL/min/1.73      BUN/Creatinine Ratio 26.5     Anion Gap 9.2 mmol/L     Narrative:       GFR Normal >60  Chronic Kidney Disease <60  Kidney Failure <15      CBC & Differential [[id number]] Collected:  01/10/20 0401    Specimen:  Blood Updated:  01/10/20 0406    Narrative:       The following orders were created for panel order CBC & Differential.  Procedure                               Abnormality         Status                     ---------                               -----------         ------                     CBC Auto Differential[[id number]]        Abnormal            Final result                 Please view results for these tests on the individual orders.    CBC Auto Differential [[id number]]  (Abnormal) Collected:  01/10/20 0401    Specimen:  Blood Updated:  01/10/20 0406     WBC 18.44 10*3/mm3      RBC 3.63 10*6/mm3      Hemoglobin 10.6 g/dL      Hematocrit 34.3 %      MCV 94.5 fL      MCH 29.2 pg      MCHC 30.9  g/dL      RDW 15.8 %      RDW-SD 54.8 fl      MPV 9.5 fL      Platelets 447 10*3/mm3      Neutrophil % 70.0 %      Lymphocyte % 10.1 %      Monocyte % 9.7 %      Eosinophil % 0.1 %      Basophil % 0.1 %      Immature Grans % 10.0 %      Neutrophils, Absolute 12.91 10*3/mm3      Lymphocytes, Absolute 1.87 10*3/mm3      Monocytes, Absolute 1.79 10*3/mm3      Eosinophils, Absolute 0.01 10*3/mm3      Basophils, Absolute 0.02 10*3/mm3      Immature Grans, Absolute 1.84 10*3/mm3      nRBC 0.1 /100 WBC     POC Glucose Once [[id number]]  (Abnormal) Collected:  01/09/20 2020    Specimen:  Blood Updated:  01/09/20 2026     Glucose 169 mg/dL     MRSA Screen Culture - Swab, Nares [[id number]]  (Normal) Collected:  01/08/20 1438    Specimen:  Swab from Nares Updated:  01/09/20 1838     MRSA SCREEN CX No Methicillin Resistant Staphylococcus aureus isolated    Blood Culture - Blood, Wrist, Left [[id number]] Collected:  01/06/20 1723    Specimen:  Blood from Wrist, Left Updated:  01/09/20 1745     Blood Culture No growth at 3 days    Blood Culture - Blood, Arm, Right [[id number]] Collected:  01/06/20 1709    Specimen:  Blood from Arm, Right Updated:  01/09/20 1730     Blood Culture No growth at 3 days    POC Glucose Once [[id number]]  (Normal) Collected:  01/09/20 1655    Specimen:  Blood Updated:  01/09/20 1707     Glucose 124 mg/dL     Folate [[id number]]  (Abnormal) Collected:  01/09/20 0315    Specimen:  Blood Updated:  01/09/20 1639     Folate 3.26 ng/mL     Narrative:       Results may be falsely increased if patient taking Biotin.      Vitamin B12 [[id number]]  (Normal) Collected:  01/09/20 0315    Specimen:  Blood Updated:  01/09/20 1639     Vitamin B-12 317 pg/mL     Narrative:       Results may be falsely increased if patient taking Biotin.          ECG 12 Lead   Preliminary Result   HEART RATE= 85  bpm   RR Interval= 704  ms   KS Interval= 155  ms   P Horizontal Axis= -15  deg   P Front Axis= 45  deg   QRSD Interval= 84  ms    QT Interval= 406  ms   QRS Axis= 55  deg   T Wave Axis= 78  deg   - ABNORMAL ECG -   Sinus rhythm   Abnrm T, consider ischemia, anterolateral lds   Electronically Signed By:    Date and Time of Study: 2020-01-10 11:05:46      ECG 12 Lead   Final Result   HEART RATE= 99  bpm   RR Interval= 604  ms   NC Interval= 146  ms   P Horizontal Axis= 10  deg   P Front Axis= 56  deg   QRSD Interval= 101  ms   QT Interval= 371  ms   QRS Axis= 43  deg   T Wave Axis= 56  deg   - NORMAL ECG -   Sinus rhythm - new   Electronically Signed By: Navneet Ayon ([facility]) 07-Jan-2020 20:42:32   Date and Time of Study: 2020-01-06 17:10:29        Results for orders placed during the hospital encounter of 11/06/19   Adult Transthoracic Echo Complete W/ Cont if Necessary Per Protocol    Narrative · Left ventricular systolic function is normal.  · Mild-to-moderate mitral valve regurgitation is present  · Mild tricuspid valve regurgitation is present.  · Left atrial cavity size is moderately dilated.  · Calculated EF = 50.0%.  · The valve appears trileaflet. The aortic valve is abnormal in structure.   There is calcification of the aortic valve.Mild aortic valve regurgitation   is present. Moderate aortic valve stenosis is present. On the short-axis   images, the aortic valve is opening well. Concern that the gradient noted   at the aortic valve may be due to a subaortic membrane, images not clear   enough to assess this.        No radiology results for the last day    ASSESSMENT/PLAN:  Sepsis 2/2 right upper lobe pneumonia:  AHR F:  AECOPD/bullous emphysema:  Right side pleuritic chest pain:  Steroid-induced leukocytosis:  Recent RSV infection, AE COPD  HAG MA: Pulmonary following  Sputum culture pending, MRSA swab/RVP/Legionella/strep pneumo/TSPOT negative  Continue scheduled Hycodan  Procalcitonin and WBC C trending down  Continues Levaquin, prednisone, Acapella, I-S, Symbicort, duo nebs and wean oxygen as tolerated  Re-check overnight oximetry,  "will need walk ox prior to d/c in next couple of days  Monitor      Steroid-induced hyperglycemia: A1c 5.00%  Continue low-dose sliding scale insulin with Accu-Cheks     Normocytic anemia, folic acid deficiency, B12 insufficiency:   Hemoglobin stable at 10.6  No active blood loss noted  Add daily B12/folic acid supplement  Consult dietician     RA: No DMARDS, no acute issues currently     Elevated alkaline phosphatase:   Recheck in am, no abdominal concerns     Electrolyte imbalance: Continue replacement protocols, continue daily potassium supplement     DDD with chronic pain: no acute issues on heat/ice as needed     History of A. fib RVR: Nothing acute currently, NSR     Anxiety:  H/O possible seizure:  No current acute issues on Effexor and trazodone home doses     Right hip pain 2/2 ground-level fall: improved, POA, no acute findings on x-ray    PLAN FOR DISPOSITION: home when able    TOYA Camejo  Hospitalist, [facility]  01/10/20  11:38 AM    \"Dictated utilizing Dragon dictation\"    "

## 2020-01-10 NOTE — PROGRESS NOTES
"      Daily Progress Note.   [facility] MED SURG  1/10/2020    Patient:  Name:  Akila Amezcua  MRN:  [id number]  1960  59 y.o.  female         CC: Short of breath    Interval History:  Follow-up for pneumonia COPD acute exacerbation    Short of breath with exertion no hemoptysis no chest pain.  No acute issues overnight.  No high fevers.  ROS: No fever, no diarrhea, no chest pain  PMFSSH: no change    Physical Exam:  /71 (BP Location: Left arm, Patient Position: Lying)   Pulse 84   Temp 98 °F (36.7 °C) (Oral)   Resp 18   Ht 170.2 cm (67\")   Wt 79.6 kg (175 lb 6.4 oz)   SpO2 96%   BMI 27.47 kg/m²   Body mass index is 27.47 kg/m².    Intake/Output Summary (Last 24 hours) at 1/10/2020 0757  Last data filed at 1/10/2020 0500  Gross per 24 hour   Intake 1800 ml   Output --   Net 1800 ml     General appearance: Patient is awake alert she is in no acute distress, conversant   Eyes: anicteric sclerae, moist conjunctivae; no lid-lag; PERRLA  HENT: Atraumatic; oropharynx clear with moist mucous membranes and no mucosal ulcerations; normal hard and soft palate  Neck: Trachea midline; FROM, supple, no thyromegaly or lymphadenopathy  Lungs:  Diminished breath sounds bilaterally with prolonged expiratory phase no expiratory wheeze at present time, with normal respiratory effort and no intercostal retractions  CV: RRR, no MRGs   Abdomen: Soft, non-tender; no masses or HSM  Extremities: No peripheral edema or extremity lymphadenopathy  Skin: Normal temperature, turgor and texture; no rash, ulcers or subcutaneous nodules  Psych: Appropriate affect, alert and oriented to person, place and time     Data Review:  Notable Labs:  Results from last 7 days   Lab Units 01/10/20  0401 01/09/20  0315 01/08/20  0849 01/07/20  0635 01/06/20  1709   WBC 10*3/mm3 18.44* 20.56* 22.87* 19.59* 21.31*   HEMOGLOBIN g/dL 10.6* 10.6* 10.9* 11.0* 12.7   PLATELETS 10*3/mm3 447 409 550 221 301     Results from last 7 days "   Lab Units 01/10/20  0319 01/09/20  0315 01/08/20  0849 01/07/20  0635 01/07/20  0430 01/06/20  1709   SODIUM mmol/L 141 144 140 137 135* 133*   POTASSIUM mmol/L 4.2 4.6 4.4 4.0 4.1 3.4*   CHLORIDE mmol/L 109* 111* 107 103 104 98   CO2 mmol/L 22.8 20.7* 19.5* 20.3* 13.5* 19.8*   BUN mg/dL 22* 21* 18 11 11 14   CREATININE mg/dL 0.83 0.77 0.74 0.66 0.71 0.84   GLUCOSE mg/dL 102* 121* 208* 139* 137* 112*   CALCIUM mg/dL 9.0 9.3 8.9 8.4* 8.5* 9.0   MAGNESIUM mg/dL  --   --   --   --  1.6  --    PHOSPHORUS mg/dL  --   --   --   --  2.8  --    Estimated Creatinine Clearance: 81.2 mL/min (by C-G formula based on SCr of 0.83 mg/dL).    Imaging:  Reviewed chest images personally from past 3 days    Scheduled meds:      budesonide-formoterol 2 puff Inhalation BID - RT   enoxaparin 40 mg Subcutaneous Q24H   guaiFENesin 600 mg Oral Q12H   HYDROcodone-homatropine 5 mL Oral Q4H   insulin aspart 0-7 Units Subcutaneous 4x Daily AC & at Bedtime   ipratropium-albuterol 3 mL Nebulization Q6H While Awake - RT   levoFLOXacin 750 mg Oral Q24H   potassium chloride 20 mEq Oral Daily   predniSONE 40 mg Oral Daily With Breakfast   traZODone 100 mg Oral Nightly   venlafaxine 37.5 mg Oral BID With Meals       ASSESSMENT  /  PLAN:  Pneumonia community-acquired bacterial  Exacerbation of COPD  Leukocytosis  DDD  A. fib RVR  RA  Anxiety  Former tobacco abuse  Acute hypoxic respiratory failure      Her white blood cell count remains elevated she is on steroids.    Procalcitonin is coming down not completely negative at present.  I would continue antibiotics as well as steroids.    Prednisone and oral form she has no wheezing I would continue her duo nebs.   CT scan reveals substantial infiltrate this will need outpatient follow-up at [facility]  Continue Levaquin will complete 10-day course  Prednisone continue suggest wean as an outpatient 40 mg x 3 days 30 mg for 3 days 20 mg for 3 days 10 mg for 3 days  Wean oxygen as  tolerated  Follow microbiology  MRSA swab negative no need to start vancomycin  Continue scheduled DuoNebs  Good pulmonary hygiene since from her flutter valve  Discussed with referring physician team appreciate their excellent care for this patient  Stressed with the patient the importance of follow-up with us in the outpatient setting and continued smoking cessation    Discussed with referring physician team coordinating care this morning.       Oc Ko MD  [facility]  01/10/20  9:44 AM    Addendum 1:    Check ecg for qt interval today.  Oc Ko MD  [facility]  01/10/20  9:46 AM

## 2020-01-10 NOTE — PLAN OF CARE
Problem: Pneumonia (Adult)  Goal: Signs and Symptoms of Listed Potential Problems Will be Absent, Minimized or Managed (Pneumonia)  Outcome: Ongoing (interventions implemented as appropriate)     Pt is aware of disease process. Level of dyspnea somewhat improved

## 2020-01-10 NOTE — PLAN OF CARE
"  Problem: Pain, Chronic (Adult)  Goal: Identify Related Risk Factors and Signs and Symptoms  Outcome: Ongoing (interventions implemented as appropriate)  Flowsheets (Taken 1/10/2020 3648)  Related Risk Factors (Chronic Pain): coping ineffective; disease process; prolonged healing  Signs and Symptoms (Chronic Pain): verbalization of pain/discomfort for a prolonged time period; verbalization of pain descriptors     Problem: Patient Care Overview  Goal: Plan of Care Review  Outcome: Ongoing (interventions implemented as appropriate)  Flowsheets (Taken 1/10/2020 6433)  Progress: improving  Plan of Care Reviewed With: patient  Outcome Summary: Pt VSS overnight, see measures, on o2@2.5L, see RT notes. Pt reports scheduled cough syrup is helping her pain, but she is still requesting the toradol, see emar for prn pain med usage. Pt continues to become anxious with \"coughing spells\", reviewed coping mechanisms, disease process, pt verbalizes understanding. AM labs pending.     "

## 2020-01-10 NOTE — NURSING NOTE
Continued Stay Note  JOHN Hammonds     Patient Name: Akila Amezcua  MRN: [id number]  Today's Date: 1/10/2020    Admit Date: 1/6/2020    Discharge Plan     Row Name 01/10/20 1028       Plan    Plan  d/c home when able.    Patient/Family in Agreement with Plan  yes    Plan Comments  f/u visit with pt in room.  Pt resting in bed with no complaints at this time.  Pt states she has everything she needs at home.  Further questioned about her medications as some of her medications are known to be expencive, pt states she is covered by insurance so that is not a problem.  No further needs noted at this time.  CM will continue to follow.          Discharge Codes    No documentation.             Enoch Tao RN

## 2020-01-11 LAB
ANION GAP SERPL CALCULATED.3IONS-SCNC: 9.4 MMOL/L (ref 5–15)
BACTERIA SPEC AEROBE CULT: NORMAL
BACTERIA SPEC AEROBE CULT: NORMAL
BASOPHILS # BLD AUTO: 0.01 10*3/MM3 (ref 0–0.2)
BASOPHILS NFR BLD AUTO: 0.1 % (ref 0–1.5)
BUN BLD-MCNC: 21 MG/DL (ref 6–20)
BUN/CREAT SERPL: 26.6 (ref 7–25)
CALCIUM SPEC-SCNC: 9.1 MG/DL (ref 8.6–10.5)
CHLORIDE SERPL-SCNC: 106 MMOL/L (ref 98–107)
CO2 SERPL-SCNC: 24.6 MMOL/L (ref 22–29)
CREAT BLD-MCNC: 0.79 MG/DL (ref 0.57–1)
DEPRECATED RDW RBC AUTO: 53.9 FL (ref 37–54)
EOSINOPHIL # BLD AUTO: 0.11 10*3/MM3 (ref 0–0.4)
EOSINOPHIL NFR BLD AUTO: 0.6 % (ref 0.3–6.2)
ERYTHROCYTE [DISTWIDTH] IN BLOOD BY AUTOMATED COUNT: 15.7 % (ref 12.3–15.4)
GFR SERPL CREATININE-BSD FRML MDRD: 75 ML/MIN/1.73
GLUCOSE BLD-MCNC: 103 MG/DL (ref 65–99)
GLUCOSE BLDC GLUCOMTR-MCNC: 126 MG/DL (ref 70–130)
GLUCOSE BLDC GLUCOMTR-MCNC: 132 MG/DL (ref 70–130)
GLUCOSE BLDC GLUCOMTR-MCNC: 141 MG/DL (ref 70–130)
GLUCOSE BLDC GLUCOMTR-MCNC: 78 MG/DL (ref 70–130)
HCT VFR BLD AUTO: 34.1 % (ref 34–46.6)
HGB BLD-MCNC: 10.6 G/DL (ref 12–15.9)
IMM GRANULOCYTES # BLD AUTO: 3.11 10*3/MM3 (ref 0–0.05)
IMM GRANULOCYTES NFR BLD AUTO: 17.1 % (ref 0–0.5)
LYMPHOCYTES # BLD AUTO: 2.24 10*3/MM3 (ref 0.7–3.1)
LYMPHOCYTES # BLD MANUAL: 2 10*3/MM3 (ref 0.7–3.1)
LYMPHOCYTES NFR BLD AUTO: 12.3 % (ref 19.6–45.3)
LYMPHOCYTES NFR BLD MANUAL: 11 % (ref 19.6–45.3)
LYMPHOCYTES NFR BLD MANUAL: 5 % (ref 5–12)
MCH RBC QN AUTO: 29.2 PG (ref 26.6–33)
MCHC RBC AUTO-ENTMCNC: 31.1 G/DL (ref 31.5–35.7)
MCV RBC AUTO: 93.9 FL (ref 79–97)
METAMYELOCYTES NFR BLD MANUAL: 2 % (ref 0–0)
MONOCYTES # BLD AUTO: 0.91 10*3/MM3 (ref 0.1–0.9)
MONOCYTES # BLD AUTO: 1.92 10*3/MM3 (ref 0.1–0.9)
MONOCYTES NFR BLD AUTO: 10.6 % (ref 5–12)
MYELOCYTES NFR BLD MANUAL: 2 % (ref 0–0)
NEUTROPHILS # BLD AUTO: 10.75 10*3/MM3 (ref 1.7–7)
NEUTROPHILS # BLD AUTO: 14.15 10*3/MM3 (ref 1.7–7)
NEUTROPHILS NFR BLD AUTO: 59.3 % (ref 42.7–76)
NEUTROPHILS NFR BLD MANUAL: 77 % (ref 42.7–76)
NEUTS BAND NFR BLD MANUAL: 1 % (ref 0–5)
NRBC BLD AUTO-RTO: 0.3 /100 WBC (ref 0–0.2)
PLAT MORPH BLD: NORMAL
PLATELET # BLD AUTO: 455 10*3/MM3 (ref 140–450)
PMV BLD AUTO: 9.1 FL (ref 6–12)
POLYCHROMASIA BLD QL SMEAR: ABNORMAL
POTASSIUM BLD-SCNC: 4.6 MMOL/L (ref 3.5–5.2)
PROCALCITONIN SERPL-MCNC: 0.15 NG/ML (ref 0.1–0.25)
RBC # BLD AUTO: 3.63 10*6/MM3 (ref 3.77–5.28)
SODIUM BLD-SCNC: 140 MMOL/L (ref 136–145)
TROPONIN T SERPL-MCNC: <0.01 NG/ML (ref 0–0.03)
VARIANT LYMPHS NFR BLD MANUAL: 2 % (ref 0–5)
WBC MORPH BLD: NORMAL
WBC NRBC COR # BLD: 18.14 10*3/MM3 (ref 3.4–10.8)

## 2020-01-11 PROCEDURE — 25010000002 KETOROLAC TROMETHAMINE PER 15 MG: Performed by: INTERNAL MEDICINE

## 2020-01-11 PROCEDURE — 80048 BASIC METABOLIC PNL TOTAL CA: CPT | Performed by: NURSE PRACTITIONER

## 2020-01-11 PROCEDURE — 85007 BL SMEAR W/DIFF WBC COUNT: CPT | Performed by: NURSE PRACTITIONER

## 2020-01-11 PROCEDURE — 85025 COMPLETE CBC W/AUTO DIFF WBC: CPT | Performed by: NURSE PRACTITIONER

## 2020-01-11 PROCEDURE — 82962 GLUCOSE BLOOD TEST: CPT

## 2020-01-11 PROCEDURE — 94799 UNLISTED PULMONARY SVC/PX: CPT

## 2020-01-11 PROCEDURE — 25010000002 ENOXAPARIN PER 10 MG: Performed by: INTERNAL MEDICINE

## 2020-01-11 PROCEDURE — 84145 PROCALCITONIN (PCT): CPT | Performed by: NURSE PRACTITIONER

## 2020-01-11 PROCEDURE — 99233 SBSQ HOSP IP/OBS HIGH 50: CPT | Performed by: HOSPITALIST

## 2020-01-11 PROCEDURE — 84484 ASSAY OF TROPONIN QUANT: CPT | Performed by: HOSPITALIST

## 2020-01-11 PROCEDURE — 63710000001 PREDNISONE PER 1 MG: Performed by: INTERNAL MEDICINE

## 2020-01-11 RX ADMIN — FOLIC ACID 1 MG: 1 TABLET ORAL at 08:34

## 2020-01-11 RX ADMIN — CYANOCOBALAMIN TAB 1000 MCG 1000 MCG: 1000 TAB at 08:34

## 2020-01-11 RX ADMIN — KETOROLAC TROMETHAMINE 30 MG: 30 INJECTION, SOLUTION INTRAMUSCULAR at 13:24

## 2020-01-11 RX ADMIN — IPRATROPIUM BROMIDE AND ALBUTEROL SULFATE 3 ML: .5; 3 SOLUTION RESPIRATORY (INHALATION) at 13:28

## 2020-01-11 RX ADMIN — KETOROLAC TROMETHAMINE 30 MG: 30 INJECTION, SOLUTION INTRAMUSCULAR at 06:51

## 2020-01-11 RX ADMIN — HYDROCODONE BITARTRATE AND HOMATROPINE METHYLBROMIDE 5 ML: 5; 1.5 SOLUTION ORAL at 16:08

## 2020-01-11 RX ADMIN — GUAIFENESIN 600 MG: 600 TABLET, EXTENDED RELEASE ORAL at 08:33

## 2020-01-11 RX ADMIN — IPRATROPIUM BROMIDE AND ALBUTEROL SULFATE 3 ML: .5; 3 SOLUTION RESPIRATORY (INHALATION) at 19:47

## 2020-01-11 RX ADMIN — POTASSIUM CHLORIDE 20 MEQ: 20 TABLET, EXTENDED RELEASE ORAL at 08:33

## 2020-01-11 RX ADMIN — BUDESONIDE AND FORMOTEROL FUMARATE DIHYDRATE 2 PUFF: 160; 4.5 AEROSOL RESPIRATORY (INHALATION) at 07:02

## 2020-01-11 RX ADMIN — HYDROCODONE BITARTRATE AND HOMATROPINE METHYLBROMIDE 5 ML: 5; 1.5 SOLUTION ORAL at 08:32

## 2020-01-11 RX ADMIN — HYDROCODONE BITARTRATE AND HOMATROPINE METHYLBROMIDE 5 ML: 5; 1.5 SOLUTION ORAL at 12:43

## 2020-01-11 RX ADMIN — ENOXAPARIN SODIUM 40 MG: 40 INJECTION SUBCUTANEOUS at 16:10

## 2020-01-11 RX ADMIN — GUAIFENESIN 600 MG: 600 TABLET, EXTENDED RELEASE ORAL at 20:51

## 2020-01-11 RX ADMIN — LEVOFLOXACIN 750 MG: 750 TABLET, FILM COATED ORAL at 18:32

## 2020-01-11 RX ADMIN — VENLAFAXINE HYDROCHLORIDE 37.5 MG: 37.5 TABLET ORAL at 17:32

## 2020-01-11 RX ADMIN — PREDNISONE 40 MG: 20 TABLET ORAL at 08:33

## 2020-01-11 RX ADMIN — TRAZODONE HYDROCHLORIDE 100 MG: 50 TABLET ORAL at 20:51

## 2020-01-11 RX ADMIN — IPRATROPIUM BROMIDE AND ALBUTEROL SULFATE 3 ML: .5; 3 SOLUTION RESPIRATORY (INHALATION) at 06:54

## 2020-01-11 RX ADMIN — BUDESONIDE AND FORMOTEROL FUMARATE DIHYDRATE 2 PUFF: 160; 4.5 AEROSOL RESPIRATORY (INHALATION) at 19:47

## 2020-01-11 RX ADMIN — BENZONATATE 200 MG: 100 CAPSULE ORAL at 20:51

## 2020-01-11 RX ADMIN — VENLAFAXINE HYDROCHLORIDE 37.5 MG: 37.5 TABLET ORAL at 08:33

## 2020-01-11 RX ADMIN — HYDROCODONE BITARTRATE AND HOMATROPINE METHYLBROMIDE 5 ML: 5; 1.5 SOLUTION ORAL at 18:32

## 2020-01-11 RX ADMIN — SODIUM CHLORIDE, PRESERVATIVE FREE 10 ML: 5 INJECTION INTRAVENOUS at 20:54

## 2020-01-11 RX ADMIN — KETOROLAC TROMETHAMINE 15 MG: 30 INJECTION, SOLUTION INTRAMUSCULAR at 01:04

## 2020-01-11 RX ADMIN — HYDROCODONE BITARTRATE AND HOMATROPINE METHYLBROMIDE 5 ML: 5; 1.5 SOLUTION ORAL at 03:54

## 2020-01-11 RX ADMIN — KETOROLAC TROMETHAMINE 30 MG: 30 INJECTION, SOLUTION INTRAMUSCULAR at 20:51

## 2020-01-11 NOTE — PROGRESS NOTES
[facility]  [phone]  Rajiv Calderón MD    Subjective:  LOS: 2    She is feeling better.  She is on 2 L oxygen at home.  She denies new cough or productive phlegm.  No nausea vomiting diarrhea reported.    Objective   Vital Signs past 24hrs    Temp range: Temp (24hrs), Av.9 °F (36.6 °C), Min:97.5 °F (36.4 °C), Max:98.6 °F (37 °C)    BP range: BP: (120-124)/(73-80) 120/73  Pulse range: Heart Rate:  [] 84  Resp rate range: Resp:  [16-22] 16    Device (Oxygen Therapy): humidified;nasal cannulaFlow (L/min):  [2] 2  Oxygen range:SpO2:  [90 %-93 %] 90 %      79.6 kg (175 lb 6.4 oz); Body mass index is 27.47 kg/m².    Intake/Output Summary (Last 24 hours) at 2020 0830  Last data filed at 1/10/2020 1748  Gross per 24 hour   Intake 720 ml   Output --   Net 720 ml       Physical Exam   Constitutional: She appears well-developed.   HENT:   Head: Normocephalic.   Eyes: Pupils are equal, round, and reactive to light.   Cardiovascular: Normal rate and regular rhythm.   No murmur heard.  Pulmonary/Chest: Effort normal. No respiratory distress. She has decreased breath sounds (bronchial breath sounds) in the left upper field. She has no wheezes. She has rales (few) in the right lower field, the left upper field and the left lower field.   Abdominal: Soft. Bowel sounds are normal. She exhibits no mass. There is no tenderness.   Musculoskeletal: She exhibits no edema.   Skin: No rash noted.     Results Review:    I have reviewed the laboratory and imaging data since the last note by [facility] physician.  My annotations are noted in assessment and plan.    Medication Review:  I have reviewed the current MAR.  My annotations are noted in assessment and plan.       Plan   PCCM Problems  Acute on chronic hypoxic respiratory failure, improved  Right upper lobe dense pneumonia  COPD exacerbation, improving  Recent cigarette smoker  Relevant Medical Diagnoses  Atrial fibrillation with RVR  Rheumatoid  arthritis        THESE ARE NEW MEDICAL PROBLEMS TO ME.    Plan of Treatment  She appears to be improving.  Despite clinical exam the pneumonia on CT is right upper lobe.  She should finish out total 10 days of antibiotics.  She requires follow-up imaging to make sure the infiltrate completely clears in approximately 8 weeks.    She is not wheezing.  Her prednisone can be gradually tapered off.    She remains on bronchodilators which will be continued as an outpatient for maintenance.    She is on oxygen 2 L at home and this seems to be her current flow rate.    She quit smoking since last admission and was advised to continue to abstain from tobacco products.    Rajiv Calderón MD  01/11/20  8:30 AM    Part of this note may be an electronic transcription/translation of spoken language to printed text using the Dragon Dictation System.

## 2020-01-11 NOTE — PLAN OF CARE
"  Problem: Pain, Chronic (Adult)  Goal: Identify Related Risk Factors and Signs and Symptoms  1/11/2020 0520 by Kendy Andrew RN  Outcome: Ongoing (interventions implemented as appropriate)  Flowsheets (Taken 1/10/2020 0353 by Deandra Arriaza RN)  Related Risk Factors (Chronic Pain): coping ineffective;disease process;prolonged healing  1/11/2020 0510 by Kendy Andrew RN  Outcome: Ongoing (interventions implemented as appropriate)  Flowsheets (Taken 1/10/2020 0353 by Deandra Arriaza RN)  Related Risk Factors (Chronic Pain): coping ineffective;disease process;prolonged healing  Goal: Acceptable Pain/Comfort Level and Functional Ability  1/11/2020 0520 by Kendy Andrew RN  Outcome: Ongoing (interventions implemented as appropriate)  1/11/2020 0510 by Kendy Andrew RN  Outcome: Ongoing (interventions implemented as appropriate)  Flowsheets (Taken 1/10/2020 0353 by Deandra Arriaza RN)  Acceptable Pain/Comfort Level and Functional Ability: making progress toward outcome     Problem: Patient Care Overview  Goal: Plan of Care Review  1/11/2020 0520 by Kendy Andrew RN  Outcome: Ongoing (interventions implemented as appropriate)  Flowsheets  Taken 1/11/2020 0520 by Kendy Andrew RN  Progress: improving  Taken 1/10/2020 2215 by Antoine Escoto, RRT  Plan of Care Reviewed With: patient  Taken 1/10/2020 0353 by Deandra Arriaza RN  Outcome Summary: Pt VSS overnight, see measures, on o2@2.5L, see RT notes. Pt reports scheduled cough syrup is helping her pain, but she is still requesting the toradol, see emar for prn pain med usage. Pt continues to become anxious with \"coughing spells\", reviewed coping mechanisms, disease process, pt verbalizes understanding. AM labs pending.  1/11/2020 0510 by Kendy Andrew RN  Outcome: Ongoing (interventions implemented as appropriate)  Flowsheets (Taken 1/10/2020 2215 by Antoine Escoto, RRT)  Plan of Care Reviewed With: patient  Note:   PT VSS; 2L humidified NC O2; Pt " "continues to c/o pain in chest from coughing. Pt states she has quit smoking since last admission 2 mos ago. Overnight sleep study pending until expected discharge. WBC still 18.14.  Goal: Individualization and Mutuality  1/11/2020 0520 by Kendy Andrew RN  Outcome: Ongoing (interventions implemented as appropriate)  Flowsheets (Taken 1/10/2020 0353 by Deandra Arriaza RN)  Patient Specific Goals (Include Timeframe): pain control, improve breathing, improve coping mech. -decrease anxiety when \"coughing spells\"  1/11/2020 0510 by Kendy Andrew RN  Outcome: Ongoing (interventions implemented as appropriate)  Flowsheets  Taken 1/10/2020 0353 by Deandra Arriaza RN  Patient Specific Goals (Include Timeframe): pain control, improve breathing, improve coping mech. -decrease anxiety when \"coughing spells\"  Taken 1/11/2020 0510 by Kendy Andrew RN  Patient Specific Preferences: wants to ensure sleep study is necessary  Goal: Discharge Needs Assessment  1/11/2020 0520 by Kendy Andrew RN  Outcome: Ongoing (interventions implemented as appropriate)  Flowsheets (Taken 1/7/2020 1037 by Fidelina Nuno RN)  Readmission Within the Last 30 Days: no previous admission in last 30 days  1/11/2020 0510 by Kendy Andrew RN  Outcome: Ongoing (interventions implemented as appropriate)  Flowsheets (Taken 1/7/2020 1037 by Fidelina Nuno RN)  Concerns to be Addressed: denies needs/concerns at this time  Readmission Within the Last 30 Days: no previous admission in last 30 days  Goal: Interprofessional Rounds/Family Conf  1/11/2020 0520 by Kendy Andrew RN  Outcome: Ongoing (interventions implemented as appropriate)  1/11/2020 0510 by Kendy Andrew RN  Outcome: Ongoing (interventions implemented as appropriate)     Problem: Pneumonia (Adult)  Goal: Signs and Symptoms of Listed Potential Problems Will be Absent, Minimized or Managed (Pneumonia)  1/11/2020 0520 by Kendy Andrew RN  Outcome: Ongoing " (interventions implemented as appropriate)  1/11/2020 0510 by Kendy Andrew RN  Outcome: Ongoing (interventions implemented as appropriate)  Flowsheets (Taken 1/11/2020 0454)  Problems Assessed (Pneumonia): infection progression  Problems Present (Pneumonia): infection progression;respiratory compromise     Problem: Infection, Risk/Actual (Adult)  Goal: Identify Related Risk Factors and Signs and Symptoms  1/11/2020 0520 by Kendy Andrew RN  Outcome: Ongoing (interventions implemented as appropriate)  Flowsheets (Taken 1/8/2020 1613 by Azucena Pires RN)  Related Risk Factors (Infection, Risk/Actual): chronic illness/condition  Signs and Symptoms (Infection, Risk/Actual): blood glucose changes;pain  1/11/2020 0510 by Kendy Andrew RN  Outcome: Ongoing (interventions implemented as appropriate)  Flowsheets (Taken 1/8/2020 1613 by Azucena Pires RN)  Related Risk Factors (Infection, Risk/Actual): chronic illness/condition  Goal: Infection Prevention/Resolution  1/11/2020 0520 by Kendy Andrew RN  Outcome: Ongoing (interventions implemented as appropriate)  1/11/2020 0510 by Kendy Andrew RN  Outcome: Ongoing (interventions implemented as appropriate)

## 2020-01-11 NOTE — PROGRESS NOTES
"Hospitalist Team      Patient Care Team:  Justo Browning APRN as PCP - General (Family Medicine)        Chief Complaint: Follow-up BOWERS    Subjective    Ms. Amezcua still c/o severe BOWERS when she tries to do \"anything\".  She has no problems at rest.  She describes some chest pain due to cough.  She is tolerating her diet.  She denies weakness when getting up, just dyspnea.    Objective    Vital Signs  Temp:  [97.6 °F (36.4 °C)-98.6 °F (37 °C)] 98 °F (36.7 °C)  Heart Rate:  [] 84  Resp:  [16-22] 18  BP: (120-128)/(72-75) 128/72  Oxygen Therapy  SpO2: 91 %  Pulse Oximetry Type: Continuous  Device (Oxygen Therapy): nasal cannula  Flow (L/min): 2}    Flowsheet Rows      First Filed Value   Admission Height  170.2 cm (67\") Documented at 01/06/2020 1653   Admission Weight  81.2 kg (179 lb) Documented at 01/06/2020 1653        Physical Exam:    General: Appears older than stated age in NAD.  Lungs: Diminished throughout all fields.  No wheeze or accessory use.  CV: Regular rate and rhythm.  Radial and Pedal pulses are 2+.  Abdomen: Soft and non-tender w/ active bowel sounds.  MSK: No C/C/E.  No asymmetry of the LE.  Neuro: CN II-XII grossly intact  Psych: Pleasant affect.  AAOx3.    Results Review:     I reviewed the patient's new clinical results.    Lab Results (last 24 hours)     Procedure Component Value Units Date/Time    POC Glucose Once [[id number]]  (Normal) Collected:  01/11/20 1147    Specimen:  Blood Updated:  01/11/20 1207     Glucose 126 mg/dL     POC Glucose Once [[id number]]  (Normal) Collected:  01/11/20 0709    Specimen:  Blood Updated:  01/11/20 0716     Glucose 78 mg/dL     Manual Differential [[id number]]  (Abnormal) Collected:  01/11/20 0340    Specimen:  Blood Updated:  01/11/20 0553     Neutrophil % 77.0 %      Lymphocyte % 11.0 %      Monocyte % 5.0 %      Bands %  1.0 %      Metamyelocyte % 2.0 %      Myelocyte % 2.0 %      Atypical Lymphocyte % 2.0 %      Neutrophils Absolute 14.15 10*3/mm3      " Lymphocytes Absolute 2.00 10*3/mm3      Monocytes Absolute 0.91 10*3/mm3      Polychromasia Slight/1+     WBC Morphology Normal     Platelet Morphology Normal    Basic Metabolic Panel [[id number]]  (Abnormal) Collected:  01/11/20 0340    Specimen:  Blood Updated:  01/11/20 0539     Glucose 103 mg/dL      BUN 21 mg/dL      Creatinine 0.79 mg/dL      Sodium 140 mmol/L      Potassium 4.6 mmol/L      Chloride 106 mmol/L      CO2 24.6 mmol/L      Calcium 9.1 mg/dL      eGFR Non African Amer 75 mL/min/1.73      BUN/Creatinine Ratio 26.6     Anion Gap 9.4 mmol/L     Narrative:       GFR Normal >60  Chronic Kidney Disease <60  Kidney Failure <15      Procalcitonin [[id number]]  (Normal) Collected:  01/11/20 0340    Specimen:  Blood Updated:  01/11/20 0458     Procalcitonin 0.15 ng/mL     Narrative:       Results may be falsely decreased if patient taking Biotin.     CBC & Differential [[id number]] Collected:  01/11/20 0340    Specimen:  Blood Updated:  01/11/20 0407    Narrative:       The following orders were created for panel order CBC & Differential.  Procedure                               Abnormality         Status                     ---------                               -----------         ------                     CBC Auto Differential[[id number]]        Abnormal            Final result                 Please view results for these tests on the individual orders.    CBC Auto Differential [[id number]]  (Abnormal) Collected:  01/11/20 0340    Specimen:  Blood Updated:  01/11/20 0407     WBC 18.14 10*3/mm3      RBC 3.63 10*6/mm3      Hemoglobin 10.6 g/dL      Hematocrit 34.1 %      MCV 93.9 fL      MCH 29.2 pg      MCHC 31.1 g/dL      RDW 15.7 %      RDW-SD 53.9 fl      MPV 9.1 fL      Platelets 455 10*3/mm3      Neutrophil % 59.3 %      Lymphocyte % 12.3 %      Monocyte % 10.6 %      Eosinophil % 0.6 %      Basophil % 0.1 %      Immature Grans % 17.1 %      Neutrophils, Absolute 10.75 10*3/mm3      Lymphocytes,  Absolute 2.24 10*3/mm3      Monocytes, Absolute 1.92 10*3/mm3      Eosinophils, Absolute 0.11 10*3/mm3      Basophils, Absolute 0.01 10*3/mm3      Immature Grans, Absolute 3.11 10*3/mm3      nRBC 0.3 /100 WBC     POC Glucose Once [[id number]]  (Abnormal) Collected:  01/10/20 2016    Specimen:  Blood Updated:  01/10/20 2035     Glucose 153 mg/dL     Blood Culture - Blood, Wrist, Left [[id number]] Collected:  01/06/20 1723    Specimen:  Blood from Wrist, Left Updated:  01/10/20 1745     Blood Culture No growth at 4 days    Blood Culture - Blood, Arm, Right [[id number]] Collected:  01/06/20 1709    Specimen:  Blood from Arm, Right Updated:  01/10/20 1730     Blood Culture No growth at 4 days    POC Glucose Once [[id number]]  (Normal) Collected:  01/10/20 1639    Specimen:  Blood Updated:  01/10/20 1652     Glucose 127 mg/dL           Imaging Results (Last 24 Hours)     ** No results found for the last 24 hours. **            Medication Review:   I have reviewed the patient's current medication list    Current Facility-Administered Medications:   •  albuterol (PROVENTIL) nebulizer solution 0.083% 2.5 mg/3mL, 2.5 mg, Nebulization, Q4H PRN, Colten Franco MD  •  benzonatate (TESSALON) capsule 200 mg, 200 mg, Oral, TID PRN, Colten Franco MD, 200 mg at 01/10/20 2320  •  budesonide-formoterol (SYMBICORT) 160-4.5 MCG/ACT inhaler 2 puff, 2 puff, Inhalation, BID - RT, Colten Franco MD, 2 puff at 01/11/20 0702  •  calcium carbonate (TUMS) chewable tablet 500 mg (200 mg elemental), 1 tablet, Oral, BID PRN, Colten Franco MD  •  dextrose (D50W) 25 g/ 50mL Intravenous Solution 25 g, 25 g, Intravenous, Q15 Min PRN, Ina Adamson APRHUBERT  •  dextrose (GLUTOSE) oral gel 15 g, 15 g, Oral, Q15 Min PRN, Ina Adamson APRN  •  enoxaparin (LOVENOX) syringe 40 mg, 40 mg, Subcutaneous, Q24H, Colten Franco MD, 40 mg at 01/10/20 1717  •  folic acid (FOLVITE) tablet 1 mg, 1 mg, Oral, Daily, Juan Diego  TOYA Gannon, 1 mg at 01/11/20 0834  •  glucagon (GLUCAGEN) injection 1 mg, 1 mg, Subcutaneous, Q15 Min PRN, Ina Adamson APRN  •  guaiFENesin (MUCINEX) 12 hr tablet 600 mg, 600 mg, Oral, Q12H, Colten Franco MD, 600 mg at 01/11/20 0833  •  HYDROcodone-homatropine (HYCODAN) 5-1.5 MG/5ML syrup 5 mL, 5 mL, Oral, Q4H, Ina Adamson APRN, 5 mL at 01/11/20 0832  •  insulin aspart (novoLOG) injection 0-7 Units, 0-7 Units, Subcutaneous, 4x Daily AC & at Bedtime, Ina Adamson APRN, 2 Units at 01/10/20 2048  •  ipratropium-albuterol (DUO-NEB) nebulizer solution 3 mL, 3 mL, Nebulization, Q6H While Awake - RT, José Patterson MD, 3 mL at 01/11/20 0654  •  ketorolac (TORADOL) injection 15 mg, 15 mg, Intravenous, Q6H PRN, Colten Franco MD, 15 mg at 01/11/20 0104  •  ketorolac (TORADOL) injection 30 mg, 30 mg, Intravenous, Q6H PRN, Colten Franco MD, 30 mg at 01/11/20 0651  •  levoFLOXacin (LEVAQUIN) tablet 750 mg, 750 mg, Oral, Q24H, Ina Adamson APRN, 750 mg at 01/10/20 1840  •  lidocaine (LIDODERM) 5 % 1 patch, 1 patch, Transdermal, Daily PRN, Colten Franco MD  •  melatonin tablet 5 mg, 5 mg, Oral, Nightly PRN, Colten Franco MD, 5 mg at 01/10/20 0000  •  menthol (TOPICAL ANALGESIC) 2.5 % gel, , Topical, Q1H PRN, Colten Franco MD  •  ondansetron (ZOFRAN) tablet 4 mg, 4 mg, Oral, Q6H PRN **OR** ondansetron (ZOFRAN) injection 4 mg, 4 mg, Intravenous, Q6H PRN, Colten Franco MD  •  potassium chloride (K-DUR,KLOR-CON) CR tablet 20 mEq, 20 mEq, Oral, Daily, Colten Franco MD, 20 mEq at 01/11/20 0833  •  predniSONE (DELTASONE) tablet 40 mg, 40 mg, Oral, Daily With Breakfast, Oc Ko MD, 40 mg at 01/11/20 0833  •  senna-docusate sodium (SENOKOT-S) 8.6-50 MG tablet 2 tablet, 2 tablet, Oral, Nightly PRN, Colten Franco MD  •  sodium chloride 0.9 % flush 10 mL, 10 mL, Intravenous, PRN, Colten Franco MD, 10 mL at  "01/06/20 2222  •  traZODone (DESYREL) tablet 100 mg, 100 mg, Oral, Nightly, Colten Franco MD, 100 mg at 01/10/20 2047  •  venlafaxine (EFFEXOR) tablet 37.5 mg, 37.5 mg, Oral, BID With Meals, Colten Franco MD, 37.5 mg at 01/11/20 0833  •  vitamin B-12 (CYANOCOBALAMIN) tablet 1,000 mcg, 1,000 mcg, Oral, Daily, Ina Adamson APRN, 1,000 mcg at 01/11/20 0834      Assessment/Plan     1.  Acute Hypoxic Respiratory Failure: Continues on 2L and continuous pulse Ox.  Likely multifactorial in etiology.  She was pleased to inform me she quit tobacco after our conversation last stay.  Pulmonary following.  Walk test closer to discharge.  I\"m still going to mobilize patient.    2.  Right Upper Lobe Pneumonia: Not Septic as this is a localized infection.  Continue on Levaquin.  Culture data negative.  Pulmonary recommends 10-day course of Levaquin.  QTc preserved.    3.  EKG changes: I reviewed this morning's EKG to evaluate the QTc interval given use of Levaquin, and V2, I, and AVL leads demonstrate dynamic changes.  Her chest pain is related to her cough.  Adding a troponin to blood in lab.  Will ask Cardiology to see.  Place echo order.  Repeat tracing.    4.  Steroid-Induced Hyperglycemia: Bedsides at goal.  A1c was 5.0%.  Continue to monitor.    5.  Normocytic Anemia w/ B12 and Folic acid Deficiency: Hgb stable.  Will stop daily draws.    6.  RA: No DMARD therapy.  No acute issues.    7.  Anxiety: No acute issues on current regimen.  No Trazodone/Levaquin QTc changes.    8.  Hx/O Atrial Fibrillation: Remains regular on exam.  Likely was due to acute illness.      Plan for disposition: Home when able.    José Patterson MD  01/11/20  12:42 PM          "

## 2020-01-12 LAB
ANION GAP SERPL CALCULATED.3IONS-SCNC: 9.2 MMOL/L (ref 5–15)
ARTERIAL PATENCY WRIST A: POSITIVE
ATMOSPHERIC PRESS: 747 MMHG
BASE EXCESS BLDA CALC-SCNC: 4.6 MMOL/L (ref 0–2)
BASOPHILS # BLD AUTO: 0.01 10*3/MM3 (ref 0–0.2)
BASOPHILS NFR BLD AUTO: 0 % (ref 0–1.5)
BDY SITE: ABNORMAL
BODY TEMPERATURE: 37 C
BUN BLD-MCNC: 21 MG/DL (ref 6–20)
BUN/CREAT SERPL: 24.7 (ref 7–25)
CALCIUM SPEC-SCNC: 8.4 MG/DL (ref 8.6–10.5)
CHLORIDE SERPL-SCNC: 105 MMOL/L (ref 98–107)
CO2 SERPL-SCNC: 25.8 MMOL/L (ref 22–29)
CREAT BLD-MCNC: 0.85 MG/DL (ref 0.57–1)
DEPRECATED RDW RBC AUTO: 53.1 FL (ref 37–54)
EOSINOPHIL # BLD AUTO: 0.3 10*3/MM3 (ref 0–0.4)
EOSINOPHIL NFR BLD AUTO: 1.3 % (ref 0.3–6.2)
ERYTHROCYTE [DISTWIDTH] IN BLOOD BY AUTOMATED COUNT: 15.5 % (ref 12.3–15.4)
GAS FLOW AIRWAY: 2 LPM
GFR SERPL CREATININE-BSD FRML MDRD: 69 ML/MIN/1.73
GLUCOSE BLD-MCNC: 109 MG/DL (ref 65–99)
GLUCOSE BLDC GLUCOMTR-MCNC: 118 MG/DL (ref 70–130)
GLUCOSE BLDC GLUCOMTR-MCNC: 135 MG/DL (ref 70–130)
GLUCOSE BLDC GLUCOMTR-MCNC: 88 MG/DL (ref 70–130)
GLUCOSE BLDC GLUCOMTR-MCNC: 88 MG/DL (ref 70–130)
HCO3 BLDA-SCNC: 29.5 MMOL/L (ref 20–26)
HCT VFR BLD AUTO: 33.5 % (ref 34–46.6)
HGB BLD-MCNC: 10.6 G/DL (ref 12–15.9)
HGB BLDA-MCNC: 11.2 G/DL (ref 13.5–17.5)
IMM GRANULOCYTES # BLD AUTO: 4.75 10*3/MM3 (ref 0–0.05)
IMM GRANULOCYTES NFR BLD AUTO: 20.2 % (ref 0–0.5)
LYMPHOCYTES # BLD AUTO: 2.4 10*3/MM3 (ref 0.7–3.1)
LYMPHOCYTES NFR BLD AUTO: 10.2 % (ref 19.6–45.3)
MCH RBC QN AUTO: 29.4 PG (ref 26.6–33)
MCHC RBC AUTO-ENTMCNC: 31.6 G/DL (ref 31.5–35.7)
MCV RBC AUTO: 93.1 FL (ref 79–97)
MODALITY: ABNORMAL
MONOCYTES # BLD AUTO: 1.99 10*3/MM3 (ref 0.1–0.9)
MONOCYTES NFR BLD AUTO: 8.5 % (ref 5–12)
NEUTROPHILS # BLD AUTO: 14.08 10*3/MM3 (ref 1.7–7)
NEUTROPHILS NFR BLD AUTO: 59.8 % (ref 42.7–76)
NRBC BLD AUTO-RTO: 0.1 /100 WBC (ref 0–0.2)
PCO2 BLDA: 44.4 MM HG (ref 35–45)
PCO2 TEMP ADJ BLD: 44.4 MM HG (ref 35–45)
PH BLDA: 7.43 PH UNITS (ref 7.35–7.45)
PH, TEMP CORRECTED: 7.43 PH UNITS (ref 7.35–7.45)
PLATELET # BLD AUTO: 503 10*3/MM3 (ref 140–450)
PMV BLD AUTO: 9.4 FL (ref 6–12)
PO2 BLDA: 70.4 MM HG (ref 83–108)
PO2 TEMP ADJ BLD: 70.4 MM HG (ref 83–108)
POTASSIUM BLD-SCNC: 4.7 MMOL/L (ref 3.5–5.2)
RBC # BLD AUTO: 3.6 10*6/MM3 (ref 3.77–5.28)
SAO2 % BLDCOA: 94.5 % (ref 94–99)
SODIUM BLD-SCNC: 140 MMOL/L (ref 136–145)
VENTILATOR MODE: ABNORMAL
WBC NRBC COR # BLD: 23.53 10*3/MM3 (ref 3.4–10.8)

## 2020-01-12 PROCEDURE — 63710000001 PREDNISONE PER 1 MG: Performed by: INTERNAL MEDICINE

## 2020-01-12 PROCEDURE — 93010 ELECTROCARDIOGRAM REPORT: CPT | Performed by: INTERNAL MEDICINE

## 2020-01-12 PROCEDURE — 99232 SBSQ HOSP IP/OBS MODERATE 35: CPT | Performed by: HOSPITALIST

## 2020-01-12 PROCEDURE — 94762 N-INVAS EAR/PLS OXIMTRY CONT: CPT

## 2020-01-12 PROCEDURE — 93005 ELECTROCARDIOGRAM TRACING: CPT | Performed by: HOSPITALIST

## 2020-01-12 PROCEDURE — 25010000002 ENOXAPARIN PER 10 MG: Performed by: INTERNAL MEDICINE

## 2020-01-12 PROCEDURE — 85025 COMPLETE CBC W/AUTO DIFF WBC: CPT | Performed by: NURSE PRACTITIONER

## 2020-01-12 PROCEDURE — 82962 GLUCOSE BLOOD TEST: CPT

## 2020-01-12 PROCEDURE — 82803 BLOOD GASES ANY COMBINATION: CPT

## 2020-01-12 PROCEDURE — 80048 BASIC METABOLIC PNL TOTAL CA: CPT | Performed by: NURSE PRACTITIONER

## 2020-01-12 PROCEDURE — 99254 IP/OBS CNSLTJ NEW/EST MOD 60: CPT | Performed by: INTERNAL MEDICINE

## 2020-01-12 PROCEDURE — 94618 PULMONARY STRESS TESTING: CPT

## 2020-01-12 PROCEDURE — 94799 UNLISTED PULMONARY SVC/PX: CPT

## 2020-01-12 PROCEDURE — 36600 WITHDRAWAL OF ARTERIAL BLOOD: CPT

## 2020-01-12 RX ADMIN — HYDROCODONE BITARTRATE AND HOMATROPINE METHYLBROMIDE 5 ML: 5; 1.5 SOLUTION ORAL at 03:46

## 2020-01-12 RX ADMIN — MELATONIN TAB 5 MG 5 MG: 5 TAB at 20:05

## 2020-01-12 RX ADMIN — GUAIFENESIN 600 MG: 600 TABLET, EXTENDED RELEASE ORAL at 09:19

## 2020-01-12 RX ADMIN — HYDROCODONE BITARTRATE AND HOMATROPINE METHYLBROMIDE 5 ML: 5; 1.5 SOLUTION ORAL at 00:42

## 2020-01-12 RX ADMIN — IPRATROPIUM BROMIDE AND ALBUTEROL SULFATE 3 ML: .5; 3 SOLUTION RESPIRATORY (INHALATION) at 12:26

## 2020-01-12 RX ADMIN — TRAZODONE HYDROCHLORIDE 100 MG: 50 TABLET ORAL at 20:05

## 2020-01-12 RX ADMIN — GUAIFENESIN 600 MG: 600 TABLET, EXTENDED RELEASE ORAL at 20:05

## 2020-01-12 RX ADMIN — HYDROCODONE BITARTRATE AND HOMATROPINE METHYLBROMIDE 5 ML: 5; 1.5 SOLUTION ORAL at 12:32

## 2020-01-12 RX ADMIN — HYDROCODONE BITARTRATE AND HOMATROPINE METHYLBROMIDE 5 ML: 5; 1.5 SOLUTION ORAL at 23:32

## 2020-01-12 RX ADMIN — POTASSIUM CHLORIDE 20 MEQ: 20 TABLET, EXTENDED RELEASE ORAL at 09:19

## 2020-01-12 RX ADMIN — BUDESONIDE AND FORMOTEROL FUMARATE DIHYDRATE 2 PUFF: 160; 4.5 AEROSOL RESPIRATORY (INHALATION) at 07:05

## 2020-01-12 RX ADMIN — IPRATROPIUM BROMIDE AND ALBUTEROL SULFATE 3 ML: .5; 3 SOLUTION RESPIRATORY (INHALATION) at 20:19

## 2020-01-12 RX ADMIN — HYDROCODONE BITARTRATE AND HOMATROPINE METHYLBROMIDE 5 ML: 5; 1.5 SOLUTION ORAL at 16:01

## 2020-01-12 RX ADMIN — BUDESONIDE AND FORMOTEROL FUMARATE DIHYDRATE 2 PUFF: 160; 4.5 AEROSOL RESPIRATORY (INHALATION) at 20:19

## 2020-01-12 RX ADMIN — ENOXAPARIN SODIUM 40 MG: 40 INJECTION SUBCUTANEOUS at 16:02

## 2020-01-12 RX ADMIN — VENLAFAXINE HYDROCHLORIDE 37.5 MG: 37.5 TABLET ORAL at 09:22

## 2020-01-12 RX ADMIN — PREDNISONE 40 MG: 20 TABLET ORAL at 09:22

## 2020-01-12 RX ADMIN — HYDROCODONE BITARTRATE AND HOMATROPINE METHYLBROMIDE 5 ML: 5; 1.5 SOLUTION ORAL at 20:05

## 2020-01-12 RX ADMIN — CYANOCOBALAMIN TAB 1000 MCG 1000 MCG: 1000 TAB at 09:22

## 2020-01-12 RX ADMIN — VENLAFAXINE HYDROCHLORIDE 37.5 MG: 37.5 TABLET ORAL at 18:07

## 2020-01-12 RX ADMIN — FOLIC ACID 1 MG: 1 TABLET ORAL at 09:19

## 2020-01-12 RX ADMIN — IPRATROPIUM BROMIDE AND ALBUTEROL SULFATE 3 ML: .5; 3 SOLUTION RESPIRATORY (INHALATION) at 06:57

## 2020-01-12 RX ADMIN — HYDROCODONE BITARTRATE AND HOMATROPINE METHYLBROMIDE 5 ML: 5; 1.5 SOLUTION ORAL at 06:46

## 2020-01-12 RX ADMIN — LEVOFLOXACIN 750 MG: 750 TABLET, FILM COATED ORAL at 18:07

## 2020-01-12 NOTE — CONSULTS
Patient Name: Akila Amezcua  :1960  59 y.o.    Date of Admission: 2020  Encounter Provider: Eden Peres MD  Date of Encounter Visit: 20  Place of Service: [facility] CARDIOLOGY  Referring Provider: Colten Franco MD  Patient Care Team:  Justo Browning APRN as PCP - General (Family Medicine)      Chief complaint:  Abnormal EKG.    History of Present Illness:    This is a lady who was seen by Dr. Vitale in 2019.  She has a history of an echocardiogram in 2019.  This showed low normal LV systolic function with an ejection fraction of 50%.  There was mild to moderate mitral regurgitation.  There was mild aortic regurgitation with moderate aortic stenosis.  There was concerns for a subvalvular membrane.  She was post to see Aiden Guo on 2019 but did not show up for her appointment.  She was previously seen for atrial fibrillation in the setting of RSV and a COPD exacerbation.  EKG yesterday showed sinus rhythm with an isolated T wave inversion in aVL.  There is no significant change from prior EKGs.  Troponin x1.    She has been having shortness of breath with cough.  She has chest pain which she describes as a sharp stabbing pain on the right side of her chest that radiates to her back.  It is worse when she coughs or moves in certain positions.  There are no associated symptoms.  Relieving factors include not coughing.  She was having relief with Toradol but that has been discontinued.    Past Medical History:   Diagnosis Date   • Anxiety    • Arthritis    • COPD (chronic obstructive pulmonary disease) (CMS/Formerly [facility])     LUNG BLEBS   • DJD (degenerative joint disease)    • Gallstones     SCHEDULED FOR SX   • GERD (gastroesophageal reflux disease)    • Injury of back     degenertive disc disease   • Murmur     BENIGN   • Panic attacks    • Rheumatoid arthritis (CMS/HCC)    • Seizures (CMS/HCC)     LAST ONE 2017       Past  Surgical History:   Procedure Laterality Date   • APPENDECTOMY     •  SECTION     • CHOLECYSTECTOMY N/A 2017    Procedure: LAPAROSCOPIC CHOLECYSTECTOMY, laparoscopic adhesiolysis requiring 45 minutes of operative time;  Surgeon: Liliana Monroy MD;  Location: [facility];  Service:    • ESOPHAGEAL ATRESIA REPAIR     • PLEURAL BIOPSY     • PLEURAL SCARIFICATION           Prior to Admission medications    Medication Sig Start Date End Date Taking? Authorizing Provider   albuterol (PROVENTIL) (2.5 MG/3ML) 0.083% nebulizer solution Take 2.5 mg by nebulization Every 4 (Four) Hours As Needed for wheezing.   Yes Provider, MD Donald   Fluticasone Furoate-Vilanterol (BREO ELLIPTA IN) Inhale 1 puff Daily.   Yes ProviderDonald MD   ipratropium-albuterol (DUO-NEB) 0.5-2.5 mg/mL nebulizer Take 3 mL by nebulization Every 4 (Four) Hours As Needed for wheezing.   Yes ProviderDonald MD   meloxicam (MOBIC) 15 MG tablet Take 15 mg by mouth Daily.   Yes Provider, MD Donald   Misc. Devices (BATH BENCH WITH BACK) misc 1 Units Daily As Needed (shower). 19  Yes Justo Browning APRN   traZODone (DESYREL) 50 MG tablet Take 1-2 tablets by mouth Every Night. 19  Yes Justo Browning APRN   venlafaxine (EFFEXOR) 37.5 MG tablet Take 37.5 mg by mouth 2 (Two) Times a Day.   Yes ProviderDonald MD   guaiFENesin-dextromethorphan (ROBITUSSIN DM) 100-10 MG/5ML syrup Take 5 mL by mouth Every 4 (Four) Hours As Needed for Cough. 19   Shireen Ferguson, DO   promethazine-dextromethorphan (PROMETHAZINE-DM) 6.25-15 MG/5ML syrup Take 5 mL by mouth 4 (Four) Times a Day As Needed for Cough. 19   Justo Browning APRN   rivaroxaban (XARELTO) 20 MG tablet Take 1 tablet by mouth Daily With Dinner. 19   Shireen Ferguson DO   sodium chloride (OCEAN NASAL SPRAY) 0.65 % nasal spray 1 spray into the nostril(s) as directed by provider As Needed for Congestion. 19   Justo Browning, APRN    traMADol (ULTRAM) 50 MG tablet TAKE 1 TABLET BY MOUTH 3 TIMES A DAY AS NEEDED FOR PAIN 9/30/19   Provider, Donald, MD       Allergies   Allergen Reactions   • Penicillins Anaphylaxis       Social History     Socioeconomic History   • Marital status:      Spouse name: Not on file   • Number of children: Not on file   • Years of education: Not on file   • Highest education level: Not on file   Tobacco Use   • Smoking status: Current Every Day Smoker     Packs/day: 1.00     Years: 35.00     Pack years: 35.00   Substance and Sexual Activity   • Alcohol use: No   • Drug use: No   • Sexual activity: Defer       Family History   Problem Relation Age of Onset   • Lung cancer Father        REVIEW OF SYSTEMS:   All other systems reviewed and negative.        Objective:     Vitals:    01/12/20 0710 01/12/20 1108 01/12/20 1231 01/12/20 1238   BP:  124/74     BP Location:  Right arm     Patient Position:  Lying     Pulse: 96 90 88 88   Resp: 16 16 16 16   Temp:  97.9 °F (36.6 °C)     TempSrc:  Oral     SpO2: 93% 92% 92% 92%   Weight:       Height:         Body mass index is 27.47 kg/m².    Intake/Output Summary (Last 24 hours) at 1/12/2020 1322  Last data filed at 1/12/2020 0850  Gross per 24 hour   Intake 720 ml   Output --   Net 720 ml       Constitutional: She is oriented to person, place, and time. She appears well-developed. She does not appear ill.   HENT:   Head: Normocephalic and atraumatic. Head is without contusion.   Right Ear: Hearing normal. No drainage.   Left Ear: Hearing normal. No drainage.   Nose: No nasal deformity. No epistaxis.   Eyes: Lids are normal. Right eye exhibits no exudate. Left eye exhibits no exudate.  Neck: No JVD present. Carotid bruit is not present. No tracheal deviation present. No thyroid mass and no thyromegaly present.   Cardiovascular: Regular.  Positive murmurs.  No edema.  Pulmonary/Chest: Effort normal and breath sounds normal.   Abdominal: Soft. Normal appearance and  bowel sounds are normal. There is no tenderness.   Musculoskeletal: Normal range of motion.        Right shoulder: She exhibits no deformity.        Left shoulder: She exhibits no deformity.   Neurological: She is alert and oriented to person, place, and time. She has normal strength.   Skin: Skin is warm, dry and intact. No rash noted.   Psychiatric: She has a normal mood and affect. Her behavior is normal. Thought content normal.   Vitals reviewed      Lab Review:     Results from last 7 days   Lab Units 01/12/20  0330  01/07/20  0430   SODIUM mmol/L 140   < > 135*   POTASSIUM mmol/L 4.7   < > 4.1   CHLORIDE mmol/L 105   < > 104   CO2 mmol/L 25.8   < > 13.5*   BUN mg/dL 21*   < > 11   CREATININE mg/dL 0.85   < > 0.71   CALCIUM mg/dL 8.4*   < > 8.5*   BILIRUBIN mg/dL  --   --  1.2   ALK PHOS U/L  --   --  137*   ALT (SGPT) U/L  --   --  8   AST (SGOT) U/L  --   --  11   GLUCOSE mg/dL 109*   < > 137*    < > = values in this interval not displayed.     Results from last 7 days   Lab Units 01/11/20  0340 01/06/20  1709   TROPONIN T ng/mL <0.010 <0.010     Results from last 7 days   Lab Units 01/12/20  0330   WBC 10*3/mm3 23.53*   HEMOGLOBIN g/dL 10.6*   HEMATOCRIT % 33.5*   PLATELETS 10*3/mm3 503*     Results from last 7 days   Lab Units 01/06/20  1709   INR  1.22*   APTT seconds 38.3*     Results from last 7 days   Lab Units 01/07/20  0430   MAGNESIUM mg/dL 1.6         I personally viewed and interpreted the patient's EKG/Telemetry data.        Assessment and Plan:       1.  Chest pain.  Atypical.  Consistent with musculoskeletal pain from coughing.  No further testing needed at this time.  2.  Paroxysmal atrial fibrillation.  Currently in sinus rhythm.  On Xarelto for stroke prevention.  3.  Concern for subvalvular membrane.  She needs to keep her follow-up appointments and I explained this to her.  3.  COPD exacerbation/acute on chronic hypoxic respiratory failure/pneumonia.    No further heart testing at this  time.  I will arrange for follow-up with 1 of our nurse practitioners in [place].    Eden Peres MD  01/12/20  1:22 PM

## 2020-01-12 NOTE — PROGRESS NOTES
[facility]  [phone]  Rajiv Calderón MD    Subjective:  LOS: 3    She is somewhat improved.  Less short of breath when she goes to the restroom.  I inquired about her baseline status.  She is limited even doing activities around the house and has to stop intermittently to do laundry etc.  This is at baseline.  Overall she feels somewhat better.    Objective   Vital Signs past 24hrs    Temp range: Temp (24hrs), Av.2 °F (36.8 °C), Min:98 °F (36.7 °C), Max:98.3 °F (36.8 °C)    BP range: BP: (115-141)/(70-83) 115/74  Pulse range: Heart Rate:  [84-91] 84  Resp rate range: Resp:  [16-20] 16    Device (Oxygen Therapy): humidified;nasal cannulaFlow (L/min):  [2] 2  Oxygen range:SpO2:  [88 %-92 %] 92 %      79.6 kg (175 lb 6.4 oz); Body mass index is 27.47 kg/m².    Intake/Output Summary (Last 24 hours) at 2020 0826  Last data filed at 2020 1815  Gross per 24 hour   Intake 960 ml   Output --   Net 960 ml       Physical Exam   Constitutional: She appears well-developed.   HENT:   Head: Normocephalic.   Eyes: Pupils are equal, round, and reactive to light.   Cardiovascular: Normal rate and regular rhythm.   No murmur heard.  Pulmonary/Chest: Effort normal. No respiratory distress. She has decreased breath sounds (bronchial breath sounds) in the left upper field. She has no wheezes. She has rales (few) in the right lower field and the left lower field.   Abdominal: Soft. Bowel sounds are normal. She exhibits no mass. There is no tenderness.   Musculoskeletal: She exhibits no edema.   Skin: No rash noted.     Results Review:    I have reviewed the laboratory and imaging data since the last note by [facility] physician.  My annotations are noted in assessment and plan.    Medication Review:  I have reviewed the current MAR.  My annotations are noted in assessment and plan.       Plan   PCCM Problems  Acute on chronic hypoxic respiratory failure, improved  Right upper lobe dense pneumonia  COPD exacerbation,  improving  Recent cigarette smoker  Relevant Medical Diagnoses  Atrial fibrillation with RVR  Rheumatoid arthritis        Plan of Treatment  Clinically better.  Finish out 10 days of antibiotics.  Can switch to p.o. at any time from my standpoint.  Needs close follow-up with chest x-ray in 6 weeks to make sure infiltrate is clearing or resolved.  If not she may require further investigation.  White count is likely secondary steroids.  No evidence of fever or other toxic symptoms.    She is not wheezing.  Her prednisone can be gradually tapered off.  Continue maintenance bronchodilators on discharge.    She remains on bronchodilators which will be continued as an outpatient for maintenance.    She is on oxygen 2 L at home and this seems to be her current flow rate.  Check ABG on same.  She has an element of hypercapnia.  She may require consideration of trilogy.  Also check walking oximetry with oxygen to see if current oxygen flows are adequate or if these need to be increased when she exerts herself.    She quit smoking since last admission and was advised to continue to abstain from tobacco products.    Discussed with hospitalist.    Rajiv Calderón MD  01/12/20  8:26 AM    Part of this note may be an electronic transcription/translation of spoken language to printed text using the Dragon Dictation System.

## 2020-01-12 NOTE — PLAN OF CARE
Problem: Patient Care Overview  Goal: Plan of Care Review  Outcome: Ongoing (interventions implemented as appropriate)  Flowsheets (Taken 1/12/2020 1532)  Progress: improving  Plan of Care Reviewed With: patient  Note:   Right chest pain related to coughing; pt sts pain improved with scheduled Hycoden; diminished breath sounds; good nonproductive cough; 2L NC at rest & 4L NC with exertion per walking pulse ox done today; remains on prednisone; labs in am

## 2020-01-12 NOTE — PLAN OF CARE
Problem: Patient Care Overview  Goal: Plan of Care Review  Outcome: Ongoing (interventions implemented as appropriate)  Flowsheets (Taken 1/11/2020 1950)  Progress: improving  Plan of Care Reviewed With: patient     Problem: Patient Care Overview  Goal: Individualization and Mutuality  Outcome: Ongoing (interventions implemented as appropriate)     Problem: Pneumonia (Adult)  Intervention: Maximize Oxygenation/Ventilation/Perfusion  Flowsheets (Taken 1/11/2020 1950)  Head of Bed (HOB): HOB elevated  Airway/Ventilation Management: airway patency maintained; pulmonary hygiene promoted; humidification applied

## 2020-01-12 NOTE — PROGRESS NOTES
Exercise Oximetry    Patient Name:Akila Amezcua   MRN: [id number]   Date: 01/12/20             ROOM AIR BASELINE   SpO2%                87   Heart Rate           92   Blood Pressure      EXERCISE ON ROOM AIR SpO2% EXERCISE ON O2 @   LPM SpO2%   1 MINUTE  1 MINUTE                      2 lpm     90   2 MINUTES  2 MINUTES                   3 lpm     87   3 MINUTES  3 MINUTES                   3 lpm     88   4 MINUTES  4 MINUTES                   4 lpm     87   5 MINUTES  5 MINUTES                   4 lpm        90   6 MINUTES  6 MINUTES                   4 lpm     89              Distance Walked   Distance Walked                 230 ft.   Dyspnea (Estefania Scale)   Dyspnea (Estefania Scale) pre 0 post 6   Fatigue (Estefania Scale)   Fatigue (Estefania Scale)   Pre 0 post 4   SpO2% Post Exercise   SpO2% Post Exercise         91   HR Post Exercise   HR Post Exercise                 99   Time to Recovery   Time to Recovery              1 min     Comments:  rec pt on  4 lpm for 1 min. Dec. O2 to 2 lpm for 2 min .Sats now 94 %.

## 2020-01-12 NOTE — PLAN OF CARE
Problem: Pain, Chronic (Adult)  Goal: Identify Related Risk Factors and Signs and Symptoms  Outcome: Ongoing (interventions implemented as appropriate)  Flowsheets (Taken 1/10/2020 0353 by Deandra Arriaza RN)  Related Risk Factors (Chronic Pain): coping ineffective;disease process;prolonged healing  Signs and Symptoms (Chronic Pain): verbalization of pain/discomfort for a prolonged time period;verbalization of pain descriptors

## 2020-01-12 NOTE — PROGRESS NOTES
"Hospitalist Team      Patient Care Team:  Justo Browning APRN as PCP - General (Family Medicine)        Chief Complaint:  Follow-up Dyspnea    Subjective    Feels pretty good today.  She has been ambulating in the halls, and still feels dyspneic.  I checked on her at one point, and her SaO2 on 2L was in the mid-80's.  She denies chest pain.  She is tolerating PO.    Objective    Vital Signs  Temp:  [97.9 °F (36.6 °C)-98.2 °F (36.8 °C)] 98 °F (36.7 °C)  Heart Rate:  [84-96] 86  Resp:  [16-18] 16  BP: (115-141)/(74-83) 124/76  Oxygen Therapy  SpO2: 93 %  Pulse Oximetry Type: Continuous  Device (Oxygen Therapy): nasal cannula  Flow (L/min): 2}  Flowsheet Rows      First Filed Value   Admission Height  170.2 cm (67\") Documented at 01/06/2020 1653   Admission Weight  81.2 kg (179 lb) Documented at 01/06/2020 1653          Physical Exam:    General: Appears older than stated age in NAD  Lungs: Diminished throughout all fields.  I appreciate no wheeze or rales.  No accessory use.  CV: Regular w/o murmur.  Radial and pedal pulses are 2+ and symmetric.  Abdomen: Soft and non-tender w/ active bowel sounds  MSK: No C/C/E  Neuro: CN II-XII grossly intact  Psych: Pleasant affect. AAOx3.    Results Review:     I reviewed the patient's new clinical results.    Lab Results (last 24 hours)     Procedure Component Value Units Date/Time    POC Glucose Once [[id number]]  (Normal) Collected:  01/12/20 1649    Specimen:  Blood Updated:  01/12/20 1658     Glucose 118 mg/dL     POC Glucose Once [[id number]]  (Normal) Collected:  01/12/20 1136    Specimen:  Blood Updated:  01/12/20 1148     Glucose 88 mg/dL     Blood Gas, Arterial [[id number]]  (Abnormal) Collected:  01/12/20 0935    Specimen:  Arterial Blood Updated:  01/12/20 0940     Site Right Radial     Jose L's Test Positive     pH, Arterial 7.431 pH units      pCO2, Arterial 44.4 mm Hg      pO2, Arterial 70.4 mm Hg      Comment: 84 Value below reference range        HCO3, Arterial 29.5 " mmol/L      Comment: 83 Value above reference range        Base Excess, Arterial 4.6 mmol/L      Comment: 83 Value above reference range        O2 Saturation, Arterial 94.5 %      Hemoglobin, Blood Gas 11.2 g/dL      Temperature 37.0 C      Barometric Pressure for Blood Gas 747 mmHg      Modality Nasal Cannula     Flow Rate 2.0 lpm      Ventilator Mode NA     Comment: Meter: [id number]     :  [id number]        pCO2, Temperature Corrected 44.4 mm Hg      pH, Temp Corrected 7.431 pH Units      pO2, Temperature Corrected 70.4 mm Hg     POC Glucose Once [[id number]]  (Normal) Collected:  01/12/20 0709    Specimen:  Blood Updated:  01/12/20 0715     Glucose 88 mg/dL     Basic Metabolic Panel [[id number]]  (Abnormal) Collected:  01/12/20 0330    Specimen:  Blood Updated:  01/12/20 0417     Glucose 109 mg/dL      BUN 21 mg/dL      Creatinine 0.85 mg/dL      Sodium 140 mmol/L      Potassium 4.7 mmol/L      Chloride 105 mmol/L      CO2 25.8 mmol/L      Calcium 8.4 mg/dL      eGFR Non African Amer 69 mL/min/1.73      BUN/Creatinine Ratio 24.7     Anion Gap 9.2 mmol/L     Narrative:       GFR Normal >60  Chronic Kidney Disease <60  Kidney Failure <15      CBC & Differential [[id number]] Collected:  01/12/20 0330    Specimen:  Blood Updated:  01/12/20 0400    Narrative:       The following orders were created for panel order CBC & Differential.  Procedure                               Abnormality         Status                     ---------                               -----------         ------                     CBC Auto Differential[[id number]]        Abnormal            Final result                 Please view results for these tests on the individual orders.    CBC Auto Differential [[id number]]  (Abnormal) Collected:  01/12/20 0330    Specimen:  Blood Updated:  01/12/20 0400     WBC 23.53 10*3/mm3      RBC 3.60 10*6/mm3      Hemoglobin 10.6 g/dL      Hematocrit 33.5 %      MCV 93.1 fL      MCH 29.4 pg      MCHC  31.6 g/dL      RDW 15.5 %      RDW-SD 53.1 fl      MPV 9.4 fL      Platelets 503 10*3/mm3      Neutrophil % 59.8 %      Lymphocyte % 10.2 %      Monocyte % 8.5 %      Eosinophil % 1.3 %      Basophil % 0.0 %      Immature Grans % 20.2 %      Neutrophils, Absolute 14.08 10*3/mm3      Lymphocytes, Absolute 2.40 10*3/mm3      Monocytes, Absolute 1.99 10*3/mm3      Eosinophils, Absolute 0.30 10*3/mm3      Basophils, Absolute 0.01 10*3/mm3      Immature Grans, Absolute 4.75 10*3/mm3      nRBC 0.1 /100 WBC     POC Glucose Once [[id number]]  (Abnormal) Collected:  01/11/20 2024    Specimen:  Blood Updated:  01/11/20 2031     Glucose 132 mg/dL     Blood Culture - Blood, Wrist, Left [[id number]] Collected:  01/06/20 1723    Specimen:  Blood from Wrist, Left Updated:  01/11/20 1745     Blood Culture No growth at 5 days          Imaging Results (Last 24 Hours)     ** No results found for the last 24 hours. **            Medication Review:   I have reviewed the patient's current medication list    Current Facility-Administered Medications:   •  albuterol (PROVENTIL) nebulizer solution 0.083% 2.5 mg/3mL, 2.5 mg, Nebulization, Q4H PRN, Colten Franco MD  •  benzonatate (TESSALON) capsule 200 mg, 200 mg, Oral, TID PRN, Colten Franco MD, 200 mg at 01/11/20 2051  •  budesonide-formoterol (SYMBICORT) 160-4.5 MCG/ACT inhaler 2 puff, 2 puff, Inhalation, BID - RT, Colten Franco MD, 2 puff at 01/12/20 0705  •  calcium carbonate (TUMS) chewable tablet 500 mg (200 mg elemental), 1 tablet, Oral, BID PRN, Colten Franco MD  •  dextrose (D50W) 25 g/ 50mL Intravenous Solution 25 g, 25 g, Intravenous, Q15 Min PRN, Ina Adamson APRN  •  dextrose (GLUTOSE) oral gel 15 g, 15 g, Oral, Q15 Min PRN, Ina Adamson APRN  •  enoxaparin (LOVENOX) syringe 40 mg, 40 mg, Subcutaneous, Q24H, Colten Franco MD, 40 mg at 01/12/20 1602  •  folic acid (FOLVITE) tablet 1 mg, 1 mg, Oral, Daily, Ina Adamson  APRN, 1 mg at 01/12/20 0919  •  glucagon (GLUCAGEN) injection 1 mg, 1 mg, Subcutaneous, Q15 Min PRN, Ina Adamson APRN  •  guaiFENesin (MUCINEX) 12 hr tablet 600 mg, 600 mg, Oral, Q12H, Colten Franco MD, 600 mg at 01/12/20 0919  •  HYDROcodone-homatropine (HYCODAN) 5-1.5 MG/5ML syrup 5 mL, 5 mL, Oral, Q4H, Ina Adamson APRN, 5 mL at 01/12/20 1601  •  insulin aspart (novoLOG) injection 0-7 Units, 0-7 Units, Subcutaneous, 4x Daily AC & at Bedtime, Ina Adamson APRN, 2 Units at 01/10/20 2048  •  ipratropium-albuterol (DUO-NEB) nebulizer solution 3 mL, 3 mL, Nebulization, Q6H While Awake - RT, José Patterson MD, 3 mL at 01/12/20 1226  •  levoFLOXacin (LEVAQUIN) tablet 750 mg, 750 mg, Oral, Q24H, Ina Adamson APRN, 750 mg at 01/11/20 1832  •  lidocaine (LIDODERM) 5 % 1 patch, 1 patch, Transdermal, Daily PRN, Colten Franco MD  •  melatonin tablet 5 mg, 5 mg, Oral, Nightly PRN, Colten Franco MD, 5 mg at 01/10/20 0000  •  menthol (TOPICAL ANALGESIC) 2.5 % gel, , Topical, Q1H PRN, Colten Franco MD  •  ondansetron (ZOFRAN) tablet 4 mg, 4 mg, Oral, Q6H PRN **OR** ondansetron (ZOFRAN) injection 4 mg, 4 mg, Intravenous, Q6H PRN, Colten Franco MD  •  potassium chloride (K-DUR,KLOR-CON) CR tablet 20 mEq, 20 mEq, Oral, Daily, Colten Franco MD, 20 mEq at 01/12/20 0919  •  predniSONE (DELTASONE) tablet 40 mg, 40 mg, Oral, Daily With Breakfast, Oc Ko MD, 40 mg at 01/12/20 0922  •  senna-docusate sodium (SENOKOT-S) 8.6-50 MG tablet 2 tablet, 2 tablet, Oral, Nightly PRN, Colten Franco MD  •  sodium chloride 0.9 % flush 10 mL, 10 mL, Intravenous, PRN, Colten Franco MD, 10 mL at 01/11/20 2054  •  traZODone (DESYREL) tablet 100 mg, 100 mg, Oral, Nightly, Colten Franco MD, 100 mg at 01/11/20 2051  •  venlafaxine (EFFEXOR) tablet 37.5 mg, 37.5 mg, Oral, BID With Meals, Colten Franco MD, 37.5 mg at 01/12/20 0922  •   vitamin B-12 (CYANOCOBALAMIN) tablet 1,000 mcg, 1,000 mcg, Oral, Daily, Ina Adamson, APRN, 1,000 mcg at 01/12/20 0922      Assessment/Plan     1.  Acute Hypoxic Respiratory Failure: I suspect she is going to need 4L w/ exertion.  Walk test pending.  Will also complete overnight oximetry tonight.  Repeat ABG looks good.  Discussed w/ Dr. Calderón.     2.  Right Upper Lobe Pneumonia: Not septic.  Continue Levaquin to complete 10-day course.     3.  EKG changes: I've reviewed this morning's tracing.  Discussed w/ Dr. Peres.  No further work-up.     4.  Steroid-Induced Hyperglycemia: A1c was 5.0%.  No acute issues.     5.  Normocytic Anemia w/ B12 and Folic acid Deficiency: Minimize blood draws.  Hgb stable last check.     6.  RA: No acute issues.  No DMARD therapy.     7.  Anxiety: Pleasant on exam.  Nothing acute.     8.  Hx/O Atrial Fibrillation: Regular on exam.  Likely was due to acute illness.    Plan for disposition: Home tomorrow.    José Patterson MD  01/12/20  5:33 PM

## 2020-01-13 ENCOUNTER — APPOINTMENT (OUTPATIENT)
Dept: ULTRASOUND IMAGING | Facility: HOSPITAL | Age: 60
End: 2020-01-13

## 2020-01-13 VITALS
RESPIRATION RATE: 22 BRPM | TEMPERATURE: 97.6 F | OXYGEN SATURATION: 91 % | HEART RATE: 92 BPM | WEIGHT: 175.4 LBS | DIASTOLIC BLOOD PRESSURE: 72 MMHG | HEIGHT: 67 IN | BODY MASS INDEX: 27.53 KG/M2 | SYSTOLIC BLOOD PRESSURE: 120 MMHG

## 2020-01-13 LAB
ANION GAP SERPL CALCULATED.3IONS-SCNC: 8.1 MMOL/L (ref 5–15)
BASOPHILS # BLD AUTO: 0 10*3/MM3 (ref 0–0.2)
BASOPHILS NFR BLD AUTO: 0 % (ref 0–1.5)
BUN BLD-MCNC: 18 MG/DL (ref 6–20)
BUN/CREAT SERPL: 29 (ref 7–25)
CALCIUM SPEC-SCNC: 8.2 MG/DL (ref 8.6–10.5)
CHLORIDE SERPL-SCNC: 102 MMOL/L (ref 98–107)
CO2 SERPL-SCNC: 28.9 MMOL/L (ref 22–29)
CREAT BLD-MCNC: 0.62 MG/DL (ref 0.57–1)
DEPRECATED RDW RBC AUTO: 52.6 FL (ref 37–54)
EOSINOPHIL # BLD AUTO: 0.52 10*3/MM3 (ref 0–0.4)
EOSINOPHIL NFR BLD AUTO: 2.2 % (ref 0.3–6.2)
ERYTHROCYTE [DISTWIDTH] IN BLOOD BY AUTOMATED COUNT: 15.4 % (ref 12.3–15.4)
GFR SERPL CREATININE-BSD FRML MDRD: 99 ML/MIN/1.73
GLUCOSE BLD-MCNC: 99 MG/DL (ref 65–99)
GLUCOSE BLDC GLUCOMTR-MCNC: 84 MG/DL (ref 70–130)
GLUCOSE BLDC GLUCOMTR-MCNC: 90 MG/DL (ref 70–130)
HCT VFR BLD AUTO: 33.1 % (ref 34–46.6)
HGB BLD-MCNC: 10.7 G/DL (ref 12–15.9)
IMM GRANULOCYTES # BLD AUTO: 4.23 10*3/MM3 (ref 0–0.05)
IMM GRANULOCYTES NFR BLD AUTO: 18.2 % (ref 0–0.5)
LYMPHOCYTES # BLD AUTO: 2.4 10*3/MM3 (ref 0.7–3.1)
LYMPHOCYTES NFR BLD AUTO: 10.3 % (ref 19.6–45.3)
MCH RBC QN AUTO: 29.9 PG (ref 26.6–33)
MCHC RBC AUTO-ENTMCNC: 32.3 G/DL (ref 31.5–35.7)
MCV RBC AUTO: 92.5 FL (ref 79–97)
MONOCYTES # BLD AUTO: 1.98 10*3/MM3 (ref 0.1–0.9)
MONOCYTES NFR BLD AUTO: 8.5 % (ref 5–12)
NEUTROPHILS # BLD AUTO: 14.15 10*3/MM3 (ref 1.7–7)
NEUTROPHILS NFR BLD AUTO: 60.8 % (ref 42.7–76)
NRBC BLD AUTO-RTO: 0 /100 WBC (ref 0–0.2)
PLATELET # BLD AUTO: 541 10*3/MM3 (ref 140–450)
PMV BLD AUTO: 8.9 FL (ref 6–12)
POTASSIUM BLD-SCNC: 4.4 MMOL/L (ref 3.5–5.2)
RBC # BLD AUTO: 3.58 10*6/MM3 (ref 3.77–5.28)
SODIUM BLD-SCNC: 139 MMOL/L (ref 136–145)
WBC NRBC COR # BLD: 23.28 10*3/MM3 (ref 3.4–10.8)

## 2020-01-13 PROCEDURE — 82962 GLUCOSE BLOOD TEST: CPT

## 2020-01-13 PROCEDURE — 94799 UNLISTED PULMONARY SVC/PX: CPT

## 2020-01-13 PROCEDURE — 99239 HOSP IP/OBS DSCHRG MGMT >30: CPT | Performed by: NURSE PRACTITIONER

## 2020-01-13 PROCEDURE — 80048 BASIC METABOLIC PNL TOTAL CA: CPT | Performed by: NURSE PRACTITIONER

## 2020-01-13 PROCEDURE — 93971 EXTREMITY STUDY: CPT

## 2020-01-13 PROCEDURE — 85025 COMPLETE CBC W/AUTO DIFF WBC: CPT | Performed by: NURSE PRACTITIONER

## 2020-01-13 RX ORDER — CHOLECALCIFEROL (VITAMIN D3) 125 MCG
5 CAPSULE ORAL NIGHTLY PRN
Status: ON HOLD
Start: 2020-01-13 | End: 2021-11-29

## 2020-01-13 RX ORDER — LEVOFLOXACIN 750 MG/1
750 TABLET ORAL EVERY 24 HOURS
Qty: 1 TABLET | Refills: 0 | Status: SHIPPED | OUTPATIENT
Start: 2020-01-13 | End: 2020-01-14

## 2020-01-13 RX ORDER — IPRATROPIUM BROMIDE AND ALBUTEROL SULFATE 2.5; .5 MG/3ML; MG/3ML
3 SOLUTION RESPIRATORY (INHALATION)
Qty: 360 ML | Refills: 1 | Status: SHIPPED | OUTPATIENT
Start: 2020-01-13 | End: 2020-05-27

## 2020-01-13 RX ORDER — LIDOCAINE 50 MG/G
1 PATCH TOPICAL DAILY PRN
Qty: 30 EACH | Refills: 0 | Status: SHIPPED | OUTPATIENT
Start: 2020-01-13 | End: 2020-01-23

## 2020-01-13 RX ORDER — FOLIC ACID 1 MG/1
1 TABLET ORAL DAILY
Qty: 30 TABLET | Refills: 1 | Status: ON HOLD | OUTPATIENT
Start: 2020-01-14 | End: 2021-11-29

## 2020-01-13 RX ORDER — BENZONATATE 200 MG/1
200 CAPSULE ORAL 3 TIMES DAILY PRN
Qty: 30 CAPSULE | Refills: 0 | Status: ON HOLD | OUTPATIENT
Start: 2020-01-13 | End: 2021-11-29

## 2020-01-13 RX ORDER — POTASSIUM CHLORIDE 20 MEQ/1
20 TABLET, EXTENDED RELEASE ORAL DAILY
Qty: 30 TABLET | Refills: 0 | Status: ON HOLD | OUTPATIENT
Start: 2020-01-14 | End: 2021-11-29

## 2020-01-13 RX ORDER — GUAIFENESIN 600 MG/1
600 TABLET, EXTENDED RELEASE ORAL EVERY 12 HOURS SCHEDULED
Qty: 40 TABLET | Refills: 0 | Status: ON HOLD | OUTPATIENT
Start: 2020-01-13 | End: 2021-11-29

## 2020-01-13 RX ADMIN — BUDESONIDE AND FORMOTEROL FUMARATE DIHYDRATE 2 PUFF: 160; 4.5 AEROSOL RESPIRATORY (INHALATION) at 06:53

## 2020-01-13 RX ADMIN — GUAIFENESIN 600 MG: 600 TABLET, EXTENDED RELEASE ORAL at 08:37

## 2020-01-13 RX ADMIN — IPRATROPIUM BROMIDE AND ALBUTEROL SULFATE 3 ML: .5; 3 SOLUTION RESPIRATORY (INHALATION) at 14:10

## 2020-01-13 RX ADMIN — FOLIC ACID 1 MG: 1 TABLET ORAL at 08:37

## 2020-01-13 RX ADMIN — IPRATROPIUM BROMIDE AND ALBUTEROL SULFATE 3 ML: .5; 3 SOLUTION RESPIRATORY (INHALATION) at 06:53

## 2020-01-13 RX ADMIN — VENLAFAXINE HYDROCHLORIDE 37.5 MG: 37.5 TABLET ORAL at 08:37

## 2020-01-13 RX ADMIN — HYDROCODONE BITARTRATE AND HOMATROPINE METHYLBROMIDE 5 ML: 5; 1.5 SOLUTION ORAL at 12:10

## 2020-01-13 RX ADMIN — CYANOCOBALAMIN TAB 1000 MCG 1000 MCG: 1000 TAB at 08:37

## 2020-01-13 RX ADMIN — HYDROCODONE BITARTRATE AND HOMATROPINE METHYLBROMIDE 5 ML: 5; 1.5 SOLUTION ORAL at 03:45

## 2020-01-13 RX ADMIN — POTASSIUM CHLORIDE 20 MEQ: 20 TABLET, EXTENDED RELEASE ORAL at 08:37

## 2020-01-13 RX ADMIN — HYDROCODONE BITARTRATE AND HOMATROPINE METHYLBROMIDE 5 ML: 5; 1.5 SOLUTION ORAL at 06:43

## 2020-01-13 NOTE — PLAN OF CARE
Problem: Pain, Chronic (Adult)  Goal: Identify Related Risk Factors and Signs and Symptoms  Description  Related risk factors and signs and symptoms are identified upon initiation of Human Response Clinical Practice Guideline (CPG).  Outcome: Ongoing (interventions implemented as appropriate)  Note:   No verbalization of pain this shift, rested comfortably, VSS  Goal: Acceptable Pain/Comfort Level and Functional Ability  Description  Patient will demonstrate the desired outcomes by discharge/transition of care.  Outcome: Ongoing (interventions implemented as appropriate)     Problem: Patient Care Overview  Goal: Plan of Care Review  Outcome: Ongoing (interventions implemented as appropriate)  Goal: Individualization and Mutuality  Outcome: Ongoing (interventions implemented as appropriate)  Goal: Discharge Needs Assessment  Outcome: Ongoing (interventions implemented as appropriate)  Goal: Interprofessional Rounds/Family Conf  Outcome: Ongoing (interventions implemented as appropriate)     Problem: Pneumonia (Adult)  Description  Prevent and manage potential problems includin. fluid/electrolyte imbalance  2. infection progression  3. respiratory compromise  Goal: Signs and Symptoms of Listed Potential Problems Will be Absent, Minimized or Managed (Pneumonia)  Description  Signs and symptoms of listed potential problems will be absent, minimized or managed by discharge/transition of care (reference Pneumonia (Adult) CPG).  Outcome: Ongoing (interventions implemented as appropriate)     Problem: Infection, Risk/Actual (Adult)  Goal: Identify Related Risk Factors and Signs and Symptoms  Description  Related risk factors and signs and symptoms are identified upon initiation of Human Response Clinical Practice Guideline (CPG).  Outcome: Ongoing (interventions implemented as appropriate)  Goal: Infection Prevention/Resolution  Description  Patient will demonstrate the desired outcomes by discharge/transition of  care.  Outcome: Ongoing (interventions implemented as appropriate)

## 2020-01-13 NOTE — PLAN OF CARE
Problem: Patient Care Overview  Goal: Plan of Care Review  Outcome: Ongoing (interventions implemented as appropriate)  Flowsheets (Taken 1/13/2020 0120)  Progress: improving  Plan of Care Reviewed With: patient     Problem: Patient Care Overview  Goal: Individualization and Mutuality  Outcome: Ongoing (interventions implemented as appropriate)     Problem: Pneumonia (Adult)  Intervention: Maximize Oxygenation/Ventilation/Perfusion  Flowsheets (Taken 1/13/2020 0120)  Head of Bed (HOB): HOB elevated  Airway/Ventilation Management: airway patency maintained; pulmonary hygiene promoted; humidification applied  Note:   On Overnight Pulse Ox Study tonight, requiring O2 2L NC.

## 2020-01-13 NOTE — NURSING NOTE
Continued Stay Note  JOHN Hammonds     Patient Name: Akila Amezcua  MRN: [id number]  Today's Date: 1/13/2020    Admit Date: 1/6/2020    Discharge Plan     Row Name 01/13/20 1404       Plan    Plan  plan home, assess needs    Patient/Family in Agreement with Plan  yes    Plan Comments  Follow up visit with patient, she states family is bring a portable 02 tank from home for her to use at dc.  provides her with a pulse ox machine to monitor her 02 sats at home. She denies additional needs and states she is feeling better and ready for dc. Will follow through dc.        Discharge Codes    No documentation.       Expected Discharge Date and Time     Expected Discharge Date Expected Discharge Time    Jan 13, 2020             Shan Park RN

## 2020-01-13 NOTE — DISCHARGE SUMMARY
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     The right sided pain is felt in her scapula, top ridge of her shoulder, lateral chest wall, and anterior chest. Worse w/ cough or deep breath, unable to get her full breath due to pain.  She had been breathing well prior to this. Some chills, no definitive fevers. Has had some sputum, but hard to cough up due to pain. No Hemoptysis or hematemesis. No Night sweats. No unintentional wt loss.      She was admitted this Fall from 11/6 to 11/13 for PNA w/ RSV, w/ an admission complicated by Afib w/ RVR. She was discharged w/ 2L Home o2 which she now uses PRN when she feels SOA. (Does not have a pulse ox at home). Has stopped taking Xarelto, Amiodarone and Cardizem. PCP note does not indicate if they were stopped there. Was supposed to have follow up with Cardiology, and Pulm. Do not see any follow up notes  in the system.     ROS negative for LE edema, palpitations, left sided chest pain, diaphoresis, skin changes.\"  Hospital Course Summary/Primary Discharge Diagnoses:  Ruled out sepsis 2/2 right upper lobe pneumonia:  AHR F:  AECOPD/bullous emphysema:  Right side pleuritic chest pain:  Steroid-induced leukocytosis:  Recent RSV infection, AE COPD  [facility] MA: Pulmonary followed  Initially on IV steroid, antibiotics, transitioned to oral, course of Levaquin nearly complete, prednisone course completed  Home on home Breo and duo nebs  Resting oxygen saturations showed need for 2 L chronic  Overnight oximetry showed need for 2 L with sleep  Walking oximetry showed need for 4 L with exertion  Needs follow-up with pulmonary in 4-6 weeks with CXR " prior to appointment  Follow-up PCP 1 week  Follow-up pulmonary 4 weeks  Secondary Discharge Diagnoses:   Steroid-induced hyperglycemia: A1c 5.00%  Received low-dose sliding scale insulin with Accu-Cheks  No acute issues     Normocytic anemia, folic acid deficiency, B12 insufficiency:   Thrombocytosis: Platelets 541,000  No active blood loss noted, folic acid and B12 supplements added  Hemoglobin stable at 10.6  Follow-up PCP 1 week for repeat labs     RA: No DMARDS, no acute issues currently     Elevated alkaline phosphatase:   Mild, no abdominal concerns, unclear significance, recommend repeat when follow-up with PCP     Electrolyte imbalance:   Received replacement protocols, started daily potassium supplementation this admission with potassium 4.4 today  Follow-up PCP     DDD with chronic pain: No current acute issues, recommend OTC products, heat/ice as needed, lidocaine patches sent to pharmacy  Follow-up PCP 1 week     History of A. fib RVR: Nothing acute currently, NSR     Anxiety:  H/O possible seizure:  No acute issues, remained on home doses of trazodone and Effexor    Right hip pain 2/2 ground-level fall: improved, POA, no acute findings on x-ray    LUE edema:  Venous duplex negative for venous thrombosis  Supportive care with ice/heat as needed    PCP  Patient Care Team:  Justo Browning APRN as PCP - General (Family Medicine)    Consults:   Consults     Date and Time Order Name Status Description    1/11/2020 1433 Inpatient Cardiology Consult Completed     1/7/2020 0030 Inpatient Pulmonology Consult Completed         Operations and Procedures Performed:     Xr Ribs Right With Pa Chest    Result Date: 1/7/2020  Narrative: XR RIBS RIGHT W PA CHEST-: 1/7/2020 8:09 AM  INDICATION: 59-year-old female with right sided rib pain after coughing 3 days  COMPARISON: 01/06/2020.  FINDINGS: PA view of the chest and oblique views of the right ribs. Stable cardiac silhouette and scoliosis. Biapical pleural  thickening persists. There are postoperative changes projecting over the lower and upper lobes on the right and there is dense masslike consolidation throughout the right upper lobe. Patchy opacities in the mid and lower lung zones do not appear appreciably changed. There is no pneumothorax. Chronic blunting of the CP angles.  Chronic appearing rib deformity on the right involving the posterior right sixth and seventh ribs. No distinct acute appearing fracture.      Impression:  1. No evidence of acute rib fracture or pneumothorax. 2. No other significant interval change. Imaging follow-up to clearing recommended for the masslike consolidation in the right upper lobe.  This report was finalized on 1/7/2020 8:57 AM by Dr. Petr Jalloh MD.      Ct Chest Without Contrast    Result Date: 1/7/2020  Narrative: CT Chest WO INDICATION: COPD exacerbation with lobar pneumonia. Systemic inflammatory response syndrome. History of recurrent pneumonia. TECHNIQUE: CT of the thorax without IV contrast. Coronal and sagittal reconstructions were obtained.  Radiation dose reduction techniques included automated exposure control or exposure modulation based on body size. Count of known CT and cardiac nuc med studies performed in previous 12 months: 0. COMPARISON: Chest radiograph 1/6/2020 and 11/7/2019 FINDINGS: Extensive airspace disease and consolidation right upper lobe which is new from 11/7/2019. Interval resolution of left upper lobe pneumonia when compared to the November study. Background diffuse lung disease with cystic changes and fibrosis predominantly within the lung apices but also scattered areas of fibrosis within the lung bases. Surgical clips right lower lobe. No effusions. Trachea and bronchi are unremarkable. Prominent cystic changes also noted anterior right middle lobe and also within the lingular segment. Patchy density midportion left lower lobe could represent focal scarring or interstitial infiltrate but no  dense consolidation. This measures up to 5 cm. Mild cardiomegaly. Aorta and pulmonary vessels are unremarkable. Mediastinal adenopathy most likely reactive due to chronic lung disease and pneumonia. Visualized upper abdomen unremarkable. Osseous structures and thoracic inlet appear normal.     Impression: Extensive airspace disease and consolidation right upper lobe compatible with lobar pneumonia which appears superimposed on background diffuse lung disease with emphysema and fibrosis. Patchy interstitial infiltrate midportion left lower lobe could represent background fibrosis but interstitial infiltrate not excluded. No consolidation. Signer Name: BERLIN Rojas MD  Signed: 1/7/2020 5:49 PM  Workstation Name: [email or web address]  [facility]    Xr Chest 1 View    Result Date: 1/6/2020  Narrative: CR Chest 1 Vw INDICATION: 59-year-old female with cough and shortness of air for 4 days. Current smoker. COMPARISON:  Chest 11/12/2019 FINDINGS: Single portable AP view(s) of the chest.  Dense infiltrate throughout the right upper lobe, concerning for pneumonia. Biapical pleural thickening. Subsegmental left mid lung atelectasis. Emphysema. No large pleural effusions. No pneumothorax. Heart size and mediastinum are within expected limits. Pulmonary vasculature unremarkable.     Impression: Dense infiltrate throughout the right upper lobe, concerning for pneumonia. Follow-up to resolution is recommended. Signer Name: Arron Guan MD  Signed: 1/6/2020 5:43 PM  Workstation Name: [email or web address]  [facility]    Allergies:  is allergic to penicillins.    Hans  Tramadol 9/2019 per report of 1/6/20, reviewed by me    Discharge Medications:     Discharge Medications      New Medications      Instructions Start Date   benzonatate 200 MG capsule  Commonly known as:  TESSALON   200 mg, Oral, 3 Times Daily PRN      cyanocobalamin 1000 MCG tablet  Commonly known as:  VITAMIN B-12   1,000  mcg, Oral, Daily   Start Date:  January 14, 2020     folic acid 1 MG tablet  Commonly known as:  FOLVITE   1 mg, Oral, Daily   Start Date:  January 14, 2020     guaiFENesin 600 MG 12 hr tablet  Commonly known as:  MUCINEX   600 mg, Oral, Every 12 Hours Scheduled      HYDROcodone-homatropine 5-1.5 MG/5ML syrup  Commonly known as:  HYCODAN   5 mL, Oral, Every 4 Hours PRN      levoFLOXacin 750 MG tablet  Commonly known as:  LEVAQUIN   750 mg, Oral, Every 24 Hours      lidocaine 5 %  Commonly known as:  LIDODERM   1 patch, Transdermal, Daily PRN      melatonin 5 MG tablet tablet   5 mg, Oral, Nightly PRN      menthol 2.5 % gel gel  Commonly known as:  TOPICAL ANALGESIC   Topical, Every 1 Hour PRN      potassium chloride 20 MEQ CR tablet  Commonly known as:  K-DUR,KLOR-CON   20 mEq, Oral, Daily   Start Date:  January 14, 2020        Changes to Medications      Instructions Start Date   ipratropium-albuterol 0.5-2.5 mg/3 ml nebulizer  Commonly known as:  DUO-NEB  What changed:    · when to take this  · reasons to take this  · additional instructions   3 mL, Nebulization, Every 6 Hours While Awake - RT, J 44.9, J 18.9         Continue These Medications      Instructions Start Date   albuterol (2.5 MG/3ML) 0.083% nebulizer solution  Commonly known as:  PROVENTIL   2.5 mg, Nebulization, Every 4 Hours PRN      Bath Bench with Back misc   1 Units, Does not apply, Daily PRN      BREO ELLIPTA IN   1 puff, Inhalation, Daily      sodium chloride 0.65 % nasal spray  Commonly known as:  OCEAN NASAL SPRAY   1 spray, Nasal, As Needed      traMADol 50 MG tablet  Commonly known as:  ULTRAM   TAKE 1 TABLET BY MOUTH 3 TIMES A DAY AS NEEDED FOR PAIN      traZODone 50 MG tablet  Commonly known as:  DESYREL    mg, Oral, Nightly      venlafaxine 37.5 MG tablet  Commonly known as:  EFFEXOR   37.5 mg, Oral, 2 Times Daily         Stop These Medications    guaiFENesin-dextromethorphan 100-10 MG/5ML syrup  Commonly known as:  ROBITUSSIN DM      meloxicam 15 MG tablet  Commonly known as:  MOBIC     promethazine-dextromethorphan 6.25-15 MG/5ML syrup  Commonly known as:  PROMETHAZINE-DM     rivaroxaban 20 MG tablet  Commonly known as:  XARELTO            Last Lab Results:   Lab Results (most recent)     Procedure Component Value Units Date/Time    Basic Metabolic Panel [[id number]]  (Abnormal) Collected:  01/13/20 0427    Specimen:  Blood Updated:  01/13/20 0524     Glucose 99 mg/dL      BUN 18 mg/dL      Creatinine 0.62 mg/dL      Sodium 139 mmol/L      Potassium 4.4 mmol/L      Chloride 102 mmol/L      CO2 28.9 mmol/L      Calcium 8.2 mg/dL      eGFR Non African Amer 99 mL/min/1.73      BUN/Creatinine Ratio 29.0     Anion Gap 8.1 mmol/L     Narrative:       GFR Normal >60  Chronic Kidney Disease <60  Kidney Failure <15      CBC & Differential [[id number]] Collected:  01/13/20 0427    Specimen:  Blood Updated:  01/13/20 0507    Narrative:       The following orders were created for panel order CBC & Differential.  Procedure                               Abnormality         Status                     ---------                               -----------         ------                     CBC Auto Differential[[id number]]        Abnormal            Final result                 Please view results for these tests on the individual orders.    CBC Auto Differential [[id number]]  (Abnormal) Collected:  01/13/20 0427    Specimen:  Blood Updated:  01/13/20 0507     WBC 23.28 10*3/mm3      RBC 3.58 10*6/mm3      Hemoglobin 10.7 g/dL      Hematocrit 33.1 %      MCV 92.5 fL      MCH 29.9 pg      MCHC 32.3 g/dL      RDW 15.4 %      RDW-SD 52.6 fl      MPV 8.9 fL      Platelets 541 10*3/mm3      Neutrophil % 60.8 %      Lymphocyte % 10.3 %      Monocyte % 8.5 %      Eosinophil % 2.2 %      Basophil % 0.0 %      Immature Grans % 18.2 %      Neutrophils, Absolute 14.15 10*3/mm3      Lymphocytes, Absolute 2.40 10*3/mm3      Monocytes, Absolute 1.98 10*3/mm3      Eosinophils,  Absolute 0.52 10*3/mm3      Basophils, Absolute 0.00 10*3/mm3      Immature Grans, Absolute 4.23 10*3/mm3      nRBC 0.0 /100 WBC     POC Glucose Once [[id number]]  (Abnormal) Collected:  01/12/20 2030    Specimen:  Blood Updated:  01/12/20 2040     Glucose 135 mg/dL     POC Glucose Once [[id number]]  (Normal) Collected:  01/12/20 1649    Specimen:  Blood Updated:  01/12/20 1658     Glucose 118 mg/dL     Blood Gas, Arterial [[id number]]  (Abnormal) Collected:  01/12/20 0935    Specimen:  Arterial Blood Updated:  01/12/20 0940     Site Right Radial     Jose L's Test Positive     pH, Arterial 7.431 pH units      pCO2, Arterial 44.4 mm Hg      pO2, Arterial 70.4 mm Hg      Comment: 84 Value below reference range        HCO3, Arterial 29.5 mmol/L      Comment: 83 Value above reference range        Base Excess, Arterial 4.6 mmol/L      Comment: 83 Value above reference range        O2 Saturation, Arterial 94.5 %      Hemoglobin, Blood Gas 11.2 g/dL      Temperature 37.0 C      Barometric Pressure for Blood Gas 747 mmHg      Modality Nasal Cannula     Flow Rate 2.0 lpm      Ventilator Mode NA     Comment: Meter: [id number]     :  [id number]        pCO2, Temperature Corrected 44.4 mm Hg      pH, Temp Corrected 7.431 pH Units      pO2, Temperature Corrected 70.4 mm Hg     Basic Metabolic Panel [[id number]]  (Abnormal) Collected:  01/12/20 0330    Specimen:  Blood Updated:  01/12/20 0417     Glucose 109 mg/dL      BUN 21 mg/dL      Creatinine 0.85 mg/dL      Sodium 140 mmol/L      Potassium 4.7 mmol/L      Chloride 105 mmol/L      CO2 25.8 mmol/L      Calcium 8.4 mg/dL      eGFR Non African Amer 69 mL/min/1.73      BUN/Creatinine Ratio 24.7     Anion Gap 9.2 mmol/L     Narrative:       GFR Normal >60  Chronic Kidney Disease <60  Kidney Failure <15      CBC & Differential [[id number]] Collected:  01/12/20 0330    Specimen:  Blood Updated:  01/12/20 0400    Narrative:       The following orders were created for panel  order CBC & Differential.  Procedure                               Abnormality         Status                     ---------                               -----------         ------                     CBC Auto Differential[[id number]]        Abnormal            Final result                 Please view results for these tests on the individual orders.    CBC Auto Differential [[id number]]  (Abnormal) Collected:  01/12/20 0330    Specimen:  Blood Updated:  01/12/20 0400     WBC 23.53 10*3/mm3      RBC 3.60 10*6/mm3      Hemoglobin 10.6 g/dL      Hematocrit 33.5 %      MCV 93.1 fL      MCH 29.4 pg      MCHC 31.6 g/dL      RDW 15.5 %      RDW-SD 53.1 fl      MPV 9.4 fL      Platelets 503 10*3/mm3      Neutrophil % 59.8 %      Lymphocyte % 10.2 %      Monocyte % 8.5 %      Eosinophil % 1.3 %      Basophil % 0.0 %      Immature Grans % 20.2 %      Neutrophils, Absolute 14.08 10*3/mm3      Lymphocytes, Absolute 2.40 10*3/mm3      Monocytes, Absolute 1.99 10*3/mm3      Eosinophils, Absolute 0.30 10*3/mm3      Basophils, Absolute 0.01 10*3/mm3      Immature Grans, Absolute 4.75 10*3/mm3      nRBC 0.1 /100 WBC     Blood Culture - Blood, Wrist, Left [[id number]] Collected:  01/06/20 1723    Specimen:  Blood from Wrist, Left Updated:  01/11/20 1745     Blood Culture No growth at 5 days    Blood Culture - Blood, Arm, Right [[id number]] Collected:  01/06/20 1709    Specimen:  Blood from Arm, Right Updated:  01/11/20 1730     Blood Culture No growth at 5 days    Troponin [[id number]]  (Normal) Collected:  01/11/20 0340    Specimen:  Blood Updated:  01/11/20 1532     Troponin T <0.010 ng/mL     Narrative:       Troponin T Reference Range:  <= 0.03 ng/mL-   Negative for AMI  >0.03 ng/mL-     Abnormal for myocardial necrosis.  Clinicians would have to utilize clinical acumen, EKG, Troponin and serial changes to determine if it is an Acute Myocardial Infarction or myocardial injury due to an underlying chronic condition.       Results may  be falsely decreased if patient taking Biotin.      Manual Differential [[id number]]  (Abnormal) Collected:  01/11/20 0340    Specimen:  Blood Updated:  01/11/20 0553     Neutrophil % 77.0 %      Lymphocyte % 11.0 %      Monocyte % 5.0 %      Bands %  1.0 %      Metamyelocyte % 2.0 %      Myelocyte % 2.0 %      Atypical Lymphocyte % 2.0 %      Neutrophils Absolute 14.15 10*3/mm3      Lymphocytes Absolute 2.00 10*3/mm3      Monocytes Absolute 0.91 10*3/mm3      Polychromasia Slight/1+     WBC Morphology Normal     Platelet Morphology Normal    Procalcitonin [[id number]]  (Normal) Collected:  01/11/20 0340    Specimen:  Blood Updated:  01/11/20 0458     Procalcitonin 0.15 ng/mL     Narrative:       Results may be falsely decreased if patient taking Biotin.     Respiratory Culture - Sputum, Cough [[id number]] Collected:  01/08/20 1313    Specimen:  Sputum from Cough Updated:  01/10/20 1008     Respiratory Culture Light growth (2+) Normal Respiratory Ivy     Gram Stain Moderate (3+) WBCs seen      Rare (1+) Epithelial cells seen      Few (2+) Gram positive cocci in pairs, chains and clusters    MRSA Screen Culture - Swab, Nares [[id number]]  (Normal) Collected:  01/08/20 1438    Specimen:  Swab from Nares Updated:  01/09/20 1838     MRSA SCREEN CX No Methicillin Resistant Staphylococcus aureus isolated    Folate [[id number]]  (Abnormal) Collected:  01/09/20 0315    Specimen:  Blood Updated:  01/09/20 1639     Folate 3.26 ng/mL     Narrative:       Results may be falsely increased if patient taking Biotin.      Vitamin B12 [[id number]]  (Normal) Collected:  01/09/20 0315    Specimen:  Blood Updated:  01/09/20 1639     Vitamin B-12 317 pg/mL     Narrative:       Results may be falsely increased if patient taking Biotin.      Iron Profile [[id number]]  (Abnormal) Collected:  01/09/20 0315    Specimen:  Blood Updated:  01/09/20 1241     Iron 72 mcg/dL      Iron Saturation 39 %      UIBC 115 mcg/dL      TIBC 187 mcg/dL      Ferritin [[id number]]  (Abnormal) Collected:  01/09/20 0315    Specimen:  Blood Updated:  01/09/20 1227     Ferritin 685.00 ng/mL     Narrative:       Results may be falsely decreased if patient taking Biotin.      Procalcitonin [[id number]]  (Abnormal) Collected:  01/09/20 0315    Specimen:  Blood Updated:  01/09/20 0646     Procalcitonin 0.57 ng/mL     Narrative:       Results may be falsely decreased if patient taking Biotin.     Hemoglobin A1c [[id number]]  (Normal) Collected:  01/08/20 0849    Specimen:  Blood Updated:  01/08/20 1249     Hemoglobin A1C 5.00 %     Narrative:       Hemoglobin A1C Ranges:    Increased Risk for Diabetes  5.7% to 6.4%  Diabetes                     >= 6.5%  Diabetic Goal                < 7.0%    TSPOT [[id number]] Collected:  01/06/20 2045    Specimen:  Blood Updated:  01/08/20 1203     TSPOTTB Negative     T-SPOT Panel A 0     T-SPOT Panel B 0     TSPOT NIL CONTROL INTERP Passed     TSPOT POS CONTROL INTERP Passed    Vancomycin, Random [[id number]]  (Normal) Collected:  01/08/20 0849    Specimen:  Blood Updated:  01/08/20 0927     Vancomycin Random 16.60 mcg/mL     Respiratory Panel, PCR - Swab, Nasopharynx [[id number]]  (Normal) Collected:  01/07/20 0714    Specimen:  Swab from Nasopharynx Updated:  01/07/20 1211     ADENOVIRUS, PCR Not Detected     Coronavirus 229E Not Detected     Coronavirus HKU1 Not Detected     Coronavirus NL63 Not Detected     Coronavirus OC43 Not Detected     Human Metapneumovirus Not Detected     Human Rhinovirus/Enterovirus Not Detected     Influenza B PCR Not Detected     Parainfluenza Virus 1 Not Detected     Parainfluenza Virus 2 Not Detected     Parainfluenza Virus 3 Not Detected     Parainfluenza Virus 4 Not Detected     Bordetella pertussis pcr Not Detected     Influenza A H1 2009 PCR Not Detected     Chlamydophila pneumoniae PCR Not Detected     Mycoplasma pneumo by PCR Not Detected     Influenza A PCR Not Detected     Influenza A H3 Not Detected      Influenza A H1 Not Detected     RSV, PCR Not Detected     Bordetella parapertussis PCR Not Detected    Legionella Antigen, Urine - Urine, Urine, Clean Catch [[id number]]  (Normal) Collected:  01/07/20 1145    Specimen:  Urine, Clean Catch Updated:  01/07/20 1206     LEGIONELLA ANTIGEN, URINE Negative    S. Pneumo Ag Urine or CSF - Urine, Urine, Clean Catch [[id number]]  (Normal) Collected:  01/07/20 1145    Specimen:  Urine, Clean Catch Updated:  01/07/20 1206     Strep Pneumo Ag Negative    Comprehensive Metabolic Panel [[id number]]  (Abnormal) Collected:  01/07/20 0430    Specimen:  Blood Updated:  01/07/20 0621     Glucose 137 mg/dL      BUN 11 mg/dL      Creatinine 0.71 mg/dL      Sodium 135 mmol/L      Potassium 4.1 mmol/L      Chloride 104 mmol/L      CO2 13.5 mmol/L      Calcium 8.5 mg/dL      Total Protein 6.7 g/dL      Albumin 2.30 g/dL      ALT (SGPT) 8 U/L      AST (SGOT) 11 U/L      Comment: Specimen hemolyzed.  Results may be affected.        Alkaline Phosphatase 137 U/L      Total Bilirubin 1.2 mg/dL      eGFR Non African Amer 85 mL/min/1.73      Globulin 4.4 gm/dL      A/G Ratio 0.5 g/dL      BUN/Creatinine Ratio 15.5     Anion Gap 17.5 mmol/L     Narrative:       GFR Normal >60  Chronic Kidney Disease <60  Kidney Failure <15      Phosphorus [[id number]]  (Normal) Collected:  01/07/20 0430    Specimen:  Blood Updated:  01/07/20 0620     Phosphorus 2.8 mg/dL     Magnesium [[id number]]  (Normal) Collected:  01/07/20 0430    Specimen:  Blood Updated:  01/07/20 0620     Magnesium 1.6 mg/dL     Vancomycin, Random [[id number]]  (Normal) Collected:  01/07/20 0430    Specimen:  Blood Updated:  01/07/20 0613     Vancomycin Random 11.40 mcg/mL     Respiratory Panel, PCR - Swab, Nasopharynx [[id number]]  (Normal) Collected:  01/06/20 1712    Specimen:  Swab from Nasopharynx Updated:  01/06/20 2037     ADENOVIRUS, PCR Not Detected     Coronavirus 229E Not Detected     Coronavirus HKU1 Not Detected     Coronavirus NL63  Not Detected     Coronavirus OC43 Not Detected     Human Metapneumovirus Not Detected     Human Rhinovirus/Enterovirus Not Detected     Influenza B PCR Not Detected     Parainfluenza Virus 1 Not Detected     Parainfluenza Virus 2 Not Detected     Parainfluenza Virus 3 Not Detected     Parainfluenza Virus 4 Not Detected     Bordetella pertussis pcr Not Detected     Influenza A H1 2009 PCR Not Detected     Chlamydophila pneumoniae PCR Not Detected     Mycoplasma pneumo by PCR Not Detected     Influenza A PCR Not Detected     Influenza A H3 Not Detected     Influenza A H1 Not Detected     RSV, PCR Not Detected     Bordetella parapertussis PCR Not Detected    [place] Draw [[id number]] Collected:  01/06/20 1709    Specimen:  Blood Updated:  01/06/20 1816    Narrative:       The following orders were created for panel order [place] Draw.  Procedure                               Abnormality         Status                     ---------                               -----------         ------                     Light Blue Top[[id number]]                                   Final result               Green Top (Gel)[[id number]]                                  Final result               Lavender Top[[id number]]                                     Final result               Gold Top - SST[[id number]]                                   Final result                 Please view results for these tests on the individual orders.    Green Top (Gel) [[id number]] Collected:  01/06/20 1709    Specimen:  Blood Updated:  01/06/20 1816     Extra Tube Hold for add-ons.     Comment: Auto resulted.       Gold Top - SST [[id number]] Collected:  01/06/20 1709    Specimen:  Blood Updated:  01/06/20 1816     Extra Tube Hold for add-ons.     Comment: Auto resulted.       Light Blue Top [[id number]] Collected:  01/06/20 1709    Specimen:  Blood Updated:  01/06/20 1816     Extra Tube hold for add-on     Comment: Auto resulted       Lavender Top [[id number]]  Collected:  01/06/20 1709    Specimen:  Blood Updated:  01/06/20 1816     Extra Tube hold for add-on     Comment: Auto resulted       Comprehensive Metabolic Panel [[id number]]  (Abnormal) Collected:  01/06/20 1709    Specimen:  Blood Updated:  01/06/20 1741     Glucose 112 mg/dL      BUN 14 mg/dL      Creatinine 0.84 mg/dL      Sodium 133 mmol/L      Potassium 3.4 mmol/L      Chloride 98 mmol/L      CO2 19.8 mmol/L      Calcium 9.0 mg/dL      Total Protein 7.7 g/dL      Albumin 3.40 g/dL      ALT (SGPT) 13 U/L      AST (SGOT) 16 U/L      Alkaline Phosphatase 159 U/L      Total Bilirubin 1.6 mg/dL      eGFR Non African Amer 70 mL/min/1.73      Globulin 4.3 gm/dL      A/G Ratio 0.8 g/dL      BUN/Creatinine Ratio 16.7     Anion Gap 15.2 mmol/L     Narrative:       GFR Normal >60  Chronic Kidney Disease <60  Kidney Failure <15      Troponin [[id number]]  (Normal) Collected:  01/06/20 1709    Specimen:  Blood Updated:  01/06/20 1740     Troponin T <0.010 ng/mL     Narrative:       Troponin T Reference Range:  <= 0.03 ng/mL-   Negative for AMI  >0.03 ng/mL-     Abnormal for myocardial necrosis.  Clinicians would have to utilize clinical acumen, EKG, Troponin and serial changes to determine if it is an Acute Myocardial Infarction or myocardial injury due to an underlying chronic condition.     Lactic Acid, Plasma [[id number]]  (Normal) Collected:  01/06/20 1709    Specimen:  Blood Updated:  01/06/20 1735     Lactate 1.2 mmol/L     Protime-INR [[id number]]  (Abnormal) Collected:  01/06/20 1709    Specimen:  Blood Updated:  01/06/20 1730     Protime 15.2 Seconds      INR 1.22    Narrative:       Therapeutic Ranges for INR: 2.0-3.0 (PT 20-30)                              2.5-3.5 (PT 25-34)    aPTT [[id number]]  (Abnormal) Collected:  01/06/20 1709    Specimen:  Blood Updated:  01/06/20 1730     PTT 38.3 seconds     Narrative:       PTT = The equivalent PTT values for the therapeutic range of heparin levels at 0.1 to 0.7 U/ml are  53 to 110 seconds.      Influenza Antigen, Rapid - Swab, Nasopharynx [[id number]]  (Normal) Collected:  01/06/20 1708    Specimen:  Swab from Nasopharynx Updated:  01/06/20 1730     Influenza A Ag, EIA Negative     Influenza B Ag, EIA Negative        Imaging Results (Most Recent)     Procedure Component Value Units Date/Time    CT Chest Without Contrast [[id number]] Collected:  01/07/20 1749     Updated:  01/07/20 1751    Narrative:       CT Chest WO    INDICATION:   COPD exacerbation with lobar pneumonia. Systemic inflammatory response syndrome. History of recurrent pneumonia.    TECHNIQUE:   CT of the thorax without IV contrast. Coronal and sagittal reconstructions were obtained.  Radiation dose reduction techniques included automated exposure control or exposure modulation based on body size. Count of known CT and cardiac nuc med studies  performed in previous 12 months: 0.     COMPARISON:   Chest radiograph 1/6/2020 and 11/7/2019    FINDINGS:  Extensive airspace disease and consolidation right upper lobe which is new from 11/7/2019. Interval resolution of left upper lobe pneumonia when compared to the November study. Background diffuse lung disease with cystic changes and fibrosis  predominantly within the lung apices but also scattered areas of fibrosis within the lung bases. Surgical clips right lower lobe. No effusions. Trachea and bronchi are unremarkable. Prominent cystic changes also noted anterior right middle lobe and also  within the lingular segment. Patchy density midportion left lower lobe could represent focal scarring or interstitial infiltrate but no dense consolidation. This measures up to 5 cm.    Mild cardiomegaly. Aorta and pulmonary vessels are unremarkable. Mediastinal adenopathy most likely reactive due to chronic lung disease and pneumonia. Visualized upper abdomen unremarkable. Osseous structures and thoracic inlet appear normal.      Impression:       Extensive airspace disease and  consolidation right upper lobe compatible with lobar pneumonia which appears superimposed on background diffuse lung disease with emphysema and fibrosis.    Patchy interstitial infiltrate midportion left lower lobe could represent background fibrosis but interstitial infiltrate not excluded. No consolidation.    Signer Name: BERLIN Rojas MD   Signed: 1/7/2020 5:49 PM   Workstation Name: [id number]    [facility]    XR Ribs Right With PA Chest [[id number]] Collected:  01/07/20 0854     Updated:  01/07/20 0859    Narrative:       XR RIBS RIGHT W PA CHEST-: 1/7/2020 8:09 AM     INDICATION:   59-year-old female with right sided rib pain after coughing 3 days     COMPARISON:   01/06/2020.     FINDINGS:  PA view of the chest and oblique views of the right ribs. Stable cardiac  silhouette and scoliosis. Biapical pleural thickening persists. There  are postoperative changes projecting over the lower and upper lobes on  the right and there is dense masslike consolidation throughout the right  upper lobe. Patchy opacities in the mid and lower lung zones do not  appear appreciably changed. There is no pneumothorax. Chronic blunting  of the CP angles.     Chronic appearing rib deformity on the right involving the posterior  right sixth and seventh ribs. No distinct acute appearing fracture.       Impression:          1. No evidence of acute rib fracture or pneumothorax.  2. No other significant interval change. Imaging follow-up to clearing  recommended for the masslike consolidation in the right upper lobe.     This report was finalized on 1/7/2020 8:57 AM by Dr. Petr Jalloh MD.       XR Chest 1 View [[id number]] Collected:  01/06/20 1743     Updated:  01/06/20 1745    Narrative:       CR Chest 1 Vw    INDICATION:   59-year-old female with cough and shortness of air for 4 days. Current smoker.     COMPARISON:    Chest 11/12/2019    FINDINGS:  Single portable AP view(s) of the chest.  Dense  infiltrate throughout the right upper lobe, concerning for pneumonia. Biapical pleural thickening. Subsegmental left mid lung atelectasis. Emphysema. No large pleural effusions. No pneumothorax. Heart size  and mediastinum are within expected limits. Pulmonary vasculature unremarkable.      Impression:       Dense infiltrate throughout the right upper lobe, concerning for pneumonia. Follow-up to resolution is recommended.    Signer Name: Arron Guan MD   Signed: 1/6/2020 5:43 PM   Workstation Name: [id number]    [facility]        PROCEDURES: None    Condition on Discharge: Stable    Physical Exam at Discharge  Vital Signs  Temp:  [97.6 °F (36.4 °C)-98 °F (36.7 °C)] 97.6 °F (36.4 °C)  Heart Rate:  [82-94] 82  Resp:  [16-24] 20  BP: (118-127)/(71-80) 120/72   Body mass index is 27.47 kg/m².    Physical Exam   Constitutional: She is oriented to person, place, and time. She appears well-nourished.   Appears older than stated age   HENT:   Head: Normocephalic and atraumatic.   Eyes: Pupils are equal, round, and reactive to light. EOM are normal.   Cardiovascular: Normal rate and regular rhythm.   Grade 2/6 systolic murmur   Pulmonary/Chest: Effort normal.   Scattered rhonchi, occasional expiratory wheezes, faint crackles right upper lobe, left lower lobe   Abdominal: Soft. Bowel sounds are normal. She exhibits no distension. There is no tenderness. There is no guarding.   Musculoskeletal: She exhibits no edema.   Neurological: She is alert and oriented to person, place, and time.   Skin: Skin is warm and dry. No erythema.   Tensive tattooing of skin   Psychiatric: She has a normal mood and affect. Her behavior is normal.   Vitals reviewed.    Discharge Disposition  Home    Visiting Nurse:    No     Home PT/OT:  No     Home Safety Evaluation:  No     DME  Arranged    Discharge Diet:      Dietary Orders (From admission, onward)     Start     Ordered    01/11/20 1897  Diet Regular  Diet  Effective Now     Comments:  Pt diabetic   Question:  Diet Texture / Consistency  Answer:  Regular    01/11/20 4115                Activity at Discharge:  As tolerated, no driving while taking Hycodan    Pre-discharge education  Medications, follow-up    Follow-up Appointments  No future appointments.  Additional Instructions for the Follow-ups that You Need to Schedule     Discharge Follow-up with PCP   As directed       Currently Documented PCP:    Justo Browning APRN    PCP Phone Number:    [phone]     Follow Up Details:  1 week         Discharge Follow-up with Specified Provider: Pulmonary; 1 Month   As directed      To:  Pulmonary    Follow Up:  1 Month    Follow Up Details:  Will need CXR PA and LAT prior to appointment               Test Results Pending at Discharge: none     TOYA Steiner  01/13/20  10:31 AM    Time: Discharge Over 30 min (if over 30 minutes give explanation as to why it took greater than 30 minutes)  Secondary to:   Coordination of care/follow up  Medication reconciliation  D/W patient

## 2020-01-13 NOTE — DISCHARGE INSTR - LAB
F/U with Justo PITTMAN on 1/17 at 9:00 AM.    Patient to call [facility] [place] for appt due to a no call/no show for last 2 appts.

## 2020-01-13 NOTE — PROGRESS NOTES
[facility]  [phone]  Rajiv Calderón MD    Subjective:  LOS: 4    She feels better.  Less cough and phlegm.  Remains on supplemental oxygen.    Objective   Vital Signs past 24hrs    Temp range: Temp (24hrs), Av.8 °F (36.6 °C), Min:97.6 °F (36.4 °C), Max:98 °F (36.7 °C)    BP range: BP: (118-127)/(71-80) 120/72  Pulse range: Heart Rate:  [82-94] 82  Resp rate range: Resp:  [16-24] 20    Device (Oxygen Therapy): nasal cannulaFlow (L/min):  [2] 2  Oxygen range:SpO2:  [87 %-93 %] 92 %      79.6 kg (175 lb 6.4 oz); Body mass index is 27.47 kg/m².    Intake/Output Summary (Last 24 hours) at 2020 0822  Last data filed at 20204  Gross per 24 hour   Intake 840 ml   Output --   Net 840 ml       Physical Exam   Constitutional: She appears well-developed.   HENT:   Head: Normocephalic.   Eyes: Pupils are equal, round, and reactive to light.   Cardiovascular: Normal rate and regular rhythm.   No murmur heard.  Pulmonary/Chest: Effort normal. No respiratory distress. She has decreased breath sounds. She has no wheezes. She has rales (few) in the right lower field and the left lower field.   Abdominal: Soft. Bowel sounds are normal. She exhibits no mass. There is no tenderness.   Musculoskeletal: She exhibits no edema.   Skin: No rash noted.     Results Review:    I have reviewed the laboratory and imaging data since the last note by [facility] physician.  My annotations are noted in assessment and plan.    Medication Review:  I have reviewed the current MAR.  My annotations are noted in assessment and plan.       Plan   PCCM Problems  Acute on chronic hypoxic respiratory failure, improved  Right upper lobe dense pneumonia  COPD exacerbation, improving  Recent cigarette smoker  Relevant Medical Diagnoses  Atrial fibrillation with RVR  Rheumatoid arthritis        Plan of Treatment  Clinically better.  Finish out 10 days of antibiotics.    Needs close follow-up with chest x-ray in 6 weeks to make sure  infiltrate is clearing or resolved.  If not she may require further investigation.  White count is likely secondary steroids.  No evidence of fever or other toxic symptoms.    She is not wheezing.  Her prednisone can be gradually tapered off -I will discontinue today.  Continue maintenance bronchodilators on discharge.    She remains on bronchodilators which will be continued as an outpatient for maintenance.    She is on oxygen 2 L at home and this seems to be her current flow rate.  ABG yesterday was acceptable.  With walking oximetry she requires 4 L when she is exerting though she can use 2 L at rest.    She quit smoking since last admission and was advised to continue to abstain from tobacco products.    No objection to discharge.  Recommend follow-up with pulmonary in 4 to 6 weeks with chest x-ray prior to arrival.    Rajiv Calderón MD  01/13/20  8:22 AM    Part of this note may be an electronic transcription/translation of spoken language to printed text using the Dragon Dictation System.

## 2020-01-13 NOTE — PROGRESS NOTES
Placed on Room Air for Overnight Pulse Oximetry again tonight. Spo2 dropped to 86-87% for 5min. Placed back on O2 2L by NC. Pt wears o2 PRN @ home.

## 2020-01-13 NOTE — PROGRESS NOTES
Adult Nutrition  Assessment/PES    Patient Name:  Akila Amezcua  YOB: 1960  MRN: [id number]  Admit Date:  1/6/2020    Assessment Date:  1/13/2020    Comments:  Good po intake. No needs voiced. Noted plan for home today.    Reason for Assessment     Row Name 01/13/20 1330          Reason for Assessment    Reason For Assessment  per organizational policy LOS     Diagnosis  pulmonary disease PNA, COPD         Nutrition/Diet History     Row Name 01/13/20 1331          Nutrition/Diet History    Typical Food/Fluid Intake  Spoke w pt who reports plan for home today. Denies any wt loss before admit and tolerating diet well with out any complaints         Anthropometrics     Row Name 01/13/20 1331          Anthropometrics    Weight  -- 175.4#        Body Mass Index (BMI)    BMI Assessment  BMI 25-29.9: overweight         Labs/Tests/Procedures/Meds     Row Name 01/13/20 1331          Labs/Procedures/Meds    Lab Results Reviewed  reviewed        Diagnostic Tests/Procedures    Diagnostic Test/Procedure Reviewed  reviewed        Medications    Pertinent Medications Reviewed  reviewed     Pertinent Medications Comments  folic acid, novolog, kcl, b12           Estimated/Assessed Needs     Row Name 01/13/20 1332          Estimated/Assessed Needs    Additional Documentation  Calorie Requirements (Group);Protein Requirements (Group);Fluid Requirements (Group)        Calorie Requirements    Estimated Calorie Need Method  Bryan-St Jeor     Estimated Calorie Requirement Comment  1697 kcal ( mifflin 1.2 )        Protein Requirements    Est Protein Requirement Amount (gms/kg)  1.0 gm protein 80 gm pro        Fluid Requirements    Estimated Fluid Requirement Method  RDA Method 1697 ml         Nutrition Prescription Ordered     Row Name 01/13/20 1332          Nutrition Prescription PO    Current PO Diet  Regular         Evaluation of Received Nutrient/Fluid Intake     Row Name 01/13/20 1336          Fluid Intake Evaluation     Oral Fluid (mL)  [address] ave x 3, 49%        PO Evaluation    Number of Meals  9     % PO Intake  94               Problem/Interventions:  Problem 1     Row Name 01/13/20 1333          Nutrition Diagnoses Problem 1    Problem 1  No Nutrition Diagnosis at this Time               Intervention Goal     Row Name 01/13/20 1333          Intervention Goal    PO  Maintain intake;PO intake (%)     PO Intake %  75 % or greater     Weight  Maintain weight         Nutrition Intervention     Row Name 01/13/20 1333          Nutrition Intervention    RD/Tech Action  Interview for preference;Follow Tx progress           Education/Evaluation     Row Name 01/13/20 1333          Education    Education  No discharge needs identified at this time        Monitor/Evaluation    Monitor  Per protocol;I&O;PO intake;Pertinent labs;Weight;Symptoms           Electronically signed by:  Aleksandra Maria RD  01/13/20 1:34 PM

## 2020-01-14 ENCOUNTER — READMISSION MANAGEMENT (OUTPATIENT)
Dept: CALL CENTER | Facility: HOSPITAL | Age: 60
End: 2020-01-14

## 2020-01-14 NOTE — OUTREACH NOTE
Prep Survey      Responses   Facility patient discharged from?  [facility]   Is LACE score < 7 ?  No   Is patient eligible?  Yes   Does the patient have one of the following disease processes/diagnoses(primary or secondary)?  COPD/Pneumonia   Does the patient have Home health ordered?  No   Is there a DME ordered?  Yes   What DME was ordered?  Oxygen per [facility]    Prep survey completed?  Yes          Emily Bone RN

## 2020-01-16 ENCOUNTER — READMISSION MANAGEMENT (OUTPATIENT)
Dept: CALL CENTER | Facility: HOSPITAL | Age: 60
End: 2020-01-16

## 2020-01-16 NOTE — OUTREACH NOTE
COPD/PN Week 1 Survey      Responses   Facility patient discharged from?  [facility]   Does the patient have one of the following disease processes/diagnoses(primary or secondary)?  COPD/Pneumonia   Is there a successful TCM telephone encounter documented?  No   Was the primary reason for admission:  Pneumonia   Week 1 attempt successful?  No   Unsuccessful attempts  Attempt 1          Hussein Pisano RN

## 2020-01-20 ENCOUNTER — READMISSION MANAGEMENT (OUTPATIENT)
Dept: CALL CENTER | Facility: HOSPITAL | Age: 60
End: 2020-01-20

## 2020-01-20 NOTE — OUTREACH NOTE
COPD/PN Week 1 Survey      Responses   Facility patient discharged from?  [facility]   Does the patient have one of the following disease processes/diagnoses(primary or secondary)?  COPD/Pneumonia   Is there a successful TCM telephone encounter documented?  No   Was the primary reason for admission:  Pneumonia   Week 1 attempt successful?  Yes   Call start time  0911   Call end time  0914   Meds reviewed with patient/caregiver?  Yes   Is the patient having any side effects they believe may be caused by any medication additions or changes?  No   Does the patient have all medications ordered at discharge?  Yes   Is the patient taking all medications as directed (includes completed medication regime)?  Yes   Does the patient have a primary care provider?   Yes   Does the patient have an appointment with their PCP or pulmonologist within 7 days of discharge?  Yes   Comments regarding PCP  appt 1/20/20   Has the patient kept scheduled appointments due by today?  N/A   What DME was ordered?  Oxygen per [facility]-already had   Psychosocial issues?  No   Comments  Pt wears O2 all the time until her f/u with pulmonary   Did the patient receive a copy of their discharge instructions?  Yes   Nursing interventions  Reviewed instructions with patient   What is the patient's perception of their health status since discharge?  Improving   Nursing Interventions  Nurse provided patient education   Are the patient's immunizations up to date?   Yes   If the patient is a current smoker, are they able to teach back resources for cessation?  -- [Pt is a nonsmoker]   Is the patient/caregiver able to teach back the hierarchy of who to call/visit for symptoms/problems? PCP, Specialist, Home health nurse, Urgent Care, ED, 911  Yes   Is the patient/caregiver able to teach back signs and symptoms of worsening condition:  Fever/chills, Shortness of breath, Chest pain   Is the patient/caregiver able to teach back importance of completing antibiotic  course of treatment?  Yes   Week 1 call completed?  Yes          Genesis Valle RN

## 2020-01-23 ENCOUNTER — OFFICE VISIT (OUTPATIENT)
Dept: FAMILY MEDICINE CLINIC | Facility: CLINIC | Age: 60
End: 2020-01-23

## 2020-01-23 VITALS
BODY MASS INDEX: 28.06 KG/M2 | SYSTOLIC BLOOD PRESSURE: 118 MMHG | HEART RATE: 100 BPM | OXYGEN SATURATION: 94 % | HEIGHT: 67 IN | TEMPERATURE: 98.1 F | DIASTOLIC BLOOD PRESSURE: 78 MMHG | WEIGHT: 178.8 LBS

## 2020-01-23 DIAGNOSIS — Z79.899 LONG-TERM USE OF HIGH-RISK MEDICATION: ICD-10-CM

## 2020-01-23 DIAGNOSIS — J18.9 PNEUMONIA OF BOTH LUNGS DUE TO INFECTIOUS ORGANISM, UNSPECIFIED PART OF LUNG: Primary | ICD-10-CM

## 2020-01-23 PROCEDURE — 99214 OFFICE O/P EST MOD 30 MIN: CPT | Performed by: NURSE PRACTITIONER

## 2020-01-23 RX ORDER — TRAMADOL HYDROCHLORIDE 50 MG/1
50 TABLET ORAL EVERY 6 HOURS PRN
Qty: 120 TABLET | Refills: 0 | Status: SHIPPED | OUTPATIENT
Start: 2020-01-23 | End: 2020-06-05

## 2020-01-23 NOTE — PATIENT INSTRUCTIONS
Community-Acquired Pneumonia, Adult  Pneumonia is an infection of the lungs. It causes swelling in the airways of the lungs. Mucus and fluid may also build up inside the airways.  One type of pneumonia can happen while a person is in a hospital. A different type can happen when a person is not in a hospital (community-acquired pneumonia).   What are the causes?    This condition is caused by germs (viruses, bacteria, or fungi). Some types of germs can be passed from one person to another. This can happen when you breathe in droplets from the cough or sneeze of an infected person.  What increases the risk?  You are more likely to develop this condition if you:  · Have a long-term (chronic) disease, such as:  ? Chronic obstructive pulmonary disease (COPD).  ? Asthma.  ? Cystic fibrosis.  ? Congestive heart failure.  ? Diabetes.  ? Kidney disease.  · Have HIV.  · Have sickle cell disease.  · Have had your spleen removed.  · Do not take good care of your teeth and mouth (poor dental hygiene).  · Have a medical condition that increases the risk of breathing in droplets from your own mouth and nose.  · Have a weakened body defense system (immune system).  · Are a smoker.  · Travel to areas where the germs that cause this illness are common.  · Are around certain animals or the places they live.  What are the signs or symptoms?  · A dry cough.  · A wet (productive) cough.  · Fever.  · Sweating.  · Chest pain. This often happens when breathing deeply or coughing.  · Fast breathing or trouble breathing.  · Shortness of breath.  · Shaking chills.  · Feeling tired (fatigue).  · Muscle aches.  How is this treated?  Treatment for this condition depends on many things. Most adults can be treated at home. In some cases, treatment must happen in a hospital. Treatment may include:  · Medicines given by mouth or through an IV tube.  · Being given extra oxygen.  · Respiratory therapy.  In rare cases, treatment for very bad pneumonia  may include:  · Using a machine to help you breathe.  · Having a procedure to remove fluid from around your lungs.  Follow these instructions at home:  Medicines  · Take over-the-counter and prescription medicines only as told by your doctor.  ? Only take cough medicine if you are losing sleep.  · If you were prescribed an antibiotic medicine, take it as told by your doctor. Do not stop taking the antibiotic even if you start to feel better.  General instructions    · Sleep with your head and neck raised (elevated). You can do this by sleeping in a recliner or by putting a few pillows under your head.  · Rest as needed. Get at least 8 hours of sleep each night.  · Drink enough water to keep your pee (urine) pale yellow.  · Eat a healthy diet that includes plenty of vegetables, fruits, whole grains, low-fat dairy products, and lean protein.  · Do not use any products that contain nicotine or tobacco. These include cigarettes, e-cigarettes, and chewing tobacco. If you need help quitting, ask your doctor.  · Keep all follow-up visits as told by your doctor. This is important.  How is this prevented?  A shot (vaccine) can help prevent pneumonia. Shots are often suggested for:  · People older than 65 years of age.  · People older than 19 years of age who:  ? Are having cancer treatment.  ? Have long-term (chronic) lung disease.  ? Have problems with their body's defense system.  You may also prevent pneumonia if you take these actions:  · Get the flu (influenza) shot every year.  · Go to the dentist as often as told.  · Wash your hands often. If you cannot use soap and water, use hand .  Contact a doctor if:  · You have a fever.  · You lose sleep because your cough medicine does not help.  Get help right away if:  · You are short of breath and it gets worse.  · You have more chest pain.  · Your sickness gets worse. This is very serious if:  ? You are an older adult.  ? Your body's defense system is weak.  · You  cough up blood.  Summary  · Pneumonia is an infection of the lungs.  · Most adults can be treated at home. Some will need treatment in a hospital.  · Drink enough water to keep your pee pale yellow.  · Get at least 8 hours of sleep each night.  This information is not intended to replace advice given to you by your health care provider. Make sure you discuss any questions you have with your health care provider.  Document Released: 06/05/2009 Document Revised: 08/15/2019 Document Reviewed: 08/15/2019  [employer] Interactive Patient Education © 2019 [employer]

## 2020-01-23 NOTE — PROGRESS NOTES
Subjective   Akila Amezcua is a 59 y.o. female.     Chief Complaint   Patient presents with   • Follow-up     PNA        History of Present Illness       Patient is here today for follow up on hospital admission for pna of the R lobe. Was at [facility] on 2020-2020.    Since being home is feeling a little better, but so-so.    Has follow up with pulmonology in 1 month.     Right now pulm has her on 2lpm at rest and 4lpm with any activity.    Still having some chest tightness.      Using neb QID at home.     Mucinex Q12 hours.     Breo daily.      Doesn't have tramadol, but states that seemed to help a little       Still on antibiotic. Gave her levaquin daily X 30 days.           The following portions of the patient's history were reviewed and updated as appropriate: allergies, current medications, past family history, past medical history, past social history, past surgical history and problem list.    Past Medical History:   Diagnosis Date   • Anxiety    • Arthritis    • COPD (chronic obstructive pulmonary disease) (CMS/HCC)     LUNG BLEBS   • DJD (degenerative joint disease)    • Gallstones     SCHEDULED FOR SX   • GERD (gastroesophageal reflux disease)    • Injury of back     degenertive disc disease   • Murmur     BENIGN   • Panic attacks    • Rheumatoid arthritis (CMS/HCC)    • Seizures (CMS/HCC)     LAST ONE 2017       Past Surgical History:   Procedure Laterality Date   • APPENDECTOMY     •  SECTION     • CHOLECYSTECTOMY N/A 2017    Procedure: LAPAROSCOPIC CHOLECYSTECTOMY, laparoscopic adhesiolysis requiring 45 minutes of operative time;  Surgeon: Liliana Monroy MD;  Location: [facility];  Service:    • ESOPHAGEAL ATRESIA REPAIR     • PLEURAL BIOPSY     • PLEURAL SCARIFICATION         Family History   Problem Relation Age of Onset   • Lung cancer Father        Social History     Socioeconomic History   • Marital status:      Spouse name: Not on file   • Number of  children: Not on file   • Years of education: Not on file   • Highest education level: Not on file   Tobacco Use   • Smoking status: Current Every Day Smoker     Packs/day: 1.00     Years: 35.00     Pack years: 35.00   Substance and Sexual Activity   • Alcohol use: No   • Drug use: No   • Sexual activity: Defer         Current Outpatient Medications:   •  albuterol (PROVENTIL) (2.5 MG/3ML) 0.083% nebulizer solution, Take 2.5 mg by nebulization Every 4 (Four) Hours As Needed for wheezing., Disp: , Rfl:   •  benzonatate (TESSALON) 200 MG capsule, Take 1 capsule by mouth 3 (Three) Times a Day As Needed for Cough., Disp: 30 capsule, Rfl: 0  •  folic acid (FOLVITE) 1 MG tablet, Take 1 tablet by mouth Daily., Disp: 30 tablet, Rfl: 1  •  ipratropium-albuterol (DUO-NEB) 0.5-2.5 mg/3 ml nebulizer, Take 3 mL by nebulization Every 6 (Six) Hours While Awake. J 44.9, J 18.9, Disp: 360 mL, Rfl: 1  •  melatonin 5 MG tablet tablet, Take 1 tablet by mouth At Night As Needed (Insomnia)., Disp: , Rfl:   •  menthol (TOPICAL ANALGESIC) 2.5 % gel gel, Apply  topically to the appropriate area as directed Every 1 (One) Hour As Needed (Muscle Pain)., Disp: , Rfl:   •  Misc. Devices (BATH BENCH WITH BACK) misc, 1 Units Daily As Needed (shower)., Disp: 1 each, Rfl: 1  •  traMADol (ULTRAM) 50 MG tablet, Take 1 tablet by mouth Every 6 (Six) Hours As Needed for Moderate Pain ., Disp: 120 tablet, Rfl: 0  •  traZODone (DESYREL) 50 MG tablet, Take 1-2 tablets by mouth Every Night., Disp: 60 tablet, Rfl: 1  •  vitamin B-12 (VITAMIN B-12) 1000 MCG tablet, Take 1 tablet by mouth Daily., Disp: 30 tablet, Rfl: 1  •  Fluticasone-Umeclidin-Vilant (TRELEGY ELLIPTA) 100-62.5-25 MCG/INH aerosol powder , Inhale 1 puff Daily., Disp: 60 each, Rfl: 2  •  Fluticasone-Umeclidin-Vilant (TRELEGY ELLIPTA) 100-62.5-25 MCG/INH aerosol powder , Inhale 1 puff Daily., Disp: 14 each, Rfl: 0  •  guaiFENesin (MUCINEX) 600 MG 12 hr tablet, Take 1 tablet by mouth Every 12  "(Twelve) Hours., Disp: 40 tablet, Rfl: 0  •  potassium chloride (K-DUR,KLOR-CON) 20 MEQ CR tablet, Take 1 tablet by mouth Daily., Disp: 30 tablet, Rfl: 0  •  sodium chloride (OCEAN NASAL SPRAY) 0.65 % nasal spray, 1 spray into the nostril(s) as directed by provider As Needed for Congestion., Disp: 59 mL, Rfl: 12  •  venlafaxine (EFFEXOR) 37.5 MG tablet, Take 37.5 mg by mouth 2 (Two) Times a Day., Disp: , Rfl:     Review of Systems   Constitutional: Positive for fatigue. Negative for fever.   HENT: Positive for congestion and rhinorrhea. Negative for ear pain and sore throat.    Respiratory: Positive for cough, chest tightness (pain with deep breaths or at night when trying to lay down), shortness of breath and wheezing.    Cardiovascular: Positive for chest pain.   Gastrointestinal: Negative for abdominal pain, constipation, diarrhea, nausea and vomiting.   Genitourinary: Negative for dysuria and urgency.   Neurological: Negative for dizziness and headache.   Psychiatric/Behavioral: Negative for depressed mood. The patient is not nervous/anxious.        Objective   Vitals:    01/23/20 1346   BP: 118/78   Pulse: 100   Temp: 98.1 °F (36.7 °C)   SpO2: 94%   Weight: 81.1 kg (178 lb 12.8 oz)   Height: 170.2 cm (67\")     Body mass index is 28 kg/m².  Physical Exam   Constitutional: She is oriented to person, place, and time. She appears well-developed and well-nourished.   HENT:   Head: Normocephalic and atraumatic.   Right Ear: External ear normal.   Left Ear: External ear normal.   Nose: Rhinorrhea present. Right sinus exhibits no maxillary sinus tenderness and no frontal sinus tenderness. Left sinus exhibits no maxillary sinus tenderness and no frontal sinus tenderness.   Mouth/Throat: Oropharynx is clear and moist and mucous membranes are normal. Tonsils are 0 on the right. Tonsils are 0 on the left.   Cardiovascular: Normal rate, regular rhythm and normal heart sounds.   Pulmonary/Chest: Tachypnea noted. She has " decreased breath sounds. She has wheezes. She exhibits tenderness (throughout).   Wearing oxygen at 4 LPM   Neurological: She is alert and oriented to person, place, and time.   Psychiatric: She has a normal mood and affect. Her behavior is normal. Judgment and thought content normal.         Assessment/Plan   Akila was seen today for follow-up.    Diagnoses and all orders for this visit:    Pneumonia of both lungs due to infectious organism, unspecified part of lung  -     CBC & Differential  -     Comprehensive metabolic panel  -     traMADol (ULTRAM) 50 MG tablet; Take 1 tablet by mouth Every 6 (Six) Hours As Needed for Moderate Pain .    Long-term use of high-risk medication  -     Drug Profile [id number] - Urine, Clean Catch    Other orders  -     Fluticasone-Umeclidin-Vilant (TRELEGY ELLIPTA) 100-62.5-25 MCG/INH aerosol powder ; Inhale 1 puff Daily.  -     Fluticasone-Umeclidin-Vilant (TRELEGY ELLIPTA) 100-62.5-25 MCG/INH aerosol powder ; Inhale 1 puff Daily.      KIM- today  UDS-today  Consent-today  Discussed tramadol and possible side effects.  Patient verbalizes understanding.      Trialing trelegy for better control of breathing since she has had 2 pneumonia diagnosis in past 6 months.                Patient Instructions   Community-Acquired Pneumonia, Adult  Pneumonia is an infection of the lungs. It causes swelling in the airways of the lungs. Mucus and fluid may also build up inside the airways.  One type of pneumonia can happen while a person is in a hospital. A different type can happen when a person is not in a hospital (community-acquired pneumonia).   What are the causes?    This condition is caused by germs (viruses, bacteria, or fungi). Some types of germs can be passed from one person to another. This can happen when you breathe in droplets from the cough or sneeze of an infected person.  What increases the risk?  You are more likely to develop this condition if you:  · Have a long-term  (chronic) disease, such as:  ? Chronic obstructive pulmonary disease (COPD).  ? Asthma.  ? Cystic fibrosis.  ? Congestive heart failure.  ? Diabetes.  ? Kidney disease.  · Have HIV.  · Have sickle cell disease.  · Have had your spleen removed.  · Do not take good care of your teeth and mouth (poor dental hygiene).  · Have a medical condition that increases the risk of breathing in droplets from your own mouth and nose.  · Have a weakened body defense system (immune system).  · Are a smoker.  · Travel to areas where the germs that cause this illness are common.  · Are around certain animals or the places they live.  What are the signs or symptoms?  · A dry cough.  · A wet (productive) cough.  · Fever.  · Sweating.  · Chest pain. This often happens when breathing deeply or coughing.  · Fast breathing or trouble breathing.  · Shortness of breath.  · Shaking chills.  · Feeling tired (fatigue).  · Muscle aches.  How is this treated?  Treatment for this condition depends on many things. Most adults can be treated at home. In some cases, treatment must happen in a hospital. Treatment may include:  · Medicines given by mouth or through an IV tube.  · Being given extra oxygen.  · Respiratory therapy.  In rare cases, treatment for very bad pneumonia may include:  · Using a machine to help you breathe.  · Having a procedure to remove fluid from around your lungs.  Follow these instructions at home:  Medicines  · Take over-the-counter and prescription medicines only as told by your doctor.  ? Only take cough medicine if you are losing sleep.  · If you were prescribed an antibiotic medicine, take it as told by your doctor. Do not stop taking the antibiotic even if you start to feel better.  General instructions    · Sleep with your head and neck raised (elevated). You can do this by sleeping in a recliner or by putting a few pillows under your head.  · Rest as needed. Get at least 8 hours of sleep each night.  · Drink enough  water to keep your pee (urine) pale yellow.  · Eat a healthy diet that includes plenty of vegetables, fruits, whole grains, low-fat dairy products, and lean protein.  · Do not use any products that contain nicotine or tobacco. These include cigarettes, e-cigarettes, and chewing tobacco. If you need help quitting, ask your doctor.  · Keep all follow-up visits as told by your doctor. This is important.  How is this prevented?  A shot (vaccine) can help prevent pneumonia. Shots are often suggested for:  · People older than 65 years of age.  · People older than 19 years of age who:  ? Are having cancer treatment.  ? Have long-term (chronic) lung disease.  ? Have problems with their body's defense system.  You may also prevent pneumonia if you take these actions:  · Get the flu (influenza) shot every year.  · Go to the dentist as often as told.  · Wash your hands often. If you cannot use soap and water, use hand .  Contact a doctor if:  · You have a fever.  · You lose sleep because your cough medicine does not help.  Get help right away if:  · You are short of breath and it gets worse.  · You have more chest pain.  · Your sickness gets worse. This is very serious if:  ? You are an older adult.  ? Your body's defense system is weak.  · You cough up blood.  Summary  · Pneumonia is an infection of the lungs.  · Most adults can be treated at home. Some will need treatment in a hospital.  · Drink enough water to keep your pee pale yellow.  · Get at least 8 hours of sleep each night.  This information is not intended to replace advice given to you by your health care provider. Make sure you discuss any questions you have with your health care provider.  Document Released: 06/05/2009 Document Revised: 08/15/2019 Document Reviewed: 08/15/2019  [employer] Interactive Patient Education © 2019 [employer]

## 2020-01-24 ENCOUNTER — TELEPHONE (OUTPATIENT)
Dept: FAMILY MEDICINE CLINIC | Facility: CLINIC | Age: 60
End: 2020-01-24

## 2020-01-24 LAB
ALBUMIN SERPL-MCNC: 3.9 G/DL (ref 3.8–4.9)
ALBUMIN/GLOB SERPL: 1.1 {RATIO} (ref 1.2–2.2)
ALP SERPL-CCNC: 114 IU/L (ref 39–117)
ALT SERPL-CCNC: 15 IU/L (ref 0–32)
AST SERPL-CCNC: 14 IU/L (ref 0–40)
BASOPHILS # BLD AUTO: 0 X10E3/UL (ref 0–0.2)
BASOPHILS NFR BLD AUTO: 1 %
BILIRUB SERPL-MCNC: 0.4 MG/DL (ref 0–1.2)
BUN SERPL-MCNC: 8 MG/DL (ref 6–24)
BUN/CREAT SERPL: 9 (ref 9–23)
CALCIUM SERPL-MCNC: 9.4 MG/DL (ref 8.7–10.2)
CHLORIDE SERPL-SCNC: 99 MMOL/L (ref 96–106)
CO2 SERPL-SCNC: 25 MMOL/L (ref 20–29)
CREAT SERPL-MCNC: 0.89 MG/DL (ref 0.57–1)
EOSINOPHIL # BLD AUTO: 0.4 X10E3/UL (ref 0–0.4)
EOSINOPHIL NFR BLD AUTO: 5 %
ERYTHROCYTE [DISTWIDTH] IN BLOOD BY AUTOMATED COUNT: 15 % (ref 11.7–15.4)
GLOBULIN SER CALC-MCNC: 3.4 G/DL (ref 1.5–4.5)
GLUCOSE SERPL-MCNC: 88 MG/DL (ref 65–99)
HCT VFR BLD AUTO: 36.2 % (ref 34–46.6)
HGB BLD-MCNC: 11.5 G/DL (ref 11.1–15.9)
IMM GRANULOCYTES # BLD AUTO: 0 X10E3/UL (ref 0–0.1)
IMM GRANULOCYTES NFR BLD AUTO: 0 %
LYMPHOCYTES # BLD AUTO: 1.6 X10E3/UL (ref 0.7–3.1)
LYMPHOCYTES NFR BLD AUTO: 22 %
MCH RBC QN AUTO: 28.3 PG (ref 26.6–33)
MCHC RBC AUTO-ENTMCNC: 31.8 G/DL (ref 31.5–35.7)
MCV RBC AUTO: 89 FL (ref 79–97)
MONOCYTES # BLD AUTO: 0.8 X10E3/UL (ref 0.1–0.9)
MONOCYTES NFR BLD AUTO: 11 %
NEUTROPHILS # BLD AUTO: 4.4 X10E3/UL (ref 1.4–7)
NEUTROPHILS NFR BLD AUTO: 61 %
PLATELET # BLD AUTO: 407 X10E3/UL (ref 150–450)
POTASSIUM SERPL-SCNC: 4.6 MMOL/L (ref 3.5–5.2)
PROT SERPL-MCNC: 7.3 G/DL (ref 6–8.5)
RBC # BLD AUTO: 4.07 X10E6/UL (ref 3.77–5.28)
SODIUM SERPL-SCNC: 139 MMOL/L (ref 134–144)
WBC # BLD AUTO: 7.3 X10E3/UL (ref 3.4–10.8)

## 2020-01-27 ENCOUNTER — TELEPHONE (OUTPATIENT)
Dept: FAMILY MEDICINE CLINIC | Facility: CLINIC | Age: 60
End: 2020-01-27

## 2020-01-27 LAB
AMPHETAMINES UR QL SCN: NEGATIVE NG/ML
BARBITURATES UR QL: NEGATIVE NG/ML
BENZODIAZ UR QL SCN: NEGATIVE NG/ML
BZE UR QL: NEGATIVE NG/ML
CANNABINOIDS UR QL SCN: NEGATIVE NG/ML
CREAT UR-MCNC: 33.3 MG/DL (ref 20–300)
ETHANOL UR-MCNC: NEGATIVE %
MEPERIDINE UR QL: NEGATIVE NG/ML
METHADONE UR QL: NEGATIVE NG/ML
OPIATES UR QL SCN: NEGATIVE NG/ML
OXYCODONE+OXYMORPHONE UR QL SCN: NEGATIVE NG/ML
PCP UR QL: NEGATIVE NG/ML
PROPOXYPH UR QL: NEGATIVE NG/ML
TRAMADOL UR QL SCN: NEGATIVE NG/ML

## 2020-01-27 NOTE — TELEPHONE ENCOUNTER
Insurance denied the Tramadol. Pt stated she can't afford to pay out of pocket for it and wanted to know if something else could be sent in.

## 2020-01-28 ENCOUNTER — TELEPHONE (OUTPATIENT)
Dept: FAMILY MEDICINE CLINIC | Facility: CLINIC | Age: 60
End: 2020-01-28

## 2020-01-29 ENCOUNTER — READMISSION MANAGEMENT (OUTPATIENT)
Dept: CALL CENTER | Facility: HOSPITAL | Age: 60
End: 2020-01-29

## 2020-01-29 NOTE — OUTREACH NOTE
COPD/PN Week 2 Survey      Responses   Facility patient discharged from?  [facility]   Does the patient have one of the following disease processes/diagnoses(primary or secondary)?  COPD/Pneumonia   Was the primary reason for admission:  Pneumonia   Week 2 attempt successful?  No   Unsuccessful attempts  Attempt 1          Hussein Pisano RN

## 2020-01-30 ENCOUNTER — READMISSION MANAGEMENT (OUTPATIENT)
Dept: CALL CENTER | Facility: HOSPITAL | Age: 60
End: 2020-01-30

## 2020-01-30 NOTE — OUTREACH NOTE
COPD/PN Week 2 Survey      Responses   Facility patient discharged from?  [facility]   Does the patient have one of the following disease processes/diagnoses(primary or secondary)?  COPD/Pneumonia   Was the primary reason for admission:  Pneumonia   Week 2 attempt successful?  Yes   Call start time  0905   Revoke  Phone number issues [pt number not going through and other number is wrong number]   Discharge diagnosis  pneumonia          Carlotta Machuca, RN

## 2020-02-04 ENCOUNTER — TRANSCRIBE ORDERS (OUTPATIENT)
Dept: ADMINISTRATIVE | Facility: HOSPITAL | Age: 60
End: 2020-02-04

## 2020-02-04 DIAGNOSIS — J18.9 PNEUMONIA DUE TO INFECTIOUS ORGANISM, UNSPECIFIED LATERALITY, UNSPECIFIED PART OF LUNG: Primary | ICD-10-CM

## 2020-02-04 DIAGNOSIS — R59.1 LYMPHADENOPATHY: ICD-10-CM

## 2020-02-05 ENCOUNTER — TELEPHONE (OUTPATIENT)
Dept: FAMILY MEDICINE CLINIC | Facility: CLINIC | Age: 60
End: 2020-02-05

## 2020-02-05 NOTE — TELEPHONE ENCOUNTER
Dr. Richardson office has tried reaching the patient regarding setting her up for a colonoscopy due to positive Cologuard. Kaila spoke with the patient once and the patient stated she would call her back, Kaila states she has tried calling both numbers several times with no response.

## 2020-02-10 ENCOUNTER — APPOINTMENT (OUTPATIENT)
Dept: CT IMAGING | Facility: HOSPITAL | Age: 60
End: 2020-02-10

## 2020-02-13 ENCOUNTER — TRANSCRIBE ORDERS (OUTPATIENT)
Dept: ADMINISTRATIVE | Facility: HOSPITAL | Age: 60
End: 2020-02-13

## 2020-02-13 DIAGNOSIS — J18.9 PNEUMONIA DUE TO INFECTIOUS ORGANISM, UNSPECIFIED LATERALITY, UNSPECIFIED PART OF LUNG: Primary | ICD-10-CM

## 2020-02-13 DIAGNOSIS — R59.1 LYMPHADENOPATHY: ICD-10-CM

## 2020-02-13 DIAGNOSIS — R06.02 SOB (SHORTNESS OF BREATH): ICD-10-CM

## 2020-03-04 ENCOUNTER — OFFICE (OUTPATIENT)
Dept: URBAN - METROPOLITAN AREA CLINIC 4 | Facility: CLINIC | Age: 60
End: 2020-03-04

## 2020-04-13 RX ORDER — TRAZODONE HYDROCHLORIDE 50 MG/1
TABLET ORAL
Qty: 60 TABLET | Refills: 1 | Status: SHIPPED | OUTPATIENT
Start: 2020-04-13 | End: 2020-06-05

## 2020-05-13 ENCOUNTER — TELEHEALTH PROVIDED OTHER THAN IN PATIENT'S HOME (OUTPATIENT)
Dept: URBAN - METROPOLITAN AREA TELEHEALTH 1 | Facility: TELEHEALTH | Age: 60
End: 2020-05-13
Payer: COMMERCIAL

## 2020-05-13 VITALS — HEIGHT: 63 IN

## 2020-05-13 DIAGNOSIS — K52.9 NONINFECTIVE GASTROENTERITIS AND COLITIS, UNSPECIFIED: ICD-10-CM

## 2020-05-13 DIAGNOSIS — K75.4 AUTOIMMUNE HEPATITIS: ICD-10-CM

## 2020-05-13 DIAGNOSIS — R14.0 ABDOMINAL DISTENSION (GASEOUS): ICD-10-CM

## 2020-05-13 DIAGNOSIS — D50.0 IRON DEFICIENCY ANEMIA SECONDARY TO BLOOD LOSS (CHRONIC): ICD-10-CM

## 2020-05-13 DIAGNOSIS — K75.81 NONALCOHOLIC STEATOHEPATITIS (NASH): ICD-10-CM

## 2020-05-13 DIAGNOSIS — R18.8 OTHER ASCITES: ICD-10-CM

## 2020-05-13 DIAGNOSIS — K74.60 UNSPECIFIED CIRRHOSIS OF LIVER: ICD-10-CM

## 2020-05-13 DIAGNOSIS — E74.31 SUCRASE-ISOMALTASE DEFICIENCY: ICD-10-CM

## 2020-05-13 PROCEDURE — 99214 OFFICE O/P EST MOD 30 MIN: CPT | Mod: 95 | Performed by: INTERNAL MEDICINE

## 2020-05-13 RX ORDER — SPIRONOLACTONE 100 MG/1
TABLET ORAL
Qty: 0 | Refills: 0 | Status: ACTIVE

## 2020-05-27 RX ORDER — IPRATROPIUM BROMIDE AND ALBUTEROL SULFATE 2.5; .5 MG/3ML; MG/3ML
SOLUTION RESPIRATORY (INHALATION)
Qty: 360 ML | Refills: 1 | Status: SHIPPED | OUTPATIENT
Start: 2020-05-27 | End: 2022-01-31 | Stop reason: HOSPADM

## 2020-06-04 ENCOUNTER — HOSPITAL ENCOUNTER (OUTPATIENT)
Age: 60
End: 2020-06-04
Payer: COMMERCIAL

## 2020-06-04 DIAGNOSIS — R10.11: ICD-10-CM

## 2020-06-04 DIAGNOSIS — Z01.818: Primary | ICD-10-CM

## 2020-06-04 PROCEDURE — 36415 COLL VENOUS BLD VENIPUNCTURE: CPT

## 2020-06-04 PROCEDURE — 86328 IA NFCT AB SARSCOV2 COVID19: CPT

## 2020-06-05 ENCOUNTER — ON CAMPUS - OUTPATIENT (OUTPATIENT)
Dept: RURAL HOSPITAL 6 | Facility: HOSPITAL | Age: 60
End: 2020-06-05
Payer: COMMERCIAL

## 2020-06-05 ENCOUNTER — HOSPITAL ENCOUNTER (OUTPATIENT)
Dept: HOSPITAL 22 - OUTP | Age: 60
Discharge: HOME | End: 2020-06-05
Payer: COMMERCIAL

## 2020-06-05 VITALS
RESPIRATION RATE: 18 BRPM | SYSTOLIC BLOOD PRESSURE: 123 MMHG | OXYGEN SATURATION: 95 % | DIASTOLIC BLOOD PRESSURE: 72 MMHG | HEART RATE: 79 BPM | TEMPERATURE: 97.4 F

## 2020-06-05 VITALS
HEART RATE: 82 BPM | TEMPERATURE: 97.34 F | RESPIRATION RATE: 18 BRPM | DIASTOLIC BLOOD PRESSURE: 72 MMHG | SYSTOLIC BLOOD PRESSURE: 140 MMHG | OXYGEN SATURATION: 96 %

## 2020-06-05 VITALS
TEMPERATURE: 99.1 F | HEART RATE: 77 BPM | SYSTOLIC BLOOD PRESSURE: 129 MMHG | RESPIRATION RATE: 18 BRPM | OXYGEN SATURATION: 94 % | DIASTOLIC BLOOD PRESSURE: 66 MMHG

## 2020-06-05 VITALS
RESPIRATION RATE: 16 BRPM | TEMPERATURE: 97.34 F | SYSTOLIC BLOOD PRESSURE: 122 MMHG | OXYGEN SATURATION: 93 % | HEART RATE: 95 BPM | DIASTOLIC BLOOD PRESSURE: 71 MMHG

## 2020-06-05 VITALS
OXYGEN SATURATION: 96 % | HEART RATE: 91 BPM | RESPIRATION RATE: 18 BRPM | TEMPERATURE: 97.34 F | SYSTOLIC BLOOD PRESSURE: 130 MMHG | DIASTOLIC BLOOD PRESSURE: 78 MMHG

## 2020-06-05 VITALS
OXYGEN SATURATION: 95 % | DIASTOLIC BLOOD PRESSURE: 69 MMHG | HEART RATE: 75 BPM | SYSTOLIC BLOOD PRESSURE: 107 MMHG | TEMPERATURE: 97.4 F | RESPIRATION RATE: 18 BRPM

## 2020-06-05 VITALS — BODY MASS INDEX: 31.8 KG/M2

## 2020-06-05 VITALS — OXYGEN SATURATION: 99 %

## 2020-06-05 DIAGNOSIS — E74.31: ICD-10-CM

## 2020-06-05 DIAGNOSIS — K83.4 SPASM OF SPHINCTER OF ODDI: ICD-10-CM

## 2020-06-05 DIAGNOSIS — K74.60: ICD-10-CM

## 2020-06-05 DIAGNOSIS — R17 UNSPECIFIED JAUNDICE: ICD-10-CM

## 2020-06-05 DIAGNOSIS — K83.8 OTHER SPECIFIED DISEASES OF BILIARY TRACT: ICD-10-CM

## 2020-06-05 DIAGNOSIS — R10.11 RIGHT UPPER QUADRANT PAIN: ICD-10-CM

## 2020-06-05 DIAGNOSIS — Z72.0: ICD-10-CM

## 2020-06-05 DIAGNOSIS — Z79.899: ICD-10-CM

## 2020-06-05 DIAGNOSIS — K92.89: Primary | ICD-10-CM

## 2020-06-05 DIAGNOSIS — Z90.49: ICD-10-CM

## 2020-06-05 PROCEDURE — 43262 ENDO CHOLANGIOPANCREATOGRAPH: CPT

## 2020-06-05 PROCEDURE — 74330 X-RAY BILE/PANC ENDOSCOPY: CPT

## 2020-06-05 PROCEDURE — 43262 ENDO CHOLANGIOPANCREATOGRAPH: CPT | Performed by: INTERNAL MEDICINE

## 2020-06-05 RX ORDER — TRAZODONE HYDROCHLORIDE 50 MG/1
TABLET ORAL
Qty: 60 TABLET | Refills: 1 | Status: SHIPPED | OUTPATIENT
Start: 2020-06-05 | End: 2020-07-30 | Stop reason: SDUPTHER

## 2020-07-17 ENCOUNTER — TELEPHONE (OUTPATIENT)
Dept: FAMILY MEDICINE CLINIC | Facility: CLINIC | Age: 60
End: 2020-07-17

## 2020-07-17 RX ORDER — NICOTINE 21 MG/24HR
1 PATCH, TRANSDERMAL 24 HOURS TRANSDERMAL EVERY 24 HOURS
Qty: 28 EACH | Refills: 2 | Status: ON HOLD | OUTPATIENT
Start: 2020-07-17 | End: 2021-11-29

## 2020-07-17 NOTE — TELEPHONE ENCOUNTER
Will send in.  I dont' have any recent hospital records. Will you see where she was at and if she needs follow up? Also, would like her to follow up on smoking cessation since i'm not seeing her for it to start her.

## 2020-07-17 NOTE — TELEPHONE ENCOUNTER
Left message for pt to call back with information on when she was in the hospital and where and what for.  I also informed her that  was going to send her in the nicotine patches however she need her to follow up with her after starting them

## 2020-07-17 NOTE — TELEPHONE ENCOUNTER
Pt requesting nicotine patches 21 MG  Stated she was using them while in the hospital and was wondering if you could send them in for her.  It needs to go to [facility] in [place] on [address].

## 2020-07-30 RX ORDER — TRAZODONE HYDROCHLORIDE 50 MG/1
50-100 TABLET ORAL NIGHTLY
Qty: 60 TABLET | Refills: 1 | Status: SHIPPED | OUTPATIENT
Start: 2020-07-30 | End: 2020-10-19 | Stop reason: SDUPTHER

## 2020-10-01 ENCOUNTER — TELEHEALTH PROVIDED OTHER THAN IN PATIENT'S HOME (OUTPATIENT)
Dept: URBAN - METROPOLITAN AREA TELEHEALTH 1 | Facility: TELEHEALTH | Age: 60
End: 2020-10-01
Payer: COMMERCIAL

## 2020-10-01 VITALS — HEIGHT: 63 IN

## 2020-10-01 DIAGNOSIS — R15.2 FECAL URGENCY: ICD-10-CM

## 2020-10-01 DIAGNOSIS — K52.9 NONINFECTIVE GASTROENTERITIS AND COLITIS, UNSPECIFIED: ICD-10-CM

## 2020-10-01 DIAGNOSIS — K75.81 NONALCOHOLIC STEATOHEPATITIS (NASH): ICD-10-CM

## 2020-10-01 DIAGNOSIS — E74.31 SUCRASE-ISOMALTASE DEFICIENCY: ICD-10-CM

## 2020-10-01 DIAGNOSIS — R10.11 RIGHT UPPER QUADRANT PAIN: ICD-10-CM

## 2020-10-01 DIAGNOSIS — R39.15 URGENCY OF URINATION: ICD-10-CM

## 2020-10-01 DIAGNOSIS — R32 UNSPECIFIED URINARY INCONTINENCE: ICD-10-CM

## 2020-10-01 PROCEDURE — 99214 OFFICE O/P EST MOD 30 MIN: CPT | Mod: 95 | Performed by: INTERNAL MEDICINE

## 2020-10-19 RX ORDER — TRAZODONE HYDROCHLORIDE 50 MG/1
50-100 TABLET ORAL NIGHTLY
Qty: 60 TABLET | Refills: 1 | Status: ON HOLD | OUTPATIENT
Start: 2020-10-19 | End: 2021-11-29

## 2020-11-03 ENCOUNTER — APPOINTMENT (OUTPATIENT)
Dept: PREADMISSION TESTING | Facility: HOSPITAL | Age: 60
End: 2020-11-03

## 2020-11-03 PROCEDURE — C9803 HOPD COVID-19 SPEC COLLECT: HCPCS

## 2020-11-03 PROCEDURE — U0004 COV-19 TEST NON-CDC HGH THRU: HCPCS

## 2020-11-04 LAB — SARS-COV-2 RNA RESP QL NAA+PROBE: NOT DETECTED

## 2020-11-05 ENCOUNTER — ANESTHESIA EVENT (OUTPATIENT)
Dept: PERIOP | Facility: HOSPITAL | Age: 60
End: 2020-11-05

## 2020-11-05 RX ORDER — FAMOTIDINE 10 MG/ML
20 INJECTION, SOLUTION INTRAVENOUS ONCE
Status: CANCELLED | OUTPATIENT
Start: 2020-11-05 | End: 2020-11-05

## 2020-11-05 RX ORDER — SODIUM CHLORIDE 0.9 % (FLUSH) 0.9 %
10 SYRINGE (ML) INJECTION AS NEEDED
Status: CANCELLED | OUTPATIENT
Start: 2020-11-05

## 2020-11-05 RX ORDER — SODIUM CHLORIDE 0.9 % (FLUSH) 0.9 %
10 SYRINGE (ML) INJECTION EVERY 12 HOURS SCHEDULED
Status: CANCELLED | OUTPATIENT
Start: 2020-11-05

## 2020-11-06 ENCOUNTER — HOSPITAL ENCOUNTER (OUTPATIENT)
Facility: HOSPITAL | Age: 60
Setting detail: HOSPITAL OUTPATIENT SURGERY
Discharge: HOME | End: 2020-11-06
Attending: UROLOGY | Admitting: UROLOGY

## 2020-11-06 ENCOUNTER — ANESTHESIA (OUTPATIENT)
Dept: PERIOP | Facility: HOSPITAL | Age: 60
End: 2020-11-06

## 2020-11-06 VITALS
TEMPERATURE: 98 F | RESPIRATION RATE: 16 BRPM | SYSTOLIC BLOOD PRESSURE: 122 MMHG | HEART RATE: 75 BPM | HEIGHT: 63 IN | DIASTOLIC BLOOD PRESSURE: 90 MMHG | WEIGHT: 170 LBS | OXYGEN SATURATION: 97 % | BODY MASS INDEX: 30.12 KG/M2

## 2020-11-06 DIAGNOSIS — N20.0 KIDNEY STONE: Primary | ICD-10-CM

## 2020-11-06 LAB
DEPRECATED RDW RBC AUTO: 51.8 FL (ref 37–54)
ERYTHROCYTE [DISTWIDTH] IN BLOOD BY AUTOMATED COUNT: 14.1 % (ref 12.3–15.4)
GLUCOSE BLDC GLUCOMTR-MCNC: 89 MG/DL (ref 70–130)
HCT VFR BLD AUTO: 40.8 % (ref 34–46.6)
HGB BLD-MCNC: 13.3 G/DL (ref 12–15.9)
MCH RBC QN AUTO: 32.7 PG (ref 26.6–33)
MCHC RBC AUTO-ENTMCNC: 32.6 G/DL (ref 31.5–35.7)
MCV RBC AUTO: 100.2 FL (ref 79–97)
PLATELET # BLD AUTO: 106 10*3/MM3 (ref 140–450)
PMV BLD AUTO: 10.8 FL (ref 6–12)
POTASSIUM SERPL-SCNC: 4.6 MMOL/L (ref 3.5–5.2)
QT INTERVAL: 396 MS
QTC INTERVAL: 442 MS
RBC # BLD AUTO: 4.07 10*6/MM3 (ref 3.77–5.28)
WBC # BLD AUTO: 4.68 10*3/MM3 (ref 3.4–10.8)

## 2020-11-06 PROCEDURE — 25010000002 DEXAMETHASONE PER 1 MG: Performed by: NURSE ANESTHETIST, CERTIFIED REGISTERED

## 2020-11-06 PROCEDURE — 25010000002 ONDANSETRON PER 1 MG: Performed by: NURSE ANESTHETIST, CERTIFIED REGISTERED

## 2020-11-06 PROCEDURE — 85027 COMPLETE CBC AUTOMATED: CPT | Performed by: UROLOGY

## 2020-11-06 PROCEDURE — 93010 ELECTROCARDIOGRAM REPORT: CPT | Performed by: INTERNAL MEDICINE

## 2020-11-06 PROCEDURE — 93005 ELECTROCARDIOGRAM TRACING: CPT | Performed by: UROLOGY

## 2020-11-06 PROCEDURE — 82962 GLUCOSE BLOOD TEST: CPT

## 2020-11-06 PROCEDURE — 25010000002 LEVOFLOXACIN PER 250 MG: Performed by: NURSE ANESTHETIST, CERTIFIED REGISTERED

## 2020-11-06 PROCEDURE — 84132 ASSAY OF SERUM POTASSIUM: CPT | Performed by: ANESTHESIOLOGY

## 2020-11-06 PROCEDURE — C1769 GUIDE WIRE: HCPCS | Performed by: UROLOGY

## 2020-11-06 PROCEDURE — C2617 STENT, NON-COR, TEM W/O DEL: HCPCS | Performed by: UROLOGY

## 2020-11-06 PROCEDURE — 25010000002 PROPOFOL 10 MG/ML EMULSION: Performed by: NURSE ANESTHETIST, CERTIFIED REGISTERED

## 2020-11-06 PROCEDURE — 25010000002 FENTANYL CITRATE (PF) 100 MCG/2ML SOLUTION: Performed by: NURSE ANESTHETIST, CERTIFIED REGISTERED

## 2020-11-06 DEVICE — URETERAL STENT
Type: IMPLANTABLE DEVICE | Status: FUNCTIONAL
Brand: PERCUFLEX™ PLUS

## 2020-11-06 RX ORDER — DEXAMETHASONE SODIUM PHOSPHATE 4 MG/ML
INJECTION, SOLUTION INTRA-ARTICULAR; INTRALESIONAL; INTRAMUSCULAR; INTRAVENOUS; SOFT TISSUE AS NEEDED
Status: DISCONTINUED | OUTPATIENT
Start: 2020-11-06 | End: 2020-11-06 | Stop reason: SURG

## 2020-11-06 RX ORDER — DOCUSATE SODIUM 100 MG/1
100 CAPSULE, LIQUID FILLED ORAL DAILY PRN
Qty: 30 CAPSULE | Refills: 1 | Status: SHIPPED | OUTPATIENT
Start: 2020-11-06 | End: 2021-11-06

## 2020-11-06 RX ORDER — UREA 10 %
140 LOTION (ML) TOPICAL
COMMUNITY

## 2020-11-06 RX ORDER — FAMOTIDINE 20 MG/1
20 TABLET, FILM COATED ORAL ONCE
Status: COMPLETED | OUTPATIENT
Start: 2020-11-06 | End: 2020-11-06

## 2020-11-06 RX ORDER — LEVOFLOXACIN 5 MG/ML
INJECTION, SOLUTION INTRAVENOUS AS NEEDED
Status: DISCONTINUED | OUTPATIENT
Start: 2020-11-06 | End: 2020-11-06 | Stop reason: SURG

## 2020-11-06 RX ORDER — LEVOFLOXACIN 5 MG/ML
500 INJECTION, SOLUTION INTRAVENOUS EVERY 24 HOURS
Status: DISCONTINUED | OUTPATIENT
Start: 2020-11-06 | End: 2020-11-06 | Stop reason: HOSPADM

## 2020-11-06 RX ORDER — DOXYCYCLINE HYCLATE 100 MG/1
100 CAPSULE ORAL DAILY
Qty: 3 CAPSULE | Refills: 0 | Status: SHIPPED | OUTPATIENT
Start: 2020-11-06 | End: 2020-11-09

## 2020-11-06 RX ORDER — MAGNESIUM HYDROXIDE 1200 MG/15ML
LIQUID ORAL AS NEEDED
Status: DISCONTINUED | OUTPATIENT
Start: 2020-11-06 | End: 2020-11-06 | Stop reason: HOSPADM

## 2020-11-06 RX ORDER — LIDOCAINE HYDROCHLORIDE 10 MG/ML
INJECTION, SOLUTION EPIDURAL; INFILTRATION; INTRACAUDAL; PERINEURAL AS NEEDED
Status: DISCONTINUED | OUTPATIENT
Start: 2020-11-06 | End: 2020-11-06 | Stop reason: SURG

## 2020-11-06 RX ORDER — SODIUM CHLORIDE, SODIUM LACTATE, POTASSIUM CHLORIDE, CALCIUM CHLORIDE 600; 310; 30; 20 MG/100ML; MG/100ML; MG/100ML; MG/100ML
9 INJECTION, SOLUTION INTRAVENOUS CONTINUOUS
Status: DISCONTINUED | OUTPATIENT
Start: 2020-11-06 | End: 2020-11-06 | Stop reason: HOSPADM

## 2020-11-06 RX ORDER — HYDROMORPHONE HYDROCHLORIDE 1 MG/ML
0.5 INJECTION, SOLUTION INTRAMUSCULAR; INTRAVENOUS; SUBCUTANEOUS
Status: DISCONTINUED | OUTPATIENT
Start: 2020-11-06 | End: 2020-11-06 | Stop reason: HOSPADM

## 2020-11-06 RX ORDER — OXYCODONE HYDROCHLORIDE 5 MG/1
5 TABLET ORAL EVERY 8 HOURS PRN
Qty: 20 TABLET | Refills: 0 | Status: SHIPPED | OUTPATIENT
Start: 2020-11-06

## 2020-11-06 RX ORDER — ONDANSETRON 2 MG/ML
4 INJECTION INTRAMUSCULAR; INTRAVENOUS ONCE AS NEEDED
Status: DISCONTINUED | OUTPATIENT
Start: 2020-11-06 | End: 2020-11-06 | Stop reason: HOSPADM

## 2020-11-06 RX ORDER — PROPOFOL 10 MG/ML
VIAL (ML) INTRAVENOUS AS NEEDED
Status: DISCONTINUED | OUTPATIENT
Start: 2020-11-06 | End: 2020-11-06 | Stop reason: SURG

## 2020-11-06 RX ORDER — LIDOCAINE HYDROCHLORIDE 10 MG/ML
0.5 INJECTION, SOLUTION EPIDURAL; INFILTRATION; INTRACAUDAL; PERINEURAL ONCE AS NEEDED
Status: COMPLETED | OUTPATIENT
Start: 2020-11-06 | End: 2020-11-06

## 2020-11-06 RX ORDER — ONDANSETRON 2 MG/ML
INJECTION INTRAMUSCULAR; INTRAVENOUS AS NEEDED
Status: DISCONTINUED | OUTPATIENT
Start: 2020-11-06 | End: 2020-11-06 | Stop reason: SURG

## 2020-11-06 RX ORDER — FENTANYL CITRATE 50 UG/ML
50 INJECTION, SOLUTION INTRAMUSCULAR; INTRAVENOUS
Status: DISCONTINUED | OUTPATIENT
Start: 2020-11-06 | End: 2020-11-06 | Stop reason: HOSPADM

## 2020-11-06 RX ADMIN — FAMOTIDINE 20 MG: 20 TABLET, FILM COATED ORAL at 11:00

## 2020-11-06 RX ADMIN — FENTANYL CITRATE 50 MCG: 0.05 INJECTION, SOLUTION INTRAMUSCULAR; INTRAVENOUS at 13:23

## 2020-11-06 RX ADMIN — LIDOCAINE HYDROCHLORIDE 0.5 ML: 10 INJECTION, SOLUTION EPIDURAL; INFILTRATION; INTRACAUDAL; PERINEURAL at 11:00

## 2020-11-06 RX ADMIN — ONDANSETRON 4 MG: 2 INJECTION INTRAMUSCULAR; INTRAVENOUS at 12:43

## 2020-11-06 RX ADMIN — LIDOCAINE HYDROCHLORIDE 50 MG: 10 INJECTION, SOLUTION EPIDURAL; INFILTRATION; INTRACAUDAL; PERINEURAL at 11:51

## 2020-11-06 RX ADMIN — DEXAMETHASONE SODIUM PHOSPHATE 8 MG: 4 INJECTION, SOLUTION INTRAMUSCULAR; INTRAVENOUS at 11:55

## 2020-11-06 RX ADMIN — SODIUM CHLORIDE, POTASSIUM CHLORIDE, SODIUM LACTATE AND CALCIUM CHLORIDE 9 ML/HR: 600; 310; 30; 20 INJECTION, SOLUTION INTRAVENOUS at 11:00

## 2020-11-06 RX ADMIN — LEVOFLOXACIN 500 MG: 5 INJECTION, SOLUTION INTRAVENOUS at 11:57

## 2020-11-06 RX ADMIN — PROPOFOL 200 MG: 10 INJECTION, EMULSION INTRAVENOUS at 11:51

## 2020-11-06 NOTE — ANESTHESIA POSTPROCEDURE EVALUATION
Patient: Anastasiia Jones    Procedure Summary     Date: 11/06/20 Room / Location:  [facility] [address] [place] [address] [facility]    Anesthesia Start: 1148 Anesthesia Stop:     Procedure: BILATERAL EXTRACORPOREAL SHOCKWAVE LITHOTRIPSY WITH LEFT STENT INSERTION BILATERAL (Bilateral ) Diagnosis:     Surgeon: Tre José Jr., MD Provider: Malick Leiva MD    Anesthesia Type: general ASA Status: 3          Anesthesia Type: general    Vitals  Vitals Value Taken Time   /71 11/06/20 1256   Temp     Pulse 72 11/06/20 1258   Resp     SpO2 100 % 11/06/20 1258   Vitals shown include unvalidated device data.        Post Anesthesia Care and Evaluation    Patient location during evaluation: PACU  Patient participation: complete - patient participated  Level of consciousness: awake  Pain score: 0  Pain management: adequate  Airway patency: patent  Anesthetic complications: No anesthetic complications  PONV Status: none  Cardiovascular status: acceptable and stable  Respiratory status: nasal cannula, unassisted, acceptable and spontaneous ventilation  Hydration status: acceptable

## 2020-11-06 NOTE — H&P
Pre-Op H&P  Anastasiia Jones  [id number]  1960      Chief complaint: Low back/flank pain      Subjective:  Patient is a 60 y.o.female presents for scheduled surgery by Dr. Estefanía Rowe. She anticipates a EXTRACORPOREAL SHOCKWAVE LITHOTRIPSY WITH STENT INSERTION BILATERAL today. She has had low back/flank pain for about 1 1/2 mths with intermittent hematuria.    Review of Systems:  Constitutiona-- No fever, chills or sweats. No fatigue.  CV-- No chest pain, palpitation or syncope  Resp-- No SOB, cough, hemoptysis  GI- No nausea, vomiting + diarrhea.    -- +hematuria and flank pain.    Skin--No rashes       Allergies: No Known Allergies  Latex: No  Contrast Dye: No      Home Meds:  Medications Prior to Admission   Medication Sig Dispense Refill Last Dose   • clidinium-chlordiazePOXIDE (LIBRAX) 5-2.5 MG per capsule Take 1 capsule by mouth 2 (Two) Times a Day.   11/5/2020 at Unknown time   • ferrous sulfate 140 (45 Fe) MG tablet controlled-release tablet Take 140 mg by mouth Daily With Breakfast.   11/5/2020 at Unknown time   • ketorolac (TORADOL) 10 MG tablet Take 1 tablet by mouth Every 6 (Six) Hours As Needed for Moderate Pain . 12 tablet 0 Past Week at Unknown time   • nadolol (CORGARD) 20 MG tablet Take 20 mg by mouth Daily. 1.2 TABLET DAILY   11/5/2020 at 1800   • pancrelipase, Lip-Prot-Amyl, (CREON) 49826 units capsule delayed-release particles capsule Take 36,000 units of lipase by mouth 2 (Two) Times a Day. TWO CAPSULES TWICE DAILY   11/5/2020 at Unknown time   • spironolactone (ALDACTONE) 100 MG tablet Take 100 mg by mouth Daily.   11/5/2020 at Unknown time   • NON FORMULARY             PMH:   Past Medical History:   Diagnosis Date   • Diabetes mellitus (CMS/HCC)    • Diverticulitis    • Fibromyalgia    • Hidradenitis    • Kidney stone    • SOLIMAN (nonalcoholic steatohepatitis)      PSH:    Past Surgical History:   Procedure Laterality Date   • BREAST CYST ASPIRATION     • CHOLECYSTECTOMY     • HYSTERECTOMY  "N/A    • OTHER SURGICAL HISTORY      sweat glands removed ivy arms   • WART REMOVAL         Immunization History:  Influenza: 2020  Pneumococcal: Yes  Tetanus: No      Social History:   Tobacco:   Social History     Tobacco Use   Smoking Status Current Every Day Smoker   • Packs/day: 0.50      Alcohol:     Social History     Substance and Sexual Activity   Alcohol Use Yes    Comment: socially         Physical Exam:/69 (BP Location: Right arm, Patient Position: Lying)   Pulse 73   Temp 98.9 °F (37.2 °C) (Tympanic)   Resp 18   Ht 160 cm (63\")   Wt 77.1 kg (170 lb)   LMP  (LMP Unknown)   SpO2 96%   BMI 30.11 kg/m²       General Appearance:    Alert, cooperative, no distress, appears stated age   Head:    Normocephalic, without obvious abnormality, atraumatic   Lungs:     Clear to auscultation bilaterally, respirations unlabored    Heart: Regular rate and rhythm, S1 and S2 normal, no murmur, rub    or gallop    Abdomen:    Soft without tenderness   Breast Exam:    deferred   Genitalia:    deferred   Extremities:   Extremities normal, atraumatic, no cyanosis or edema   Skin:   Skin color, texture, turgor normal, no rashes or lesions   Neurologic:   Grossly intact     Results Review:     LABS:  Lab Results   Component Value Date    WBC 4.68 11/06/2020    HGB 13.3 11/06/2020    HCT 40.8 11/06/2020    .2 (H) 11/06/2020     (L) 11/06/2020    NEUTROABS 3.26 07/30/2019    GLUCOSE 155 (H) 07/30/2019    BUN 9 07/30/2019    CREATININE 0.87 07/30/2019    EGFRIFNONA 67 07/30/2019    EGFRIFAFRI >60 05/08/2015     07/30/2019    K 3.6 07/30/2019     07/30/2019    CO2 22.0 07/30/2019    CALCIUM 8.6 07/30/2019    ALBUMIN 3.10 (L) 07/30/2019    AST 38 (H) 07/30/2019    ALT 16 07/30/2019    BILITOT 0.7 07/30/2019     SARS-CoV-2 ORLY   Not Detected Not Detected        RADIOLOGY:  Imaging Results (Last 72 Hours)     ** No results found for the last 72 hours. **          I reviewed the patient's new " clinical results.    Cancer Staging (if applicable)  Cancer Patient: __ yes __no __unknown; If yes, clinical stage T:__ N:__M:__, stage group or __N/A      Impression: Kidney stones      Plan: EXTRACORPOREAL SHOCKWAVE LITHOTRIPSY WITH STENT INSERTION BILATERAL      GRACE Amso   11/6/2020   11:02 EST

## 2020-11-06 NOTE — OP NOTE
EXTRACORPOREAL SHOCKWAVE LITHOTRIPSY WITH STENT INSERTION/REMOVAL  Procedure Note    Anastasiia Jones  11/6/2020    Pre-op Diagnosis:   Bilateral nephrolithiasis     Post-op Diagnosis:     bilateral nephrolithiasis    Procedure/CPT® Codes:      Procedure(s):  Bilateral EXTRACORPOREAL SHOCKWAVE LITHOTRIPSY WITH left STENT INSERTION     Surgeon(s):  Tre José Jr., MD    Anesthesia: General    Staff:   Circulator: Marissa Olivarez RN  Scrub Person: Judy Bahena; Stephanie Mcneal  Vendor Representative: Melquiades Mcclure    Estimated Blood Loss: minimal  Urine Voided: * No values recorded between 11/6/2020 11:48 AM and 11/6/2020 12:30 PM *    Specimens:                None      Drains: * No LDAs found *    Findings: Bilateral nephrolithiasis    Complications: None    History: 60-year-old with bilateral nephrolithiasis desires surgical therapy.  She gives her full consent for shockwave lithotripsy and stent placement understanding the risks and benefits therein.    Operative Report: After consent was obtained the patient brought to the operatory placed in supine position.  She has a dorsolithotomy position.  Fluoroscopic guidance was used at the incisions due to identify the right-sided renal pelvis stone.  Approximately 8 x 10 mm.  2000 shocks were delivered at 18-20 located with complete fragmentation.  Because of his elected not to place a stent at the site.  On the left the stone was larger than the ureteral stent cystoscopically with rotation, 4.8 x 24.  String was secured to patient's leg.  Bladder was emptied.  On the left side 3000 shocks were delivered at 18-20 calculated fragmentation of the ultrasound.  Patient tolerated procedure well.  Anesthesia was reversed.  She was taken to the recovery room in stable condition.    Tre José Jr., MD     Date: 11/6/2020  Time: 12:30 EST

## 2020-11-06 NOTE — ANESTHESIA PREPROCEDURE EVALUATION
Anesthesia Evaluation     Patient summary reviewed and Nursing notes reviewed   NPO Solid Status: > 8 hours  NPO Liquid Status: > 2 hours           Airway   Mallampati: I  TM distance: >3 FB  Neck ROM: full  No difficulty expected  Dental      Pulmonary    (+) a smoker Current, COPD (NO MDI ) mild,   (-) shortness of breath, recent URI, sleep apnea, no home oxygen  Cardiovascular     (-) hypertension, past MI, dysrhythmias, angina, hyperlipidemia      Neuro/Psych  (-) seizures, CVA  GI/Hepatic/Renal/Endo    (+)   hepatitis (SOLIMAN ), liver disease fatty liver disease, renal disease stones, diabetes mellitus (BL NO meds ) type 2,     Musculoskeletal     Abdominal    Substance History      OB/GYN          Other                        Anesthesia Plan    ASA 3     general     intravenous induction     Anesthetic plan, all risks, benefits, and alternatives have been provided, discussed and informed consent has been obtained with: patient.    Plan discussed with CRNA.

## 2020-11-06 NOTE — ANESTHESIA PROCEDURE NOTES
Airway  Urgency: elective    Date/Time: 11/6/2020 11:52 AM  Airway not difficult    General Information and Staff    Patient location during procedure: OR  CRNA: Shayan Cervantes CRNA    Indications and Patient Condition  Indications for airway management: airway protection    Preoxygenated: yes  Mask difficulty assessment: 0 - not attempted    Final Airway Details  Final airway type: supraglottic airway      Successful airway: I-gel  Size 4    Number of attempts at approach: 1  Assessment: lips, teeth, and gum same as pre-op and atraumatic intubation    Additional Comments  LMA placed without difficulty, ventilation with assist, equal breath sounds and symmetric chest rise and fall

## 2020-12-28 PROCEDURE — U0004 COV-19 TEST NON-CDC HGH THRU: HCPCS | Performed by: FAMILY MEDICINE

## 2021-01-20 ENCOUNTER — OFFICE (OUTPATIENT)
Dept: URBAN - METROPOLITAN AREA CLINIC 4 | Facility: CLINIC | Age: 61
End: 2021-01-20

## 2021-01-21 ENCOUNTER — HOSPITAL ENCOUNTER (EMERGENCY)
Age: 61
Discharge: HOME | End: 2021-01-21

## 2021-01-21 VITALS
WEIGHT: 201 LBS | HEART RATE: 102 BPM | OXYGEN SATURATION: 93 % | RESPIRATION RATE: 16 BRPM | SYSTOLIC BLOOD PRESSURE: 130 MMHG | DIASTOLIC BLOOD PRESSURE: 78 MMHG | BODY MASS INDEX: 32.3 KG/M2 | TEMPERATURE: 98.2 F | HEIGHT: 66 IN

## 2021-01-21 DIAGNOSIS — B02.9 HERPES ZOSTER WITHOUT COMPLICATION: Primary | ICD-10-CM

## 2021-01-21 PROCEDURE — 99284 EMERGENCY DEPT VISIT MOD MDM: CPT

## 2021-01-21 PROCEDURE — 6370000000 HC RX 637 (ALT 250 FOR IP): Performed by: PHYSICIAN ASSISTANT

## 2021-01-21 RX ORDER — HYDROCODONE BITARTRATE AND ACETAMINOPHEN 5; 325 MG/1; MG/1
1 TABLET ORAL EVERY 6 HOURS PRN
Qty: 12 TABLET | Refills: 0 | Status: SHIPPED | OUTPATIENT
Start: 2021-01-21 | End: 2021-01-24

## 2021-01-21 RX ORDER — HYDROCODONE BITARTRATE AND ACETAMINOPHEN 5; 325 MG/1; MG/1
1 TABLET ORAL ONCE
Status: COMPLETED | OUTPATIENT
Start: 2021-01-21 | End: 2021-01-21

## 2021-01-21 RX ORDER — ACYCLOVIR 400 MG/1
800 TABLET ORAL ONCE
Status: COMPLETED | OUTPATIENT
Start: 2021-01-21 | End: 2021-01-21

## 2021-01-21 RX ORDER — ACYCLOVIR 800 MG/1
800 TABLET ORAL
Qty: 35 TABLET | Refills: 0 | Status: SHIPPED | OUTPATIENT
Start: 2021-01-21 | End: 2021-01-28

## 2021-01-21 RX ADMIN — ACYCLOVIR 800 MG: 400 TABLET ORAL at 11:57

## 2021-01-21 RX ADMIN — HYDROCODONE BITARTRATE AND ACETAMINOPHEN 1 TABLET: 5; 325 TABLET ORAL at 11:57

## 2021-01-21 ASSESSMENT — ENCOUNTER SYMPTOMS
GASTROINTESTINAL NEGATIVE: 1
EYES NEGATIVE: 1
RESPIRATORY NEGATIVE: 1

## 2021-01-21 ASSESSMENT — PAIN SCALES - GENERAL
PAINLEVEL_OUTOF10: 7
PAINLEVEL_OUTOF10: 7

## 2021-01-21 ASSESSMENT — PAIN DESCRIPTION - LOCATION: LOCATION: BACK;NECK

## 2021-01-21 NOTE — ED PROVIDER NOTES
[address] [facility] ED  eMERGENCY dEPARTMENT eNCOUnter      Pt Name: Sonal Bloom  MRN: [id number]  [place] 1960  Date of evaluation: 2021  Provider: Robert Cadena PA-C      HISTORY OF PRESENT ILLNESS    Sonal Bloom is a 64 y.o. female who presents to the Emergency Department with chief complaint of a rash on the right side of her neck and on her chest.  Patient states within the last few days she had burning and tingling sensation along the area which has then directed into a vesicular rash over the last 24 hours. Patient complains of pain and discomfort. Patient took over-the-counter medications about 7 hours ago without relief. Patient admits to having chickenpox when she was younger. Patient denies fever and has no other concerns at this time. REVIEW OF SYSTEMS       Review of Systems   Constitutional: Negative. HENT: Negative. Eyes: Negative. Respiratory: Negative. Cardiovascular: Negative. Gastrointestinal: Negative. Endocrine: Negative. Genitourinary: Negative. Musculoskeletal: Negative. Skin: Positive for rash. Right neck area, right trapezius area    Neurological: Negative. Psychiatric/Behavioral: Negative. PAST MEDICAL HISTORY     Past Medical History:   Diagnosis Date    Arthritis          SURGICAL HISTORY       Past Surgical History:   Procedure Laterality Date    APPENDECTOMY       SECTION      CHOLECYSTECTOMY           CURRENT MEDICATIONS       Previous Medications    No medications on file       ALLERGIES     Pcn [penicillins]    FAMILY HISTORY     History reviewed. No pertinent family history.        SOCIAL HISTORY       Social History     Socioeconomic History    Marital status: None     Spouse name: None    Number of children: None    Years of education: None    Highest education level: None   Occupational History    None   Social Needs    Financial resource strain: None    Food insecurity Worry: None     Inability: None    Transportation needs     Medical: None     Non-medical: None   Tobacco Use    Smoking status: Current Some Day Smoker    Smokeless tobacco: Never Used   Substance and Sexual Activity    Alcohol use: Not Currently    Drug use: Never    Sexual activity: None   Lifestyle    Physical activity     Days per week: None     Minutes per session: None    Stress: None   Relationships    Social connections     Talks on phone: None     Gets together: None     Attends Mandaen service: None     Active member of club or organization: None     Attends meetings of clubs or organizations: None     Relationship status: None    Intimate partner violence     Fear of current or ex partner: None     Emotionally abused: None     Physically abused: None     Forced sexual activity: None   Other Topics Concern    None   Social History Narrative    None       SCREENINGS      @FLOW([id number])@      PHYSICAL EXAM    (up to 7 for level 4, 8 or more for level 5)     ED Triage Vitals [01/21/21 1114]   BP Temp Temp Source Pulse Resp SpO2 Height Weight   130/78 98.2 °F (36.8 °C) Oral 102 16 93 % 5' 6\" (1.676 m) 201 lb (91.2 kg)       Physical Exam  Constitutional:       General: She is not in acute distress. Appearance: She is well-developed. HENT:      Head: Normocephalic and atraumatic. Eyes:      Conjunctiva/sclera: Conjunctivae normal.      Pupils: Pupils are equal, round, and reactive to light. Neck:      Musculoskeletal: Normal range of motion and neck supple. Cardiovascular:      Rate and Rhythm: Normal rate and regular rhythm. Heart sounds: No murmur. Pulmonary:      Effort: No respiratory distress. Breath sounds: Normal breath sounds. No wheezing or rales. Abdominal:      General: There is no distension. Palpations: Abdomen is soft. Tenderness: There is no abdominal tenderness. Musculoskeletal: Normal range of motion.    Skin: General: Skin is warm and dry. Findings: Rash present. No erythema. Rash is vesicular. Neurological:      Mental Status: She is alert and oriented to person, place, and time. Cranial Nerves: No cranial nerve deficit. Psychiatric:         Judgment: Judgment normal.           All other labs were within normal range or not returned as of this dictation. EMERGENCY DEPARTMENT COURSE and DIFFERENTIALDIAGNOSIS/MDM:   Vitals:    Vitals:    01/21/21 1114   BP: 130/78   Pulse: 102   Resp: 16   Temp: 98.2 °F (36.8 °C)   TempSrc: Oral   SpO2: 93%   Weight: 201 lb (91.2 kg)   Height: 5' 6\" (1.676 m)        Patient started on acyclovir and Norco while in the emergency room. Patient to follow-up with primary care provider for re-evaluation and treatment. Return here if symptoms worsen or if new concerning symptoms arise. Patient verbalizes understanding of plan at discharge has no further questions. Patient advised to allow area to our air out and keep dry and avoid scratching. PROCEDURES:  Unless otherwise noted below, none     Procedures      FINAL IMPRESSION      1.  Herpes zoster without complication          DISPOSITION/PLAN   DISPOSITION            Madison Enrique PA-C (electronically signed)  Attending Emergency Physician  [address] [facility], [place]  01/21/21 7261

## 2021-05-05 ENCOUNTER — HOSPITAL ENCOUNTER (EMERGENCY)
Facility: HOSPITAL | Age: 61
Discharge: HOME | End: 2021-05-05
Attending: EMERGENCY MEDICINE | Admitting: EMERGENCY MEDICINE

## 2021-05-05 ENCOUNTER — APPOINTMENT (OUTPATIENT)
Dept: CT IMAGING | Facility: HOSPITAL | Age: 61
End: 2021-05-05

## 2021-05-05 VITALS
OXYGEN SATURATION: 96 % | TEMPERATURE: 97.5 F | BODY MASS INDEX: 29.59 KG/M2 | SYSTOLIC BLOOD PRESSURE: 133 MMHG | DIASTOLIC BLOOD PRESSURE: 77 MMHG | HEIGHT: 63 IN | WEIGHT: 167 LBS | RESPIRATION RATE: 16 BRPM | HEART RATE: 88 BPM

## 2021-05-05 DIAGNOSIS — K75.81 NASH (NONALCOHOLIC STEATOHEPATITIS): ICD-10-CM

## 2021-05-05 DIAGNOSIS — R82.81 PYURIA: ICD-10-CM

## 2021-05-05 DIAGNOSIS — R31.9 HEMATURIA, UNSPECIFIED TYPE: ICD-10-CM

## 2021-05-05 DIAGNOSIS — K43.9 VENTRAL HERNIA WITHOUT OBSTRUCTION OR GANGRENE: ICD-10-CM

## 2021-05-05 DIAGNOSIS — R10.9 ACUTE ABDOMINAL PAIN: Primary | ICD-10-CM

## 2021-05-05 DIAGNOSIS — E11.69 TYPE 2 DIABETES MELLITUS WITH OTHER SPECIFIED COMPLICATION, WITHOUT LONG-TERM CURRENT USE OF INSULIN (HCC): ICD-10-CM

## 2021-05-05 LAB
ALBUMIN SERPL-MCNC: 2.7 G/DL (ref 3.5–5.2)
ALBUMIN/GLOB SERPL: 0.6 G/DL
ALP SERPL-CCNC: 110 U/L (ref 39–117)
ALT SERPL W P-5'-P-CCNC: 8 U/L (ref 1–33)
ANION GAP SERPL CALCULATED.3IONS-SCNC: 9 MMOL/L (ref 5–15)
AST SERPL-CCNC: 31 U/L (ref 1–32)
BACTERIA UR QL AUTO: ABNORMAL /HPF
BASOPHILS # BLD AUTO: 0.02 10*3/MM3 (ref 0–0.2)
BASOPHILS NFR BLD AUTO: 0.4 % (ref 0–1.5)
BILIRUB SERPL-MCNC: 0.9 MG/DL (ref 0–1.2)
BILIRUB UR QL STRIP: NEGATIVE
BUN SERPL-MCNC: 7 MG/DL (ref 6–20)
BUN/CREAT SERPL: 9.3 (ref 7–25)
CALCIUM SPEC-SCNC: 8 MG/DL (ref 8.6–10.5)
CHLORIDE SERPL-SCNC: 109 MMOL/L (ref 98–107)
CLARITY UR: CLEAR
CO2 SERPL-SCNC: 21 MMOL/L (ref 22–29)
COLOR UR: YELLOW
CREAT SERPL-MCNC: 0.75 MG/DL (ref 0.57–1)
D-LACTATE SERPL-SCNC: 1.2 MMOL/L (ref 0.5–2)
DEPRECATED RDW RBC AUTO: 54.4 FL (ref 37–54)
EOSINOPHIL # BLD AUTO: 0.3 10*3/MM3 (ref 0–0.4)
EOSINOPHIL NFR BLD AUTO: 6.3 % (ref 0.3–6.2)
ERYTHROCYTE [DISTWIDTH] IN BLOOD BY AUTOMATED COUNT: 14.9 % (ref 12.3–15.4)
GFR SERPL CREATININE-BSD FRML MDRD: 79 ML/MIN/1.73
GLOBULIN UR ELPH-MCNC: 4.6 GM/DL
GLUCOSE SERPL-MCNC: 121 MG/DL (ref 65–99)
GLUCOSE UR STRIP-MCNC: NEGATIVE MG/DL
HCT VFR BLD AUTO: 38.7 % (ref 34–46.6)
HGB BLD-MCNC: 11.9 G/DL (ref 12–15.9)
HGB UR QL STRIP.AUTO: ABNORMAL
HYALINE CASTS UR QL AUTO: ABNORMAL /LPF
IMM GRANULOCYTES # BLD AUTO: 0.02 10*3/MM3 (ref 0–0.05)
IMM GRANULOCYTES NFR BLD AUTO: 0.4 % (ref 0–0.5)
KETONES UR QL STRIP: NEGATIVE
LEUKOCYTE ESTERASE UR QL STRIP.AUTO: ABNORMAL
LYMPHOCYTES # BLD AUTO: 0.88 10*3/MM3 (ref 0.7–3.1)
LYMPHOCYTES NFR BLD AUTO: 18.4 % (ref 19.6–45.3)
MCH RBC QN AUTO: 30.2 PG (ref 26.6–33)
MCHC RBC AUTO-ENTMCNC: 30.7 G/DL (ref 31.5–35.7)
MCV RBC AUTO: 98.2 FL (ref 79–97)
MONOCYTES # BLD AUTO: 0.4 10*3/MM3 (ref 0.1–0.9)
MONOCYTES NFR BLD AUTO: 8.4 % (ref 5–12)
NEUTROPHILS NFR BLD AUTO: 3.17 10*3/MM3 (ref 1.7–7)
NEUTROPHILS NFR BLD AUTO: 66.1 % (ref 42.7–76)
NITRITE UR QL STRIP: NEGATIVE
NRBC BLD AUTO-RTO: 0 /100 WBC (ref 0–0.2)
PH UR STRIP.AUTO: 7.5 [PH] (ref 5–8)
PLATELET # BLD AUTO: 124 10*3/MM3 (ref 140–450)
PMV BLD AUTO: 10.6 FL (ref 6–12)
POTASSIUM SERPL-SCNC: 4.1 MMOL/L (ref 3.5–5.2)
PROT SERPL-MCNC: 7.3 G/DL (ref 6–8.5)
PROT UR QL STRIP: ABNORMAL
RBC # BLD AUTO: 3.94 10*6/MM3 (ref 3.77–5.28)
RBC # UR: ABNORMAL /HPF
REF LAB TEST METHOD: ABNORMAL
SODIUM SERPL-SCNC: 139 MMOL/L (ref 136–145)
SP GR UR STRIP: 1.02 (ref 1–1.03)
SQUAMOUS #/AREA URNS HPF: ABNORMAL /HPF
UROBILINOGEN UR QL STRIP: ABNORMAL
WBC # BLD AUTO: 4.79 10*3/MM3 (ref 3.4–10.8)
WBC UR QL AUTO: ABNORMAL /HPF

## 2021-05-05 PROCEDURE — 87086 URINE CULTURE/COLONY COUNT: CPT | Performed by: PHYSICIAN ASSISTANT

## 2021-05-05 PROCEDURE — 74177 CT ABD & PELVIS W/CONTRAST: CPT

## 2021-05-05 PROCEDURE — 25010000002 IOPAMIDOL 61 % SOLUTION: Performed by: EMERGENCY MEDICINE

## 2021-05-05 PROCEDURE — 81001 URINALYSIS AUTO W/SCOPE: CPT | Performed by: PHYSICIAN ASSISTANT

## 2021-05-05 PROCEDURE — 99283 EMERGENCY DEPT VISIT LOW MDM: CPT

## 2021-05-05 PROCEDURE — 85025 COMPLETE CBC W/AUTO DIFF WBC: CPT | Performed by: PHYSICIAN ASSISTANT

## 2021-05-05 PROCEDURE — 83605 ASSAY OF LACTIC ACID: CPT | Performed by: PHYSICIAN ASSISTANT

## 2021-05-05 PROCEDURE — 80053 COMPREHEN METABOLIC PANEL: CPT | Performed by: PHYSICIAN ASSISTANT

## 2021-05-05 RX ORDER — PROCHLORPERAZINE MALEATE 10 MG
10 TABLET ORAL EVERY 6 HOURS PRN
Qty: 30 TABLET | Refills: 0 | Status: SHIPPED | OUTPATIENT
Start: 2021-05-05

## 2021-05-05 RX ORDER — OXYCODONE HYDROCHLORIDE 5 MG/1
5 TABLET ORAL EVERY 6 HOURS PRN
Qty: 12 TABLET | Refills: 0 | Status: SHIPPED | OUTPATIENT
Start: 2021-05-05

## 2021-05-05 RX ORDER — CEFDINIR 300 MG/1
300 CAPSULE ORAL 2 TIMES DAILY
Qty: 14 CAPSULE | Refills: 0 | Status: SHIPPED | OUTPATIENT
Start: 2021-05-05

## 2021-05-05 RX ORDER — HYDROCODONE BITARTRATE AND ACETAMINOPHEN 5; 325 MG/1; MG/1
1 TABLET ORAL EVERY 6 HOURS PRN
Qty: 12 TABLET | Refills: 0 | Status: CANCELLED | OUTPATIENT
Start: 2021-05-05

## 2021-05-05 RX ORDER — SODIUM CHLORIDE 0.9 % (FLUSH) 0.9 %
10 SYRINGE (ML) INJECTION AS NEEDED
Status: DISCONTINUED | OUTPATIENT
Start: 2021-05-05 | End: 2021-05-05 | Stop reason: HOSPADM

## 2021-05-05 RX ADMIN — IOPAMIDOL 95 ML: 612 INJECTION, SOLUTION INTRAVENOUS at 09:30

## 2021-05-06 LAB — BACTERIA SPEC AEROBE CULT: NORMAL

## 2021-11-23 ENCOUNTER — OFFICE (OUTPATIENT)
Dept: URBAN - METROPOLITAN AREA CLINIC 4 | Facility: CLINIC | Age: 61
End: 2021-11-23
Payer: COMMERCIAL

## 2021-11-23 VITALS — SYSTOLIC BLOOD PRESSURE: 104 MMHG | HEIGHT: 63 IN | DIASTOLIC BLOOD PRESSURE: 59 MMHG | WEIGHT: 175 LBS

## 2021-11-23 DIAGNOSIS — R19.7 DIARRHEA, UNSPECIFIED: ICD-10-CM

## 2021-11-23 DIAGNOSIS — K57.30 DIVERTICULOSIS OF LARGE INTESTINE WITHOUT PERFORATION OR ABS: ICD-10-CM

## 2021-11-23 PROCEDURE — 99215 OFFICE O/P EST HI 40 MIN: CPT | Performed by: NURSE PRACTITIONER

## 2021-11-23 RX ORDER — RIFAXIMIN 550 MG/1
TABLET ORAL
Qty: 60 | Refills: 11 | Status: ACTIVE
Start: 2021-11-23

## 2021-11-29 ENCOUNTER — APPOINTMENT (OUTPATIENT)
Dept: GENERAL RADIOLOGY | Facility: HOSPITAL | Age: 61
End: 2021-11-29

## 2021-11-29 ENCOUNTER — APPOINTMENT (OUTPATIENT)
Dept: CT IMAGING | Facility: HOSPITAL | Age: 61
End: 2021-11-29

## 2021-11-29 ENCOUNTER — HOSPITAL ENCOUNTER (INPATIENT)
Facility: HOSPITAL | Age: 61
LOS: 4 days | Discharge: HOME | End: 2021-12-03
Attending: EMERGENCY MEDICINE | Admitting: INTERNAL MEDICINE

## 2021-11-29 DIAGNOSIS — J44.1 COPD WITH ACUTE EXACERBATION (HCC): ICD-10-CM

## 2021-11-29 DIAGNOSIS — I48.91 ATRIAL FIBRILLATION WITH RVR (HCC): Primary | ICD-10-CM

## 2021-11-29 DIAGNOSIS — J18.9 PNEUMONIA OF BOTH LUNGS DUE TO INFECTIOUS ORGANISM, UNSPECIFIED PART OF LUNG: ICD-10-CM

## 2021-11-29 PROBLEM — J96.01 ACUTE RESPIRATORY FAILURE WITH HYPOXIA (HCC): Status: ACTIVE | Noted: 2021-11-29

## 2021-11-29 LAB
ALBUMIN SERPL-MCNC: 3.7 G/DL (ref 3.5–5.2)
ALBUMIN/GLOB SERPL: 1.1 G/DL
ALP SERPL-CCNC: 144 U/L (ref 39–117)
ALT SERPL W P-5'-P-CCNC: 35 U/L (ref 1–33)
ANION GAP SERPL CALCULATED.3IONS-SCNC: 12.4 MMOL/L (ref 5–15)
AST SERPL-CCNC: 24 U/L (ref 1–32)
BASOPHILS # BLD AUTO: 0.08 10*3/MM3 (ref 0–0.2)
BASOPHILS NFR BLD AUTO: 0.9 % (ref 0–1.5)
BILIRUB SERPL-MCNC: 0.8 MG/DL (ref 0–1.2)
BUN SERPL-MCNC: 15 MG/DL (ref 6–20)
BUN/CREAT SERPL: 15.2 (ref 7–25)
CALCIUM SPEC-SCNC: 9.5 MG/DL (ref 8.6–10.5)
CHLORIDE SERPL-SCNC: 106 MMOL/L (ref 98–107)
CO2 SERPL-SCNC: 21.6 MMOL/L (ref 22–29)
CREAT SERPL-MCNC: 0.99 MG/DL (ref 0.57–1)
D DIMER PPP FEU-MCNC: 1.43 MCGFEU/ML (ref 0–0.46)
D-LACTATE SERPL-SCNC: 1.4 MMOL/L (ref 0.5–2)
DEPRECATED RDW RBC AUTO: 49.7 FL (ref 37–54)
EOSINOPHIL # BLD AUTO: 0.22 10*3/MM3 (ref 0–0.4)
EOSINOPHIL NFR BLD AUTO: 2.5 % (ref 0.3–6.2)
ERYTHROCYTE [DISTWIDTH] IN BLOOD BY AUTOMATED COUNT: 14.6 % (ref 12.3–15.4)
GFR SERPL CREATININE-BSD FRML MDRD: 57 ML/MIN/1.73
GLOBULIN UR ELPH-MCNC: 3.4 GM/DL
GLUCOSE SERPL-MCNC: 104 MG/DL (ref 65–99)
HCT VFR BLD AUTO: 40.5 % (ref 34–46.6)
HGB BLD-MCNC: 12.8 G/DL (ref 12–15.9)
HOLD SPECIMEN: NORMAL
HOLD SPECIMEN: NORMAL
IMM GRANULOCYTES # BLD AUTO: 0.03 10*3/MM3 (ref 0–0.05)
IMM GRANULOCYTES NFR BLD AUTO: 0.3 % (ref 0–0.5)
L PNEUMO1 AG UR QL IA: NEGATIVE
LYMPHOCYTES # BLD AUTO: 1.16 10*3/MM3 (ref 0.7–3.1)
LYMPHOCYTES NFR BLD AUTO: 13.4 % (ref 19.6–45.3)
MAGNESIUM SERPL-MCNC: 1.9 MG/DL (ref 1.6–2.6)
MCH RBC QN AUTO: 29.1 PG (ref 26.6–33)
MCHC RBC AUTO-ENTMCNC: 31.6 G/DL (ref 31.5–35.7)
MCV RBC AUTO: 92 FL (ref 79–97)
MONOCYTES # BLD AUTO: 0.66 10*3/MM3 (ref 0.1–0.9)
MONOCYTES NFR BLD AUTO: 7.6 % (ref 5–12)
NEUTROPHILS NFR BLD AUTO: 6.49 10*3/MM3 (ref 1.7–7)
NEUTROPHILS NFR BLD AUTO: 75.3 % (ref 42.7–76)
NRBC BLD AUTO-RTO: 0 /100 WBC (ref 0–0.2)
NT-PROBNP SERPL-MCNC: 2784 PG/ML (ref 0–900)
PLATELET # BLD AUTO: 263 10*3/MM3 (ref 140–450)
PMV BLD AUTO: 9.8 FL (ref 6–12)
POTASSIUM SERPL-SCNC: 4.1 MMOL/L (ref 3.5–5.2)
PROCALCITONIN SERPL-MCNC: 0.07 NG/ML (ref 0–0.25)
PROT SERPL-MCNC: 7.1 G/DL (ref 6–8.5)
QT INTERVAL: 317 MS
RBC # BLD AUTO: 4.4 10*6/MM3 (ref 3.77–5.28)
S PNEUM AG SPEC QL LA: NEGATIVE
SARS-COV-2 RNA PNL SPEC NAA+PROBE: NOT DETECTED
SODIUM SERPL-SCNC: 140 MMOL/L (ref 136–145)
TROPONIN T SERPL-MCNC: <0.01 NG/ML (ref 0–0.03)
TSH SERPL DL<=0.05 MIU/L-ACNC: 2.34 UIU/ML (ref 0.27–4.2)
WBC NRBC COR # BLD: 8.64 10*3/MM3 (ref 3.4–10.8)
WHOLE BLOOD HOLD SPECIMEN: NORMAL
WHOLE BLOOD HOLD SPECIMEN: NORMAL

## 2021-11-29 PROCEDURE — 25010000002 ENOXAPARIN PER 10 MG: Performed by: INTERNAL MEDICINE

## 2021-11-29 PROCEDURE — 87899 AGENT NOS ASSAY W/OPTIC: CPT | Performed by: INTERNAL MEDICINE

## 2021-11-29 PROCEDURE — 99285 EMERGENCY DEPT VISIT HI MDM: CPT

## 2021-11-29 PROCEDURE — 71275 CT ANGIOGRAPHY CHEST: CPT

## 2021-11-29 PROCEDURE — 83735 ASSAY OF MAGNESIUM: CPT | Performed by: PHYSICIAN ASSISTANT

## 2021-11-29 PROCEDURE — 25010000002 DIGOXIN PER 500 MCG: Performed by: PHYSICIAN ASSISTANT

## 2021-11-29 PROCEDURE — 25010000002 METHYLPREDNISOLONE PER 125 MG: Performed by: PHYSICIAN ASSISTANT

## 2021-11-29 PROCEDURE — 85379 FIBRIN DEGRADATION QUANT: CPT | Performed by: PHYSICIAN ASSISTANT

## 2021-11-29 PROCEDURE — 84145 PROCALCITONIN (PCT): CPT | Performed by: PHYSICIAN ASSISTANT

## 2021-11-29 PROCEDURE — G0378 HOSPITAL OBSERVATION PER HR: HCPCS

## 2021-11-29 PROCEDURE — 25010000002 METHYLPREDNISOLONE PER 125 MG: Performed by: INTERNAL MEDICINE

## 2021-11-29 PROCEDURE — 94640 AIRWAY INHALATION TREATMENT: CPT

## 2021-11-29 PROCEDURE — 93005 ELECTROCARDIOGRAM TRACING: CPT | Performed by: EMERGENCY MEDICINE

## 2021-11-29 PROCEDURE — 85025 COMPLETE CBC W/AUTO DIFF WBC: CPT | Performed by: EMERGENCY MEDICINE

## 2021-11-29 PROCEDURE — 87633 RESP VIRUS 12-25 TARGETS: CPT | Performed by: INTERNAL MEDICINE

## 2021-11-29 PROCEDURE — 99285 EMERGENCY DEPT VISIT HI MDM: CPT | Performed by: PHYSICIAN ASSISTANT

## 2021-11-29 PROCEDURE — 71046 X-RAY EXAM CHEST 2 VIEWS: CPT

## 2021-11-29 PROCEDURE — 94799 UNLISTED PULMONARY SVC/PX: CPT

## 2021-11-29 PROCEDURE — 25010000002 AMIODARONE IN DEXTROSE 5% 360-4.14 MG/200ML-% SOLUTION

## 2021-11-29 PROCEDURE — 25010000002 DIGOXIN PER 500 MCG

## 2021-11-29 PROCEDURE — 83605 ASSAY OF LACTIC ACID: CPT | Performed by: PHYSICIAN ASSISTANT

## 2021-11-29 PROCEDURE — 25010000002 CEFTRIAXONE SODIUM-DEXTROSE 2-2.22 GM-%(50ML) RECONSTITUTED SOLUTION: Performed by: PHYSICIAN ASSISTANT

## 2021-11-29 PROCEDURE — 0 IOPAMIDOL PER 1 ML: Performed by: EMERGENCY MEDICINE

## 2021-11-29 PROCEDURE — 94761 N-INVAS EAR/PLS OXIMETRY MLT: CPT

## 2021-11-29 PROCEDURE — 36415 COLL VENOUS BLD VENIPUNCTURE: CPT

## 2021-11-29 PROCEDURE — 84484 ASSAY OF TROPONIN QUANT: CPT | Performed by: EMERGENCY MEDICINE

## 2021-11-29 PROCEDURE — 84443 ASSAY THYROID STIM HORMONE: CPT | Performed by: PHYSICIAN ASSISTANT

## 2021-11-29 PROCEDURE — 87635 SARS-COV-2 COVID-19 AMP PRB: CPT | Performed by: PHYSICIAN ASSISTANT

## 2021-11-29 PROCEDURE — 83880 ASSAY OF NATRIURETIC PEPTIDE: CPT | Performed by: EMERGENCY MEDICINE

## 2021-11-29 PROCEDURE — 93010 ELECTROCARDIOGRAM REPORT: CPT | Performed by: INTERNAL MEDICINE

## 2021-11-29 PROCEDURE — 87040 BLOOD CULTURE FOR BACTERIA: CPT | Performed by: PHYSICIAN ASSISTANT

## 2021-11-29 PROCEDURE — 80053 COMPREHEN METABOLIC PANEL: CPT | Performed by: EMERGENCY MEDICINE

## 2021-11-29 PROCEDURE — 99291 CRITICAL CARE FIRST HOUR: CPT | Performed by: INTERNAL MEDICINE

## 2021-11-29 RX ORDER — IBUPROFEN 800 MG/1
800 TABLET ORAL EVERY 8 HOURS PRN
COMMUNITY
End: 2022-01-31 | Stop reason: HOSPADM

## 2021-11-29 RX ORDER — SODIUM CHLORIDE 0.9 % (FLUSH) 0.9 %
10 SYRINGE (ML) INJECTION AS NEEDED
Status: DISCONTINUED | OUTPATIENT
Start: 2021-11-29 | End: 2021-12-03 | Stop reason: HOSPADM

## 2021-11-29 RX ORDER — METHYLPREDNISOLONE SODIUM SUCCINATE 125 MG/2ML
60 INJECTION, POWDER, LYOPHILIZED, FOR SOLUTION INTRAMUSCULAR; INTRAVENOUS EVERY 6 HOURS
Status: DISCONTINUED | OUTPATIENT
Start: 2021-11-29 | End: 2021-12-01

## 2021-11-29 RX ORDER — IPRATROPIUM BROMIDE AND ALBUTEROL SULFATE 2.5; .5 MG/3ML; MG/3ML
3 SOLUTION RESPIRATORY (INHALATION)
Status: DISCONTINUED | OUTPATIENT
Start: 2021-11-29 | End: 2021-12-03 | Stop reason: HOSPADM

## 2021-11-29 RX ORDER — DIGOXIN 0.25 MG/ML
250 INJECTION INTRAMUSCULAR; INTRAVENOUS ONCE
Status: COMPLETED | OUTPATIENT
Start: 2021-11-29 | End: 2021-11-29

## 2021-11-29 RX ORDER — SODIUM CHLORIDE 9 MG/ML
40 INJECTION, SOLUTION INTRAVENOUS AS NEEDED
Status: DISCONTINUED | OUTPATIENT
Start: 2021-11-29 | End: 2021-12-03 | Stop reason: HOSPADM

## 2021-11-29 RX ORDER — SODIUM CHLORIDE 0.9 % (FLUSH) 0.9 %
10 SYRINGE (ML) INJECTION EVERY 12 HOURS SCHEDULED
Status: DISCONTINUED | OUTPATIENT
Start: 2021-11-29 | End: 2021-12-03 | Stop reason: HOSPADM

## 2021-11-29 RX ORDER — BACLOFEN 10 MG/1
10 TABLET ORAL 2 TIMES DAILY PRN
Status: DISCONTINUED | OUTPATIENT
Start: 2021-11-29 | End: 2021-12-03 | Stop reason: HOSPADM

## 2021-11-29 RX ORDER — ALBUTEROL SULFATE 90 UG/1
2 AEROSOL, METERED RESPIRATORY (INHALATION) ONCE
Status: COMPLETED | OUTPATIENT
Start: 2021-11-29 | End: 2021-11-29

## 2021-11-29 RX ORDER — METHYLPREDNISOLONE SODIUM SUCCINATE 125 MG/2ML
125 INJECTION, POWDER, LYOPHILIZED, FOR SOLUTION INTRAMUSCULAR; INTRAVENOUS ONCE
Status: COMPLETED | OUTPATIENT
Start: 2021-11-29 | End: 2021-11-29

## 2021-11-29 RX ORDER — CEFTRIAXONE 2 G/50ML
2 INJECTION, SOLUTION INTRAVENOUS ONCE
Status: COMPLETED | OUTPATIENT
Start: 2021-11-29 | End: 2021-11-29

## 2021-11-29 RX ORDER — IPRATROPIUM BROMIDE AND ALBUTEROL SULFATE 2.5; .5 MG/3ML; MG/3ML
3 SOLUTION RESPIRATORY (INHALATION) ONCE
Status: DISCONTINUED | OUTPATIENT
Start: 2021-11-29 | End: 2021-11-29

## 2021-11-29 RX ORDER — ACETAMINOPHEN 325 MG/1
TABLET ORAL EVERY 6 HOURS PRN
Status: ON HOLD | COMMUNITY
End: 2022-01-20

## 2021-11-29 RX ORDER — DIGOXIN 0.25 MG/ML
125 INJECTION INTRAMUSCULAR; INTRAVENOUS ONCE
Status: COMPLETED | OUTPATIENT
Start: 2021-11-29 | End: 2021-11-29

## 2021-11-29 RX ORDER — BACLOFEN 10 MG/1
10 TABLET ORAL 2 TIMES DAILY PRN
COMMUNITY
End: 2022-01-31 | Stop reason: HOSPADM

## 2021-11-29 RX ORDER — ACETAMINOPHEN 500 MG
500 TABLET ORAL EVERY 6 HOURS PRN
Status: DISCONTINUED | OUTPATIENT
Start: 2021-11-29 | End: 2021-12-03 | Stop reason: HOSPADM

## 2021-11-29 RX ORDER — CEFTRIAXONE 2 G/50ML
2 INJECTION, SOLUTION INTRAVENOUS EVERY 24 HOURS
Status: DISCONTINUED | OUTPATIENT
Start: 2021-11-30 | End: 2021-11-30

## 2021-11-29 RX ORDER — METOPROLOL TARTRATE 5 MG/5ML
INJECTION INTRAVENOUS
Status: DISPENSED
Start: 2021-11-29 | End: 2021-11-30

## 2021-11-29 RX ORDER — DILTIAZEM HYDROCHLORIDE 5 MG/ML
10 INJECTION INTRAVENOUS ONCE
Status: COMPLETED | OUTPATIENT
Start: 2021-11-29 | End: 2021-11-29

## 2021-11-29 RX ORDER — DIGOXIN 0.25 MG/ML
INJECTION INTRAMUSCULAR; INTRAVENOUS
Status: COMPLETED
Start: 2021-11-29 | End: 2021-11-29

## 2021-11-29 RX ADMIN — DILTIAZEM HYDROCHLORIDE 10 MG: 5 INJECTION INTRAVENOUS at 15:28

## 2021-11-29 RX ADMIN — SODIUM CHLORIDE, PRESERVATIVE FREE 10 ML: 5 INJECTION INTRAVENOUS at 21:16

## 2021-11-29 RX ADMIN — IOPAMIDOL 73 ML: 755 INJECTION, SOLUTION INTRAVENOUS at 16:12

## 2021-11-29 RX ADMIN — METOPROLOL TARTRATE 5 MG: 5 INJECTION INTRAVENOUS at 18:17

## 2021-11-29 RX ADMIN — DIGOXIN 250 MCG: 0.25 INJECTION INTRAMUSCULAR; INTRAVENOUS at 19:53

## 2021-11-29 RX ADMIN — DIGOXIN 125 MCG: 0.25 INJECTION INTRAMUSCULAR; INTRAVENOUS at 17:20

## 2021-11-29 RX ADMIN — AMIODARONE HYDROCHLORIDE 1 MG/MIN: 1.8 INJECTION, SOLUTION INTRAVENOUS at 23:09

## 2021-11-29 RX ADMIN — SODIUM CHLORIDE 5 MG/HR: 900 INJECTION, SOLUTION INTRAVENOUS at 15:29

## 2021-11-29 RX ADMIN — ALBUTEROL SULFATE 2 PUFF: 90 AEROSOL, METERED RESPIRATORY (INHALATION) at 14:53

## 2021-11-29 RX ADMIN — IPRATROPIUM BROMIDE AND ALBUTEROL SULFATE 3 ML: .5; 2.5 SOLUTION RESPIRATORY (INHALATION) at 20:03

## 2021-11-29 RX ADMIN — ENOXAPARIN SODIUM 40 MG: 40 INJECTION SUBCUTANEOUS at 19:45

## 2021-11-29 RX ADMIN — CEFTRIAXONE 2 G: 2 INJECTION, SOLUTION INTRAVENOUS at 17:34

## 2021-11-29 RX ADMIN — METOPROLOL TARTRATE 5 MG: 5 INJECTION INTRAVENOUS at 18:00

## 2021-11-29 RX ADMIN — DOXYCYCLINE 100 MG: 100 INJECTION, POWDER, LYOPHILIZED, FOR SOLUTION INTRAVENOUS at 17:34

## 2021-11-29 RX ADMIN — METHYLPREDNISOLONE SODIUM SUCCINATE 60 MG: 125 INJECTION, POWDER, FOR SOLUTION INTRAMUSCULAR; INTRAVENOUS at 19:45

## 2021-11-29 RX ADMIN — METHYLPREDNISOLONE SODIUM SUCCINATE 125 MG: 125 INJECTION, POWDER, FOR SOLUTION INTRAMUSCULAR; INTRAVENOUS at 14:24

## 2021-11-30 ENCOUNTER — APPOINTMENT (OUTPATIENT)
Dept: CARDIOLOGY | Facility: HOSPITAL | Age: 61
End: 2021-11-30

## 2021-11-30 PROBLEM — R10.11 RIGHT UPPER QUADRANT ABDOMINAL PAIN: Status: RESOLVED | Noted: 2017-06-07 | Resolved: 2021-11-30

## 2021-11-30 PROBLEM — J12.3 HUMAN METAPNEUMOVIRUS PNEUMONIA: Status: ACTIVE | Noted: 2021-11-30

## 2021-11-30 PROBLEM — K80.00 CALCULUS OF GALLBLADDER WITH ACUTE CHOLECYSTITIS WITHOUT OBSTRUCTION: Status: RESOLVED | Noted: 2017-06-22 | Resolved: 2021-11-30

## 2021-11-30 PROBLEM — I27.81 COR PULMONALE (CHRONIC) (HCC): Status: ACTIVE | Noted: 2021-11-30

## 2021-11-30 PROBLEM — R11.2 NAUSEA AND VOMITING: Status: RESOLVED | Noted: 2017-03-31 | Resolved: 2021-11-30

## 2021-11-30 PROBLEM — I48.91 ATRIAL FIBRILLATION WITH RVR (HCC): Status: ACTIVE | Noted: 2021-11-30

## 2021-11-30 PROBLEM — I50.31 ACUTE DIASTOLIC (CONGESTIVE) HEART FAILURE: Status: ACTIVE | Noted: 2021-11-30

## 2021-11-30 PROBLEM — B34.8 INFECTION DUE TO HUMAN METAPNEUMOVIRUS (HMPV): Status: ACTIVE | Noted: 2021-11-30

## 2021-11-30 PROBLEM — I09.1 RHEUMATIC VALVULAR DISEASE: Status: ACTIVE | Noted: 2021-11-30

## 2021-11-30 PROBLEM — I48.0 PAF (PAROXYSMAL ATRIAL FIBRILLATION): Status: ACTIVE | Noted: 2021-11-30

## 2021-11-30 PROBLEM — R10.32 LEFT LOWER QUADRANT PAIN: Status: RESOLVED | Noted: 2017-03-31 | Resolved: 2021-11-30

## 2021-11-30 PROBLEM — A41.9 SEPSIS: Status: RESOLVED | Noted: 2019-11-06 | Resolved: 2021-11-30

## 2021-11-30 PROBLEM — R07.1 CHEST PAIN ON BREATHING: Status: RESOLVED | Noted: 2020-01-06 | Resolved: 2021-11-30

## 2021-11-30 LAB
ANION GAP SERPL CALCULATED.3IONS-SCNC: 14.3 MMOL/L (ref 5–15)
ASCENDING AORTA: 3.7 CM
B PARAPERT DNA SPEC QL NAA+PROBE: NOT DETECTED
B PERT DNA SPEC QL NAA+PROBE: NOT DETECTED
BASOPHILS # BLD AUTO: 0.01 10*3/MM3 (ref 0–0.2)
BASOPHILS NFR BLD AUTO: 0.2 % (ref 0–1.5)
BH CV ECHO MEAS - AI DEC SLOPE: 338 CM/SEC^2
BH CV ECHO MEAS - AI MAX PG: 76.4 MMHG
BH CV ECHO MEAS - AI MAX VEL: 437 CM/SEC
BH CV ECHO MEAS - AI P1/2T: 378.7 MSEC
BH CV ECHO MEAS - AO MAX PG (FULL): 36.5 MMHG
BH CV ECHO MEAS - AO MAX PG: 40.2 MMHG
BH CV ECHO MEAS - AO MEAN PG (FULL): 20 MMHG
BH CV ECHO MEAS - AO MEAN PG: 21 MMHG
BH CV ECHO MEAS - AO ROOT AREA (BSA CORRECTED): 1.7
BH CV ECHO MEAS - AO ROOT AREA: 9.1 CM^2
BH CV ECHO MEAS - AO ROOT DIAM: 3.4 CM
BH CV ECHO MEAS - AO V2 MAX: 317 CM/SEC
BH CV ECHO MEAS - AO V2 MEAN: 208 CM/SEC
BH CV ECHO MEAS - AO V2 VTI: 46.8 CM
BH CV ECHO MEAS - ASC AORTA: 3.7 CM
BH CV ECHO MEAS - AVA(I,A): 1 CM^2
BH CV ECHO MEAS - AVA(I,D): 1 CM^2
BH CV ECHO MEAS - AVA(V,A): 0.95 CM^2
BH CV ECHO MEAS - AVA(V,D): 0.95 CM^2
BH CV ECHO MEAS - BSA(HAYCOCK): 2.1 M^2
BH CV ECHO MEAS - BSA: 2 M^2
BH CV ECHO MEAS - BZI_BMI: 32.6 KILOGRAMS/M^2
BH CV ECHO MEAS - BZI_METRIC_HEIGHT: 167 CM
BH CV ECHO MEAS - BZI_METRIC_WEIGHT: 91 KG
BH CV ECHO MEAS - EDV(CUBED): 124.3 ML
BH CV ECHO MEAS - EDV(MOD-SP2): 81 ML
BH CV ECHO MEAS - EDV(MOD-SP4): 112 ML
BH CV ECHO MEAS - EDV(TEICH): 117.7 ML
BH CV ECHO MEAS - EF(CUBED): 55.5 %
BH CV ECHO MEAS - EF(MOD-BP): 64.8 %
BH CV ECHO MEAS - EF(MOD-SP2): 70.7 %
BH CV ECHO MEAS - EF(MOD-SP4): 55.4 %
BH CV ECHO MEAS - EF(TEICH): 47 %
BH CV ECHO MEAS - ESV(CUBED): 55.3 ML
BH CV ECHO MEAS - ESV(MOD-SP2): 23.7 ML
BH CV ECHO MEAS - ESV(MOD-SP4): 49.9 ML
BH CV ECHO MEAS - ESV(TEICH): 62.3 ML
BH CV ECHO MEAS - FS: 23.6 %
BH CV ECHO MEAS - IVS/LVPW: 1.2
BH CV ECHO MEAS - IVSD: 1.2 CM
BH CV ECHO MEAS - LAT PEAK E' VEL: 7.4 CM/SEC
BH CV ECHO MEAS - LV DIASTOLIC VOL/BSA (35-75): 56.1 ML/M^2
BH CV ECHO MEAS - LV MASS(C)D: 199.4 GRAMS
BH CV ECHO MEAS - LV MASS(C)DI: 99.9 GRAMS/M^2
BH CV ECHO MEAS - LV MAX PG: 3.7 MMHG
BH CV ECHO MEAS - LV MEAN PG: 1 MMHG
BH CV ECHO MEAS - LV SYSTOLIC VOL/BSA (12-30): 25 ML/M^2
BH CV ECHO MEAS - LV V1 MAX: 95.8 CM/SEC
BH CV ECHO MEAS - LV V1 MEAN: 52.7 CM/SEC
BH CV ECHO MEAS - LV V1 VTI: 15 CM
BH CV ECHO MEAS - LVIDD: 5 CM
BH CV ECHO MEAS - LVIDS: 3.8 CM
BH CV ECHO MEAS - LVLD AP2: 7.1 CM
BH CV ECHO MEAS - LVLD AP4: 7.4 CM
BH CV ECHO MEAS - LVLS AP2: 6.2 CM
BH CV ECHO MEAS - LVLS AP4: 6.5 CM
BH CV ECHO MEAS - LVOT AREA (M): 3.1 CM^2
BH CV ECHO MEAS - LVOT AREA: 3.1 CM^2
BH CV ECHO MEAS - LVOT DIAM: 2 CM
BH CV ECHO MEAS - LVPWD: 0.98 CM
BH CV ECHO MEAS - MED PEAK E' VEL: 6.7 CM/SEC
BH CV ECHO MEAS - MR MAX PG: 116.2 MMHG
BH CV ECHO MEAS - MR MAX VEL: 539 CM/SEC
BH CV ECHO MEAS - MR MEAN PG: 75 MMHG
BH CV ECHO MEAS - MR MEAN VEL: 416 CM/SEC
BH CV ECHO MEAS - MR VTI: 126 CM
BH CV ECHO MEAS - MV DEC SLOPE: 838 CM/SEC^2
BH CV ECHO MEAS - MV DEC TIME: 359 SEC
BH CV ECHO MEAS - MV MEAN PG: 7 MMHG
BH CV ECHO MEAS - MV P1/2T MAX VEL: 232.5 CM/SEC
BH CV ECHO MEAS - MV P1/2T: 81.3 MSEC
BH CV ECHO MEAS - MV V2 MEAN: 110.5 CM/SEC
BH CV ECHO MEAS - MV V2 VTI: 50.9 CM
BH CV ECHO MEAS - MVA P1/2T LCG: 0.95 CM^2
BH CV ECHO MEAS - MVA(P1/2T): 2.7 CM^2
BH CV ECHO MEAS - MVA(VTI): 0.93 CM^2
BH CV ECHO MEAS - PA ACC TIME: 0.07 SEC
BH CV ECHO MEAS - PA MAX PG: 2.5 MMHG
BH CV ECHO MEAS - PA PR(ACCEL): 49.3 MMHG
BH CV ECHO MEAS - PA V2 MAX: 78.4 CM/SEC
BH CV ECHO MEAS - QP/QS: 0.76
BH CV ECHO MEAS - RAP SYSTOLE: 8 MMHG
BH CV ECHO MEAS - RV MEAN PG: 1 MMHG
BH CV ECHO MEAS - RV V1 MEAN: 44 CM/SEC
BH CV ECHO MEAS - RV V1 VTI: 11.4 CM
BH CV ECHO MEAS - RVOT AREA: 3.1 CM^2
BH CV ECHO MEAS - RVOT DIAM: 2 CM
BH CV ECHO MEAS - RVSP: 48 MMHG
BH CV ECHO MEAS - SI(AO): 212.7 ML/M^2
BH CV ECHO MEAS - SI(CUBED): 34.5 ML/M^2
BH CV ECHO MEAS - SI(LVOT): 23.6 ML/M^2
BH CV ECHO MEAS - SI(MOD-SP2): 28.7 ML/M^2
BH CV ECHO MEAS - SI(MOD-SP4): 31.1 ML/M^2
BH CV ECHO MEAS - SI(TEICH): 27.7 ML/M^2
BH CV ECHO MEAS - SV(AO): 424.9 ML
BH CV ECHO MEAS - SV(CUBED): 68.9 ML
BH CV ECHO MEAS - SV(LVOT): 47.1 ML
BH CV ECHO MEAS - SV(MOD-SP2): 57.3 ML
BH CV ECHO MEAS - SV(MOD-SP4): 62.1 ML
BH CV ECHO MEAS - SV(RVOT): 35.8 ML
BH CV ECHO MEAS - SV(TEICH): 55.4 ML
BH CV ECHO MEAS - TAPSE (>1.6): 1.8 CM
BH CV ECHO MEAS - TR MAX PG: 40 MMHG
BH CV ECHO MEAS - TR MAX VEL: 307.5 CM/SEC
BH CV XLRA - RV BASE: 4.6 CM
BH CV XLRA - TDI S': 15.5 CM/SEC
BUN SERPL-MCNC: 14 MG/DL (ref 6–20)
BUN/CREAT SERPL: 18.9 (ref 7–25)
C PNEUM DNA NPH QL NAA+NON-PROBE: NOT DETECTED
CALCIUM SPEC-SCNC: 9.3 MG/DL (ref 8.6–10.5)
CHLORIDE SERPL-SCNC: 105 MMOL/L (ref 98–107)
CO2 SERPL-SCNC: 18.7 MMOL/L (ref 22–29)
CREAT SERPL-MCNC: 0.74 MG/DL (ref 0.57–1)
DEPRECATED RDW RBC AUTO: 49.8 FL (ref 37–54)
EOSINOPHIL # BLD AUTO: 0 10*3/MM3 (ref 0–0.4)
EOSINOPHIL NFR BLD AUTO: 0 % (ref 0.3–6.2)
ERYTHROCYTE [DISTWIDTH] IN BLOOD BY AUTOMATED COUNT: 14.8 % (ref 12.3–15.4)
FLUAV SUBTYP SPEC NAA+PROBE: NOT DETECTED
FLUBV RNA ISLT QL NAA+PROBE: NOT DETECTED
GFR SERPL CREATININE-BSD FRML MDRD: 80 ML/MIN/1.73
GLUCOSE SERPL-MCNC: 179 MG/DL (ref 65–99)
HADV DNA SPEC NAA+PROBE: NOT DETECTED
HCOV 229E RNA SPEC QL NAA+PROBE: NOT DETECTED
HCOV HKU1 RNA SPEC QL NAA+PROBE: NOT DETECTED
HCOV NL63 RNA SPEC QL NAA+PROBE: NOT DETECTED
HCOV OC43 RNA SPEC QL NAA+PROBE: NOT DETECTED
HCT VFR BLD AUTO: 40.3 % (ref 34–46.6)
HGB BLD-MCNC: 12.5 G/DL (ref 12–15.9)
HMPV RNA NPH QL NAA+NON-PROBE: DETECTED
HPIV1 RNA ISLT QL NAA+PROBE: NOT DETECTED
HPIV2 RNA SPEC QL NAA+PROBE: NOT DETECTED
HPIV3 RNA NPH QL NAA+PROBE: NOT DETECTED
HPIV4 P GENE NPH QL NAA+PROBE: NOT DETECTED
IMM GRANULOCYTES # BLD AUTO: 0.02 10*3/MM3 (ref 0–0.05)
IMM GRANULOCYTES NFR BLD AUTO: 0.4 % (ref 0–0.5)
LEFT ATRIUM VOLUME INDEX: 60.5 ML/M2
LYMPHOCYTES # BLD AUTO: 0.75 10*3/MM3 (ref 0.7–3.1)
LYMPHOCYTES NFR BLD AUTO: 13.4 % (ref 19.6–45.3)
M PNEUMO IGG SER IA-ACNC: NOT DETECTED
MAGNESIUM SERPL-MCNC: 1.8 MG/DL (ref 1.6–2.6)
MAXIMAL PREDICTED HEART RATE: 161 BPM
MCH RBC QN AUTO: 28.6 PG (ref 26.6–33)
MCHC RBC AUTO-ENTMCNC: 31 G/DL (ref 31.5–35.7)
MCV RBC AUTO: 92.2 FL (ref 79–97)
MONOCYTES # BLD AUTO: 0.13 10*3/MM3 (ref 0.1–0.9)
MONOCYTES NFR BLD AUTO: 2.3 % (ref 5–12)
NEUTROPHILS NFR BLD AUTO: 4.68 10*3/MM3 (ref 1.7–7)
NEUTROPHILS NFR BLD AUTO: 83.7 % (ref 42.7–76)
NRBC BLD AUTO-RTO: 0 /100 WBC (ref 0–0.2)
PLATELET # BLD AUTO: 298 10*3/MM3 (ref 140–450)
PMV BLD AUTO: 10.2 FL (ref 6–12)
POTASSIUM SERPL-SCNC: 4.3 MMOL/L (ref 3.5–5.2)
QT INTERVAL: 348 MS
RBC # BLD AUTO: 4.37 10*6/MM3 (ref 3.77–5.28)
RHINOVIRUS RNA SPEC NAA+PROBE: NOT DETECTED
RSV RNA NPH QL NAA+NON-PROBE: NOT DETECTED
SODIUM SERPL-SCNC: 138 MMOL/L (ref 136–145)
STRESS TARGET HR: 137 BPM
WBC NRBC COR # BLD: 5.59 10*3/MM3 (ref 3.4–10.8)

## 2021-11-30 PROCEDURE — 85025 COMPLETE CBC W/AUTO DIFF WBC: CPT | Performed by: INTERNAL MEDICINE

## 2021-11-30 PROCEDURE — 25010000002 FUROSEMIDE PER 20 MG: Performed by: INTERNAL MEDICINE

## 2021-11-30 PROCEDURE — 94761 N-INVAS EAR/PLS OXIMETRY MLT: CPT

## 2021-11-30 PROCEDURE — 25010000002 ENOXAPARIN PER 10 MG: Performed by: INTERNAL MEDICINE

## 2021-11-30 PROCEDURE — 99214 OFFICE O/P EST MOD 30 MIN: CPT | Performed by: INTERNAL MEDICINE

## 2021-11-30 PROCEDURE — 93306 TTE W/DOPPLER COMPLETE: CPT | Performed by: INTERNAL MEDICINE

## 2021-11-30 PROCEDURE — 93010 ELECTROCARDIOGRAM REPORT: CPT | Performed by: INTERNAL MEDICINE

## 2021-11-30 PROCEDURE — 25010000002 DIGOXIN PER 500 MCG: Performed by: INTERNAL MEDICINE

## 2021-11-30 PROCEDURE — 25010000002 METHYLPREDNISOLONE PER 125 MG: Performed by: INTERNAL MEDICINE

## 2021-11-30 PROCEDURE — 83735 ASSAY OF MAGNESIUM: CPT | Performed by: INTERNAL MEDICINE

## 2021-11-30 PROCEDURE — 93005 ELECTROCARDIOGRAM TRACING: CPT | Performed by: HOSPITALIST

## 2021-11-30 PROCEDURE — 25010000002 PERFLUTREN (DEFINITY) 8.476 MG IN SODIUM CHLORIDE (PF) 0.9 % 10 ML INJECTION: Performed by: INTERNAL MEDICINE

## 2021-11-30 PROCEDURE — 94799 UNLISTED PULMONARY SVC/PX: CPT

## 2021-11-30 PROCEDURE — 25010000002 CEFTRIAXONE SODIUM-DEXTROSE 2-2.22 GM-%(50ML) RECONSTITUTED SOLUTION: Performed by: INTERNAL MEDICINE

## 2021-11-30 PROCEDURE — 99233 SBSQ HOSP IP/OBS HIGH 50: CPT | Performed by: INTERNAL MEDICINE

## 2021-11-30 PROCEDURE — 80048 BASIC METABOLIC PNL TOTAL CA: CPT | Performed by: INTERNAL MEDICINE

## 2021-11-30 PROCEDURE — 94664 DEMO&/EVAL PT USE INHALER: CPT

## 2021-11-30 PROCEDURE — 93306 TTE W/DOPPLER COMPLETE: CPT

## 2021-11-30 PROCEDURE — 25010000002 AMIODARONE IN DEXTROSE 5% 360-4.14 MG/200ML-% SOLUTION: Performed by: HOSPITALIST

## 2021-11-30 RX ORDER — BUDESONIDE 0.5 MG/2ML
0.5 INHALANT ORAL
Status: DISCONTINUED | OUTPATIENT
Start: 2021-11-30 | End: 2021-12-03 | Stop reason: HOSPADM

## 2021-11-30 RX ORDER — METOPROLOL TARTRATE 5 MG/5ML
INJECTION INTRAVENOUS
Status: COMPLETED
Start: 2021-11-30 | End: 2021-11-30

## 2021-11-30 RX ORDER — DOXYCYCLINE 100 MG/10ML
INJECTION, POWDER, LYOPHILIZED, FOR SOLUTION INTRAVENOUS
Status: DISPENSED
Start: 2021-11-30 | End: 2021-11-30

## 2021-11-30 RX ORDER — DIGOXIN 0.25 MG/ML
500 INJECTION INTRAMUSCULAR; INTRAVENOUS ONCE
Status: COMPLETED | OUTPATIENT
Start: 2021-11-30 | End: 2021-11-30

## 2021-11-30 RX ORDER — DIGOXIN 0.25 MG/ML
250 INJECTION INTRAMUSCULAR; INTRAVENOUS EVERY 6 HOURS
Status: COMPLETED | OUTPATIENT
Start: 2021-11-30 | End: 2021-12-01

## 2021-11-30 RX ORDER — FUROSEMIDE 10 MG/ML
40 INJECTION INTRAMUSCULAR; INTRAVENOUS
Status: COMPLETED | OUTPATIENT
Start: 2021-11-30 | End: 2021-12-01

## 2021-11-30 RX ADMIN — IPRATROPIUM BROMIDE AND ALBUTEROL SULFATE 3 ML: .5; 2.5 SOLUTION RESPIRATORY (INHALATION) at 11:11

## 2021-11-30 RX ADMIN — IPRATROPIUM BROMIDE AND ALBUTEROL SULFATE 3 ML: .5; 2.5 SOLUTION RESPIRATORY (INHALATION) at 06:59

## 2021-11-30 RX ADMIN — DOXYCYCLINE 100 MG: 100 INJECTION, POWDER, LYOPHILIZED, FOR SOLUTION INTRAVENOUS at 05:26

## 2021-11-30 RX ADMIN — METOPROLOL TARTRATE 2.5 MG: 5 INJECTION INTRAVENOUS at 08:34

## 2021-11-30 RX ADMIN — BUDESONIDE 0.5 MG: 0.5 SUSPENSION RESPIRATORY (INHALATION) at 20:04

## 2021-11-30 RX ADMIN — ENOXAPARIN SODIUM 90 MG: 100 INJECTION SUBCUTANEOUS at 14:25

## 2021-11-30 RX ADMIN — CEFTRIAXONE 2 G: 2 INJECTION, SOLUTION INTRAVENOUS at 17:57

## 2021-11-30 RX ADMIN — SODIUM CHLORIDE, PRESERVATIVE FREE 10 ML: 5 INJECTION INTRAVENOUS at 20:21

## 2021-11-30 RX ADMIN — FUROSEMIDE 40 MG: 10 INJECTION, SOLUTION INTRAVENOUS at 17:57

## 2021-11-30 RX ADMIN — IPRATROPIUM BROMIDE AND ALBUTEROL SULFATE 3 ML: .5; 2.5 SOLUTION RESPIRATORY (INHALATION) at 15:48

## 2021-11-30 RX ADMIN — AMIODARONE HYDROCHLORIDE 1 MG/MIN: 1.8 INJECTION, SOLUTION INTRAVENOUS at 04:46

## 2021-11-30 RX ADMIN — METOPROLOL TARTRATE 2.5 MG: 5 INJECTION INTRAVENOUS at 12:13

## 2021-11-30 RX ADMIN — METOPROLOL TARTRATE 2.5 MG: 5 INJECTION INTRAVENOUS at 05:42

## 2021-11-30 RX ADMIN — DIGOXIN 500 MCG: 0.25 INJECTION INTRAMUSCULAR; INTRAVENOUS at 14:31

## 2021-11-30 RX ADMIN — SODIUM CHLORIDE 2 ML: 9 INJECTION INTRAMUSCULAR; INTRAVENOUS; SUBCUTANEOUS at 10:59

## 2021-11-30 RX ADMIN — DILTIAZEM HYDROCHLORIDE 30 MG: 30 TABLET, FILM COATED ORAL at 14:26

## 2021-11-30 RX ADMIN — IPRATROPIUM BROMIDE AND ALBUTEROL SULFATE 3 ML: .5; 2.5 SOLUTION RESPIRATORY (INHALATION) at 20:04

## 2021-11-30 RX ADMIN — ACETAMINOPHEN 500 MG: 500 TABLET, FILM COATED ORAL at 14:26

## 2021-11-30 RX ADMIN — SODIUM CHLORIDE, PRESERVATIVE FREE 10 ML: 5 INJECTION INTRAVENOUS at 08:34

## 2021-11-30 RX ADMIN — METHYLPREDNISOLONE SODIUM SUCCINATE 60 MG: 125 INJECTION, POWDER, FOR SOLUTION INTRAMUSCULAR; INTRAVENOUS at 20:21

## 2021-11-30 RX ADMIN — BUDESONIDE 0.5 MG: 0.5 SUSPENSION RESPIRATORY (INHALATION) at 11:11

## 2021-11-30 RX ADMIN — DIGOXIN 250 MCG: 0.25 INJECTION INTRAMUSCULAR; INTRAVENOUS at 20:24

## 2021-11-30 RX ADMIN — METHYLPREDNISOLONE SODIUM SUCCINATE 60 MG: 125 INJECTION, POWDER, FOR SOLUTION INTRAMUSCULAR; INTRAVENOUS at 03:28

## 2021-11-30 RX ADMIN — METHYLPREDNISOLONE SODIUM SUCCINATE 60 MG: 125 INJECTION, POWDER, FOR SOLUTION INTRAMUSCULAR; INTRAVENOUS at 08:34

## 2021-11-30 RX ADMIN — METHYLPREDNISOLONE SODIUM SUCCINATE 60 MG: 125 INJECTION, POWDER, FOR SOLUTION INTRAMUSCULAR; INTRAVENOUS at 14:25

## 2021-11-30 RX ADMIN — DILTIAZEM HYDROCHLORIDE 30 MG: 30 TABLET, FILM COATED ORAL at 18:45

## 2021-12-01 LAB
ANION GAP SERPL CALCULATED.3IONS-SCNC: 10.3 MMOL/L (ref 5–15)
BASOPHILS # BLD AUTO: 0.02 10*3/MM3 (ref 0–0.2)
BASOPHILS NFR BLD AUTO: 0.2 % (ref 0–1.5)
BUN SERPL-MCNC: 24 MG/DL (ref 6–20)
BUN/CREAT SERPL: 32.9 (ref 7–25)
CALCIUM SPEC-SCNC: 8.8 MG/DL (ref 8.6–10.5)
CHLORIDE SERPL-SCNC: 105 MMOL/L (ref 98–107)
CO2 SERPL-SCNC: 22.7 MMOL/L (ref 22–29)
CREAT SERPL-MCNC: 0.73 MG/DL (ref 0.57–1)
DEPRECATED RDW RBC AUTO: 51.1 FL (ref 37–54)
EOSINOPHIL # BLD AUTO: 0.06 10*3/MM3 (ref 0–0.4)
EOSINOPHIL NFR BLD AUTO: 0.6 % (ref 0.3–6.2)
ERYTHROCYTE [DISTWIDTH] IN BLOOD BY AUTOMATED COUNT: 14.9 % (ref 12.3–15.4)
GFR SERPL CREATININE-BSD FRML MDRD: 82 ML/MIN/1.73
GLUCOSE SERPL-MCNC: 141 MG/DL (ref 65–99)
HCT VFR BLD AUTO: 38.2 % (ref 34–46.6)
HGB BLD-MCNC: 11.9 G/DL (ref 12–15.9)
IMM GRANULOCYTES # BLD AUTO: 0.05 10*3/MM3 (ref 0–0.05)
IMM GRANULOCYTES NFR BLD AUTO: 0.5 % (ref 0–0.5)
LYMPHOCYTES # BLD AUTO: 0.75 10*3/MM3 (ref 0.7–3.1)
LYMPHOCYTES NFR BLD AUTO: 7.7 % (ref 19.6–45.3)
MAGNESIUM SERPL-MCNC: 2 MG/DL (ref 1.6–2.6)
MCH RBC QN AUTO: 29 PG (ref 26.6–33)
MCHC RBC AUTO-ENTMCNC: 31.2 G/DL (ref 31.5–35.7)
MCV RBC AUTO: 92.9 FL (ref 79–97)
MONOCYTES # BLD AUTO: 0.38 10*3/MM3 (ref 0.1–0.9)
MONOCYTES NFR BLD AUTO: 3.9 % (ref 5–12)
NEUTROPHILS NFR BLD AUTO: 8.54 10*3/MM3 (ref 1.7–7)
NEUTROPHILS NFR BLD AUTO: 87.1 % (ref 42.7–76)
NRBC BLD AUTO-RTO: 0.9 /100 WBC (ref 0–0.2)
PLATELET # BLD AUTO: 289 10*3/MM3 (ref 140–450)
PMV BLD AUTO: 9.9 FL (ref 6–12)
POTASSIUM SERPL-SCNC: 4.2 MMOL/L (ref 3.5–5.2)
RBC # BLD AUTO: 4.11 10*6/MM3 (ref 3.77–5.28)
RBC MORPH BLD: NORMAL
SMALL PLATELETS BLD QL SMEAR: ADEQUATE
SODIUM SERPL-SCNC: 138 MMOL/L (ref 136–145)
WBC MORPH BLD: NORMAL
WBC NRBC COR # BLD: 9.8 10*3/MM3 (ref 3.4–10.8)

## 2021-12-01 PROCEDURE — 83735 ASSAY OF MAGNESIUM: CPT | Performed by: INTERNAL MEDICINE

## 2021-12-01 PROCEDURE — 25010000002 FUROSEMIDE PER 20 MG: Performed by: INTERNAL MEDICINE

## 2021-12-01 PROCEDURE — 80048 BASIC METABOLIC PNL TOTAL CA: CPT | Performed by: INTERNAL MEDICINE

## 2021-12-01 PROCEDURE — 25010000002 METHYLPREDNISOLONE PER 125 MG: Performed by: INTERNAL MEDICINE

## 2021-12-01 PROCEDURE — 94761 N-INVAS EAR/PLS OXIMETRY MLT: CPT

## 2021-12-01 PROCEDURE — 99233 SBSQ HOSP IP/OBS HIGH 50: CPT | Performed by: INTERNAL MEDICINE

## 2021-12-01 PROCEDURE — 25010000002 DIGOXIN PER 500 MCG: Performed by: INTERNAL MEDICINE

## 2021-12-01 PROCEDURE — 94799 UNLISTED PULMONARY SVC/PX: CPT

## 2021-12-01 PROCEDURE — 85007 BL SMEAR W/DIFF WBC COUNT: CPT | Performed by: INTERNAL MEDICINE

## 2021-12-01 PROCEDURE — 99232 SBSQ HOSP IP/OBS MODERATE 35: CPT | Performed by: INTERNAL MEDICINE

## 2021-12-01 PROCEDURE — 25010000002 ENOXAPARIN PER 10 MG: Performed by: INTERNAL MEDICINE

## 2021-12-01 PROCEDURE — 25010000002 METHYLPREDNISOLONE PER 40 MG: Performed by: INTERNAL MEDICINE

## 2021-12-01 PROCEDURE — 85025 COMPLETE CBC W/AUTO DIFF WBC: CPT | Performed by: INTERNAL MEDICINE

## 2021-12-01 RX ORDER — WHITE PETROLATUM 57.7 %-MINERAL OIL 31.9 % EYE OINTMENT
1
Status: DISCONTINUED | OUTPATIENT
Start: 2021-12-01 | End: 2021-12-01

## 2021-12-01 RX ORDER — WHITE PETROLATUM 57.7 %-MINERAL OIL 31.9 % EYE OINTMENT
Status: DISCONTINUED | OUTPATIENT
Start: 2021-12-01 | End: 2021-12-01

## 2021-12-01 RX ORDER — METHYLPREDNISOLONE SODIUM SUCCINATE 40 MG/ML
40 INJECTION, POWDER, LYOPHILIZED, FOR SOLUTION INTRAMUSCULAR; INTRAVENOUS EVERY 8 HOURS
Status: DISCONTINUED | OUTPATIENT
Start: 2021-12-01 | End: 2021-12-02

## 2021-12-01 RX ADMIN — SODIUM CHLORIDE, PRESERVATIVE FREE 10 ML: 5 INJECTION INTRAVENOUS at 15:10

## 2021-12-01 RX ADMIN — SODIUM CHLORIDE, PRESERVATIVE FREE 10 ML: 5 INJECTION INTRAVENOUS at 08:24

## 2021-12-01 RX ADMIN — DILTIAZEM HYDROCHLORIDE 30 MG: 30 TABLET, FILM COATED ORAL at 12:09

## 2021-12-01 RX ADMIN — BUDESONIDE 0.5 MG: 0.5 SUSPENSION RESPIRATORY (INHALATION) at 07:37

## 2021-12-01 RX ADMIN — ENOXAPARIN SODIUM 90 MG: 100 INJECTION SUBCUTANEOUS at 15:10

## 2021-12-01 RX ADMIN — METHYLPREDNISOLONE SODIUM SUCCINATE 60 MG: 125 INJECTION, POWDER, FOR SOLUTION INTRAMUSCULAR; INTRAVENOUS at 02:07

## 2021-12-01 RX ADMIN — SODIUM CHLORIDE, PRESERVATIVE FREE 10 ML: 5 INJECTION INTRAVENOUS at 20:40

## 2021-12-01 RX ADMIN — FUROSEMIDE 40 MG: 10 INJECTION, SOLUTION INTRAVENOUS at 08:22

## 2021-12-01 RX ADMIN — ENOXAPARIN SODIUM 90 MG: 100 INJECTION SUBCUTANEOUS at 02:11

## 2021-12-01 RX ADMIN — BUDESONIDE 0.5 MG: 0.5 SUSPENSION RESPIRATORY (INHALATION) at 19:34

## 2021-12-01 RX ADMIN — IPRATROPIUM BROMIDE AND ALBUTEROL SULFATE 3 ML: .5; 2.5 SOLUTION RESPIRATORY (INHALATION) at 19:34

## 2021-12-01 RX ADMIN — DILTIAZEM HYDROCHLORIDE 30 MG: 30 TABLET, FILM COATED ORAL at 00:16

## 2021-12-01 RX ADMIN — DILTIAZEM HYDROCHLORIDE 30 MG: 30 TABLET, FILM COATED ORAL at 19:00

## 2021-12-01 RX ADMIN — WHITE PETROLATUM 57.7 %-MINERAL OIL 31.9 % EYE OINTMENT 1 APPLICATION: at 18:00

## 2021-12-01 RX ADMIN — METHYLPREDNISOLONE SODIUM SUCCINATE 40 MG: 40 INJECTION, POWDER, FOR SOLUTION INTRAMUSCULAR; INTRAVENOUS at 16:02

## 2021-12-01 RX ADMIN — IPRATROPIUM BROMIDE AND ALBUTEROL SULFATE 3 ML: .5; 2.5 SOLUTION RESPIRATORY (INHALATION) at 15:46

## 2021-12-01 RX ADMIN — IPRATROPIUM BROMIDE AND ALBUTEROL SULFATE 3 ML: .5; 2.5 SOLUTION RESPIRATORY (INHALATION) at 07:35

## 2021-12-01 RX ADMIN — METHYLPREDNISOLONE SODIUM SUCCINATE 60 MG: 125 INJECTION, POWDER, FOR SOLUTION INTRAMUSCULAR; INTRAVENOUS at 08:22

## 2021-12-01 RX ADMIN — DIGOXIN 250 MCG: 0.25 INJECTION INTRAMUSCULAR; INTRAVENOUS at 02:09

## 2021-12-01 RX ADMIN — DILTIAZEM HYDROCHLORIDE 30 MG: 30 TABLET, FILM COATED ORAL at 05:20

## 2021-12-01 NOTE — PROGRESS NOTES
"SERVICE: [facility] HOSPITALIST    CONSULTANTS: Cardiology    CHIEF COMPLAINT: Shortness of breath    SUBJECTIVE: Patient seen and examined at bedside.  Patient is in good spirits today.  Patient reports improvement in her shortness of breath, denies chest pressure, reports cough is now dry, and she does not notice any wheezing.  patient denies headache, fever or chills, nausea, vomiting, diarrhea, abdominal pain, chest pain or palpitations,  leg pain or weakness.     OBJECTIVE:    Physical exam is largely unchanged from previous exam in previous note, except where documented below.     Physical Exam:  General: Patient is awake and alert. Well developed and well nourished. No acute distress noted.   HENT: Head is atraumatic, normocephalic. Hearing is grossly intact. Nose is without obvious congestion and appears patent. Neck is supple and trachea is midline.   Eyes: Vision is grossly intact. Pupils appear equal and round.   Cardiovascular: Irregularly irregular rhythm with normal rate no murmurs rubs or gallops noted  Respiratory: Slightly improved air exchange but still diffusely diminished without wheezing rhonchi or rales  Abdominal/GI: Soft, nontender, bowel sounds present. No rebound or guarding present.   Extremities: No peripheral edema noted.   Musculoskeletal: Spontaneous movement of bilateral upper and lower extremities against gravity noted. No signs of injury or deformity noted.   Skin: Warm and dry.   Psych: Mood and affect are appropriate. Cooperative with exam.   Neuro: No facial asymmetry noted. No focal deficits noted, hearing and vision are grossly intact.     /78   Pulse 81   Temp 97.4 °F (36.3 °C) (Oral)   Resp 26   Ht 167 cm (65.75\")   Wt 91 kg (200 lb 9.9 oz)   SpO2 93%   BMI 32.63 kg/m²     MEDS/LABS REVIEWED AND ORDERED    budesonide, 0.5 mg, Nebulization, BID - RT  dilTIAZem, 30 mg, Oral, Q6H  enoxaparin, 1 mg/kg, Subcutaneous, Q12H  furosemide, 40 mg, " Intravenous, BID  ipratropium-albuterol, 3 mL, Nebulization, 4x Daily - RT  methylPREDNISolone sodium succinate, 60 mg, Intravenous, Q6H  sodium chloride, 10 mL, Intravenous, Q12H          LAB/DIAGNOSTICS:    Lab Results (last 24 hours)     Procedure Component Value Units Date/Time    CBC & Differential [[id number]]  (Abnormal) Collected: 12/01/21 0517    Specimen: Blood Updated: 12/01/21 0605    Narrative:      The following orders were created for panel order CBC & Differential.  Procedure                               Abnormality         Status                     ---------                               -----------         ------                     CBC Auto Differential[[id number]]        Abnormal            Final result               Scan Slide[[id number]]                                       Final result                 Please view results for these tests on the individual orders.    Scan Slide [[id number]] Collected: 12/01/21 0517    Specimen: Blood Updated: 12/01/21 0605     RBC Morphology Normal     WBC Morphology Normal     Platelet Estimate Adequate    CBC Auto Differential [[id number]]  (Abnormal) Collected: 12/01/21 0517    Specimen: Blood Updated: 12/01/21 0605     WBC 9.80 10*3/mm3      RBC 4.11 10*6/mm3      Hemoglobin 11.9 g/dL      Hematocrit 38.2 %      MCV 92.9 fL      MCH 29.0 pg      MCHC 31.2 g/dL      RDW 14.9 %      RDW-SD 51.1 fl      MPV 9.9 fL      Platelets 289 10*3/mm3      Neutrophil % 87.1 %      Lymphocyte % 7.7 %      Monocyte % 3.9 %      Eosinophil % 0.6 %      Basophil % 0.2 %      Immature Grans % 0.5 %      Neutrophils, Absolute 8.54 10*3/mm3      Lymphocytes, Absolute 0.75 10*3/mm3      Monocytes, Absolute 0.38 10*3/mm3      Eosinophils, Absolute 0.06 10*3/mm3      Basophils, Absolute 0.02 10*3/mm3      Immature Grans, Absolute 0.05 10*3/mm3      nRBC 0.9 /100 WBC     Basic Metabolic Panel [[id number]]  (Abnormal) Collected: 12/01/21 0517    Specimen: Blood Updated: 12/01/21 0549      Glucose 141 mg/dL      BUN 24 mg/dL      Creatinine 0.73 mg/dL      Sodium 138 mmol/L      Potassium 4.2 mmol/L      Chloride 105 mmol/L      CO2 22.7 mmol/L      Calcium 8.8 mg/dL      eGFR Non African Amer 82 mL/min/1.73      BUN/Creatinine Ratio 32.9     Anion Gap 10.3 mmol/L     Narrative:      GFR Normal >60  Chronic Kidney Disease <60  Kidney Failure <15      Magnesium [[id number]]  (Normal) Collected: 12/01/21 0517    Specimen: Blood Updated: 12/01/21 0548     Magnesium 2.0 mg/dL     Blood Culture - Blood, Arm, Right [[id number]]  (Normal) Collected: 11/29/21 1731    Specimen: Blood from Arm, Right Updated: 11/30/21 1746     Blood Culture No growth at 24 hours    Blood Culture - Blood, Arm, Left [[id number]]  (Normal) Collected: 11/29/21 1732    Specimen: Blood from Arm, Left Updated: 11/30/21 1746     Blood Culture No growth at 24 hours    Respiratory Panel, PCR (WITHOUT COVID) - Swab, Nasopharynx [[id number]]  (Abnormal) Collected: 11/29/21 1912    Specimen: Swab from Nasopharynx Updated: 11/30/21 1156     ADENOVIRUS, PCR Not Detected     Coronavirus 229E Not Detected     Coronavirus HKU1 Not Detected     Coronavirus NL63 Not Detected     Coronavirus OC43 Not Detected     Human Metapneumovirus Detected     Human Rhinovirus/Enterovirus Not Detected     Influenza B PCR Not Detected     Parainfluenza Virus 1 Not Detected     Parainfluenza Virus 2 Not Detected     Parainfluenza Virus 3 Not Detected     Parainfluenza Virus 4 Not Detected     Bordetella pertussis pcr Not Detected     Chlamydophila pneumoniae PCR Not Detected     Mycoplasma pneumo by PCR Not Detected     Influenza A PCR Not Detected     RSV, PCR Not Detected     Bordetella parapertussis PCR Not Detected    Narrative:      The coronavirus on the RVP is NOT COVID-19 and is NOT indicative of infection with COVID-19.    In the setting of a positive respiratory panel with a viral infection PLUS a negative procalcitonin without other underlying concern  for bacterial infection, consider observing off antibiotics or discontinuation of antibiotics and continue supportive care. If the respiratory panel is positive for atypical bacterial infection (Bordetella pertussis, Chlamydophila pneumoniae, or Mycoplasma pneumoniae), consider antibiotic de-escalation to target atypical bacterial infection.    CBC & Differential [[id number]]  (Abnormal) Collected: 11/30/21 0532    Specimen: Blood Updated: 11/30/21 0706    Narrative:      The following orders were created for panel order CBC & Differential.  Procedure                               Abnormality         Status                     ---------                               -----------         ------                     CBC Auto Differential[[id number]]        Abnormal            Final result                 Please view results for these tests on the individual orders.    CBC Auto Differential [[id number]]  (Abnormal) Collected: 11/30/21 0532    Specimen: Blood Updated: 11/30/21 0706     WBC 5.59 10*3/mm3      RBC 4.37 10*6/mm3      Hemoglobin 12.5 g/dL      Hematocrit 40.3 %      MCV 92.2 fL      MCH 28.6 pg      MCHC 31.0 g/dL      RDW 14.8 %      RDW-SD 49.8 fl      MPV 10.2 fL      Platelets 298 10*3/mm3      Neutrophil % 83.7 %      Lymphocyte % 13.4 %      Monocyte % 2.3 %      Eosinophil % 0.0 %      Basophil % 0.2 %      Immature Grans % 0.4 %      Neutrophils, Absolute 4.68 10*3/mm3      Lymphocytes, Absolute 0.75 10*3/mm3      Monocytes, Absolute 0.13 10*3/mm3      Eosinophils, Absolute 0.00 10*3/mm3      Basophils, Absolute 0.01 10*3/mm3      Immature Grans, Absolute 0.02 10*3/mm3      nRBC 0.0 /100 WBC         ECG 12 Lead   Final Result   HEART RATE= 115  bpm   RR Interval= 522  ms   [place] Interval=   ms   P Horizontal Axis=   deg   P Front Axis=   deg   QRSD Interval= 87  ms   QT Interval= 348  ms   QRS Axis= 47  deg   T Wave Axis= 85  deg   - ABNORMAL ECG -   Atrial fibrillation   Nonspecific T abnormalities,  lateral leads   When compared with ECG of 29-Nov-2021 14:03:10,   Significant rate decrease otherwise no change   Electronically Signed By: Chico Tubbs ([facility]) 30-Nov-2021 12:42:36   Date and Time of Study: 2021-11-30 06:52:15      ECG 12 Lead   Final Result   HEART RATE= 142  bpm   RR Interval= 421  ms   TX Interval=   ms   P Horizontal Axis=   deg   P Front Axis=   deg   QRSD Interval= 103  ms   QT Interval= 317  ms   QRS Axis= 27  deg   T Wave Axis= 65  deg   - ABNORMAL ECG -   Atrial fibrillation   Borderline prolonged QT interval   SINCE PREVIOUS TRACING,  AFIB WITH RVR IS NEW   Electronically Signed By: Audie Osorio ([facility]) 29-Nov-2021 17:41:40   Date and Time of Study: 2021-11-29 14:03:10        Results for orders placed during the hospital encounter of 11/29/21    Adult Transthoracic Echo Complete With Contrast if Necessary Per Protocol    Interpretation Summary  · Calculated left ventricular EF = 64.8% Estimated left ventricular EF was in agreement with the calculated left ventricular EF. Left ventricular systolic function is normal. Normal left ventricular cavity size noted. Left ventricular wall thickness is consistent with mild concentric hypertrophy. All left ventricular wall segments contract normally. Left ventricular diastolic function was indeterminate.  · The right ventricular cavity is moderate to severely dilated. Mildly reduced right ventricular systolic function noted.  · The left atrial cavity is severely dilated.  · The right atrial cavity is moderately dilated.  · The aortic valve is abnormal. It is trileaflet, but diffusely thickened and calcified. There is moderate aortic insufficiency and moderate aortic stenosis.  · The mitral valve is poorly visualized. There is nodular thickening of the leaflet tips, with more pronounced thickening and calcification noted on the posterior leaflet. The posterior leaflet appears restricted. Visually, there does not appear to be significant mitral  stenosis, but the mean gradient of 7 mmHg is consistent with mild to moderate mitral stenosis. There is at least moderate, if not moderately severe, mitral regurgitation.  · Moderate tricuspid valve regurgitation is present. Estimated right ventricular systolic pressure from tricuspid regurgitation is moderately elevated (45-55 mmHg). Calculated right ventricular systolic pressure from tricuspid regurgitation is 48 mmHg.  · No dilation of the aortic root is present. Mild dilation of the ascending aorta is present. Ascending aorta = 3.7 cm    XR Chest 2 View    Result Date: 11/29/2021  Right greater than left perihilar infiltrates. Please see the CT scan done same time Signer Name: Marcelino Lund MD  Signed: 11/29/2021 4:46 PM  Workstation Name: [id number]  Radiology [facility]    CT Angiogram Chest    Result Date: 11/29/2021  There is dense infiltrate within the right lower lobe superior segment measuring about 4 cm in diameter. Is also a dense infiltrate in the left lower lobe superior segment measuring 4 cm in diameter. No CT evidence pulmonary embolus. Chronic changes in the lungs with apical fibrosis and emphysematous change. Signer Name: Marcelino Lund MD  Signed: 11/29/2021 4:45 PM  Workstation Name: [id number]  Radiology [facility]        ASSESSMENT/PLAN:  Please not portions of this assessment/plan may have been copied and pasted, but I have personally seen this patient and reviewed each line of this assessment and plan for accuracy and made updates to reflect my necessary changes on 12/01/21 .     Acute hypoxic respiratory failure secondary to human metapneumovirus pneumonia and COPD exacerbation  -Patient requiring 4 L oxygen with no baseline oxygen use, attempt to wean as tolerated.   -We will discontinue IV Rocephin and doxycycline as this is likely viral pneumonia.     Paroxysmal A. fib   -Patient previously had A. fib on last admission when she had Covid pneumonia  -Cardiology has  "placed the patient on diltiazem after additional doses of digoxin, heart rate currently well controlled  -Echocardiogram shows chronic cor pulmonale/moderate pulmonary hypertension, rheumatic valvular disease, diastolic congestive heart failure-cardiology has given Lasix.   -Patient has been placed on full dose Lovenox, with plan to cover with warfarin because of her valvular disease.     Acute exacerbation of COPD  -Patient on Solu-Medrol 60 mg every 6 hours will wean as tolerated and DuoNeb breathing treatments  -As above attempt to wean oxygen as tolerated, for now we will target oxygen saturation of 88 to 92%.   -Continue budesonide    Insomnia-continue trazodone, no acute issues    DVT prophylaxis-Lovenox    PLAN FOR DISPOSITION: CHET Waldrop DO  Hospitalist, [facility]  12/01/21  06:32 EST    \"Dictated utilizing Dragon dictation\"    "

## 2021-12-01 NOTE — PLAN OF CARE
Problem: Adult Inpatient Plan of Care  Goal: Plan of Care Review  Flowsheets (Taken 11/30/2021 8461)  Plan of Care Reviewed With: patient   Goal Outcome Evaluation:  Plan of Care Reviewed With: patient

## 2021-12-01 NOTE — PROGRESS NOTES
"    Patient Name: Akila Amezcua  :1960  61 y.o.      Patient Care Team:  Melly Holliday APRN as PCP - General (Nurse Practitioner)    Chief Complaint: SOB    Interval History: Less shortness of breath.  She says she feels better this morning.       Objective   Vital Signs  Temp:  [97.4 °F (36.3 °C)-98 °F (36.7 °C)] 97.7 °F (36.5 °C)  Heart Rate:  [] 99  Resp:  [2-26] 15  BP: ()/(69-93) 118/71    Intake/Output Summary (Last 24 hours) at 2021 0827  Last data filed at 2021 0800  Gross per 24 hour   Intake 1420 ml   Output 1900 ml   Net -480 ml     Flowsheet Rows      First Filed Value   Admission Height 167.6 cm (66\") Documented at 2021 1352   Admission Weight 81.7 kg (180 lb 3.2 oz) Documented at 2021 1352          Physical Exam:   General Appearance:    Alert, cooperative, in no acute distress   Lungs:    Coarse breath sounds.  Normal respiratory effort and rate.      Heart:    irregular rhythm and normal rate, normal S1 and S2, no  gallops or rubs.  2/6 crescendo decrescendo systolic murmur   Chest Wall:    No abnormalities observed   Abdomen:     Soft, nontender, positive bowel sounds.     Extremities:   no cyanosis, clubbing or edema.  No marked joint deformities.  Adequate musculoskeletal strength.       Results Review:    Results from last 7 days   Lab Units 21  0517   SODIUM mmol/L 138   POTASSIUM mmol/L 4.2   CHLORIDE mmol/L 105   CO2 mmol/L 22.7   BUN mg/dL 24*   CREATININE mg/dL 0.73   GLUCOSE mg/dL 141*   CALCIUM mg/dL 8.8     Results from last 7 days   Lab Units 21  1401   TROPONIN T ng/mL <0.010     Results from last 7 days   Lab Units 21  0517   WBC 10*3/mm3 9.80   HEMOGLOBIN g/dL 11.9*   HEMATOCRIT % 38.2   PLATELETS 10*3/mm3 289         Results from last 7 days   Lab Units 21  0517   MAGNESIUM mg/dL 2.0             Results for orders placed during the hospital encounter of 21    Adult Transthoracic Echo Complete With " Contrast if Necessary Per Protocol    Interpretation Summary  · Calculated left ventricular EF = 64.8% Estimated left ventricular EF was in agreement with the calculated left ventricular EF. Left ventricular systolic function is normal. Normal left ventricular cavity size noted. Left ventricular wall thickness is consistent with mild concentric hypertrophy. All left ventricular wall segments contract normally. Left ventricular diastolic function was indeterminate.  · The right ventricular cavity is moderate to severely dilated. Mildly reduced right ventricular systolic function noted.  · The left atrial cavity is severely dilated.  · The right atrial cavity is moderately dilated.  · The aortic valve is abnormal. It is trileaflet, but diffusely thickened and calcified. There is moderate aortic insufficiency and moderate aortic stenosis.  · The mitral valve is poorly visualized. There is nodular thickening of the leaflet tips, with more pronounced thickening and calcification noted on the posterior leaflet. The posterior leaflet appears restricted. Visually, there does not appear to be significant mitral stenosis, but the mean gradient of 7 mmHg is consistent with mild to moderate mitral stenosis. There is at least moderate, if not moderately severe, mitral regurgitation.  · Moderate tricuspid valve regurgitation is present. Estimated right ventricular systolic pressure from tricuspid regurgitation is moderately elevated (45-55 mmHg). Calculated right ventricular systolic pressure from tricuspid regurgitation is 48 mmHg.  · No dilation of the aortic root is present. Mild dilation of the ascending aorta is present. Ascending aorta = 3.7 cm          Medication Review:   budesonide, 0.5 mg, Nebulization, BID - RT  dilTIAZem, 30 mg, Oral, Q6H  enoxaparin, 1 mg/kg, Subcutaneous, Q12H  ipratropium-albuterol, 3 mL, Nebulization, 4x Daily - RT  methylPREDNISolone sodium succinate, 60 mg, Intravenous, Q6H  sodium chloride, 10  mL, Intravenous, Q12H            CHADS-VASc Risk Assessment            2 Total Score    1 CHF    1 Sex: Female        Criteria that do not apply:    Hypertension    Age >/= 75    DM    PRIOR STROKE/TIA/THROMBO    Vascular Disease    Age 65-74          Assessment/Plan     Active Hospital Problems    Diagnosis  POA   • Human metapneumovirus pneumonia [J12.3]  Yes   • Cor pulmonale (chronic) ([facility]) [I27.81]  Unknown   • Rheumatic valvular disease [I09.1]  Unknown   • Acute diastolic (congestive) heart failure (H[facility]) [I50.31]  Unknown   • PAF (paroxysmal atrial fibrillation) ([facility]) [I48.0]  Unknown   • Atrial fibrillation with RVR ([facility]) [I48.91]  Yes   • Acute respiratory failure with hypoxia ([facility]) [J96.01]  Yes   • COPD with acute exacerbation ([facility]) [J44.1]  Yes      Resolved Hospital Problems   No resolved problems to display.     1.  Acute hypoxic respiratory failure with acute metapneumovirus infection  2.  Atrial fibrillation with RVR, rate well controlled on current oral regimen, I am not going make any adjustment today  3.  Acute exacerbation COPD  4.  History of Covid pneumonia  5.  Valvular heart disease with moderate aortic insufficiency and moderate aortic stenosis, mild to moderate mitral stenosis  6.  Pulmonary hypertension  7.  Mild dilatation of the ascending aorta    Navneet Ayon III, MD  [facility]  12/01/21  08:27 EST

## 2021-12-01 NOTE — PLAN OF CARE
Goal Outcome Evaluation:  Plan of Care Reviewed With: patient        Progress: improving  Outcome Summary: rested well overnight. remains a-fib on the monitor with controlled rate. remains tachypneic but states feeling better this am. vss on oxygen at 4L/NC

## 2021-12-02 LAB
ANION GAP SERPL CALCULATED.3IONS-SCNC: 10.8 MMOL/L (ref 5–15)
BASOPHILS # BLD AUTO: 0 10*3/MM3 (ref 0–0.2)
BASOPHILS NFR BLD AUTO: 0 % (ref 0–1.5)
BUN SERPL-MCNC: 25 MG/DL (ref 6–20)
BUN/CREAT SERPL: 33.3 (ref 7–25)
CALCIUM SPEC-SCNC: 9.1 MG/DL (ref 8.6–10.5)
CHLORIDE SERPL-SCNC: 104 MMOL/L (ref 98–107)
CO2 SERPL-SCNC: 26.2 MMOL/L (ref 22–29)
CREAT SERPL-MCNC: 0.75 MG/DL (ref 0.57–1)
DEPRECATED RDW RBC AUTO: 50 FL (ref 37–54)
EOSINOPHIL # BLD AUTO: 0 10*3/MM3 (ref 0–0.4)
EOSINOPHIL NFR BLD AUTO: 0 % (ref 0.3–6.2)
ERYTHROCYTE [DISTWIDTH] IN BLOOD BY AUTOMATED COUNT: 14.4 % (ref 12.3–15.4)
GFR SERPL CREATININE-BSD FRML MDRD: 79 ML/MIN/1.73
GLUCOSE SERPL-MCNC: 148 MG/DL (ref 65–99)
HCT VFR BLD AUTO: 38.3 % (ref 34–46.6)
HGB BLD-MCNC: 11.9 G/DL (ref 12–15.9)
IMM GRANULOCYTES # BLD AUTO: 0.04 10*3/MM3 (ref 0–0.05)
IMM GRANULOCYTES NFR BLD AUTO: 0.3 % (ref 0–0.5)
INR PPP: 1 (ref 0.9–1.1)
LYMPHOCYTES # BLD AUTO: 0.94 10*3/MM3 (ref 0.7–3.1)
LYMPHOCYTES NFR BLD AUTO: 7.8 % (ref 19.6–45.3)
MAGNESIUM SERPL-MCNC: 2.1 MG/DL (ref 1.6–2.6)
MCH RBC QN AUTO: 28.7 PG (ref 26.6–33)
MCHC RBC AUTO-ENTMCNC: 31.1 G/DL (ref 31.5–35.7)
MCV RBC AUTO: 92.3 FL (ref 79–97)
MONOCYTES # BLD AUTO: 0.45 10*3/MM3 (ref 0.1–0.9)
MONOCYTES NFR BLD AUTO: 3.7 % (ref 5–12)
NEUTROPHILS NFR BLD AUTO: 10.66 10*3/MM3 (ref 1.7–7)
NEUTROPHILS NFR BLD AUTO: 88.2 % (ref 42.7–76)
PLATELET # BLD AUTO: 338 10*3/MM3 (ref 140–450)
PMV BLD AUTO: 9.6 FL (ref 6–12)
POTASSIUM SERPL-SCNC: 4 MMOL/L (ref 3.5–5.2)
PROTHROMBIN TIME: 13.4 SECONDS (ref 12.1–15)
RBC # BLD AUTO: 4.15 10*6/MM3 (ref 3.77–5.28)
SODIUM SERPL-SCNC: 141 MMOL/L (ref 136–145)
WBC NRBC COR # BLD: 12.09 10*3/MM3 (ref 3.4–10.8)

## 2021-12-02 PROCEDURE — 94799 UNLISTED PULMONARY SVC/PX: CPT

## 2021-12-02 PROCEDURE — 99232 SBSQ HOSP IP/OBS MODERATE 35: CPT | Performed by: INTERNAL MEDICINE

## 2021-12-02 PROCEDURE — 83735 ASSAY OF MAGNESIUM: CPT | Performed by: INTERNAL MEDICINE

## 2021-12-02 PROCEDURE — 25010000002 ENOXAPARIN PER 10 MG: Performed by: INTERNAL MEDICINE

## 2021-12-02 PROCEDURE — 85025 COMPLETE CBC W/AUTO DIFF WBC: CPT | Performed by: INTERNAL MEDICINE

## 2021-12-02 PROCEDURE — 25010000002 METHYLPREDNISOLONE PER 40 MG: Performed by: INTERNAL MEDICINE

## 2021-12-02 PROCEDURE — 80048 BASIC METABOLIC PNL TOTAL CA: CPT | Performed by: INTERNAL MEDICINE

## 2021-12-02 PROCEDURE — 85610 PROTHROMBIN TIME: CPT | Performed by: INTERNAL MEDICINE

## 2021-12-02 PROCEDURE — 94761 N-INVAS EAR/PLS OXIMETRY MLT: CPT

## 2021-12-02 PROCEDURE — 99233 SBSQ HOSP IP/OBS HIGH 50: CPT | Performed by: INTERNAL MEDICINE

## 2021-12-02 RX ORDER — DILTIAZEM HYDROCHLORIDE 180 MG/1
180 CAPSULE, COATED, EXTENDED RELEASE ORAL
Status: DISCONTINUED | OUTPATIENT
Start: 2021-12-02 | End: 2021-12-03 | Stop reason: HOSPADM

## 2021-12-02 RX ORDER — WARFARIN SODIUM 1 MG/1
2 TABLET ORAL
Status: DISCONTINUED | OUTPATIENT
Start: 2021-12-02 | End: 2021-12-03 | Stop reason: HOSPADM

## 2021-12-02 RX ORDER — METHYLPREDNISOLONE SODIUM SUCCINATE 40 MG/ML
40 INJECTION, POWDER, LYOPHILIZED, FOR SOLUTION INTRAMUSCULAR; INTRAVENOUS EVERY 12 HOURS
Status: DISCONTINUED | OUTPATIENT
Start: 2021-12-02 | End: 2021-12-03

## 2021-12-02 RX ORDER — POLYVINYL ALCOHOL 14 MG/ML
1 SOLUTION/ DROPS OPHTHALMIC
Status: DISCONTINUED | OUTPATIENT
Start: 2021-12-02 | End: 2021-12-03 | Stop reason: HOSPADM

## 2021-12-02 RX ADMIN — METHYLPREDNISOLONE SODIUM SUCCINATE 40 MG: 40 INJECTION, POWDER, FOR SOLUTION INTRAMUSCULAR; INTRAVENOUS at 00:13

## 2021-12-02 RX ADMIN — BUDESONIDE 0.5 MG: 0.5 SUSPENSION RESPIRATORY (INHALATION) at 19:38

## 2021-12-02 RX ADMIN — DILTIAZEM HYDROCHLORIDE 30 MG: 30 TABLET, FILM COATED ORAL at 00:05

## 2021-12-02 RX ADMIN — POLYVINYL ALCOHOL 1 DROP: 14 SOLUTION/ DROPS OPHTHALMIC at 11:51

## 2021-12-02 RX ADMIN — DILTIAZEM HYDROCHLORIDE 180 MG: 180 CAPSULE, COATED, EXTENDED RELEASE ORAL at 11:49

## 2021-12-02 RX ADMIN — SODIUM CHLORIDE, PRESERVATIVE FREE 10 ML: 5 INJECTION INTRAVENOUS at 09:17

## 2021-12-02 RX ADMIN — DILTIAZEM HYDROCHLORIDE 30 MG: 30 TABLET, FILM COATED ORAL at 06:26

## 2021-12-02 RX ADMIN — IPRATROPIUM BROMIDE AND ALBUTEROL SULFATE 3 ML: .5; 2.5 SOLUTION RESPIRATORY (INHALATION) at 08:06

## 2021-12-02 RX ADMIN — METHYLPREDNISOLONE SODIUM SUCCINATE 40 MG: 40 INJECTION, POWDER, FOR SOLUTION INTRAMUSCULAR; INTRAVENOUS at 11:50

## 2021-12-02 RX ADMIN — BUDESONIDE 0.5 MG: 0.5 SUSPENSION RESPIRATORY (INHALATION) at 08:06

## 2021-12-02 RX ADMIN — IPRATROPIUM BROMIDE AND ALBUTEROL SULFATE 3 ML: .5; 2.5 SOLUTION RESPIRATORY (INHALATION) at 15:39

## 2021-12-02 RX ADMIN — ENOXAPARIN SODIUM 90 MG: 100 INJECTION SUBCUTANEOUS at 14:14

## 2021-12-02 RX ADMIN — ENOXAPARIN SODIUM 90 MG: 100 INJECTION SUBCUTANEOUS at 03:32

## 2021-12-02 RX ADMIN — IPRATROPIUM BROMIDE AND ALBUTEROL SULFATE 3 ML: .5; 2.5 SOLUTION RESPIRATORY (INHALATION) at 19:29

## 2021-12-02 RX ADMIN — SODIUM CHLORIDE, PRESERVATIVE FREE 10 ML: 5 INJECTION INTRAVENOUS at 21:45

## 2021-12-02 RX ADMIN — WARFARIN SODIUM 2 MG: 1 TABLET ORAL at 18:06

## 2021-12-02 NOTE — CASE MANAGEMENT/SOCIAL WORK
Continued Stay Note  JOHN Hammonds     Patient Name: Akila Amezcua  MRN: [id number]  Today's Date: 12/2/2021    Admit Date: 11/29/2021     Discharge Plan     Row Name 12/02/21 2275       Plan    Plan plan home with family    Plan Comments Follow up visit, patient remains on 02, feeling better. Plan remains to return home with family at OR. CM will follow for needs.               Discharge Codes    No documentation.                     Shan Park RN

## 2021-12-02 NOTE — PROGRESS NOTES
"SERVICE: [facility] HOSPITALIST    CONSULTANTS: Cardiology    CHIEF COMPLAINT: Shortness of breath    SUBJECTIVE: Patient seen and examined at bedside. Patient reports significant improvement in her shortness of breath symptoms, continues to have dry cough but no wheezing. Patient's heart rate remains well controlled.  patient denies headache, fever or chills, nausea, vomiting, diarrhea, abdominal pain, chest pain or palpitations,  leg pain or weakness.     OBJECTIVE:    Physical exam is largely unchanged from previous exam in previous note, except where documented below.     Physical Exam:  General: Patient is awake and alert. Well developed and well nourished. No acute distress noted.   HENT: Head is atraumatic, normocephalic. Hearing is grossly intact. Nose is without obvious congestion and appears patent. Neck is supple and trachea is midline.   Eyes: Vision is grossly intact. Pupils appear equal and round.   Cardiovascular: Irregularly irregular rhythm with normal rate no murmurs rubs or gallops noted  Respiratory: Significantly improved air exchange bilaterally without wheezing rhonchi or rales  Abdominal/GI: Soft, nontender, bowel sounds present. No rebound or guarding present.   Extremities: No peripheral edema noted.   Musculoskeletal: Spontaneous movement of bilateral upper and lower extremities against gravity noted. No signs of injury or deformity noted.   Skin: Warm and dry.   Psych: Mood and affect are appropriate. Cooperative with exam.   Neuro: No facial asymmetry noted. No focal deficits noted, hearing and vision are grossly intact.     /80   Pulse 93   Temp 97.5 °F (36.4 °C) (Oral)   Resp 24   Ht 167 cm (65.75\")   Wt 91 kg (200 lb 9.9 oz)   SpO2 93%   BMI 32.63 kg/m²     MEDS/LABS REVIEWED AND ORDERED    budesonide, 0.5 mg, Nebulization, BID - RT  dilTIAZem, 30 mg, Oral, Q6H  enoxaparin, 1 mg/kg, Subcutaneous, Q12H  ipratropium-albuterol, 3 mL, Nebulization, 4x Daily " - RT  methylPREDNISolone sodium succinate, 40 mg, Intravenous, Q8H  sodium chloride, 10 mL, Intravenous, Q12H          LAB/DIAGNOSTICS:    Lab Results (last 24 hours)     Procedure Component Value Units Date/Time    CBC & Differential [[id number]]  (Abnormal) Collected: 12/02/21 0630    Specimen: Blood Updated: 12/02/21 0642    Narrative:      The following orders were created for panel order CBC & Differential.  Procedure                               Abnormality         Status                     ---------                               -----------         ------                     CBC Auto Differential[[id number]]        Abnormal            Final result               Scan Slide[[id number]]                                                                    Please view results for these tests on the individual orders.    CBC Auto Differential [[id number]]  (Abnormal) Collected: 12/02/21 0630    Specimen: Blood Updated: 12/02/21 0642     WBC 12.09 10*3/mm3      RBC 4.15 10*6/mm3      Hemoglobin 11.9 g/dL      Hematocrit 38.3 %      MCV 92.3 fL      MCH 28.7 pg      MCHC 31.1 g/dL      RDW 14.4 %      RDW-SD 50.0 fl      MPV 9.6 fL      Platelets 338 10*3/mm3      Neutrophil % 88.2 %      Lymphocyte % 7.8 %      Monocyte % 3.7 %      Eosinophil % 0.0 %      Basophil % 0.0 %      Immature Grans % 0.3 %      Neutrophils, Absolute 10.66 10*3/mm3      Lymphocytes, Absolute 0.94 10*3/mm3      Monocytes, Absolute 0.45 10*3/mm3      Eosinophils, Absolute 0.00 10*3/mm3      Basophils, Absolute 0.00 10*3/mm3      Immature Grans, Absolute 0.04 10*3/mm3     Basic Metabolic Panel [[id number]] Collected: 12/02/21 0630    Specimen: Blood Updated: 12/02/21 0637    Magnesium [[id number]] Collected: 12/02/21 0630    Specimen: Blood Updated: 12/02/21 0637    Blood Culture - Blood, Arm, Right [[id number]]  (Normal) Collected: 11/29/21 1731    Specimen: Blood from Arm, Right Updated: 12/01/21 7005     Blood Culture No growth at 2 days    Blood  Culture - Blood, Arm, Left [[id number]]  (Normal) Collected: 11/29/21 1732    Specimen: Blood from Arm, Left Updated: 12/01/21 1745     Blood Culture No growth at 2 days        ECG 12 Lead   Final Result   HEART RATE= 115  bpm   RR Interval= 522  ms   NV Interval=   ms   P Horizontal Axis=   deg   P Front Axis=   deg   QRSD Interval= 87  ms   QT Interval= 348  ms   QRS Axis= 47  deg   T Wave Axis= 85  deg   - ABNORMAL ECG -   Atrial fibrillation   Nonspecific T abnormalities, lateral leads   When compared with ECG of 29-Nov-2021 14:03:10,   Significant rate decrease otherwise no change   Electronically Signed By: Chico Tubbs ([facility]) 30-Nov-2021 12:42:36   Date and Time of Study: 2021-11-30 06:52:15      ECG 12 Lead   Final Result   HEART RATE= 142  bpm   RR Interval= 421  ms   NV Interval=   ms   P Horizontal Axis=   deg   P Front Axis=   deg   QRSD Interval= 103  ms   QT Interval= 317  ms   QRS Axis= 27  deg   T Wave Axis= 65  deg   - ABNORMAL ECG -   Atrial fibrillation   Borderline prolonged QT interval   SINCE PREVIOUS TRACING,  AFIB WITH RVR IS NEW   Electronically Signed By: Audie Osorio ([facility]) 29-Nov-2021 17:41:40   Date and Time of Study: 2021-11-29 14:03:10        Results for orders placed during the hospital encounter of 11/29/21    Adult Transthoracic Echo Complete With Contrast if Necessary Per Protocol    Interpretation Summary  · Calculated left ventricular EF = 64.8% Estimated left ventricular EF was in agreement with the calculated left ventricular EF. Left ventricular systolic function is normal. Normal left ventricular cavity size noted. Left ventricular wall thickness is consistent with mild concentric hypertrophy. All left ventricular wall segments contract normally. Left ventricular diastolic function was indeterminate.  · The right ventricular cavity is moderate to severely dilated. Mildly reduced right ventricular systolic function noted.  · The left atrial cavity is severely  dilated.  · The right atrial cavity is moderately dilated.  · The aortic valve is abnormal. It is trileaflet, but diffusely thickened and calcified. There is moderate aortic insufficiency and moderate aortic stenosis.  · The mitral valve is poorly visualized. There is nodular thickening of the leaflet tips, with more pronounced thickening and calcification noted on the posterior leaflet. The posterior leaflet appears restricted. Visually, there does not appear to be significant mitral stenosis, but the mean gradient of 7 mmHg is consistent with mild to moderate mitral stenosis. There is at least moderate, if not moderately severe, mitral regurgitation.  · Moderate tricuspid valve regurgitation is present. Estimated right ventricular systolic pressure from tricuspid regurgitation is moderately elevated (45-55 mmHg). Calculated right ventricular systolic pressure from tricuspid regurgitation is 48 mmHg.  · No dilation of the aortic root is present. Mild dilation of the ascending aorta is present. Ascending aorta = 3.7 cm    No radiology results for the last day      ASSESSMENT/PLAN:  Please not portions of this assessment/plan may have been copied and pasted, but I have personally seen this patient and reviewed each line of this assessment and plan for accuracy and made updates to reflect my necessary changes on 12/02/21 .     Acute hypoxic respiratory failure secondary to human metapneumovirus pneumonia and COPD exacerbation  -Patient requiring 3 L oxygen with no baseline oxygen use, attempt to wean as tolerated.   -Continue supportive care as needed    Paroxysmal A. fib   -Patient previously had A. fib on last admission when she had Covid pneumonia  -Remains in A. fib but with rate control on oral diltiazem  -Patient has been placed on full dose Lovenox by cardiology, with plan to cover with warfarin because of her valvular disease. Coumadin has been initiated will follow INR.     Acute exacerbation of  "COPD  -Patient on Solu-Medrol 40 mg every 12 hours will wean as tolerated and DuoNeb breathing treatments  -As above attempt to wean oxygen as tolerated, for now we will target oxygen saturation of 88 to 92%.   -Continue budesonide    Insomnia-continue trazodone, no acute issues    DVT prophylaxis-Lovenox    PLAN FOR DISPOSITION: CHET Waldrop DO  Hospitalist, [facility]  12/02/21  06:32 EST    \"Dictated utilizing Dragon dictation\"    "

## 2021-12-02 NOTE — PLAN OF CARE
Goal Outcome Evaluation:              Outcome Summary: VSS. telemtry afib. Will start coumadin tonight.Remains on o2 at 3 liters.Will continue to monitor

## 2021-12-02 NOTE — PROGRESS NOTES
"CC: Atrial fibrillation with rapid ventricular response    Interval History:   Patient reports improved breathing    Vital Signs  Temp:  [97.4 °F (36.3 °C)-98 °F (36.7 °C)] 97.5 °F (36.4 °C)  Heart Rate:  [] 93  Resp:  [15-26] 24  BP: (117-125)/(71-83) 121/80    Intake/Output Summary (Last 24 hours) at 12/2/2021 0757  Last data filed at 12/2/2021 0000  Gross per 24 hour   Intake 1440 ml   Output 1925 ml   Net -485 ml     Flowsheet Rows      First Filed Value   Admission Height 167.6 cm (66\") Documented at 11/29/2021 1352   Admission Weight 81.7 kg (180 lb 3.2 oz) Documented at 11/29/2021 1352          PHYSICAL EXAM:  General: Mild distress on supplemental oxygen through nasal cannula  Resp: Bilateral scattered wheezing  CV: Irregularly irregular, NL PMI, NL S1 and S2, no Murmur, no gallop, no rub, No JVD.   ABD:Nl sounds, no masses or tenderness, nondistended, no guarding or rebound  Neuro: alert,cooperative and oriented  Extr:Normal pedal pulses, No edema or cyanosis, moves all extremities       Results Review:    Results from last 7 days   Lab Units 12/02/21  0630   SODIUM mmol/L 141   POTASSIUM mmol/L 4.0   CHLORIDE mmol/L 104   CO2 mmol/L 26.2   BUN mg/dL 25*   CREATININE mg/dL 0.75   GLUCOSE mg/dL 148*   CALCIUM mg/dL 9.1     Results from last 7 days   Lab Units 11/29/21  1401   TROPONIN T ng/mL <0.010     Results from last 7 days   Lab Units 12/02/21  0630   WBC 10*3/mm3 12.09*   HEMOGLOBIN g/dL 11.9*   HEMATOCRIT % 38.3   PLATELETS 10*3/mm3 338             Results from last 7 days   Lab Units 12/02/21  0630   MAGNESIUM mg/dL 2.1         I reviewed the patient's new clinical results.  I personally viewed and interpreted the patient's EKG/Telemetry data        Medication Review:   Meds reviewed         Assessment/Plan    1.  Acute hypoxic respiratory failure with acute metapneumovirus infection  2.    Atrial fibrillation with rapid ventricular response  3.  Acute exacerbation COPD  4.  History of Covid " pneumonia  5.  Valvular heart disease with moderate aortic insufficiency and moderate aortic stenosis, mild to moderate mitral stenosis  6.  Pulmonary hypertension  7.  Mild dilatation of the ascending aorta    Heart rate is fairly controlled  Switch diltiazem to extended release   Currently on Lovenox and to be started on Coumadin today.]\  Use diuretic as needed     I will sign off for now but call with any questions   Patient will followed in cardiology clinic after discharge     Rustam Mccloud MD  12/02/21  07:57 EST

## 2021-12-02 NOTE — PLAN OF CARE
"Goal Outcome Evaluation:  Plan of Care Reviewed With: patient        Progress: improving  Outcome Summary: pt has had a great day compared to prev . she has been up moving around with minimal soa or over exertion. now on 3 L NC, states she \"feels so much better\". Remains in A Fib but rate is controlled and pt is now asymptomatic .tolerating po Cardizem. Received last dose of IV lasix this am. UOP has been great this shift. Cont on iv steroids as ordered.      Problem: Respiratory Compromise (Pneumonia)  Goal: Effective Oxygenation and Ventilation  Outcome: Ongoing, Progressing  Intervention: Promote Airway Secretion Clearance  Recent Flowsheet Documentation  Taken 12/1/2021 1234 by Eden Givens RN  Cough And Deep Breathing: done independently per patient  Taken 12/1/2021 0918 by Eden Givens RN  Breathing Techniques/Airway Clearance: deep/controlled cough encouraged  Cough And Deep Breathing: done independently per patient  Intervention: Optimize Oxygenation and Ventilation  Recent Flowsheet Documentation  Taken 12/1/2021 1800 by Eden Givens RN  Head of Bed (HOB): HOB at 60-90 degrees  Taken 12/1/2021 1500 by Eden Givens RN  Head of Bed (HOB): HOB at 60 degrees  Taken 12/1/2021 0918 by Eden Givens RN  Airway/Ventilation Management:   pulmonary hygiene promoted   airway patency maintained     "

## 2021-12-03 VITALS
WEIGHT: 205 LBS | TEMPERATURE: 97 F | OXYGEN SATURATION: 91 % | SYSTOLIC BLOOD PRESSURE: 117 MMHG | RESPIRATION RATE: 16 BRPM | DIASTOLIC BLOOD PRESSURE: 71 MMHG | HEART RATE: 88 BPM | HEIGHT: 66 IN | BODY MASS INDEX: 32.95 KG/M2

## 2021-12-03 LAB
ANION GAP SERPL CALCULATED.3IONS-SCNC: 11.9 MMOL/L (ref 5–15)
BASOPHILS # BLD AUTO: 0 10*3/MM3 (ref 0–0.2)
BASOPHILS NFR BLD AUTO: 0 % (ref 0–1.5)
BUN SERPL-MCNC: 24 MG/DL (ref 6–20)
BUN/CREAT SERPL: 29.6 (ref 7–25)
CALCIUM SPEC-SCNC: 9.1 MG/DL (ref 8.6–10.5)
CHLORIDE SERPL-SCNC: 103 MMOL/L (ref 98–107)
CO2 SERPL-SCNC: 22.1 MMOL/L (ref 22–29)
CREAT SERPL-MCNC: 0.81 MG/DL (ref 0.57–1)
DEPRECATED RDW RBC AUTO: 49.1 FL (ref 37–54)
EOSINOPHIL # BLD AUTO: 0 10*3/MM3 (ref 0–0.4)
EOSINOPHIL NFR BLD AUTO: 0 % (ref 0.3–6.2)
ERYTHROCYTE [DISTWIDTH] IN BLOOD BY AUTOMATED COUNT: 14.2 % (ref 12.3–15.4)
GFR SERPL CREATININE-BSD FRML MDRD: 72 ML/MIN/1.73
GLUCOSE SERPL-MCNC: 134 MG/DL (ref 65–99)
HCT VFR BLD AUTO: 40.2 % (ref 34–46.6)
HGB BLD-MCNC: 12.6 G/DL (ref 12–15.9)
IMM GRANULOCYTES # BLD AUTO: 0.1 10*3/MM3 (ref 0–0.05)
IMM GRANULOCYTES NFR BLD AUTO: 0.8 % (ref 0–0.5)
INR PPP: 1.06 (ref 0.9–1.1)
LYMPHOCYTES # BLD AUTO: 0.86 10*3/MM3 (ref 0.7–3.1)
LYMPHOCYTES NFR BLD AUTO: 6.9 % (ref 19.6–45.3)
MAGNESIUM SERPL-MCNC: 2.4 MG/DL (ref 1.6–2.6)
MCH RBC QN AUTO: 28.9 PG (ref 26.6–33)
MCHC RBC AUTO-ENTMCNC: 31.3 G/DL (ref 31.5–35.7)
MCV RBC AUTO: 92.2 FL (ref 79–97)
MONOCYTES # BLD AUTO: 0.39 10*3/MM3 (ref 0.1–0.9)
MONOCYTES NFR BLD AUTO: 3.1 % (ref 5–12)
NEUTROPHILS NFR BLD AUTO: 11.2 10*3/MM3 (ref 1.7–7)
NEUTROPHILS NFR BLD AUTO: 89.2 % (ref 42.7–76)
PLATELET # BLD AUTO: 356 10*3/MM3 (ref 140–450)
PMV BLD AUTO: 9.5 FL (ref 6–12)
POTASSIUM SERPL-SCNC: 4.4 MMOL/L (ref 3.5–5.2)
PROTHROMBIN TIME: 14 SECONDS (ref 12.1–15)
RBC # BLD AUTO: 4.36 10*6/MM3 (ref 3.77–5.28)
SODIUM SERPL-SCNC: 137 MMOL/L (ref 136–145)
WBC NRBC COR # BLD: 12.55 10*3/MM3 (ref 3.4–10.8)

## 2021-12-03 PROCEDURE — 94799 UNLISTED PULMONARY SVC/PX: CPT

## 2021-12-03 PROCEDURE — 85610 PROTHROMBIN TIME: CPT | Performed by: INTERNAL MEDICINE

## 2021-12-03 PROCEDURE — 94618 PULMONARY STRESS TESTING: CPT

## 2021-12-03 PROCEDURE — 83735 ASSAY OF MAGNESIUM: CPT | Performed by: INTERNAL MEDICINE

## 2021-12-03 PROCEDURE — 25010000002 METHYLPREDNISOLONE PER 40 MG: Performed by: INTERNAL MEDICINE

## 2021-12-03 PROCEDURE — 99239 HOSP IP/OBS DSCHRG MGMT >30: CPT | Performed by: INTERNAL MEDICINE

## 2021-12-03 PROCEDURE — 25010000002 ENOXAPARIN PER 10 MG: Performed by: INTERNAL MEDICINE

## 2021-12-03 PROCEDURE — 85025 COMPLETE CBC W/AUTO DIFF WBC: CPT | Performed by: INTERNAL MEDICINE

## 2021-12-03 PROCEDURE — 80048 BASIC METABOLIC PNL TOTAL CA: CPT | Performed by: INTERNAL MEDICINE

## 2021-12-03 RX ORDER — IPRATROPIUM BROMIDE AND ALBUTEROL SULFATE 2.5; .5 MG/3ML; MG/3ML
3 SOLUTION RESPIRATORY (INHALATION) EVERY 4 HOURS PRN
Status: DISCONTINUED | OUTPATIENT
Start: 2021-12-03 | End: 2021-12-03 | Stop reason: HOSPADM

## 2021-12-03 RX ORDER — DILTIAZEM HYDROCHLORIDE 180 MG/1
180 CAPSULE, COATED, EXTENDED RELEASE ORAL
Qty: 30 CAPSULE | Refills: 0 | Status: SHIPPED | OUTPATIENT
Start: 2021-12-04 | End: 2021-12-29

## 2021-12-03 RX ORDER — BENZONATATE 100 MG/1
100 CAPSULE ORAL 3 TIMES DAILY PRN
Qty: 30 CAPSULE | Refills: 0 | Status: SHIPPED | OUTPATIENT
Start: 2021-12-03 | End: 2022-01-31 | Stop reason: HOSPADM

## 2021-12-03 RX ORDER — METHYLPREDNISOLONE SODIUM SUCCINATE 40 MG/ML
40 INJECTION, POWDER, LYOPHILIZED, FOR SOLUTION INTRAMUSCULAR; INTRAVENOUS DAILY
Status: DISCONTINUED | OUTPATIENT
Start: 2021-12-04 | End: 2021-12-03 | Stop reason: HOSPADM

## 2021-12-03 RX ORDER — METHYLPREDNISOLONE 4 MG/1
TABLET ORAL
Qty: 21 TABLET | Refills: 0 | Status: SHIPPED | OUTPATIENT
Start: 2021-12-03 | End: 2021-12-29 | Stop reason: SDUPTHER

## 2021-12-03 RX ORDER — WARFARIN SODIUM 3 MG/1
TABLET ORAL
Qty: 30 TABLET | Refills: 0 | Status: ON HOLD | OUTPATIENT
Start: 2021-12-03 | End: 2022-01-14

## 2021-12-03 RX ADMIN — IPRATROPIUM BROMIDE AND ALBUTEROL SULFATE 3 ML: .5; 2.5 SOLUTION RESPIRATORY (INHALATION) at 11:07

## 2021-12-03 RX ADMIN — METHYLPREDNISOLONE SODIUM SUCCINATE 40 MG: 40 INJECTION, POWDER, FOR SOLUTION INTRAMUSCULAR; INTRAVENOUS at 08:45

## 2021-12-03 RX ADMIN — IPRATROPIUM BROMIDE AND ALBUTEROL SULFATE 3 ML: .5; 2.5 SOLUTION RESPIRATORY (INHALATION) at 07:53

## 2021-12-03 RX ADMIN — IPRATROPIUM BROMIDE AND ALBUTEROL SULFATE 3 ML: .5; 2.5 SOLUTION RESPIRATORY (INHALATION) at 01:52

## 2021-12-03 RX ADMIN — DILTIAZEM HYDROCHLORIDE 180 MG: 180 CAPSULE, COATED, EXTENDED RELEASE ORAL at 08:45

## 2021-12-03 RX ADMIN — ENOXAPARIN SODIUM 90 MG: 100 INJECTION SUBCUTANEOUS at 01:19

## 2021-12-03 RX ADMIN — METHYLPREDNISOLONE SODIUM SUCCINATE 40 MG: 40 INJECTION, POWDER, FOR SOLUTION INTRAMUSCULAR; INTRAVENOUS at 01:19

## 2021-12-03 RX ADMIN — SODIUM CHLORIDE, PRESERVATIVE FREE 10 ML: 5 INJECTION INTRAVENOUS at 08:46

## 2021-12-03 RX ADMIN — BUDESONIDE 0.5 MG: 0.5 SUSPENSION RESPIRATORY (INHALATION) at 07:53

## 2021-12-03 NOTE — NURSING NOTE
0450 - 8618 patient's primary PCA for this shift.  Received report from GRACIA Ortega, gave report to LEYDA Giron.

## 2021-12-03 NOTE — PLAN OF CARE
Problem: Adult Inpatient Plan of Care  Goal: Plan of Care Review  12/3/2021 0407 by Kendy Andrew RN  Outcome: Ongoing, Progressing  Flowsheets (Taken 12/3/2021 0407)  Progress: improving  Plan of Care Reviewed With: patient  Outcome Summary:   VSS   A Fib on Tele. HR increased to 150's when ambulating. Pt continues O2 at 3L. will continue to monitor.   Goal Outcome Evaluation:

## 2021-12-03 NOTE — DISCHARGE INSTRUCTIONS
Please have INR checked and either primary office or cardiology office next Monday or Tuesday to evaluate INR level

## 2021-12-03 NOTE — PROCEDURES
Exercise Oximetry    Patient Name:Akila Amezcua   MRN: [id number]   Date: 12/03/21             ROOM AIR BASELINE   SpO2% 86   Heart Rate 103   Blood Pressure      EXERCISE ON ROOM AIR SpO2% EXERCISE ON O2 @ 2 LPM SpO2%   1 MINUTE  1 MINUTE 91   2 MINUTES  2 MINUTES 89   3 MINUTES  3 MINUTES 3lpm 88   4 MINUTES  4 MINUTES 4lpm 89   5 MINUTES  5 MINUTES 90   6 MINUTES  6 MINUTES 90              Distance Walked   Distance Walked 200ft   Dyspnea (Estefania Scale)   Dyspnea (Estefania Scale)   Fatigue (Estefania Scale)   Fatigue (Estefania Scale)   SpO2% Post Exercise   SpO2% Post Exercise 90   HR Post Exercise   HR Post Exercise 118   Time to Recovery   Time to Recovery 2min     Comments:

## 2021-12-03 NOTE — PROGRESS NOTES
Adult Nutrition  Assessment/PES    Patient Name:  Akila Amezcua  YOB: 1960  MRN: [id number]  Admit Date:  11/29/2021    Assessment Date:  12/3/2021    Comments:  Agree with diet. Pt declines warafin/CHF education.  See below.     Reason for Assessment     Row Name 12/03/21 1334          Reason for Assessment    Reason For Assessment per organizational policy  warafin, CHF list     Diagnosis pulmonary disease  AHRF COPD AFIB                Nutrition/Diet History     Row Name 12/03/21 1335          Nutrition/Diet History    Typical Food/Fluid Intake Pt up in chair after am meal reports going home today. Reports has been on warafin before, doesn't drink ETOH, aware of cran juice & leafy greens etc. Declines further edu on diet.                Anthropometrics     Row Name 12/03/21 1336          Anthropometrics    Weight --  205#            Body Mass Index (BMI)    BMI Assessment BMI 30-34.9: obesity grade I                Labs/Tests/Procedures/Meds     Row Name 12/03/21 1336          Labs/Procedures/Meds    Lab Results Reviewed reviewed     Lab Results Comments BUn 24, Glu 148, 141            Diagnostic Tests/Procedures    Diagnostic Test/Procedure Reviewed reviewed            Medications    Pertinent Medications Reviewed reviewed     Pertinent Medications Comments solumedrol. warafin                  Estimated/Assessed Needs     Row Name 12/03/21 1336          Estimated/Assessed Needs    Additional Documentation Calorie Requirements (Group); Fluid Requirements (Group); Protein Requirements (Group)            Calorie Requirements    Estimated Calorie Need Method [place]-St Jeor     Estimated Calorie Requirement Comment 7060-8025 kcal ( mifflin 0-1.2)            Protein Requirements    Est Protein Requirement Amount (gms/kg) 0.8 gm protein  74 gm pro            Fluid Requirements    Estimated Fluid Requirement Method RDA Method  6053-6849 ml                Nutrition Prescription Ordered     Row Name  12/03/21 1338          Nutrition Prescription PO    Current PO Diet Regular                Evaluation of Received Nutrient/Fluid Intake     Row Name 12/03/21 1338          Fluid Intake Evaluation    Oral Fluid (mL) 1293  ave x 3, 85% min            PO Evaluation    Number of Meals 5     % PO Intake 100                     Problem/Interventions:   Problem 1     Row Name 12/03/21 1338          Nutrition Diagnoses Problem 1    Problem 1 Knowledge Deficit     Etiology (related to) Medication Interaction     Food/Medication Interaction Anticoagulant     Signs/Symptoms (evidenced by) Potential Information Deficit                      Intervention Goal     Row Name 12/03/21 1330          Intervention Goal    General Provide information regarding MNT for treatment/condition     PO Maintain intake; PO intake (%)     PO Intake % 75 %  or greater                Nutrition Intervention     Row Name 12/03/21 1336          Nutrition Intervention    RD/Tech Action Interview for preference; Follow Tx progress                  Education/Evaluation     Row Name 12/03/21 2090          Education    Education Education topics; Other (comment); Education offered and refused  Pt declines need for edu on blood thinner. Reinforced no ETOH, no cran juice, consistent intake of leafy green( high vitamin K ) foods.     Education Topics Vitamin K; CHF            Monitor/Evaluation    Monitor Per protocol; I&O; PO intake; Pertinent labs; Weight; Symptoms     Education Follow-up Other (comment)  pt states she has been on warafin & declines edu                 Electronically signed by:  Aleksandra Maria RD  12/03/21 13:40 EST

## 2021-12-03 NOTE — DISCHARGE SUMMARY
Date of Admission: 11/29/2021    Date of Discharge:  12/3/2021    Length of stay:  LOS: 4 days     Presenting Problem:   Atrial fibrillation with RVR ([facility]) [I48.91]  Pneumonia of both lungs due to infectious organism, unspecified part of lung [J18.9]      Active Diagnosis During Hospital Stay/Discharge Diagnoses/Course by Diagnoses:    Acute hypoxic respiratory failure secondary to human metapneumovirus pneumonia and COPD exacerbation  -stable home on 2L at rest and 4L with exertion, reports she has been on this same level in the past after COPD exacerbations  -no abx currently indicated      Paroxysmal A. fib   -rate now controlled   -continue home cardizem   -home on comadin, needs INR checked next week to monitor INR  -f/u cardiology outpt     Acute exacerbation of COPD  -home on steroid taper  -continue home inhaler and nebulizer     Insomnia-continue trazodone, no acute issues       Active Hospital Problems    Diagnosis  POA   • Human metapneumovirus pneumonia [J12.3]  Yes   • Cor pulmonale (chronic) ([facility]) [I27.81]  Unknown   • Rheumatic valvular disease [I09.1]  Unknown   • Acute diastolic (congestive) heart failure ([facility]) [I50.31]  Unknown   • PAF (paroxysmal atrial fibrillation) ([facility]) [I48.0]  Unknown   • Atrial fibrillation with RVR ([facility]) [I48.91]  Yes   • Acute respiratory failure with hypoxia ([facility]) [J96.01]  Yes   • COPD with acute exacerbation ([facility]) [J44.1]  Yes      Resolved Hospital Problems   No resolved problems to display.         Hospital Course  Patient is a 61 y.o. female presented with us of breath and acute hypoxic respiratory failure.  She was admitted and treated and eventually improved and felt she was more baseline.  She did require oxygen supplementation but she reported that after previous COPD exacerbations that she had had to have oxygen shortly.  Hopefully this can be continue to wean as an outpatient as she improves.  She was felt and requesting to be discharged home when she was  felt stable.      Procedures Performed:as noted     12/03 1109 Walking Oximetry    Consults:   Consults     Date and Time Order Name Status Description    11/29/2021  6:02 PM Inpatient Cardiology Consult Completed           Pertinent Test Results:     Results from last 7 days   Lab Units 12/03/21  0649 12/02/21 0630 12/01/21 0517   WBC 10*3/mm3 12.55* 12.09* 9.80   HEMOGLOBIN g/dL 12.6 11.9* 11.9*   HEMATOCRIT % 40.2 38.3 38.2   PLATELETS 10*3/mm3 356 338 289     Results from last 7 days   Lab Units 12/03/21  0649 12/02/21 0630 12/01/21  0517 11/30/21  0533 11/29/21  1401   SODIUM mmol/L 137 141 138   < > 140   POTASSIUM mmol/L 4.4 4.0 4.2   < > 4.1   CHLORIDE mmol/L 103 104 105   < > 106   CO2 mmol/L 22.1 26.2 22.7   < > 21.6*   BUN mg/dL 24* 25* 24*   < > 15   CREATININE mg/dL 0.81 0.75 0.73   < > 0.99   CALCIUM mg/dL 9.1 9.1 8.8   < > 9.5   BILIRUBIN mg/dL  --   --   --   --  0.8   ALK PHOS U/L  --   --   --   --  144*   ALT (SGPT) U/L  --   --   --   --  35*   AST (SGOT) U/L  --   --   --   --  24   GLUCOSE mg/dL 134* 148* 141*   < > 104*    < > = values in this interval not displayed.       Microbiology Results (last 10 days)     Procedure Component Value - Date/Time    Legionella Antigen, Urine - Urine, Urine, Clean Catch [[id number]]  (Normal) Collected: 11/29/21 2059    Lab Status: Final result Specimen: Urine, Clean Catch Updated: 11/29/21 2126     LEGIONELLA ANTIGEN, URINE Negative    S. Pneumo Ag Urine or CSF - Urine, Urine, Clean Catch [[id number]]  (Normal) Collected: 11/29/21 2059    Lab Status: Final result Specimen: Urine, Clean Catch Updated: 11/29/21 2126     Strep Pneumo Ag Negative    Respiratory Panel, PCR (WITHOUT COVID) - Swab, Nasopharynx [[id number]]  (Abnormal) Collected: 11/29/21 1912    Lab Status: Final result Specimen: Swab from Nasopharynx Updated: 11/30/21 1156     ADENOVIRUS, PCR Not Detected     Coronavirus 229E Not Detected     Coronavirus HKU1 Not Detected     Coronavirus NL63 Not  Detected     Coronavirus OC43 Not Detected     Human Metapneumovirus Detected     Human Rhinovirus/Enterovirus Not Detected     Influenza B PCR Not Detected     Parainfluenza Virus 1 Not Detected     Parainfluenza Virus 2 Not Detected     Parainfluenza Virus 3 Not Detected     Parainfluenza Virus 4 Not Detected     Bordetella pertussis pcr Not Detected     Chlamydophila pneumoniae PCR Not Detected     Mycoplasma pneumo by PCR Not Detected     Influenza A PCR Not Detected     RSV, PCR Not Detected     Bordetella parapertussis PCR Not Detected    Narrative:      The coronavirus on the RVP is NOT COVID-19 and is NOT indicative of infection with COVID-19.    In the setting of a positive respiratory panel with a viral infection PLUS a negative procalcitonin without other underlying concern for bacterial infection, consider observing off antibiotics or discontinuation of antibiotics and continue supportive care. If the respiratory panel is positive for atypical bacterial infection (Bordetella pertussis, Chlamydophila pneumoniae, or Mycoplasma pneumoniae), consider antibiotic de-escalation to target atypical bacterial infection.    Blood Culture - Blood, Arm, Left [[id number]]  (Normal) Collected: 11/29/21 1732    Lab Status: Preliminary result Specimen: Blood from Arm, Left Updated: 12/02/21 1745     Blood Culture No growth at 3 days    Blood Culture - Blood, Arm, Right [[id number]]  (Normal) Collected: 11/29/21 1731    Lab Status: Preliminary result Specimen: Blood from Arm, Right Updated: 12/02/21 1745     Blood Culture No growth at 3 days    COVID-19,[facility] [employer] IN-HOUSE,Nasal Swab No Transport Media 3-4 HR TAT - Swab, Nasal Cavity [[id number]]  (Normal) Collected: 11/29/21 1406    Lab Status: Final result Specimen: Swab from Nasal Cavity Updated: 11/29/21 1454     COVID19 Not Detected    Narrative:      Fact sheet for providers: https://www.fda.gov/media/309209/download     Fact sheet for patients:  https://www.fda.gov/media/385003/download    Test performed by PCR.    Consider negative results in combination with clinical observations, patient history, and epidemiological information.          Results for orders placed during the hospital encounter of 11/29/21    Adult Transthoracic Echo Complete With Contrast if Necessary Per Protocol    Interpretation Summary  · Calculated left ventricular EF = 64.8% Estimated left ventricular EF was in agreement with the calculated left ventricular EF. Left ventricular systolic function is normal. Normal left ventricular cavity size noted. Left ventricular wall thickness is consistent with mild concentric hypertrophy. All left ventricular wall segments contract normally. Left ventricular diastolic function was indeterminate.  · The right ventricular cavity is moderate to severely dilated. Mildly reduced right ventricular systolic function noted.  · The left atrial cavity is severely dilated.  · The right atrial cavity is moderately dilated.  · The aortic valve is abnormal. It is trileaflet, but diffusely thickened and calcified. There is moderate aortic insufficiency and moderate aortic stenosis.  · The mitral valve is poorly visualized. There is nodular thickening of the leaflet tips, with more pronounced thickening and calcification noted on the posterior leaflet. The posterior leaflet appears restricted. Visually, there does not appear to be significant mitral stenosis, but the mean gradient of 7 mmHg is consistent with mild to moderate mitral stenosis. There is at least moderate, if not moderately severe, mitral regurgitation.  · Moderate tricuspid valve regurgitation is present. Estimated right ventricular systolic pressure from tricuspid regurgitation is moderately elevated (45-55 mmHg). Calculated right ventricular systolic pressure from tricuspid regurgitation is 48 mmHg.  · No dilation of the aortic root is present. Mild dilation of the ascending aorta is present.  Ascending aorta = 3.7 cm      Imaging Results (All)     Procedure Component Value Units Date/Time    XR Chest 2 View [[id number]] Collected: 11/29/21 1646     Updated: 11/29/21 1648    Narrative:      CR Chest 2 Vws    INDICATION:    Shortness of air starting last night    COMPARISON:    1/7/2020    FINDINGS:   PA and lateral views of the chest.  Heart size normal. There is right perihilar infiltrate and left perihilar infiltrate which is new. The previous study showed a large dense infiltrate in the right apex and upper lobe which has improved. There is  biapical pleural scarring.        Impression:      Right greater than left perihilar infiltrates. Please see the CT scan done same time    Signer Name: Marcelino Lund MD   Signed: 11/29/2021 4:46 PM   Workstation Name: [id number]    [facility]    CT Angiogram Chest [[id number]] Collected: 11/29/21 1645     Updated: 11/29/21 1647    Narrative:      CTA Chest    INDICATION:   Shortness of air and tachycardia since Saturday    TECHNIQUE:   CT angiogram of the chest with IV contrast. 3-D reconstructions were obtained and reviewed.   Radiation dose reduction techniques included automated exposure control or exposure modulation based on body size. Count of known CT and cardiac nuc med studies  performed in previous 12 months: 0.     COMPARISON:   1/7/2020    FINDINGS:   Thyroid gland is normal. There is optimal opacification of the pulmonary arteries. There is no CT evidence of pulmonary embolus. The thyroid gland is normal. There is right paratracheal adenopathy measuring about 2 cm in diameter. There is subcarinal  adenopathy measuring about 2.8 cm in diameter. These are stable from 1/7/2020. There is dense consolidation in the superior segment of the right lower lobe and there is stable scarring in the right apex. There are emphysematous changes in the left lung.  There is dense infiltrate in the left lower lobe medially within the superior segment.  Upper abdominal images are unremarkable. Bones are unremarkable          Impression:      There is dense infiltrate within the right lower lobe superior segment measuring about 4 cm in diameter. Is also a dense infiltrate in the left lower lobe superior segment measuring 4 cm in diameter.    No CT evidence pulmonary embolus.    Chronic changes in the lungs with apical fibrosis and emphysematous change.    Signer Name: Marcelino Lund MD   Signed: 11/29/2021 4:45 PM   Workstation Name: [id number]    [facility]            Condition on Discharge:  Stable     Vital Signs  Temp:  [97 °F (36.1 °C)-98.4 °F (36.9 °C)] 97 °F (36.1 °C)  Heart Rate:  [] 88  Resp:  [16-22] 16  BP: (117-121)/(71-75) 117/71    Physical Exam:  Physical Exam  Vitals reviewed.   HENT:      Mouth/Throat:      Mouth: Mucous membranes are moist.   Pulmonary:      Effort: No respiratory distress.   Abdominal:      Palpations: Abdomen is soft.   Skin:     General: Skin is warm.   Neurological:      Mental Status: She is alert and oriented to person, place, and time.   Psychiatric:         Mood and Affect: Mood normal.         Discharge Disposition  Home or Self Care    Discharge Medications     Discharge Medications      New Medications      Instructions Start Date   methylPREDNISolone 4 MG dose pack  Commonly known as: MEDROL   Take as directed on package instructions.      warfarin 3 MG tablet  Commonly known as: COUMADIN   Take on tablet daily         Continue These Medications      Instructions Start Date   acetaminophen 325 MG tablet  Commonly known as: TYLENOL   Oral, Every 6 Hours PRN      albuterol (2.5 MG/3ML) 0.083% nebulizer solution  Commonly known as: PROVENTIL   2.5 mg, Nebulization, Every 4 Hours PRN      baclofen 10 MG tablet  Commonly known as: LIORESAL   10 mg, Oral, 2 Times Daily PRN      Bath Bench with Back misc   1 Units, Does not apply, Daily PRN      Fluticasone-Umeclidin-Vilant 100-62.5-25 MCG/INH  inhaler  Commonly known as: Trelegy Ellipta   1 puff, Inhalation, Daily      ibuprofen 800 MG tablet  Commonly known as: ADVIL,MOTRIN   800 mg, Oral, Every 8 Hours PRN      ipratropium-albuterol 0.5-2.5 mg/3 ml nebulizer  Commonly known as: DUO-NEB   INHALE 3 MLS VIA NEBULIZER EVER 6 HOURS WHILE AWAKE         ASK your doctor about these medications      Instructions Start Date   dilTIAZem  MG 24 hr capsule  Commonly known as: Cardizem CD  Ask about: Which instructions should I use?   300 mg, Oral, Nightly      dilTIAZem  MG 24 hr capsule  Commonly known as: CARDIZEM CD  Ask about: Which instructions should I use?   180 mg, Oral, Every 24 Hours Scheduled   Start Date: December 4, 2021            Discharge Diet:     Activity at Discharge:   Activity Instructions     Gradually Increase Activity Until at Pre-Hospitalization Level            Follow-up Appointments  Future Appointments   Date Time Provider Department Center   12/29/2021 12:45 PM Lorie Guo APRN [facility]     Additional Instructions for the Follow-ups that You Need to Schedule     Discharge Follow-up with PCP   As directed       Currently Documented PCP:    Melly Holliday APRN    PCP Phone Number:    [phone]     Follow Up Details: one week         Discharge Follow-up with Specified Provider: cardiology; 2 Weeks   As directed      To: cardiology    Follow Up: 2 Weeks               Test Results Pending at Discharge  Pending Labs     Order Current Status    Blood Culture - Blood, Arm, Left Preliminary result    Blood Culture - Blood, Arm, Right Preliminary result           Risk for Readmission (LACE) Score: 13 (12/3/2021  6:01 AM)      This patient has been examined wearing appropriate Personal Protective Equipment . 12/03/21      Time: Discharge 36 min with face-to-face history exam, writing of prescriptions, and documenting discharge data including care coordination with the nursing staff.      Electronically signed by  Kevon Sarabia DO, 12/03/21, 13:27 EST.

## 2021-12-03 NOTE — CASE MANAGEMENT/SOCIAL WORK
Continued Stay Note  JOHN Hammonds     Patient Name: Akila Amezcua  MRN: [id number]  Today's Date: 12/3/2021    Admit Date: 11/29/2021     Discharge Plan     Row Name 12/03/21 1339       Plan    Plan Comments I spoke with the patient via phone.  We discussed her walk test and need for oxygen at home.  She advised that she has used [facility] previously.  I advised that I will contact them for oxygen.  She is agreeable to that.  I then spoke with Mohsen from [facility] and the requested information was faxed.  CM will continue to follow.               Discharge Codes    No documentation.               Expected Discharge Date and Time     Expected Discharge Date Expected Discharge Time    Dec 3, 2021             Lyn Stanford RN

## 2021-12-04 ENCOUNTER — READMISSION MANAGEMENT (OUTPATIENT)
Dept: CALL CENTER | Facility: HOSPITAL | Age: 61
End: 2021-12-04

## 2021-12-04 LAB
BACTERIA SPEC AEROBE CULT: NORMAL
BACTERIA SPEC AEROBE CULT: NORMAL

## 2021-12-04 PROCEDURE — 25010000002 METHYLPREDNISOLONE PER 40 MG: Performed by: INTERNAL MEDICINE

## 2021-12-04 NOTE — OUTREACH NOTE
Prep Survey      Responses   [facility] facility patient discharged from? [facility]   Is LACE score < 7 ? No   Emergency Room discharge w/ pulse ox? No   Eligibility Readm Mgmt   Discharge diagnosis PNA   Does the patient have one of the following disease processes/diagnoses(primary or secondary)? COPD/Pneumonia   Does the patient have Home health ordered? No   Is there a DME ordered? Yes   What DME was ordered? Oxygen    Comments regarding appointments Follow up with TOYA Lambert   Medication alerts for this patient Tessalon Perles, Cardizem CD, Medrol and Coumadin   Prep survey completed? Yes          Anuradha Smith RN

## 2021-12-05 NOTE — CASE MANAGEMENT/SOCIAL WORK
Case Management Discharge Note      Final Note: dc home    Provided Post Acute Provider List?: N/A  Provided Post Acute Provider Quality & Resource List?: N/A    Selected Continued Care - Discharged on 12/3/2021 Admission date: 11/29/2021 - Discharge disposition: Home or Self Care    Destination    No services have been selected for the patient.              Durable Medical Equipment    No services have been selected for the patient.              Dialysis/Infusion    No services have been selected for the patient.              Home Medical Care    No services have been selected for the patient.              Therapy    No services have been selected for the patient.              Community Resources    No services have been selected for the patient.              Community & DME    No services have been selected for the patient.                       Final Discharge Disposition Code: 01 - home or self-care

## 2021-12-07 ENCOUNTER — READMISSION MANAGEMENT (OUTPATIENT)
Dept: CALL CENTER | Facility: HOSPITAL | Age: 61
End: 2021-12-07

## 2021-12-07 NOTE — OUTREACH NOTE
COPD/PN Week 1 Survey      Responses   [facility] patient discharged from? [facility]   Does the patient have one of the following disease processes/diagnoses(primary or secondary)? COPD/Pneumonia   Was the primary reason for admission: Pneumonia   Week 1 attempt successful? No   Unsuccessful attempts Attempt 1          Hussein Pisano RN

## 2021-12-09 ENCOUNTER — READMISSION MANAGEMENT (OUTPATIENT)
Dept: CALL CENTER | Facility: HOSPITAL | Age: 61
End: 2021-12-09

## 2021-12-09 NOTE — OUTREACH NOTE
COPD/PN Week 1 Survey      Responses   [facility] facility patient discharged from? [facility]   Does the patient have one of the following disease processes/diagnoses(primary or secondary)? COPD/Pneumonia   Was the primary reason for admission: Pneumonia   Week 1 attempt successful? No   Unsuccessful attempts Attempt 2          Madison Walker LPN

## 2021-12-29 ENCOUNTER — TELEPHONE (OUTPATIENT)
Dept: CARDIOLOGY | Facility: CLINIC | Age: 61
End: 2021-12-29

## 2021-12-29 ENCOUNTER — OFFICE VISIT (OUTPATIENT)
Dept: CARDIOLOGY | Facility: CLINIC | Age: 61
End: 2021-12-29

## 2021-12-29 VITALS
HEART RATE: 115 BPM | HEIGHT: 68 IN | DIASTOLIC BLOOD PRESSURE: 72 MMHG | WEIGHT: 204 LBS | SYSTOLIC BLOOD PRESSURE: 108 MMHG | BODY MASS INDEX: 30.92 KG/M2

## 2021-12-29 DIAGNOSIS — I27.81 COR PULMONALE (CHRONIC) (HCC): ICD-10-CM

## 2021-12-29 DIAGNOSIS — J44.1 COPD WITH ACUTE EXACERBATION (HCC): ICD-10-CM

## 2021-12-29 DIAGNOSIS — I09.1 RHEUMATIC VALVULAR DISEASE: ICD-10-CM

## 2021-12-29 DIAGNOSIS — I50.31 ACUTE DIASTOLIC (CONGESTIVE) HEART FAILURE (HCC): ICD-10-CM

## 2021-12-29 DIAGNOSIS — I48.0 PAF (PAROXYSMAL ATRIAL FIBRILLATION) (HCC): Primary | ICD-10-CM

## 2021-12-29 PROCEDURE — 93000 ELECTROCARDIOGRAM COMPLETE: CPT | Performed by: NURSE PRACTITIONER

## 2021-12-29 PROCEDURE — 99214 OFFICE O/P EST MOD 30 MIN: CPT | Performed by: NURSE PRACTITIONER

## 2021-12-29 RX ORDER — GUAIFENESIN, DEXTROMETHORPHAN HBR 600; 30 MG/1; MG/1
TABLET ORAL
Status: ON HOLD | COMMUNITY
Start: 2021-12-14 | End: 2022-01-20

## 2021-12-29 RX ORDER — POTASSIUM CHLORIDE 750 MG/1
TABLET, FILM COATED, EXTENDED RELEASE ORAL
COMMUNITY
Start: 2021-12-22 | End: 2021-12-29 | Stop reason: SDUPTHER

## 2021-12-29 RX ORDER — DILTIAZEM HYDROCHLORIDE 240 MG/1
240 CAPSULE, COATED, EXTENDED RELEASE ORAL DAILY
Qty: 30 CAPSULE | Refills: 11 | Status: SHIPPED | OUTPATIENT
Start: 2021-12-29 | End: 2022-01-31 | Stop reason: HOSPADM

## 2021-12-29 RX ORDER — FUROSEMIDE 20 MG/1
40 TABLET ORAL DAILY
Qty: 60 TABLET | Refills: 3 | Status: SHIPPED | OUTPATIENT
Start: 2021-12-29 | End: 2022-01-03

## 2021-12-29 RX ORDER — NICOTINE 10 MG
CARTRIDGE (EA) INHALATION
COMMUNITY
Start: 2021-11-16 | End: 2022-01-14

## 2021-12-29 RX ORDER — FUROSEMIDE 20 MG/1
TABLET ORAL
COMMUNITY
Start: 2021-12-22 | End: 2021-12-29 | Stop reason: SDUPTHER

## 2021-12-29 RX ORDER — POTASSIUM CHLORIDE 750 MG/1
20 TABLET, FILM COATED, EXTENDED RELEASE ORAL DAILY
Qty: 60 TABLET | Refills: 3 | Status: SHIPPED | OUTPATIENT
Start: 2021-12-29 | End: 2022-01-10 | Stop reason: SDUPTHER

## 2021-12-29 RX ORDER — DILTIAZEM HYDROCHLORIDE 180 MG/1
CAPSULE, EXTENDED RELEASE ORAL
COMMUNITY
Start: 2021-12-03 | End: 2022-01-14 | Stop reason: ALTCHOICE

## 2021-12-29 RX ORDER — OXYMETAZOLINE SPRAY 50 MG/100ML
LIQUID NASAL
Status: ON HOLD | COMMUNITY
Start: 2021-12-06 | End: 2022-01-20

## 2021-12-29 RX ORDER — WARFARIN SODIUM 2 MG/1
2 TABLET ORAL
COMMUNITY
Start: 2021-12-22 | End: 2022-01-31 | Stop reason: HOSPADM

## 2021-12-29 NOTE — TELEPHONE ENCOUNTER
The patient called after she went to the pharmacy to get her medication with the changes from today's visit.   In reference to  Tiadylt  mg (was 180 mg). The pharmacy is [facility] in [place] and, per the patient, do not understand the dosage or instructions, and the insurance will not cover the medication?  Please advise.    Thank you

## 2021-12-29 NOTE — TELEPHONE ENCOUNTER
I stopped her diltiazem 180 mg dose and started her on diltiazem 240 mg daily.  Generic is fine and I do not know why that would not be covered.

## 2021-12-29 NOTE — PROGRESS NOTES
Date of Office Visit: 2021  Encounter Provider: TOYA Patel  Place of Service: [facility] CARDIOLOGY  Patient Name: Akila Amezcua  :1960  Primary Cardiologist: Dr. Vitale    Chief Complaint   Patient presents with   • 4 wk f/u     CHF   :     Dear Melly    HPI: Akila Amezcua is a pleasant 61 y.o. female who presents 2021 for cardiac follow up.  I reviewed her past medical records including notes, labs and testing in preparation for today's visit.    Ms. Amezcua is a 61-year-old woman who has been evaluated by my partners in 2019 and  for paroxysmal atrial fibrillation in the setting of acute COPD exacerbations.  She has had echocardiograms that have been suggestive of aortic stenosis.  She has not kept any follow-up appointments in our cardiology clinic.     She presented to the emergency department 2021 with acute exacerbation of COPD and aspirin respiratory infection.  She has been having symptoms for approximately 3 days.  She was very short of breath and coughing but could not cough up the sputum.  She is also very congested and could feel her heart beating rapidly.  She did not have any chest pain or chest discomfort.  She was noted to be in rapid atrial fibrillation.  She did receive IV diltiazem and was placed on a dill drip with little effect on her heart rate.  She was given a dose of digoxin 0.25 mg IV followed by another dose of 0.125 mg IV.  She was then started on IV amiodarone.  She was given prophylactic enoxaparin.  She also received IV fluids and IV steroids. She has been diagnosed with human metapneumovirus.  A CTA was performed and was negative for PE, but showed dense bilateral lower lobe consolidations, consistent with pneumonia.  Her proBNP was elevated at 2800.  Her troponin was negative.  Her echo shows normal left ventricular systolic function, indeterminate diastolic function, moderate to severe right ventricular dilation,  moderate pulmonary hypertension, and what appears to be rheumatic valvular heart disease affecting the aortic, mitral, and tricuspid valves.  The study is extremely technically difficult and the valves are not well visualized.  There is moderate aortic stenosis, moderate aortic regurgitation, mild-moderate mitral stenosis, moderate (if not moderate to severe) mitral regurgitation, and moderate tricuspid regurgitation.  The amiodarone drip was stopped, she was started on oral diltiazem and her heart rate responded well.  She was also switched to warfarin due to her valvular disease.  She was discharged home 12/3/2021.    She returns today in follow-up.  She can still feel her heart racing at times.  She can also feel palpitations especially if she is resting quietly.  She states about a week ago her feet and ankle started to swell and she saw you and you started her on Lasix 20 mg daily along with potassium.  She states it really has not made much of a difference in her feet remain edematous.  She is continuing to use oxygen and uses 2 L at rest and does not feel short of breath, however, she does have to turn it up to 4 L if she is up trying to be active and that does still make her feel winded.  She also notices with activity that her heart rate will respond.  At times she has a little bit of dizziness when first standing up.  She does have some chest soreness from coughing and states she now has a productive cough with some thick sputum.  She denies fatigue and states overall she is recuperating.  She states she has been monitoring her PT/INR and that it was a little too thin and you have decreased her dose recently.        Past Medical History:   Diagnosis Date   • Anxiety    • COPD (chronic obstructive pulmonary disease) ([facility])     LUNG BLEBS   • DJD (degenerative joint disease)    • Gallstones     SCHEDULED FOR SX   • GERD (gastroesophageal reflux disease)    • Injury of back     degenertive disc disease   •  Panic attacks    • Paroxysmal atrial fibrillation (HCC)    • Rheumatoid arthritis (HCC)    • Seizures (HCC)     LAST ONE 2017       Past Surgical History:   Procedure Laterality Date   • APPENDECTOMY     •  SECTION     • CHOLECYSTECTOMY N/A 2017    Procedure: LAPAROSCOPIC CHOLECYSTECTOMY, laparoscopic adhesiolysis requiring 45 minutes of operative time;  Surgeon: Liliana Monroy MD;  Location: [facility];  Service:    • ESOPHAGEAL ATRESIA REPAIR     • PLEURAL BIOPSY     • PLEURAL SCARIFICATION         Social History     Socioeconomic History   • Marital status:    Tobacco Use   • Smoking status: Current Every Day Smoker     Packs/day: 1.00     Years: 35.00     Pack years: 35.00   Substance and Sexual Activity   • Alcohol use: No   • Drug use: No   • Sexual activity: Defer       Family History   Problem Relation Age of Onset   • Lung cancer Father        The following portion of the patient's history were reviewed and updated as appropriate: past medical history, past surgical history, past social history, past family history, allergies, current medications, and problem list.    Review of Systems   Constitutional: Negative for diaphoresis, fever and malaise/fatigue.   HENT: Negative for congestion, hearing loss, hoarse voice, nosebleeds and sore throat.    Eyes: Negative for photophobia, vision loss in left eye, vision loss in right eye and visual disturbance.   Cardiovascular: Positive for chest pain (chest tightness from coughing), leg swelling and palpitations. Negative for dyspnea on exertion, irregular heartbeat, near-syncope, orthopnea, paroxysmal nocturnal dyspnea and syncope.   Respiratory: Positive for shortness of breath (improving). Negative for cough, hemoptysis, sleep disturbances due to breathing, snoring, sputum production and wheezing.    Endocrine: Negative for cold intolerance, heat intolerance, polydipsia, polyphagia and polyuria.   Hematologic/Lymphatic: Negative for  bleeding problem. Does not bruise/bleed easily.   Skin: Negative for color change, dry skin, poor wound healing, rash and suspicious lesions.   Musculoskeletal: Negative for arthritis, back pain, falls, gout, joint pain, joint swelling, muscle cramps, muscle weakness and myalgias.   Gastrointestinal: Negative for bloating, abdominal pain, constipation, diarrhea, dysphagia, melena, nausea and vomiting.   Neurological: Positive for dizziness (occ). Negative for excessive daytime sleepiness, headaches, light-headedness, loss of balance, numbness, paresthesias, seizures, vertigo and weakness.   Psychiatric/Behavioral: Negative for depression, memory loss and substance abuse. The patient is not nervous/anxious.        Allergies   Allergen Reactions   • Penicillins Anaphylaxis   • Anesthetics, Amide Anxiety         Current Outpatient Medications:   •  acetaminophen (TYLENOL) 325 MG tablet, Take  by mouth Every 6 (Six) Hours As Needed for Mild Pain ., Disp: , Rfl:   •  albuterol (PROVENTIL) (2.5 MG/3ML) 0.083% nebulizer solution, Take 2.5 mg by nebulization Every 4 (Four) Hours As Needed for wheezing., Disp: , Rfl:   •  baclofen (LIORESAL) 10 MG tablet, Take 10 mg by mouth 2 (Two) Times a Day As Needed for Muscle Spasms., Disp: , Rfl:   •  benzonatate (Tessalon Perles) 100 MG capsule, Take 1 capsule by mouth 3 (Three) Times a Day As Needed for Cough., Disp: 30 capsule, Rfl: 0  •  CVS 8HR Muscle Aches & Pain 650 MG 8 hr tablet, , Disp: , Rfl:   •  Dextromethorphan-guaiFENesin (Mucus Relief DM)  MG tablet sustained-release 12 hour, , Disp: , Rfl:   •  dilTIAZem CD (CARDIZEM CD) 180 MG 24 hr capsule, Take 1 capsule by mouth Daily., Disp: 30 capsule, Rfl: 0  •  Fluticasone-Umeclidin-Vilant (TRELEGY ELLIPTA) 100-62.5-25 MCG/INH aerosol powder , Inhale 1 puff Daily., Disp: 60 each, Rfl: 2  •  furosemide (LASIX) 20 MG tablet, , Disp: , Rfl:   •  ibuprofen (ADVIL,MOTRIN) 800 MG tablet, Take 800 mg by mouth Every 8 (Eight)  "Hours As Needed for Mild Pain ., Disp: , Rfl:   •  ipratropium-albuterol (DUO-NEB) 0.5-2.5 mg/3 ml nebulizer, INHALE 3 MLS VIA NEBULIZER EVER 6 HOURS WHILE AWAKE, Disp: 360 mL, Rfl: 1  •  potassium chloride 10 MEQ CR tablet, , Disp: , Rfl:   •  Tiadylt  MG 24 hr capsule, , Disp: , Rfl:   •  warfarin (COUMADIN) 2 MG tablet, , Disp: , Rfl:   •  Misc. Devices (BATH BENCH WITH BACK) misc, 1 Units Daily As Needed (shower)., Disp: 1 each, Rfl: 1  •  Nicotrol 10 MG inhaler, , Disp: , Rfl:   •  warfarin (COUMADIN) 3 MG tablet, Take on tablet daily  Indications: Atrial Fibrillation, Disp: 30 tablet, Rfl: 0        Objective:     Vitals:    12/29/21 1259   BP: 108/72   Weight: 92.5 kg (204 lb)   Height: 171.5 cm (67.5\")     Body mass index is 31.48 kg/m².      Vitals reviewed.   Constitutional:       General: Not in acute distress.     Appearance: Well-developed and not in distress.   Eyes:      General:         Right eye: No discharge.         Left eye: No discharge.      Conjunctiva/sclera: Conjunctivae normal.   HENT:      Head: Normocephalic and atraumatic.      Right Ear: External ear normal.      Left Ear: External ear normal.      Nose: Nose normal.   Neck:      Thyroid: No thyromegaly.      Vascular: No JVD.      Trachea: No tracheal deviation.      Lymphadenopathy: No cervical adenopathy.   Pulmonary:      Effort: Pulmonary effort is normal. No respiratory distress.      Breath sounds: Examination of the right-lower field reveals decreased breath sounds. Examination of the left-lower field reveals decreased breath sounds. Decreased breath sounds present. No wheezing. No rales.   Chest:      Chest wall: Not tender to palpatation.   Cardiovascular:      Tachycardia present. Irregularly irregular rhythm.      No gallop.   Pulses:     Intact distal pulses.   Edema:     Peripheral edema present.     Ankle: bilateral 2+ edema of the ankle.     Feet: bilateral 2+ edema of the feet.  Abdominal:      General: There is no " distension.      Palpations: Abdomen is soft.      Tenderness: There is no abdominal tenderness.   Musculoskeletal: Normal range of motion.         General: No tenderness or deformity.      Cervical back: Normal range of motion and neck supple. Skin:     General: Skin is warm and dry.      Findings: No erythema or rash.   Neurological:      Mental Status: Alert and oriented to person, place, and time.      Coordination: Coordination normal.   Psychiatric:         Attention and Perception: Attention normal.         Mood and Affect: Mood normal.         Speech: Speech normal.         Behavior: Behavior normal.         Thought Content: Thought content normal.         Cognition and Memory: Cognition normal.         Judgment: Judgment normal.               ECG 12 Lead    Date/Time: 12/29/2021 1:23 PM  Performed by: Lorie Guo APRN  Authorized by: Lorie Guo APRN   Comparison: compared with previous ECG from 11/30/2021  Similar to previous ECG  Rhythm: atrial fibrillation  Rate: tachycardic  Conduction: conduction normal  ST Depression: I, aVL, V2, V5 and V6 (minimal)  T Waves: T waves normal  QRS axis: normal    Clinical impression: abnormal EKG              Assessment:       Diagnosis Plan   1. PAF (paroxysmal atrial fibrillation) ([facility])     2. Rheumatic valvular disease     3. Acute diastolic (congestive) heart failure ([facility])     4. Cor pulmonale (chronic) ([facility])     5. COPD with acute exacerbation ([facility])            Plan:       1.  Acute hypoxic respiratory failure with acute metapneumovirus infection -she continues to improve.  She still requires oxygen of 2 L at rest and 4 L with exertion.  She has a productive cough and denies fever.  2.  Atrial fibrillation -she had A. fib RVR in the setting of with her pneumonia and acute COPD exasperation.  She was started on diltiazem.  She is still little tacky.  I am going to increase the diltiazem to 240 mg daily.  Primary care is checking her PT/INR.  3.  Acute  exacerbation COPD-continues to improve but still requires oxygen at this point.  4.  History of Covid pneumonia  5.  Valvular heart disease with moderate aortic insufficiency and moderate aortic stenosis, mild to moderate mitral stenosis  6.  Pulmonary hypertension  7.  Mild dilatation of the ascending aorta   8.  Lower extremity edema-primary care started her on Lasix 20 mg daily that is not helping.  Him can increase that to 40 mg daily.  I am also increasing her potassium to 20 mg daily.  She will have lab work in 1 week.    Increase the diltiazem to 240 mg daily.  Increase her Lasix to 40 mg daily and potassium to 20 mg daily.  She is to have labs checked in 1 week with primary care.  I have asked her to call me next week if things have not improved.      As always, it has been a pleasure to participate in your patient's care. Thank you.       Sincerely,       TOYA Patel      Current Outpatient Medications:   •  acetaminophen (TYLENOL) 325 MG tablet, Take  by mouth Every 6 (Six) Hours As Needed for Mild Pain ., Disp: , Rfl:   •  albuterol (PROVENTIL) (2.5 MG/3ML) 0.083% nebulizer solution, Take 2.5 mg by nebulization Every 4 (Four) Hours As Needed for wheezing., Disp: , Rfl:   •  baclofen (LIORESAL) 10 MG tablet, Take 10 mg by mouth 2 (Two) Times a Day As Needed for Muscle Spasms., Disp: , Rfl:   •  benzonatate (Tessalon Perles) 100 MG capsule, Take 1 capsule by mouth 3 (Three) Times a Day As Needed for Cough., Disp: 30 capsule, Rfl: 0  •  CVS 8HR Muscle Aches & Pain 650 MG 8 hr tablet, , Disp: , Rfl:   •  Dextromethorphan-guaiFENesin (Mucus Relief DM)  MG tablet sustained-release 12 hour, , Disp: , Rfl:   •  Fluticasone-Umeclidin-Vilant (TRELEGY ELLIPTA) 100-62.5-25 MCG/INH aerosol powder , Inhale 1 puff Daily., Disp: 60 each, Rfl: 2  •  furosemide (LASIX) 20 MG tablet, Take 2 tablets by mouth Daily., Disp: 60 tablet, Rfl: 3  •  ibuprofen (ADVIL,MOTRIN) 800 MG tablet, Take 800 mg by mouth Every 8  (Eight) Hours As Needed for Mild Pain ., Disp: , Rfl:   •  ipratropium-albuterol (DUO-NEB) 0.5-2.5 mg/3 ml nebulizer, INHALE 3 MLS VIA NEBULIZER EVER 6 HOURS WHILE AWAKE, Disp: 360 mL, Rfl: 1  •  potassium chloride 10 MEQ CR tablet, Take 2 tablets by mouth Daily., Disp: 60 tablet, Rfl: 3  •  Tiadylt  MG 24 hr capsule, , Disp: , Rfl:   •  warfarin (COUMADIN) 2 MG tablet, , Disp: , Rfl:   •  dilTIAZem CD (CARDIZEM CD) 240 MG 24 hr capsule, Take 1 capsule by mouth Daily., Disp: 30 capsule, Rfl: 11  •  Misc. Devices (BATH BENCH WITH BACK) misc, 1 Units Daily As Needed (shower)., Disp: 1 each, Rfl: 1  •  Nicotrol 10 MG inhaler, , Disp: , Rfl:   •  warfarin (COUMADIN) 3 MG tablet, Take on tablet daily  Indications: Atrial Fibrillation, Disp: 30 tablet, Rfl: 0    Dictated utilizing Dragon dictation

## 2022-01-03 ENCOUNTER — TELEPHONE (OUTPATIENT)
Dept: CARDIOLOGY | Facility: CLINIC | Age: 62
End: 2022-01-03

## 2022-01-03 RX ORDER — TORSEMIDE 20 MG/1
20 TABLET ORAL DAILY
Qty: 30 TABLET | Refills: 3 | Status: SHIPPED | OUTPATIENT
Start: 2022-01-03 | End: 2022-01-31 | Stop reason: HOSPADM

## 2022-01-03 NOTE — TELEPHONE ENCOUNTER
Stop the Lasix and start torsemide 20 mg daily.  Have her start the torsemide tomorrow in the morning.  Continue on her potassium.  Please just let her know the torsemide is stronger than the Lasix.  I still want her to have labs done in about a week.  Have her call me again next week with an update

## 2022-01-03 NOTE — TELEPHONE ENCOUNTER
The patient phoned today at 12:14 pm or guidance on the swelling in her feet.   She states she has had a two week period for increasing swelling in her feet, the increase from Lasix 20 mg to Lasix 40 mg has had no effect on the swelling and she needs further guidance on what to do.  Please contact the patient.   Thank you

## 2022-01-10 ENCOUNTER — TELEPHONE (OUTPATIENT)
Dept: CARDIOLOGY | Facility: CLINIC | Age: 62
End: 2022-01-10

## 2022-01-10 DIAGNOSIS — I50.31 ACUTE DIASTOLIC (CONGESTIVE) HEART FAILURE: Primary | ICD-10-CM

## 2022-01-10 RX ORDER — POTASSIUM CHLORIDE 750 MG/1
20 TABLET, FILM COATED, EXTENDED RELEASE ORAL DAILY
Qty: 60 TABLET | Refills: 3 | Status: SHIPPED | OUTPATIENT
Start: 2022-01-10 | End: 2022-08-18

## 2022-01-10 NOTE — TELEPHONE ENCOUNTER
The patient phoned today requestng a new Rx with refills for her increased potassium dosage.  With the increase last week, she has run out of medication and the pharmacy/insurance will not approve the refill as it is too soon.   The patient uses the [facility] in [place].   Thank you

## 2022-01-11 NOTE — TELEPHONE ENCOUNTER
The patient advised she is continuing to have swelling in her feet and lower legs that does not go down at night.   Please advise.

## 2022-01-12 NOTE — TELEPHONE ENCOUNTER
Spoke to patient.  She states she still has a lot of lower extremity edema that is moving up her legs.  She still states she is short of breath.  We have made her an appointment for Friday, she needs a late afternoon appointment.  She may need IV diuresis.

## 2022-01-14 ENCOUNTER — HOSPITAL ENCOUNTER (INPATIENT)
Facility: HOSPITAL | Age: 62
LOS: 4 days | Discharge: ANOTHER HOSPITAL | End: 2022-01-18
Attending: NURSE PRACTITIONER | Admitting: HOSPITALIST

## 2022-01-14 ENCOUNTER — OFFICE VISIT (OUTPATIENT)
Dept: CARDIOLOGY | Facility: CLINIC | Age: 62
End: 2022-01-14

## 2022-01-14 ENCOUNTER — APPOINTMENT (OUTPATIENT)
Dept: CT IMAGING | Facility: HOSPITAL | Age: 62
End: 2022-01-14

## 2022-01-14 ENCOUNTER — APPOINTMENT (OUTPATIENT)
Dept: CARDIOLOGY | Facility: HOSPITAL | Age: 62
End: 2022-01-14

## 2022-01-14 ENCOUNTER — APPOINTMENT (OUTPATIENT)
Dept: GENERAL RADIOLOGY | Facility: HOSPITAL | Age: 62
End: 2022-01-14

## 2022-01-14 VITALS
WEIGHT: 206 LBS | RESPIRATION RATE: 16 BRPM | HEIGHT: 67 IN | HEART RATE: 95 BPM | OXYGEN SATURATION: 87 % | SYSTOLIC BLOOD PRESSURE: 98 MMHG | DIASTOLIC BLOOD PRESSURE: 72 MMHG | BODY MASS INDEX: 32.33 KG/M2

## 2022-01-14 DIAGNOSIS — I48.0 PAF (PAROXYSMAL ATRIAL FIBRILLATION): ICD-10-CM

## 2022-01-14 DIAGNOSIS — I09.1 RHEUMATIC VALVULAR DISEASE: ICD-10-CM

## 2022-01-14 DIAGNOSIS — I50.31 ACUTE DIASTOLIC (CONGESTIVE) HEART FAILURE: Primary | ICD-10-CM

## 2022-01-14 LAB
ALBUMIN SERPL-MCNC: 3.7 G/DL (ref 3.5–5.2)
ALBUMIN/GLOB SERPL: 0.8 G/DL
ALP SERPL-CCNC: 70 U/L (ref 39–117)
ALT SERPL W P-5'-P-CCNC: 22 U/L (ref 1–33)
ANION GAP SERPL CALCULATED.3IONS-SCNC: 11 MMOL/L (ref 5–15)
AST SERPL-CCNC: 23 U/L (ref 1–32)
B PARAPERT DNA SPEC QL NAA+PROBE: NOT DETECTED
B PERT DNA SPEC QL NAA+PROBE: NOT DETECTED
BASOPHILS # BLD AUTO: 0.09 10*3/MM3 (ref 0–0.2)
BASOPHILS NFR BLD AUTO: 0.8 % (ref 0–1.5)
BILIRUB SERPL-MCNC: 1.4 MG/DL (ref 0–1.2)
BUN SERPL-MCNC: 12 MG/DL (ref 6–20)
BUN/CREAT SERPL: 15.8 (ref 7–25)
C PNEUM DNA NPH QL NAA+NON-PROBE: NOT DETECTED
CALCIUM SPEC-SCNC: 10 MG/DL (ref 8.6–10.5)
CHLORIDE SERPL-SCNC: 100 MMOL/L (ref 98–107)
CO2 SERPL-SCNC: 27 MMOL/L (ref 22–29)
CREAT SERPL-MCNC: 0.76 MG/DL (ref 0.57–1)
D DIMER PPP FEU-MCNC: 1.94 MCGFEU/ML (ref 0–0.46)
DEPRECATED RDW RBC AUTO: 56.7 FL (ref 37–54)
EOSINOPHIL # BLD AUTO: 0.15 10*3/MM3 (ref 0–0.4)
EOSINOPHIL NFR BLD AUTO: 1.3 % (ref 0.3–6.2)
ERYTHROCYTE [DISTWIDTH] IN BLOOD BY AUTOMATED COUNT: 16.1 % (ref 12.3–15.4)
FLUAV SUBTYP SPEC NAA+PROBE: NOT DETECTED
FLUBV RNA ISLT QL NAA+PROBE: NOT DETECTED
GFR SERPL CREATININE-BSD FRML MDRD: 78 ML/MIN/1.73
GLOBULIN UR ELPH-MCNC: 4.5 GM/DL
GLUCOSE SERPL-MCNC: 113 MG/DL (ref 65–99)
HADV DNA SPEC NAA+PROBE: NOT DETECTED
HBA1C MFR BLD: 5.5 % (ref 4.8–5.6)
HCOV 229E RNA SPEC QL NAA+PROBE: NOT DETECTED
HCOV HKU1 RNA SPEC QL NAA+PROBE: NOT DETECTED
HCOV NL63 RNA SPEC QL NAA+PROBE: NOT DETECTED
HCOV OC43 RNA SPEC QL NAA+PROBE: NOT DETECTED
HCT VFR BLD AUTO: 36.8 % (ref 34–46.6)
HGB BLD-MCNC: 10.9 G/DL (ref 12–15.9)
HMPV RNA NPH QL NAA+NON-PROBE: NOT DETECTED
HPIV1 RNA ISLT QL NAA+PROBE: NOT DETECTED
HPIV2 RNA SPEC QL NAA+PROBE: NOT DETECTED
HPIV3 RNA NPH QL NAA+PROBE: NOT DETECTED
HPIV4 P GENE NPH QL NAA+PROBE: NOT DETECTED
IMM GRANULOCYTES # BLD AUTO: 0.03 10*3/MM3 (ref 0–0.05)
IMM GRANULOCYTES NFR BLD AUTO: 0.3 % (ref 0–0.5)
INR PPP: 1.63 (ref 0.9–1.1)
LYMPHOCYTES # BLD AUTO: 1.37 10*3/MM3 (ref 0.7–3.1)
LYMPHOCYTES NFR BLD AUTO: 12.2 % (ref 19.6–45.3)
M PNEUMO IGG SER IA-ACNC: NOT DETECTED
MCH RBC QN AUTO: 28.3 PG (ref 26.6–33)
MCHC RBC AUTO-ENTMCNC: 29.6 G/DL (ref 31.5–35.7)
MCV RBC AUTO: 95.6 FL (ref 79–97)
MONOCYTES # BLD AUTO: 0.63 10*3/MM3 (ref 0.1–0.9)
MONOCYTES NFR BLD AUTO: 5.6 % (ref 5–12)
NEUTROPHILS NFR BLD AUTO: 79.8 % (ref 42.7–76)
NEUTROPHILS NFR BLD AUTO: 8.95 10*3/MM3 (ref 1.7–7)
NT-PROBNP SERPL-MCNC: 1595 PG/ML (ref 0–900)
PLATELET # BLD AUTO: 297 10*3/MM3 (ref 140–450)
PMV BLD AUTO: 9.4 FL (ref 6–12)
POTASSIUM SERPL-SCNC: 4.3 MMOL/L (ref 3.5–5.2)
PROCALCITONIN SERPL-MCNC: 0.04 NG/ML (ref 0–0.25)
PROT SERPL-MCNC: 8.2 G/DL (ref 6–8.5)
PROTHROMBIN TIME: 19.7 SECONDS (ref 12.1–15)
RBC # BLD AUTO: 3.85 10*6/MM3 (ref 3.77–5.28)
RHINOVIRUS RNA SPEC NAA+PROBE: NOT DETECTED
RSV RNA NPH QL NAA+NON-PROBE: NOT DETECTED
SARS-COV-2 RNA NPH QL NAA+NON-PROBE: NOT DETECTED
SODIUM SERPL-SCNC: 138 MMOL/L (ref 136–145)
TROPONIN T SERPL-MCNC: <0.01 NG/ML (ref 0–0.03)
TSH SERPL DL<=0.05 MIU/L-ACNC: 3.4 UIU/ML (ref 0.27–4.2)
WBC NRBC COR # BLD: 11.22 10*3/MM3 (ref 3.4–10.8)

## 2022-01-14 PROCEDURE — 85379 FIBRIN DEGRADATION QUANT: CPT | Performed by: NURSE PRACTITIONER

## 2022-01-14 PROCEDURE — 0202U NFCT DS 22 TRGT SARS-COV-2: CPT | Performed by: NURSE PRACTITIONER

## 2022-01-14 PROCEDURE — 94799 UNLISTED PULMONARY SVC/PX: CPT

## 2022-01-14 PROCEDURE — 93306 TTE W/DOPPLER COMPLETE: CPT | Performed by: INTERNAL MEDICINE

## 2022-01-14 PROCEDURE — 85025 COMPLETE CBC W/AUTO DIFF WBC: CPT | Performed by: NURSE PRACTITIONER

## 2022-01-14 PROCEDURE — 93010 ELECTROCARDIOGRAM REPORT: CPT | Performed by: INTERNAL MEDICINE

## 2022-01-14 PROCEDURE — 93005 ELECTROCARDIOGRAM TRACING: CPT | Performed by: NURSE PRACTITIONER

## 2022-01-14 PROCEDURE — 93306 TTE W/DOPPLER COMPLETE: CPT

## 2022-01-14 PROCEDURE — 80053 COMPREHEN METABOLIC PANEL: CPT | Performed by: NURSE PRACTITIONER

## 2022-01-14 PROCEDURE — 84443 ASSAY THYROID STIM HORMONE: CPT | Performed by: NURSE PRACTITIONER

## 2022-01-14 PROCEDURE — 84484 ASSAY OF TROPONIN QUANT: CPT | Performed by: NURSE PRACTITIONER

## 2022-01-14 PROCEDURE — 93000 ELECTROCARDIOGRAM COMPLETE: CPT | Performed by: NURSE PRACTITIONER

## 2022-01-14 PROCEDURE — 83036 HEMOGLOBIN GLYCOSYLATED A1C: CPT | Performed by: NURSE PRACTITIONER

## 2022-01-14 PROCEDURE — 71045 X-RAY EXAM CHEST 1 VIEW: CPT

## 2022-01-14 PROCEDURE — 84145 PROCALCITONIN (PCT): CPT | Performed by: NURSE PRACTITIONER

## 2022-01-14 PROCEDURE — 83880 ASSAY OF NATRIURETIC PEPTIDE: CPT | Performed by: NURSE PRACTITIONER

## 2022-01-14 PROCEDURE — 94640 AIRWAY INHALATION TREATMENT: CPT

## 2022-01-14 PROCEDURE — 25010000002 PERFLUTREN (DEFINITY) 8.476 MG IN SODIUM CHLORIDE (PF) 0.9 % 10 ML INJECTION: Performed by: INTERNAL MEDICINE

## 2022-01-14 PROCEDURE — 99223 1ST HOSP IP/OBS HIGH 75: CPT | Performed by: NURSE PRACTITIONER

## 2022-01-14 PROCEDURE — 99215 OFFICE O/P EST HI 40 MIN: CPT | Performed by: NURSE PRACTITIONER

## 2022-01-14 PROCEDURE — 94761 N-INVAS EAR/PLS OXIMETRY MLT: CPT

## 2022-01-14 PROCEDURE — 85610 PROTHROMBIN TIME: CPT | Performed by: NURSE PRACTITIONER

## 2022-01-14 RX ORDER — BUDESONIDE AND FORMOTEROL FUMARATE DIHYDRATE 160; 4.5 UG/1; UG/1
AEROSOL RESPIRATORY (INHALATION)
Status: COMPLETED
Start: 2022-01-14 | End: 2022-01-14

## 2022-01-14 RX ORDER — BACLOFEN 10 MG/1
10 TABLET ORAL 2 TIMES DAILY PRN
Status: DISCONTINUED | OUTPATIENT
Start: 2022-01-14 | End: 2022-01-18

## 2022-01-14 RX ORDER — ACETAMINOPHEN 160 MG/5ML
650 SOLUTION ORAL EVERY 4 HOURS PRN
Status: DISCONTINUED | OUTPATIENT
Start: 2022-01-14 | End: 2022-01-18 | Stop reason: HOSPADM

## 2022-01-14 RX ORDER — WARFARIN SODIUM 3 MG/1
3 TABLET ORAL
Status: DISCONTINUED | OUTPATIENT
Start: 2022-01-14 | End: 2022-01-17

## 2022-01-14 RX ORDER — ACETAMINOPHEN 650 MG/1
650 SUPPOSITORY RECTAL EVERY 4 HOURS PRN
Status: DISCONTINUED | OUTPATIENT
Start: 2022-01-14 | End: 2022-01-18 | Stop reason: HOSPADM

## 2022-01-14 RX ORDER — NITROGLYCERIN 0.4 MG/1
0.4 TABLET SUBLINGUAL
Status: DISCONTINUED | OUTPATIENT
Start: 2022-01-14 | End: 2022-01-18 | Stop reason: HOSPADM

## 2022-01-14 RX ORDER — SODIUM CHLORIDE 0.9 % (FLUSH) 0.9 %
10 SYRINGE (ML) INJECTION EVERY 12 HOURS SCHEDULED
Status: DISCONTINUED | OUTPATIENT
Start: 2022-01-14 | End: 2022-01-18 | Stop reason: HOSPADM

## 2022-01-14 RX ORDER — BUDESONIDE AND FORMOTEROL FUMARATE DIHYDRATE 160; 4.5 UG/1; UG/1
2 AEROSOL RESPIRATORY (INHALATION)
Status: DISCONTINUED | OUTPATIENT
Start: 2022-01-14 | End: 2022-01-18 | Stop reason: HOSPADM

## 2022-01-14 RX ORDER — ALBUTEROL SULFATE 2.5 MG/3ML
2.5 SOLUTION RESPIRATORY (INHALATION) EVERY 4 HOURS PRN
Status: DISCONTINUED | OUTPATIENT
Start: 2022-01-14 | End: 2022-01-18 | Stop reason: HOSPADM

## 2022-01-14 RX ORDER — ONDANSETRON 4 MG/1
4 TABLET, FILM COATED ORAL EVERY 6 HOURS PRN
Status: DISCONTINUED | OUTPATIENT
Start: 2022-01-14 | End: 2022-01-18 | Stop reason: HOSPADM

## 2022-01-14 RX ORDER — NICOTINE 21 MG/24HR
1 PATCH, TRANSDERMAL 24 HOURS TRANSDERMAL EVERY 24 HOURS
Status: DISCONTINUED | OUTPATIENT
Start: 2022-01-14 | End: 2022-01-14

## 2022-01-14 RX ORDER — BUMETANIDE 0.25 MG/ML
2 INJECTION INTRAMUSCULAR; INTRAVENOUS EVERY 12 HOURS
Status: DISCONTINUED | OUTPATIENT
Start: 2022-01-14 | End: 2022-01-15

## 2022-01-14 RX ORDER — BUMETANIDE 0.25 MG/ML
2 INJECTION INTRAMUSCULAR; INTRAVENOUS ONCE
Status: DISCONTINUED | OUTPATIENT
Start: 2022-01-14 | End: 2022-01-14

## 2022-01-14 RX ORDER — SODIUM CHLORIDE 9 MG/ML
40 INJECTION, SOLUTION INTRAVENOUS AS NEEDED
Status: DISCONTINUED | OUTPATIENT
Start: 2022-01-14 | End: 2022-01-18 | Stop reason: HOSPADM

## 2022-01-14 RX ORDER — FAMOTIDINE 20 MG/1
20 TABLET, FILM COATED ORAL
Status: DISCONTINUED | OUTPATIENT
Start: 2022-01-14 | End: 2022-01-18 | Stop reason: HOSPADM

## 2022-01-14 RX ORDER — IPRATROPIUM BROMIDE AND ALBUTEROL SULFATE 2.5; .5 MG/3ML; MG/3ML
3 SOLUTION RESPIRATORY (INHALATION)
Status: DISCONTINUED | OUTPATIENT
Start: 2022-01-14 | End: 2022-01-15

## 2022-01-14 RX ORDER — SODIUM CHLORIDE 0.9 % (FLUSH) 0.9 %
10 SYRINGE (ML) INJECTION AS NEEDED
Status: DISCONTINUED | OUTPATIENT
Start: 2022-01-14 | End: 2022-01-18 | Stop reason: HOSPADM

## 2022-01-14 RX ORDER — ONDANSETRON 2 MG/ML
4 INJECTION INTRAMUSCULAR; INTRAVENOUS EVERY 6 HOURS PRN
Status: DISCONTINUED | OUTPATIENT
Start: 2022-01-14 | End: 2022-01-18 | Stop reason: HOSPADM

## 2022-01-14 RX ORDER — DILTIAZEM HYDROCHLORIDE 120 MG/1
240 CAPSULE, COATED, EXTENDED RELEASE ORAL DAILY
Status: DISCONTINUED | OUTPATIENT
Start: 2022-01-14 | End: 2022-01-18

## 2022-01-14 RX ORDER — ACETAMINOPHEN 325 MG/1
650 TABLET ORAL EVERY 4 HOURS PRN
Status: DISCONTINUED | OUTPATIENT
Start: 2022-01-14 | End: 2022-01-18 | Stop reason: HOSPADM

## 2022-01-14 RX ORDER — CHOLECALCIFEROL (VITAMIN D3) 125 MCG
5 CAPSULE ORAL NIGHTLY PRN
Status: DISCONTINUED | OUTPATIENT
Start: 2022-01-14 | End: 2022-01-18 | Stop reason: HOSPADM

## 2022-01-14 RX ADMIN — BUDESONIDE AND FORMOTEROL FUMARATE DIHYDRATE 2 PUFF: 160; 4.5 AEROSOL RESPIRATORY (INHALATION) at 18:57

## 2022-01-14 RX ADMIN — FAMOTIDINE 20 MG: 20 TABLET ORAL at 18:00

## 2022-01-14 RX ADMIN — WARFARIN SODIUM 3 MG: 3 TABLET ORAL at 20:28

## 2022-01-14 RX ADMIN — ACETAMINOPHEN 650 MG: 325 TABLET ORAL at 20:01

## 2022-01-14 RX ADMIN — BACLOFEN 10 MG: 10 TABLET ORAL at 20:01

## 2022-01-14 RX ADMIN — BUMETANIDE 2 MG: 0.25 INJECTION, SOLUTION INTRAMUSCULAR; INTRAVENOUS at 18:00

## 2022-01-14 RX ADMIN — SODIUM CHLORIDE 3 ML: 9 INJECTION INTRAMUSCULAR; INTRAVENOUS; SUBCUTANEOUS at 20:14

## 2022-01-14 RX ADMIN — IPRATROPIUM BROMIDE AND ALBUTEROL SULFATE 3 ML: .5; 3 SOLUTION RESPIRATORY (INHALATION) at 18:57

## 2022-01-14 RX ADMIN — SODIUM CHLORIDE, PRESERVATIVE FREE 10 ML: 5 INJECTION INTRAVENOUS at 20:29

## 2022-01-14 RX ADMIN — MELATONIN TAB 5 MG 5 MG: 5 TAB at 21:23

## 2022-01-14 NOTE — H&P
[facility] HOSPITALIST     Melly Holliday APRN    CHIEF COMPLAINT: soa    HISTORY OF PRESENT ILLNESS:  The patient is a 61-year-old female that presented from cardiology office on direct admission secondary to acute CHF concerns.  The patient reports that she has been short of air since discharge from this facility in December however it has become worse in the last several days.  She notes excessive lower extremity edema, abdominal distention and early satiety and has been unable to tolerate much by mouth due to shortness of air.  She is remained on 3 L of oxygen at home and been using her normal breathing treatments without effect.  Cardiology office has been attempting to keep her at home, managing diuretic therapy.  At time of exam she is extremely dyspneic, unable to speak in full sentences and appears acutely in heart failure.  She reports no sick contacts, no smoking in over 1 year.  She reports compliance with all medications and treatment plans per cardiology.    No diagnostics have been completed at the time of this exam    She has a history of chronic a-fib on OAC, bullous emphysema/chronic oxygen dependent COPD, DDD with chronic pain, anxiety, RA, GERD    She otherwise denies f/c/headache/rhinorrhea/nasal congestion/lightheadedness/syncopal sensation/cough/n/v/d/chest pain/abdominal pain/recent illness/sick exposures/change in bowel or bladder habits/bloody emesis or bloody stools/change in medications or any other new concerns.    Past Medical History:   Diagnosis Date   • Anxiety    • COPD (chronic obstructive pulmonary disease) (HCC)     LUNG BLEBS   • DJD (degenerative joint disease)    • Gallstones     SCHEDULED FOR SX   • GERD (gastroesophageal reflux disease)    • Injury of back     degenertive disc disease   • Panic attacks    • Paroxysmal atrial fibrillation (HCC)    • Rheumatoid arthritis (HCC)    • Seizures (HCC)     LAST ONE JAN. 2017     Past Surgical History:    Procedure Laterality Date   • APPENDECTOMY     •  SECTION     • CHOLECYSTECTOMY N/A 2017    Procedure: LAPAROSCOPIC CHOLECYSTECTOMY, laparoscopic adhesiolysis requiring 45 minutes of operative time;  Surgeon: Liliana Monroy MD;  Location: [facility] OR;  Service:    • ESOPHAGEAL ATRESIA REPAIR     • PLEURAL BIOPSY     • PLEURAL SCARIFICATION       Family History   Problem Relation Age of Onset   • Lung cancer Father      Social History     Tobacco Use   • Smoking status: Former Smoker     Packs/day: 1.00     Years: 35.00     Pack years: 35.00     Quit date:      Years since quittin.0   • Smokeless tobacco: Never Used   • Tobacco comment: NO CAFFIENE   Substance Use Topics   • Alcohol use: No   • Drug use: No     Medications Prior to Admission   Medication Sig Dispense Refill Last Dose   • acetaminophen (TYLENOL) 325 MG tablet Take  by mouth Every 6 (Six) Hours As Needed for Mild Pain .      • albuterol (PROVENTIL) (2.5 MG/3ML) 0.083% nebulizer solution Take 2.5 mg by nebulization Every 4 (Four) Hours As Needed for wheezing.      • baclofen (LIORESAL) 10 MG tablet Take 10 mg by mouth 2 (Two) Times a Day As Needed for Muscle Spasms.      • benzonatate (Tessalon Perles) 100 MG capsule Take 1 capsule by mouth 3 (Three) Times a Day As Needed for Cough. 30 capsule 0    • CVS 8HR Muscle Aches & Pain 650 MG 8 hr tablet       • Dextromethorphan-guaiFENesin (Mucus Relief DM)  MG tablet sustained-release 12 hour       • dilTIAZem CD (CARDIZEM CD) 240 MG 24 hr capsule Take 1 capsule by mouth Daily. 30 capsule 11    • Fluticasone-Umeclidin-Vilant (TRELEGY ELLIPTA) 100-62.5-25 MCG/INH aerosol powder  Inhale 1 puff Daily. 60 each 2    • ibuprofen (ADVIL,MOTRIN) 800 MG tablet Take 800 mg by mouth Every 8 (Eight) Hours As Needed for Mild Pain .      • ipratropium-albuterol (DUO-NEB) 0.5-2.5 mg/3 ml nebulizer INHALE 3 MLS VIA NEBULIZER EVER 6 HOURS WHILE AWAKE 360 mL 1    • Misc. Devices (BATH BENCH WITH  BACK) misc 1 Units Daily As Needed (shower). 1 each 1    • potassium chloride 10 MEQ CR tablet Take 2 tablets by mouth Daily. 60 tablet 3    • torsemide (Demadex) 20 MG tablet Take 1 tablet by mouth Daily. 30 tablet 3    • warfarin (COUMADIN) 2 MG tablet       • warfarin (COUMADIN) 3 MG tablet Take on tablet daily  Indications: Atrial Fibrillation 30 tablet 0      Allergies:  Penicillins and Anesthetics, amide    Immunization History   Administered Date(s) Administered   • FluLaval/Fluarix/Fluzone >6 11/21/2019       REVIEW OF SYSTEMS:  Please see the above history of present illness for pertinent positives and negatives.  The remainder of the patient's systems have been reviewed and are negative.     Vital Signs  Awaiting vs       Physical Exam  Vitals reviewed.   Constitutional:       General: She is in acute distress.      Appearance: She is obese. She is ill-appearing.      Comments: Appears older than stated age acutely ill   HENT:      Head: Normocephalic and atraumatic.      Mouth/Throat:      Mouth: Mucous membranes are dry.   Eyes:      Extraocular Movements: Extraocular movements intact.      Pupils: Pupils are equal, round, and reactive to light.   Cardiovascular:      Rate and Rhythm: Normal rate. Rhythm irregular.   Pulmonary:      Comments: Extremely dyspneic with any speech, Rales to mid lung fields bilaterally  Abdominal:      General: Abdomen is flat. Bowel sounds are normal. There is no distension.      Palpations: Abdomen is soft.      Tenderness: There is no abdominal tenderness. There is no guarding.   Musculoskeletal:         General: Swelling present.      Comments: 3+ bilateral LE pitting edema     Skin:     General: Skin is warm and dry.      Capillary Refill: Capillary refill takes less than 2 seconds.      Findings: No erythema.      Comments: Tattooing of skin   Neurological:      General: No focal deficit present.      Mental Status: She is alert and oriented to person, place, and time.    Psychiatric:         Mood and Affect: Mood normal.         Behavior: Behavior normal.       Results Review:    I reviewed the patient's new clinical results.  Lab Results (most recent)     None          Imaging Results (Most Recent)     Procedure Component Value Units Date/Time    XR Chest 1 View [[id number]] Resulted: 01/14/22 1640     Updated: 01/14/22 1640        Pending    ECG/EMG Results (most recent)     None        Pending    Assessment/Plan   Acute/chronic hypoxic respiratory failure secondary to acute/chronic dCHF:  PAF:  Subtherapeutic INR:  Valvular hear disease/mod AI/mod AS, mild to mod MS, PH:  Last echo 11/2021 normal EF, indeterminate diastolic function, RV mod-severe dilation, mildly reduced RVSP, severe LA dilation, mod RA dilation  Stat EKG/labs/CXR/bumex IV now, may need transfer to ICU for nitro drip/bipap  Repeat echo if able  Daily weight, strict I and O   Add home warfarin 3 mg daily, daily INRs  Add home diltiazem  Monitor closely as patient is at high risk for decompensation    Chronic Bullous oxygen dependent COPD:  Oxygen to keep sats 88 to 92%  Add mirtha sub for trelegy  Appears more volume overloaded than exacerbation of COPD at this time    GERD: Nothing acute, add Pepcid    RA/DDD, chronic pain: No current acute issues, add Tylenol  KIM reviewed    Anxiety: Nothing acute currently    I discussed the patient's findings and my recommendations with patient and staff.     Ina Adamson, APRN  01/14/22  16:50 EST

## 2022-01-14 NOTE — PROGRESS NOTES
Date of Office Visit: 2022  Encounter Provider: TOYA Patel  Place of Service: [facility] CARDIOLOGY  Patient Name: Akila Amezcua  :1960  Primary Cardiologist: Dr. HAWTHORNE:  1 month follow up    Dear Melly    HPI: Akila Amezcua is a pleasant 61 y.o. female who presents 2022 for cardiac follow up.  I reviewed her past medical records including notes, labs and testing in preparation for today's visit.    Ms. Amezcua is a 61-year-old woman who has been evaluated by my partners in 2019 and  for paroxysmal atrial fibrillation in the setting of acute COPD exacerbations.  She has had echocardiograms that have been suggestive of aortic stenosis.  She has not kept any follow-up appointments in our cardiology clinic.     She presented to the emergency department 2021 with acute exacerbation of COPD and aspirin respiratory infection.  She has been having symptoms for approximately 3 days.  She was very short of breath and coughing but could not cough up the sputum.  She is also very congested and could feel her heart beating rapidly.  She did not have any chest pain or chest discomfort.  She was noted to be in rapid atrial fibrillation.  She did receive IV diltiazem and was placed on a dill drip with little effect on her heart rate.  She was given a dose of digoxin 0.25 mg IV followed by another dose of 0.125 mg IV.  She was then started on IV amiodarone.  She was given prophylactic enoxaparin.  She also received IV fluids and IV steroids. She has been diagnosed with human metapneumovirus.  A CTA was performed and was negative for PE, but showed dense bilateral lower lobe consolidations, consistent with pneumonia.  Her proBNP was elevated at 2800.  Her troponin was negative.  Her echo shows normal left ventricular systolic function, indeterminate diastolic function, moderate to severe right ventricular dilation, moderate pulmonary hypertension, and what appears to  be rheumatic valvular heart disease affecting the aortic, mitral, and tricuspid valves.  The study is extremely technically difficult and the valves are not well visualized.  There is moderate aortic stenosis, moderate aortic regurgitation, mild-moderate mitral stenosis, moderate (if not moderate to severe) mitral regurgitation, and moderate tricuspid regurgitation.  The amiodarone drip was stopped, she was started on oral diltiazem and her heart rate responded well.  She was also switched to warfarin due to her valvular disease.  She was discharged home 12/3/2021.     She returns today in follow-up.  She can still feel her heart racing at times.  She can also feel palpitations especially if she is resting quietly.  She states about a week ago her feet and ankle started to swell and she saw you and you started her on Lasix 20 mg daily along with potassium.  She states it really has not made much of a difference in her feet remain edematous.  She is continuing to use oxygen and uses 2 L at rest and does not feel short of breath, however, she does have to turn it up to 4 L if she is up trying to be active and that does still make her feel winded.  She also notices with activity that her heart rate will respond.  At times she has a little bit of dizziness when first standing up.  She does have some chest soreness from coughing and states she now has a productive cough with some thick sputum.  She denies fatigue and states overall she is recuperating.  She states she has been monitoring her PT/INR and that it was a little too thin and you have decreased her dose recently.  I increased the diltiazem to 240 mg daily.    I increased her Lasix to 40 mg daily and potassium to 20 mg daily.  She is to have labs checked in 1 week with primary care.  I have asked her to call me next week if things have not improved.    Her lower extremity edema did not improve so I stopped the Lasix and switch her over to torsemide 20 mg daily.   "She did not have her labs checked as ask.  She then called our office and stated that her symptoms had not improved, she was continued to have shortness of breath and lower extremity edema.  An appointment was made.    She presents today for follow-up.  She \"feels terrible and is willing to do anything even if that means admission to feel better\".  She states the changes in medication did not help and sometimes when she would check her oxygen sats at home they would be 70%.  She does remain on 3 L of oxygen.  She cannot basically breathe and talk at the same time without getting short of breath.  I asked her why she did not return to the hospital since she was feeling so poorly and she said \"I do not know I should have I just do not know why I did not\".  She is using accessory muscles, she has 2-3+ edema bilaterally up to her knees.  Her abdomen is very hard.  She states she has been taking her medications.  She did complain of orthopnea, worsening shortness of breath, intermittent dizziness and fatigue.  She states she cannot take a deep breath and sometimes it even hurts when she tries to do so.       Past Medical History:   Diagnosis Date   • Anxiety    • COPD (chronic obstructive pulmonary disease) (HCC)     LUNG BLEBS   • DJD (degenerative joint disease)    • Gallstones     SCHEDULED FOR SX   • GERD (gastroesophageal reflux disease)    • Injury of back     degenertive disc disease   • Panic attacks    • Paroxysmal atrial fibrillation (HCC)    • Rheumatoid arthritis (HCC)    • Seizures (HCC)     LAST ONE 2017       Past Surgical History:   Procedure Laterality Date   • APPENDECTOMY     •  SECTION     • CHOLECYSTECTOMY N/A 2017    Procedure: LAPAROSCOPIC CHOLECYSTECTOMY, laparoscopic adhesiolysis requiring 45 minutes of operative time;  Surgeon: Liliana Monroy MD;  Location: [facility];  Service:    • ESOPHAGEAL ATRESIA REPAIR     • PLEURAL BIOPSY     • PLEURAL SCARIFICATION         Social " History     Socioeconomic History   • Marital status:    Tobacco Use   • Smoking status: Former Smoker     Packs/day: 1.00     Years: 35.00     Pack years: 35.00     Quit date:      Years since quittin.0   • Smokeless tobacco: Never Used   • Tobacco comment: NO CAFFIENE   Substance and Sexual Activity   • Alcohol use: No   • Drug use: No   • Sexual activity: Defer       Family History   Problem Relation Age of Onset   • Lung cancer Father        The following portion of the patient's history were reviewed and updated as appropriate: past medical history, past surgical history, past social history, past family history, allergies, current medications, and problem list.    Review of Systems   Constitutional: Positive for malaise/fatigue. Negative for diaphoresis and fever.   HENT: Negative for congestion, hearing loss, hoarse voice, nosebleeds and sore throat.    Eyes: Negative for photophobia, vision loss in left eye, vision loss in right eye and visual disturbance.   Cardiovascular: Positive for dyspnea on exertion, leg swelling and orthopnea. Negative for chest pain, irregular heartbeat, near-syncope, palpitations, paroxysmal nocturnal dyspnea and syncope.   Respiratory: Positive for shortness of breath (worsening). Negative for cough, hemoptysis, sleep disturbances due to breathing, snoring, sputum production and wheezing.    Endocrine: Negative for cold intolerance, heat intolerance, polydipsia, polyphagia and polyuria.   Hematologic/Lymphatic: Negative for bleeding problem. Does not bruise/bleed easily.   Skin: Negative for color change, dry skin, poor wound healing, rash and suspicious lesions.   Musculoskeletal: Negative for arthritis, back pain, falls, gout, joint pain, joint swelling, muscle cramps, muscle weakness and myalgias.   Gastrointestinal: Negative for bloating, abdominal pain, constipation, diarrhea, dysphagia, melena, nausea and vomiting.   Neurological: Positive for dizziness and  weakness. Negative for excessive daytime sleepiness, headaches, light-headedness, loss of balance, numbness, paresthesias, seizures and vertigo.   Psychiatric/Behavioral: Negative for depression, memory loss and substance abuse. The patient is not nervous/anxious.        Allergies   Allergen Reactions   • Penicillins Anaphylaxis   • Anesthetics, Amide Anxiety         Current Outpatient Medications:   •  albuterol (PROVENTIL) (2.5 MG/3ML) 0.083% nebulizer solution, Take 2.5 mg by nebulization Every 4 (Four) Hours As Needed for wheezing., Disp: , Rfl:   •  baclofen (LIORESAL) 10 MG tablet, Take 10 mg by mouth 2 (Two) Times a Day As Needed for Muscle Spasms., Disp: , Rfl:   •  benzonatate (Tessalon Perles) 100 MG capsule, Take 1 capsule by mouth 3 (Three) Times a Day As Needed for Cough., Disp: 30 capsule, Rfl: 0  •  [employer] 8HR Muscle Aches & Pain 650 MG 8 hr tablet, , Disp: , Rfl:   •  Dextromethorphan-guaiFENesin (Mucus Relief DM)  MG tablet sustained-release 12 hour, , Disp: , Rfl:   •  dilTIAZem CD (CARDIZEM CD) 240 MG 24 hr capsule, Take 1 capsule by mouth Daily., Disp: 30 capsule, Rfl: 11  •  Fluticasone-Umeclidin-Vilant (TRELEGY ELLIPTA) 100-62.5-25 MCG/INH aerosol powder , Inhale 1 puff Daily., Disp: 60 each, Rfl: 2  •  ibuprofen (ADVIL,MOTRIN) 800 MG tablet, Take 800 mg by mouth Every 8 (Eight) Hours As Needed for Mild Pain ., Disp: , Rfl:   •  ipratropium-albuterol (DUO-NEB) 0.5-2.5 mg/3 ml nebulizer, INHALE 3 MLS VIA NEBULIZER EVER 6 HOURS WHILE AWAKE, Disp: 360 mL, Rfl: 1  •  Misc. Devices (BATH BENCH WITH BACK) misc, 1 Units Daily As Needed (shower)., Disp: 1 each, Rfl: 1  •  potassium chloride 10 MEQ CR tablet, Take 2 tablets by mouth Daily., Disp: 60 tablet, Rfl: 3  •  torsemide (Demadex) 20 MG tablet, Take 1 tablet by mouth Daily., Disp: 30 tablet, Rfl: 3  •  warfarin (COUMADIN) 2 MG tablet, , Disp: , Rfl:   •  warfarin (COUMADIN) 3 MG tablet, Take on tablet daily  Indications: Atrial Fibrillation,  "Disp: 30 tablet, Rfl: 0  •  acetaminophen (TYLENOL) 325 MG tablet, Take  by mouth Every 6 (Six) Hours As Needed for Mild Pain ., Disp: , Rfl:         Objective:     Vitals:    01/14/22 1525   BP: 98/72   Pulse: 95   Resp: 16   SpO2: (!) 87%   Weight: 93.4 kg (206 lb)   Height: 170.2 cm (67\")     Body mass index is 32.26 kg/m².      Vitals reviewed.   Constitutional:       General: Not in acute distress.     Appearance: Acutely ill-appearing.   Eyes:      General:         Right eye: No discharge.         Left eye: No discharge.      Conjunctiva/sclera: Conjunctivae normal.   HENT:      Head: Normocephalic and atraumatic.      Right Ear: External ear normal.      Left Ear: External ear normal.      Nose: Nose normal.   Neck:      Thyroid: No thyromegaly.      Vascular: No JVD.      Trachea: No tracheal deviation.      Lymphadenopathy: No cervical adenopathy.   Pulmonary:      Effort: Tachypnea and accessory muscle usage present. No respiratory distress.      Breath sounds: Decreased air movement present. No wheezing. No rales.   Chest:      Chest wall: Not tender to palpatation.   Cardiovascular:      Normal rate. Irregularly irregular rhythm.      No gallop.   Pulses:     Intact distal pulses.   Edema:     Peripheral edema present.     Ankle: bilateral 3+ edema of the ankle.     Feet: bilateral 3+ edema of the feet.  Abdominal:      General: There is distension.      Tenderness: There is no abdominal tenderness.   Musculoskeletal: Normal range of motion.         General: No tenderness or deformity.      Cervical back: Normal range of motion and neck supple. Skin:     General: Skin is warm and dry.      Findings: No erythema or rash.   Neurological:      Mental Status: Alert and oriented to person, place, and time.      Coordination: Coordination normal.   Psychiatric:         Attention and Perception: Attention normal.         Mood and Affect: Mood normal.         Speech: Speech normal.         Behavior: Behavior " normal. Behavior is cooperative.         Thought Content: Thought content normal.         Cognition and Memory: Cognition normal.         Judgment: Judgment normal.               ECG 12 Lead    Date/Time: 1/14/2022 3:32 PM  Performed by: Lorie Guo APRN  Authorized by: Lorie Guo APRN   Comparison: compared with previous ECG from 12/29/2021  Similar to previous ECG  Rhythm: atrial fibrillation  Rate: normal  Conduction: conduction normal  ST Segments: ST segments normal  T Waves: T waves normal  QRS axis: normal    Clinical impression: abnormal EKG              Assessment:       Diagnosis Plan   1. Acute diastolic (congestive) heart failure (HCC)     2. PAF (paroxysmal atrial fibrillation) ([facility])     3. Rheumatic valvular disease            Plan:       1.  Acute hypoxic respiratory failure with acute metapneumovirus infection -she is back and acute respiratory failure with hypoxia.  She states at home sometimes just resting her sats are 70% even with her oxygen.  She is using accessory muscles and is a bit tachypneic  2.  Atrial fibrillation -she had A. fib RVR in the setting of with her pneumonia and acute COPD exasperation.  Her heart rate is better improved with the diltiazem increase.  She remains on warfarin.  3.  Acute exacerbation COPD- still requiring oxygen mostly 3 to 4 L  4.  History of Covid pneumonia  5.  Valvular heart disease with moderate aortic insufficiency and moderate aortic stenosis, mild to moderate mitral stenosis  6.  Pulmonary hypertension  7.  Mild dilatation of the ascending aorta   8.  Lower extremity edema- no improvement switching to torsemide.    Patient is in acute volume overload.  I spoke to TOYA Elmore and she is going to admit the patient.  Myself and Cassandra Garcia MA, transported patient to her hospital room, 1420.  Staff on the floor aware and in the room with patient.           As always, it has been a pleasure to participate in your patient's care. Thank  you.       Sincerely,       TOYA Patel    No current facility-administered medications for this visit.  No current outpatient medications on file.    Facility-Administered Medications Ordered in Other Visits:   •  acetaminophen (TYLENOL) tablet 650 mg, 650 mg, Oral, Q4H PRN **OR** acetaminophen (TYLENOL) 160 MG/5ML solution 650 mg, 650 mg, Oral, Q4H PRN **OR** acetaminophen (TYLENOL) suppository 650 mg, 650 mg, Rectal, Q4H PRN, Ina Adamson R, APRN  •  bumetanide (BUMEX) injection 2 mg, 2 mg, Intravenous, Once, Ina Adamson R, APRN  •  melatonin tablet 5 mg, 5 mg, Oral, Nightly PRN, Ina Adamson R, APRN  •  nicotine (NICODERM CQ) 21 MG/24HR patch 1 patch, 1 patch, Transdermal, Q24H, Ina Adamson R, APRN  •  nitroglycerin (NITROSTAT) SL tablet 0.4 mg, 0.4 mg, Sublingual, Q5 Min PRN, Ina Adamson R, APRN  •  ondansetron (ZOFRAN) tablet 4 mg, 4 mg, Oral, Q6H PRN **OR** ondansetron (ZOFRAN) injection 4 mg, 4 mg, Intravenous, Q6H PRN, Ina Adamson R, APRN  •  sodium chloride 0.9 % flush 10 mL, 10 mL, Intravenous, PRN, Juan Diego, Ina R, APRN  •  sodium chloride 0.9 % flush 10 mL, 10 mL, Intravenous, Q12H, Juan Diego, Ina R, APRN  •  sodium chloride 0.9 % flush 10 mL, 10 mL, Intravenous, Q12H, Juan Diego, Ina R, APRN  •  sodium chloride 0.9 % flush 10 mL, 10 mL, Intravenous, PRN, Juan Diego, Ina R, APRN  •  sodium chloride 0.9 % infusion 40 mL, 40 mL, Intravenous, PRN, Leivasy, Ina R, APRN      Dictated utilizing Dragon dictation

## 2022-01-15 ENCOUNTER — APPOINTMENT (OUTPATIENT)
Dept: CT IMAGING | Facility: HOSPITAL | Age: 62
End: 2022-01-15

## 2022-01-15 PROBLEM — I50.30 (HFPEF) HEART FAILURE WITH PRESERVED EJECTION FRACTION (HCC): Status: ACTIVE | Noted: 2022-01-15

## 2022-01-15 LAB
ANION GAP SERPL CALCULATED.3IONS-SCNC: 7.7 MMOL/L (ref 5–15)
AORTIC ARCH: 2.5 CM
AORTIC DIMENSIONLESS INDEX: 0.3 (DI)
ASCENDING AORTA: 3.2 CM
BH CV ECHO MEAS - ACS: 1.3 CM
BH CV ECHO MEAS - AO ACC TIME: 0.09 SEC
BH CV ECHO MEAS - AO MAX PG: 34 MMHG
BH CV ECHO MEAS - AO MEAN PG (FULL): 11.3 MMHG
BH CV ECHO MEAS - AO MEAN PG: 13.3 MMHG
BH CV ECHO MEAS - AO ROOT AREA (BSA CORRECTED): 1.4
BH CV ECHO MEAS - AO ROOT AREA: 6.6 CM^2
BH CV ECHO MEAS - AO ROOT DIAM: 2.9 CM
BH CV ECHO MEAS - AO V2 MAX: 291 CM/SEC
BH CV ECHO MEAS - AO V2 MEAN: 156.5 CM/SEC
BH CV ECHO MEAS - AO V2 VTI: 42.8 CM
BH CV ECHO MEAS - ASC AORTA: 3.2 CM
BH CV ECHO MEAS - AVA(I,A): 1.7 CM^2
BH CV ECHO MEAS - AVA(I,D): 1.7 CM^2
BH CV ECHO MEAS - BSA(HAYCOCK): 2.1 M^2
BH CV ECHO MEAS - BSA: 2 M^2
BH CV ECHO MEAS - BZI_BMI: 32.2 KILOGRAMS/M^2
BH CV ECHO MEAS - BZI_METRIC_HEIGHT: 170 CM
BH CV ECHO MEAS - BZI_METRIC_WEIGHT: 93 KG
BH CV ECHO MEAS - CONTRAST EF (2CH): 78.2 CM2
BH CV ECHO MEAS - CONTRAST EF 4CH: 74.2 CM2
BH CV ECHO MEAS - EDV(CUBED): 58.4 ML
BH CV ECHO MEAS - EDV(MOD-SP2): 95.5 ML
BH CV ECHO MEAS - EDV(MOD-SP4): 111 ML
BH CV ECHO MEAS - EDV(TEICH): 65.1 ML
BH CV ECHO MEAS - EF(CUBED): 45.5 %
BH CV ECHO MEAS - EF(MOD-BP): 77.9 %
BH CV ECHO MEAS - EF(TEICH): 38.5 %
BH CV ECHO MEAS - ESV(CUBED): 31.9 ML
BH CV ECHO MEAS - ESV(MOD-SP2): 20.8 ML
BH CV ECHO MEAS - ESV(MOD-SP4): 28.6 ML
BH CV ECHO MEAS - ESV(TEICH): 40 ML
BH CV ECHO MEAS - FS: 18.3 %
BH CV ECHO MEAS - IVS/LVPW: 1.4
BH CV ECHO MEAS - IVSD: 1.6 CM
BH CV ECHO MEAS - LAT PEAK E' VEL: 5.7 CM/SEC
BH CV ECHO MEAS - LV DIASTOLIC VOL/BSA (35-75): 54.4 ML/M^2
BH CV ECHO MEAS - LV MASS(C)D: 182.6 GRAMS
BH CV ECHO MEAS - LV MASS(C)DI: 89.4 GRAMS/M^2
BH CV ECHO MEAS - LV MAX PG: 4 MMHG
BH CV ECHO MEAS - LV MEAN PG: 2 MMHG
BH CV ECHO MEAS - LV SYSTOLIC VOL/BSA (12-30): 14 ML/M^2
BH CV ECHO MEAS - LV V1 MAX: 101 CM/SEC
BH CV ECHO MEAS - LV V1 MEAN: 60.6 CM/SEC
BH CV ECHO MEAS - LV V1 VTI: 19 CM
BH CV ECHO MEAS - LVIDD: 3.9 CM
BH CV ECHO MEAS - LVIDS: 3.2 CM
BH CV ECHO MEAS - LVLD AP2: 7.8 CM
BH CV ECHO MEAS - LVLD AP4: 6.9 CM
BH CV ECHO MEAS - LVLS AP2: 6.1 CM
BH CV ECHO MEAS - LVLS AP4: 6.3 CM
BH CV ECHO MEAS - LVOT AREA (M): 3.8 CM^2
BH CV ECHO MEAS - LVOT AREA: 3.8 CM^2
BH CV ECHO MEAS - LVOT DIAM: 2.2 CM
BH CV ECHO MEAS - LVPWD: 1.1 CM
BH CV ECHO MEAS - MED PEAK E' VEL: 6.2 CM/SEC
BH CV ECHO MEAS - MR MEAN PG: 74 MMHG
BH CV ECHO MEAS - MR MEAN VEL: 399 CM/SEC
BH CV ECHO MEAS - MR VTI: 123 CM
BH CV ECHO MEAS - MV MAX PG: 27 MMHG
BH CV ECHO MEAS - MV MEAN PG: 9 MMHG
BH CV ECHO MEAS - MV V2 VTI: 49 CM
BH CV ECHO MEAS - PA ACC TIME: 0.12 SEC
BH CV ECHO MEAS - PA MAX PG: 3.1 MMHG
BH CV ECHO MEAS - PA PR(ACCEL): 23.2 MMHG
BH CV ECHO MEAS - PA V2 MAX: 88 CM/SEC
BH CV ECHO MEAS - QP/QS: 0.45
BH CV ECHO MEAS - RAP SYSTOLE: 15 MMHG
BH CV ECHO MEAS - RV MEAN PG: 0 MMHG
BH CV ECHO MEAS - RV V1 MEAN: 32.2 CM/SEC
BH CV ECHO MEAS - RV V1 VTI: 6.6 CM
BH CV ECHO MEAS - RVOT AREA: 4.9 CM^2
BH CV ECHO MEAS - RVOT DIAM: 2.5 CM
BH CV ECHO MEAS - RVSP: 57 MMHG
BH CV ECHO MEAS - SI(AO): 138.5 ML/M^2
BH CV ECHO MEAS - SI(CUBED): 13 ML/M^2
BH CV ECHO MEAS - SI(LVOT): 35.4 ML/M^2
BH CV ECHO MEAS - SI(MOD-SP2): 36.6 ML/M^2
BH CV ECHO MEAS - SI(MOD-SP4): 40.4 ML/M^2
BH CV ECHO MEAS - SI(TEICH): 12.3 ML/M^2
BH CV ECHO MEAS - SUP REN AO DIAM: 4.3 CM
BH CV ECHO MEAS - SV(AO): 282.9 ML
BH CV ECHO MEAS - SV(CUBED): 26.6 ML
BH CV ECHO MEAS - SV(LVOT): 72.2 ML
BH CV ECHO MEAS - SV(MOD-SP2): 74.7 ML
BH CV ECHO MEAS - SV(MOD-SP4): 82.4 ML
BH CV ECHO MEAS - SV(RVOT): 32.6 ML
BH CV ECHO MEAS - SV(TEICH): 25.1 ML
BH CV ECHO MEAS - TAPSE (>1.6): 0.92 CM
BH CV ECHO MEAS - TR MAX VEL: 322.3 CM/SEC
BH CV VAS BP LEFT ARM: NORMAL MMHG
BH CV XLRA - RV BASE: 3.6 CM
BH CV XLRA - RV LENGTH: 7 CM
BH CV XLRA - RV MID: 3.7 CM
BH CV XLRA - TDI S': 9.3 CM/SEC
BUN SERPL-MCNC: 11 MG/DL (ref 6–20)
BUN/CREAT SERPL: 13.4 (ref 7–25)
CALCIUM SPEC-SCNC: 9.4 MG/DL (ref 8.6–10.5)
CHLORIDE SERPL-SCNC: 95 MMOL/L (ref 98–107)
CO2 SERPL-SCNC: 39.3 MMOL/L (ref 22–29)
CREAT SERPL-MCNC: 0.82 MG/DL (ref 0.57–1)
GFR SERPL CREATININE-BSD FRML MDRD: 71 ML/MIN/1.73
GLUCOSE SERPL-MCNC: 155 MG/DL (ref 65–99)
LEFT ATRIUM VOLUME INDEX: 22.7 ML/M2
LV EF 2D ECHO EST: 78 %
MAGNESIUM SERPL-MCNC: 1.3 MG/DL (ref 1.6–2.6)
POTASSIUM SERPL-SCNC: 3.8 MMOL/L (ref 3.5–5.2)
SINUS: 3.4 CM
SODIUM SERPL-SCNC: 142 MMOL/L (ref 136–145)
STJ: 2.9 CM

## 2022-01-15 PROCEDURE — 94799 UNLISTED PULMONARY SVC/PX: CPT

## 2022-01-15 PROCEDURE — 25010000002 ENOXAPARIN PER 10 MG: Performed by: INTERNAL MEDICINE

## 2022-01-15 PROCEDURE — 71275 CT ANGIOGRAPHY CHEST: CPT

## 2022-01-15 PROCEDURE — 74177 CT ABD & PELVIS W/CONTRAST: CPT

## 2022-01-15 PROCEDURE — 99233 SBSQ HOSP IP/OBS HIGH 50: CPT | Performed by: INTERNAL MEDICINE

## 2022-01-15 PROCEDURE — 0 MAGNESIUM SULFATE 4 GM/100ML SOLUTION: Performed by: INTERNAL MEDICINE

## 2022-01-15 PROCEDURE — 25010000002 DIGOXIN PER 500 MCG: Performed by: INTERNAL MEDICINE

## 2022-01-15 PROCEDURE — 83735 ASSAY OF MAGNESIUM: CPT | Performed by: INTERNAL MEDICINE

## 2022-01-15 PROCEDURE — 94761 N-INVAS EAR/PLS OXIMETRY MLT: CPT

## 2022-01-15 PROCEDURE — 80048 BASIC METABOLIC PNL TOTAL CA: CPT | Performed by: INTERNAL MEDICINE

## 2022-01-15 PROCEDURE — 0 IOPAMIDOL PER 1 ML: Performed by: INTERNAL MEDICINE

## 2022-01-15 RX ORDER — DIGOXIN 0.25 MG/ML
500 INJECTION INTRAMUSCULAR; INTRAVENOUS EVERY 6 HOURS
Status: COMPLETED | OUTPATIENT
Start: 2022-01-15 | End: 2022-01-15

## 2022-01-15 RX ORDER — TRAMADOL HYDROCHLORIDE 50 MG/1
25 TABLET ORAL EVERY 6 HOURS PRN
Status: DISCONTINUED | OUTPATIENT
Start: 2022-01-15 | End: 2022-01-18 | Stop reason: HOSPADM

## 2022-01-15 RX ORDER — BUMETANIDE 0.25 MG/ML
2 INJECTION INTRAMUSCULAR; INTRAVENOUS EVERY 8 HOURS
Status: DISCONTINUED | OUTPATIENT
Start: 2022-01-15 | End: 2022-01-18

## 2022-01-15 RX ORDER — IPRATROPIUM BROMIDE AND ALBUTEROL SULFATE 2.5; .5 MG/3ML; MG/3ML
3 SOLUTION RESPIRATORY (INHALATION) EVERY 4 HOURS PRN
Status: DISCONTINUED | OUTPATIENT
Start: 2022-01-15 | End: 2022-01-18 | Stop reason: HOSPADM

## 2022-01-15 RX ORDER — MAGNESIUM SULFATE HEPTAHYDRATE 40 MG/ML
4 INJECTION, SOLUTION INTRAVENOUS ONCE
Status: COMPLETED | OUTPATIENT
Start: 2022-01-15 | End: 2022-01-15

## 2022-01-15 RX ADMIN — SODIUM CHLORIDE, PRESERVATIVE FREE 10 ML: 5 INJECTION INTRAVENOUS at 20:39

## 2022-01-15 RX ADMIN — DIGOXIN 500 MCG: 0.25 INJECTION INTRAMUSCULAR; INTRAVENOUS at 16:30

## 2022-01-15 RX ADMIN — FAMOTIDINE 20 MG: 20 TABLET ORAL at 08:35

## 2022-01-15 RX ADMIN — ACETAMINOPHEN 650 MG: 325 TABLET ORAL at 17:45

## 2022-01-15 RX ADMIN — BUMETANIDE 2 MG: 0.25 INJECTION, SOLUTION INTRAMUSCULAR; INTRAVENOUS at 13:51

## 2022-01-15 RX ADMIN — MAGNESIUM SULFATE HEPTAHYDRATE 4 G: 40 INJECTION, SOLUTION INTRAVENOUS at 13:54

## 2022-01-15 RX ADMIN — DIGOXIN 500 MCG: 0.25 INJECTION INTRAMUSCULAR; INTRAVENOUS at 10:30

## 2022-01-15 RX ADMIN — BUDESONIDE AND FORMOTEROL FUMARATE DIHYDRATE 2 PUFF: 160; 4.5 AEROSOL RESPIRATORY (INHALATION) at 19:47

## 2022-01-15 RX ADMIN — DILTIAZEM HYDROCHLORIDE 240 MG: 120 CAPSULE, COATED, EXTENDED RELEASE ORAL at 08:35

## 2022-01-15 RX ADMIN — BACLOFEN 10 MG: 10 TABLET ORAL at 22:19

## 2022-01-15 RX ADMIN — IPRATROPIUM BROMIDE AND ALBUTEROL SULFATE 3 ML: .5; 3 SOLUTION RESPIRATORY (INHALATION) at 07:30

## 2022-01-15 RX ADMIN — ENOXAPARIN SODIUM 90 MG: 100 INJECTION SUBCUTANEOUS at 20:38

## 2022-01-15 RX ADMIN — IOPAMIDOL 100 ML: 755 INJECTION, SOLUTION INTRAVENOUS at 12:59

## 2022-01-15 RX ADMIN — FAMOTIDINE 20 MG: 20 TABLET ORAL at 16:30

## 2022-01-15 RX ADMIN — ENOXAPARIN SODIUM 90 MG: 100 INJECTION SUBCUTANEOUS at 10:33

## 2022-01-15 RX ADMIN — WARFARIN SODIUM 3 MG: 3 TABLET ORAL at 17:45

## 2022-01-15 RX ADMIN — SODIUM CHLORIDE 40 ML: 9 INJECTION, SOLUTION INTRAVENOUS at 13:55

## 2022-01-15 RX ADMIN — IOPAMIDOL 100 ML: 755 INJECTION, SOLUTION INTRAVENOUS at 12:16

## 2022-01-15 RX ADMIN — BUDESONIDE AND FORMOTEROL FUMARATE DIHYDRATE 2 PUFF: 160; 4.5 AEROSOL RESPIRATORY (INHALATION) at 09:39

## 2022-01-15 RX ADMIN — TIOTROPIUM BROMIDE INHALATION SPRAY 2 PUFF: 3.12 SPRAY, METERED RESPIRATORY (INHALATION) at 09:39

## 2022-01-15 RX ADMIN — BUMETANIDE 2 MG: 0.25 INJECTION, SOLUTION INTRAMUSCULAR; INTRAVENOUS at 05:10

## 2022-01-15 RX ADMIN — BUMETANIDE 2 MG: 0.25 INJECTION, SOLUTION INTRAMUSCULAR; INTRAVENOUS at 20:40

## 2022-01-15 RX ADMIN — TRAMADOL HYDROCHLORIDE 25 MG: 50 TABLET, COATED ORAL at 18:34

## 2022-01-15 RX ADMIN — SODIUM CHLORIDE, PRESERVATIVE FREE 10 ML: 5 INJECTION INTRAVENOUS at 08:35

## 2022-01-15 RX ADMIN — SODIUM CHLORIDE, PRESERVATIVE FREE 10 ML: 5 INJECTION INTRAVENOUS at 10:34

## 2022-01-15 NOTE — PROGRESS NOTES
"[facility]    Patient Name: Akila Amezcua  :1960  61 y.o.  LOS: 1  Encounter Provider: Emil Aiken Jr, MD      Patient Care Team:  Melly Holliday APRN as PCP - General (Nurse Practitioner)    Chief Complaint: Follow-up acute on chronic diastolic heart failure, atrial fibrillation with RVR, mitral stenosis, mitral regurgitation, aortic stenosis and regurgitation, severe pulmonary hypertension, COPD    Interval History: Patient remains moderate to severely volume overloaded.  Atrial fibrillation with RVR this morning.  Elevated D-dimer noted in the setting of A. fib with RVR and subtherapeutic INR.       Objective   Vital Signs  Temp:  [97.6 °F (36.4 °C)-98.6 °F (37 °C)] 97.6 °F (36.4 °C)  Heart Rate:  [] 139  Resp:  [16-24] 20  BP: ()/(67-80) 110/73    Intake/Output Summary (Last 24 hours) at 1/15/2022 1038  Last data filed at 1/15/2022 0936  Gross per 24 hour   Intake 240 ml   Output 1600 ml   Net -1360 ml     Flowsheet Rows      First Filed Value   Admission Height 170 cm (66.93\") Documented at 2022 1845   Admission Weight 93.4 kg (206 lb) Documented at 2022 1700            Vitals reviewed.   Constitutional:       Appearance: Frail. Acutely ill-appearing.   Eyes:      Conjunctiva/sclera: Conjunctivae normal.      Pupils: Pupils are equal, round, and reactive to light.   Neck:      Vascular: JVR present. JVD elevated.   Pulmonary:      Effort: Pulmonary effort is normal.      Breath sounds: No wheezing. No rhonchi. Rales present.   Chest:      Chest wall: Not tender to palpatation.   Cardiovascular:      PMI at left midclavicular line. Tachycardia present. Irregularly irregular rhythm. Normal S1. Normal S2.      Murmurs: There is no murmur.      No gallop. No click. No rub.   Pulses:     Intact distal pulses.   Edema:     Thigh: bilateral 2+ edema of the thigh.     Pretibial: bilateral 2+ edema of the pretibial area.     Ankle: bilateral 2+ edema of " the ankle.     Feet: bilateral 2+ edema of the feet.  Abdominal:      General: Bowel sounds are normal.      Palpations: Abdomen is soft.      Tenderness: There is no abdominal tenderness.   Musculoskeletal: Normal range of motion.         General: No tenderness. Skin:     General: Skin is warm and dry.   Neurological:      General: No focal deficit present.      Mental Status: Alert and oriented to person, place and time.           Pertinent Test Results:  Results from last 7 days   Lab Units 01/15/22  0914 01/14/22  1627   SODIUM mmol/L 142 138   POTASSIUM mmol/L 3.8 4.3   CHLORIDE mmol/L 95* 100   CO2 mmol/L 39.3* 27.0   BUN mg/dL 11 12   CREATININE mg/dL 0.82 0.76   GLUCOSE mg/dL 155* 113*   CALCIUM mg/dL 9.4 10.0   AST (SGOT) U/L  --  23   ALT (SGPT) U/L  --  22     Results from last 7 days   Lab Units 01/14/22  1627   TROPONIN T ng/mL <0.010     Results from last 7 days   Lab Units 01/14/22  1627   WBC 10*3/mm3 11.22*   HEMOGLOBIN g/dL 10.9*   HEMATOCRIT % 36.8   PLATELETS 10*3/mm3 297     Results from last 7 days   Lab Units 01/14/22  1627   INR  1.63*     Results from last 7 days   Lab Units 01/15/22  0914   MAGNESIUM mg/dL 1.3*           Invalid input(s): LDLCALC  Results from last 7 days   Lab Units 01/14/22  1627   PROBNP pg/mL 1,595.0*     Results from last 7 days   Lab Units 01/14/22  1627   TSH uIU/mL 3.400           Medication Review:   budesonide-formoterol, 2 puff, Inhalation, BID - RT   And  tiotropium bromide monohydrate, 2 puff, Inhalation, Daily - RT  bumetanide, 2 mg, Intravenous, Q8H  digoxin, 500 mcg, Intravenous, Q6H  dilTIAZem CD, 240 mg, Oral, Daily  enoxaparin, 1 mg/kg, Subcutaneous, Q12H  famotidine, 20 mg, Oral, BID AC  Pharmacy to dose Trelegy, , Does not apply, Daily  sodium chloride, 10 mL, Intravenous, Q12H  sodium chloride, 10 mL, Intravenous, Q12H  warfarin, 3 mg, Oral, Daily              Assessment/Plan   1. Acute on chronic diastolic heart failure -secondary to valvular heart  disease and uncontrolled atrial fibrillation.  Will increase frequency of Bumex.  Give digoxin for rapid ventricular rate.  She has severe valvular heart disease on repeat echo.  Will need to optimize volume status, heart rate and blood pressure control.  Elevated D-dimer which is unchanged from previous.  CTA chest done with previous elevated D-dimer showed no evidence of PE.  She is subtherapeutic on warfarin will give enoxaparin until INR is greater than 2.  We will hold off on CT chest at this time.  2. Atrial fibrillation with RVR -give digoxin load.  Continue diltiazem.  Anticoagulation plan as above.  3. AAA -4.2 on echo.  We will get a CTA abdomen once pulmonary status is improved.  4. COPD -on 3 L home oxygen.  Pulmonary status is tenuous.  Tachycardia is worse with scheduled bronchodilators.  She is not wheezing on exam.  Will change bronchodilator regimen to as needed.  5. Pulmonary hypertension -optimize volume status    Severe valvular heart disease and patient who is likely a poor candidate for surgical intervention.  We will need to optimize afterload reduction, volume status and heart rate and repeat echocardiogram at a future date.  Guarded prognosis.  We will follow clinical progress closely.    Emil Aiken Jr, MD  [facility]  01/15/22  10:38 EST

## 2022-01-15 NOTE — PLAN OF CARE
Goal Outcome Evaluation:  Plan of Care Reviewed With: patient      Pt will be free of excessive fluid and edema on bilateral lower extremities.

## 2022-01-15 NOTE — NURSING NOTE
Call placed to Dr Sarabia that we were unable to get a 22g or larger IV AC or above for CT angiogram.  Dr Sarabia stated to hold CT for now.

## 2022-01-15 NOTE — CASE MANAGEMENT/SOCIAL WORK
Discharge Planning Assessment   Roro Farrell     Patient Name: Akila Amezcua  MRN: [id number]  Today's Date: 1/15/2022    Admit Date: 1/14/2022     Discharge Needs Assessment     Row Name 01/15/22 1407       Living Environment    Lives With child(alysha), adult    Name(s) of Who Lives With Patient zarina Cook    Current Living Arrangements home/apartment/condo    Duration at Residence 7 months    Potentially Unsafe Housing Conditions --  none    Primary Care Provided by self    Provides Primary Care For no one    Family Caregiver if Needed child(alysha), adult    Family Caregiver Names zarina Cook    Quality of Family Relationships unable to assess    Able to Return to Prior Arrangements yes       Resource/Environmental Concerns    Resource/Environmental Concerns none    Transportation Concerns car, none       Transition Planning    Patient/Family Anticipates Transition to home    Patient/Family Anticipated Services at Transition none    Transportation Anticipated family or friend will provide       Discharge Needs Assessment    Readmission Within the Last 30 Days no previous admission in last 30 days    Current Outpatient/Agency/Support Group --  none    Equipment Currently Used at Home oxygen; nebulizer    Concerns to be Addressed discharge planning    Anticipated Changes Related to Illness none    Equipment Needed After Discharge none    Outpatient/Agency/Support Group Needs --  Pt declined    Discharge Facility/Level of Care Needs --  Pt declined    Provided Post Acute Provider List? Refused    Refused Provider List Comment Pt declined    Provided Post Acute Provider Quality & Resource List? Refused    Refused Quality and Resource List Comment Pt declined    Current Discharge Risk --  none               Discharge Plan     Row Name 01/15/22 1409       Plan    Plan Discharge home with family    Patient/Family in Agreement with Plan yes    Plan Comments I spoke with the patient at bedside with her permission.   I introduced myself and explained my role as .  I verified the information on the facesheet and her PCP Melly Holliday.   The patient uses [facility] in [place] and she is able to afford her medications and can  them up.  The patient lives in a single story home for the past 7 months.   She lives with her daughter Joyce and grandchildren. There are no steps to enter and she is able to enter and maneuver around her home with no issues.  She is independent with her ADL’s, has a car and drives.   The patient’s daughter will pick her up at discharge.  The patient has a nebulizer and oxygen which is provided by [facility] at home and denied needing any DME at discharge. I offered the patient information regarding home health and other community resources and she declined the information. The patient will discharge home with her family to assist as needed and she is agreeable to that plan.  She denied having and further questions or concerns at this time.  CM will continue to follow for further needs.              Continued Care and Services - Admitted Since 1/14/2022    Coordination has not been started for this encounter.          Demographic Summary     Row Name 01/15/22 5197       General Information    Admission Type inpatient    Arrived From home    Referral Source admission list    Reason for Consult discharge planning    Preferred Language English     Used During This Interaction no       Contact Information    Permission Granted to Share Info With                Functional Status    No documentation.                Psychosocial    No documentation.                Abuse/Neglect    No documentation.                Legal    No documentation.                Substance Abuse    No documentation.                Patient Forms    No documentation.                   Lyn Stanford RN

## 2022-01-15 NOTE — PLAN OF CARE
Goal Outcome Evaluation:  Plan of Care Reviewed With: patient        Progress: improving   Patient was in Afib RVR this  morning, symptomatic with SOB. Patient given two doses of Digoxin. HR and breathing much better now. Patient is on 7L humidified oxygen via nasal cannula. Patient with bilateral lower extremity swelling. Refused to get oob today, complaining of generalized malaise. Refused tylenol, MD now ordered tramadol. Waiting for pharmacy to verify. Patient states she doesn't take any pain medication at home. Singh catheter intact draining clear yellow urine. Patient tolerating well. Patient is being diuresed. Patient is aware to frequent turn in bed to prevent pressure ulcers. 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 Otherwise stable now. Call light with in reach. Has a Right hand and AC IV. Had a CT of the chest and abdomen earlier. See result.

## 2022-01-15 NOTE — NURSING NOTE
Patient's heart rate has been in the 120's when RN first arrived on shift. She was receiving a respiratory treatment. Heart rate did not subside. RN notified Dr. Sarabia that patient's heart rate sustaining 150's. Patient just received her morning cardizem po tablet. Patient is on 7 liters high flow. RN notified Dr. Aiken who is currently rounding in ICU of the above.     RN updated patient on current plan

## 2022-01-15 NOTE — PROGRESS NOTES
"Hospital Medicine Team    LOS 1 days      Patient Care Team:  Melly Holliday APRN as PCP - General (Nurse Practitioner)          Chief Complaint: Shortness of breath, lower extremity edema    Subjective      Reports only mild improvement in shortness of breath.  Reports her lower extremity edema remains.  Review of Systems   Constitutional: Negative for fever.   Respiratory: Positive for shortness of breath. Negative for cough.    Cardiovascular: Positive for palpitations and leg swelling.       Objective    Vital Signs  Temp:  [97.6 °F (36.4 °C)-98.6 °F (37 °C)] 97.6 °F (36.4 °C)  Heart Rate:  [] 107  Resp:  [18-24] 20  BP: ()/(64-80) 97/64  Oxygen Therapy  SpO2: 92 %  Pulse Oximetry Type: Continuous  Device (Oxygen Therapy): nasal cannula, humidified  $ High Flow Nasal Cannula Set-Up: yes  Flow (L/min): 7  ETCO2 (mmHg): 30 mmHg  Flowsheet Rows      First Filed Value   Admission Height 170 cm (66.93\") Documented at 01/14/2022 1845   Admission Weight 93.4 kg (206 lb) Documented at 01/14/2022 1700              Physical Exam:  Physical Exam  Vitals reviewed.   Constitutional:       Appearance: She is ill-appearing.   Cardiovascular:      Rate and Rhythm: Tachycardia present. Rhythm irregular.   Pulmonary:      Effort: Tachypnea present.      Breath sounds: Rales present.   Abdominal:      Palpations: Abdomen is soft.      Tenderness: There is no abdominal tenderness.   Musculoskeletal:      Right lower leg: Edema present.      Left lower leg: Edema present.   Skin:     General: Skin is warm and dry.   Neurological:      Mental Status: She is oriented to person, place, and time.   Psychiatric:         Mood and Affect: Mood normal.         Behavior: Behavior normal.           Results Review:    I reviewed the patient's new clinical results.    Results from last 7 days   Lab Units 01/14/22  1627   WBC 10*3/mm3 11.22*   HEMOGLOBIN g/dL 10.9*   HEMATOCRIT % 36.8   PLATELETS 10*3/mm3 297     Results " from last 7 days   Lab Units 01/15/22  0914 01/14/22  1627   SODIUM mmol/L 142 138   POTASSIUM mmol/L 3.8 4.3   CHLORIDE mmol/L 95* 100   CO2 mmol/L 39.3* 27.0   BUN mg/dL 11 12   CREATININE mg/dL 0.82 0.76   CALCIUM mg/dL 9.4 10.0   BILIRUBIN mg/dL  --  1.4*   ALK PHOS U/L  --  70   ALT (SGPT) U/L  --  22   AST (SGOT) U/L  --  23   GLUCOSE mg/dL 155* 113*     Results from last 7 days   Lab Units 01/15/22  0914   MAGNESIUM mg/dL 1.3*       Microbiology Results (last 10 days)     Procedure Component Value - Date/Time    Respiratory Panel PCR w/COVID-19(SARS-CoV-2) [facility]USS/MENDY/PAD/COR/MAD/AUDELIA In-House, NP Swab in UTM/VTM, 3-4 HR TAT - Swab, Nasopharynx [[id number]]  (Normal) Collected: 01/14/22 1646    Lab Status: Final result Specimen: Swab from Nasopharynx Updated: 01/14/22 2009     ADENOVIRUS, PCR Not Detected     Coronavirus 229E Not Detected     Coronavirus HKU1 Not Detected     Coronavirus NL63 Not Detected     Coronavirus OC43 Not Detected     COVID19 Not Detected     Human Metapneumovirus Not Detected     Human Rhinovirus/Enterovirus Not Detected     Influenza A PCR Not Detected     Influenza B PCR Not Detected     Parainfluenza Virus 1 Not Detected     Parainfluenza Virus 2 Not Detected     Parainfluenza Virus 3 Not Detected     Parainfluenza Virus 4 Not Detected     RSV, PCR Not Detected     Bordetella pertussis pcr Not Detected     Bordetella parapertussis PCR Not Detected     Chlamydophila pneumoniae PCR Not Detected     Mycoplasma pneumo by PCR Not Detected    Narrative:      In the setting of a positive respiratory panel with a viral infection PLUS a negative procalcitonin without other underlying concern for bacterial infection, consider observing off antibiotics or discontinuation of antibiotics and continue supportive care. If the respiratory panel is positive for atypical bacterial infection (Bordetella pertussis, Chlamydophila pneumoniae, or Mycoplasma pneumoniae), consider antibiotic de-escalation  to target atypical bacterial infection.              Results for orders placed during the hospital encounter of 01/14/22    Adult Transthoracic Echo Complete w/ Color, Spectral and Contrast if Necessary Per Protocol    Interpretation Summary  · Atrial fibrillation with tachycardia noted through much of the study.  · Calculated left ventricular EF = 77.9% Estimated left ventricular EF = 78% Estimated left ventricular EF was in agreement with the calculated left ventricular EF. Left ventricular systolic function is hyperdynamic (EF > 70%). The left ventricular cavity is small in size. Left ventricular wall thickness is consistent with moderate concentric hypertrophy. All left ventricular wall segments contract normally. Left ventricular diastolic function was indeterminate.  · The right ventricular cavity is moderately dilated. Normal right ventricular wall thickness noted. Moderately reduced right ventricular systolic function noted.  · The left atrial cavity is severely dilated.  · The right atrial cavity is severely dilated.  · There is severe calcification of the aortic valve. Moderate aortic valve regurgitation is present. Mild aortic valve stenosis is present. Aortic valve mean pressure gradient is 13.3 mmHg. Aortic valve area is 1.7 cm2. Aortic valve dimensionless index is 0.30 .  · Severe mitral annular calcification is present. There is mild, anterior mitral leaflet thickening present. There is severe, posterior mitral leaflet thickening present. Moderate to severe mitral valve regurgitation is present. Moderate mitral valve stenosis is present. The mitral valve peak gradient is 27 mmHg. The mitral valve mean gradient is 9 mmHg. Mean mitral gradient is 9 mmHg at a heart rate of 111 bpm.  · Severe tricuspid valve regurgitation is present. Estimated right ventricular systolic pressure from tricuspid regurgitation is markedly elevated (>55 mmHg). Calculated right ventricular systolic pressure from tricuspid  regurgitation is 57 mmHg. Pulmonary hypertension is likely underestimated utilizing RV systolic pressure due to concomitant findings of RV systolic dysfunction  · Abdominal aorta = 4.3 cm Subcostal imaging is limited but from the views available may be a large abdominal aortic aneurysm. Images are quite limited and would consider dedicated imaging to assess further.      CT Angiogram Chest With & Without Contrast    Result Date: 1/15/2022  Impression: 1. No evidence of pulmonary embolus on this study. 2. Extensive bilateral groundglass infiltrates with atelectatic/infiltrative changes in the right lung base. Small bilateral pleural effusions. Imaging features can be seen with COVID-19 pneumonia, although are nonspecific and may occur with a variety of infectious and noninfectious processes. Signer Name: Deandre Guan MD  Signed: 1/15/2022 1:36 PM  Workstation Name: [facility]    CT Abdomen Pelvis With Contrast    Result Date: 1/15/2022  Diffuse hepatic steatosis. No evidence of abdominal aortic aneurysm. Signer Name: Deandre Guan MD  Signed: 1/15/2022 1:42 PM  Workstation Name: [facility]    XR Chest 1 View    Result Date: 1/14/2022  Extensive bilateral interstitial infiltrates show worsening since the previous examination in November. Probable interstitial edema. Heart size is unchanged. Signer Name: Raheem Carranza MD  Signed: 1/14/2022 4:54 PM  Workstation Name: [id number]      ECG/EMG Results (most recent)     Procedure Component Value Units Date/Time    ECG 12 Lead [[id number]] Collected: 01/14/22 1648     Updated: 01/14/22 1652     QT Interval 345 ms     Narrative:      HEART RATE= 111  bpm  RR Interval= 542  ms  IA Interval=   ms  P Horizontal Axis=   deg  P Front Axis=   deg  QRSD Interval= 99  ms  QT Interval= 345  ms  QRS Axis= 30  deg  T Wave Axis= 73  deg  - ABNORMAL ECG -  Atrial  fibrillation  Borderline ST depression, anterolateral leads  Electronically Signed By:   Date and Time of Study: 2022-01-14 16:48:54    Adult Transthoracic Echo Complete w/ Color, Spectral and Contrast if Necessary Per Protocol [[id number]] Collected: 01/14/22 1851     Updated: 01/15/22 0944     BSA 2.0 m^2      IVSd 1.6 cm      LVIDd 3.9 cm      LVIDs 3.2 cm      LVPWd 1.1 cm      IVS/LVPW 1.4     FS 18.3 %      EDV(Teich) 65.1 ml      ESV(Teich) 40.0 ml      EF(Teich) 38.5 %      EDV(cubed) 58.4 ml      ESV(cubed) 31.9 ml      EF(cubed) 45.5 %      LV mass(C)d 182.6 grams      LV mass(C)dI 89.4 grams/m^2      SV(Teich) 25.1 ml      SI(Teich) 12.3 ml/m^2      SV(cubed) 26.6 ml      SI(cubed) 13.0 ml/m^2      Ao root diam 2.9 cm      Ao root area 6.6 cm^2      ACS 1.3 cm      asc Aorta Diam 3.2 cm      LVOT diam 2.2 cm      LVOT area 3.8 cm^2      LVOT area(traced) 3.8 cm^2      RVOT diam 2.5 cm      RVOT area 4.9 cm^2      LVLd ap4 6.9 cm      EDV(MOD-sp4) 111.0 ml      LVLs ap4 6.3 cm      ESV(MOD-sp4) 28.6 ml      LVLd ap2 7.8 cm      EDV(MOD-sp2) 95.5 ml      LVLs ap2 6.1 cm      ESV(MOD-sp2) 20.8 ml      SV(MOD-sp4) 82.4 ml      SI(MOD-sp4) 40.4 ml/m^2      SV(MOD-sp2) 74.7 ml      SI(MOD-sp2) 36.6 ml/m^2      Ao root area (BSA corrected) 1.4     LV Adam Vol (BSA corrected) 54.4 ml/m^2      LV Sys Vol (BSA corrected) 14.0 ml/m^2      TAPSE (>1.6) 0.92 cm      EF(MOD-bp) 77.9 %      Ao V2 mean 156.5 cm/sec      Ao mean PG 13.3 mmHg      Ao mean PG (full) 11.3 mmHg      Ao V2 VTI 42.8 cm      FADIA(I,A) 1.7 cm^2      FADIA(I,D) 1.7 cm^2      Ao acc time 0.09 sec      LV V1 mean PG 2.0 mmHg      LV V1 mean 60.6 cm/sec      LV V1 VTI 19.0 cm      MR mean mala 399.0 cm/sec      MR mean PG 74.0 mmHg      MR .0 cm      SV(Ao) 282.9 ml      SI(Ao) 138.5 ml/m^2      SV(LVOT) 72.2 ml      SV(RVOT) 32.6 ml      SI(LVOT) 35.4 ml/m^2      PA V2 max 88.0 cm/sec      PA max PG 3.1 mmHg      PA acc time 0.12 sec      RV V1  mean PG 0 mmHg      RV V1 mean 32.2 cm/sec      RV V1 VTI 6.6 cm      TR max jb 322.3 cm/sec      RVSP(TR) 57 mmHg      RAP systole 15.0 mmHg      PA pr(Accel) 23.2 mmHg      Qp/Qs 0.45     RV Base 3.6 cm      RV Length 7.0 cm      RV Mid 3.7 cm      Lat Peak E' Jb 5.7 cm/sec      Med Peak E' Jb 6.2 cm/sec      RV S' 9.3 cm/sec       CV ECHO VIJI - BZI_BMI 32.2 kilograms/m^2       CV ECHO VIJI - BSA([facility]) 2.1 m^2       CV ECHO VIJI - BZI_METRIC_WEIGHT 93.0 kg       CV ECHO VIJI - BZI_METRIC_HEIGHT 170.0 cm       CV VAS BP LEFT ARM 98/72 mmHg      Sinus 3.4 cm      STJ 2.9 cm      Ascending aorta 3.2 cm      Aortic arch 2.5 cm      Dimensionless Index 0.30 (DI)      LA Volume Index 22.7 mL/m2      Ao pk jb 291.0 cm/sec      LV V1 max PG 4.0 mmHg      LV V1 max 101.0 cm/sec      Abdo Ao Diam 4.3 cm      Ao max PG 34 mmHg      EF - Contrast (2Ch) 78.2 cm2      EF - Contrast (4Ch) 74.2 cm2      Echo EF Estimated 78 %      MV V2 VTI 49 cm      MV max PG 27 mmHg      MV mean PG 9 mmHg     Narrative:      · Atrial fibrillation with tachycardia noted through much of the study.  · Calculated left ventricular EF = 77.9% Estimated left ventricular EF =   78% Estimated left ventricular EF was in agreement with the calculated   left ventricular EF. Left ventricular systolic function is hyperdynamic   (EF > 70%). The left ventricular cavity is small in size. Left ventricular   wall thickness is consistent with moderate concentric hypertrophy. All   left ventricular wall segments contract normally. Left ventricular   diastolic function was indeterminate.  · The right ventricular cavity is moderately dilated. Normal right   ventricular wall thickness noted. Moderately reduced right ventricular   systolic function noted.  · The left atrial cavity is severely dilated.  · The right atrial cavity is severely dilated.  · There is severe calcification of the aortic valve. Moderate aortic valve   regurgitation is present.  Mild aortic valve stenosis is present. Aortic   valve mean pressure gradient is 13.3 mmHg. Aortic valve area is 1.7 cm2.   Aortic valve dimensionless index is 0.30 .  · Severe mitral annular calcification is present. There is mild, anterior   mitral leaflet thickening present. There is severe, posterior mitral   leaflet thickening present. Moderate to severe mitral valve regurgitation   is present. Moderate mitral valve stenosis is present. The mitral valve   peak gradient is 27 mmHg. The mitral valve mean gradient is 9 mmHg. Mean   mitral gradient is 9 mmHg at a heart rate of 111 bpm.  · Severe tricuspid valve regurgitation is present. Estimated right   ventricular systolic pressure from tricuspid regurgitation is markedly   elevated (>55 mmHg). Calculated right ventricular systolic pressure from   tricuspid regurgitation is 57 mmHg. Pulmonary hypertension is likely   underestimated utilizing RV systolic pressure due to concomitant findings   of RV systolic dysfunction  · Abdominal aorta = 4.3 cm Subcostal imaging is limited but from the views   available may be a large abdominal aortic aneurysm. Images are quite   limited and would consider dedicated imaging to assess further.               X-rays, labs reviewed personally by physician.    Medication Review:   I have reviewed the patient's current medication list    Scheduled Meds  budesonide-formoterol, 2 puff, Inhalation, BID - RT   And  tiotropium bromide monohydrate, 2 puff, Inhalation, Daily - RT  bumetanide, 2 mg, Intravenous, Q8H  digoxin, 500 mcg, Intravenous, Q6H  dilTIAZem CD, 240 mg, Oral, Daily  enoxaparin, 1 mg/kg, Subcutaneous, Q12H  famotidine, 20 mg, Oral, BID   Pharmacy to dose Trelegy, , Does not apply, Daily  sodium chloride, 10 mL, Intravenous, Q12H  sodium chloride, 10 mL, Intravenous, Q12H  warfarin, 3 mg, Oral, Daily        Meds Infusions       Meds PRN  •  acetaminophen **OR** acetaminophen **OR** acetaminophen  •  albuterol  •   baclofen  •  ipratropium-albuterol  •  melatonin  •  nitroglycerin  •  ondansetron **OR** ondansetron  •  sodium chloride  •  sodium chloride  •  sodium chloride            Assessment/Plan  (MDM)    Active Hospital Problems:  Active Hospital Problems    Diagnosis  POA   • (HFpEF) heart failure with preserved ejection fraction (HCC) [I50.30]  Yes      Resolved Hospital Problems   No resolved problems to display.       Acute/chronic hypoxic respiratory failure secondary to acute/chronic dCHF:  Valvular hear disease/mod AI/mod AS, mild to mod MS, PH:  Echo 1/15/2022: EF hyperdynamic, severe valvular heart disease noted  Continue IV Bumex  CT chest 1/15/2022: No PE noted diffuse groundglass opacities, given presentation most likely represents diffuse edema  Per cards poor candidate for surgical intervention for valvular heart disease  Cardiology following    PAF:  Subtherapeutic INR:  RVR noted this a.m., given digoxin per cardiology  Continue rate control with Cardizem  Treatment Lovenox until INR therapeutic       Chronic Bullous oxygen dependent COPD:  Oxygen to keep sats 88 to 92%  Add duonebs, sub for trelegy  COVID-19 and respiratory viral panel negative     Hypomagnesemia  Replace and follow    GERD: Nothing acute, add Pepcid     RA/DDD, chronic pain: No current acute issues, add Tylenol  KIM reviewed     Anxiety: Nothing acute currently     High risk for decompensation    DVT ppx-Lovenox bridging Coumadin    This patient has been examined wearing appropriate Personal Protective Equipment . 01/15/22      Plan for disposition:defer for now    Electronically signed by Kevon Sarabia DO, 01/15/22, 15:51 EST.

## 2022-01-16 LAB
ALBUMIN SERPL-MCNC: 3.3 G/DL (ref 3.5–5.2)
ANION GAP SERPL CALCULATED.3IONS-SCNC: 7.8 MMOL/L (ref 5–15)
BUN SERPL-MCNC: 16 MG/DL (ref 6–20)
BUN/CREAT SERPL: 18.6 (ref 7–25)
CALCIUM SPEC-SCNC: 9.1 MG/DL (ref 8.6–10.5)
CHLORIDE SERPL-SCNC: 88 MMOL/L (ref 98–107)
CO2 SERPL-SCNC: 40.2 MMOL/L (ref 22–29)
CREAT SERPL-MCNC: 0.86 MG/DL (ref 0.57–1)
DEPRECATED RDW RBC AUTO: 53.6 FL (ref 37–54)
ERYTHROCYTE [DISTWIDTH] IN BLOOD BY AUTOMATED COUNT: 15.7 % (ref 12.3–15.4)
GFR SERPL CREATININE-BSD FRML MDRD: 68 ML/MIN/1.73
GLUCOSE SERPL-MCNC: 103 MG/DL (ref 65–99)
HCT VFR BLD AUTO: 31.9 % (ref 34–46.6)
HGB BLD-MCNC: 9.7 G/DL (ref 12–15.9)
INR PPP: 1.82 (ref 0.9–1.1)
MAGNESIUM SERPL-MCNC: 1.8 MG/DL (ref 1.6–2.6)
MCH RBC QN AUTO: 28.2 PG (ref 26.6–33)
MCHC RBC AUTO-ENTMCNC: 30.4 G/DL (ref 31.5–35.7)
MCV RBC AUTO: 92.7 FL (ref 79–97)
PHOSPHATE SERPL-MCNC: 3.9 MG/DL (ref 2.5–4.5)
PLATELET # BLD AUTO: 264 10*3/MM3 (ref 140–450)
PMV BLD AUTO: 9.3 FL (ref 6–12)
POTASSIUM SERPL-SCNC: 3.3 MMOL/L (ref 3.5–5.2)
PROTHROMBIN TIME: 21.5 SECONDS (ref 12.1–15)
RBC # BLD AUTO: 3.44 10*6/MM3 (ref 3.77–5.28)
SODIUM SERPL-SCNC: 136 MMOL/L (ref 136–145)
WBC NRBC COR # BLD: 9.22 10*3/MM3 (ref 3.4–10.8)

## 2022-01-16 PROCEDURE — 85027 COMPLETE CBC AUTOMATED: CPT | Performed by: INTERNAL MEDICINE

## 2022-01-16 PROCEDURE — 85610 PROTHROMBIN TIME: CPT | Performed by: INTERNAL MEDICINE

## 2022-01-16 PROCEDURE — 94761 N-INVAS EAR/PLS OXIMETRY MLT: CPT

## 2022-01-16 PROCEDURE — 94799 UNLISTED PULMONARY SVC/PX: CPT

## 2022-01-16 PROCEDURE — 83735 ASSAY OF MAGNESIUM: CPT | Performed by: INTERNAL MEDICINE

## 2022-01-16 PROCEDURE — 99233 SBSQ HOSP IP/OBS HIGH 50: CPT | Performed by: INTERNAL MEDICINE

## 2022-01-16 PROCEDURE — 99232 SBSQ HOSP IP/OBS MODERATE 35: CPT | Performed by: INTERNAL MEDICINE

## 2022-01-16 PROCEDURE — 80069 RENAL FUNCTION PANEL: CPT | Performed by: INTERNAL MEDICINE

## 2022-01-16 RX ADMIN — FAMOTIDINE 20 MG: 20 TABLET ORAL at 17:22

## 2022-01-16 RX ADMIN — BUDESONIDE AND FORMOTEROL FUMARATE DIHYDRATE 2 PUFF: 160; 4.5 AEROSOL RESPIRATORY (INHALATION) at 09:40

## 2022-01-16 RX ADMIN — SODIUM CHLORIDE, PRESERVATIVE FREE 10 ML: 5 INJECTION INTRAVENOUS at 20:49

## 2022-01-16 RX ADMIN — BUDESONIDE AND FORMOTEROL FUMARATE DIHYDRATE 2 PUFF: 160; 4.5 AEROSOL RESPIRATORY (INHALATION) at 20:33

## 2022-01-16 RX ADMIN — WARFARIN SODIUM 3 MG: 3 TABLET ORAL at 17:22

## 2022-01-16 RX ADMIN — SODIUM CHLORIDE, PRESERVATIVE FREE 10 ML: 5 INJECTION INTRAVENOUS at 08:00

## 2022-01-16 RX ADMIN — BUMETANIDE 2 MG: 0.25 INJECTION, SOLUTION INTRAMUSCULAR; INTRAVENOUS at 13:19

## 2022-01-16 RX ADMIN — DILTIAZEM HYDROCHLORIDE 240 MG: 120 CAPSULE, COATED, EXTENDED RELEASE ORAL at 08:12

## 2022-01-16 RX ADMIN — BUMETANIDE 2 MG: 0.25 INJECTION, SOLUTION INTRAMUSCULAR; INTRAVENOUS at 20:47

## 2022-01-16 RX ADMIN — SODIUM CHLORIDE, PRESERVATIVE FREE 10 ML: 5 INJECTION INTRAVENOUS at 21:34

## 2022-01-16 RX ADMIN — FAMOTIDINE 20 MG: 20 TABLET ORAL at 06:25

## 2022-01-16 RX ADMIN — TRAMADOL HYDROCHLORIDE 25 MG: 50 TABLET, COATED ORAL at 08:12

## 2022-01-16 RX ADMIN — TIOTROPIUM BROMIDE INHALATION SPRAY 2 PUFF: 3.12 SPRAY, METERED RESPIRATORY (INHALATION) at 09:40

## 2022-01-16 RX ADMIN — BUMETANIDE 2 MG: 0.25 INJECTION, SOLUTION INTRAMUSCULAR; INTRAVENOUS at 06:26

## 2022-01-16 NOTE — PLAN OF CARE
Goal Outcome Evaluation:  Plan of Care Reviewed With: patient        Progress: no change  Outcome Summary: 7L humidified nasal cannula - Afib on tele - BLE - choe catheter due to diuresing - baclofen given for leg cramps - rested well through night

## 2022-01-16 NOTE — PROGRESS NOTES
Adult Nutrition  Assessment/PES    Patient Name:  Akila Amezcua  YOB: 1960  MRN: [id number]  Admit Date:  1/14/2022    Assessment Date:  1/16/2022    Comments: Good po intake recorded.     This note completed with aid of nursing and review of medical record.     Will cont to follow and monitor.      Reason for Assessment     Row Name 01/16/22 1330          Reason for Assessment    Reason For Assessment diagnosis/disease state  CHF, warafin     Diagnosis cardiac disease  CHF, acute on chronic resp failure hx COPD                Nutrition/Diet History     Row Name 01/16/22 1331          Nutrition/Diet History    Typical Food/Fluid Intake no answer to call                Anthropometrics     Row Name 01/16/22 1337          Anthropometrics    Weight --  205#            Body Mass Index (BMI)    BMI Assessment BMI 30-34.9: obesity grade I                Labs/Tests/Procedures/Meds     Row Name 01/16/22 2281          Labs/Procedures/Meds    Lab Results Reviewed reviewed     Lab Results Comments Mg 1.3 L, glu 15            Diagnostic Tests/Procedures    Diagnostic Test/Procedure Reviewed reviewed            Medications    Pertinent Medications Reviewed reviewed     Pertinent Medications Comments bumex, warafin                  Estimated/Assessed Needs     Row Name 01/16/22 3205          Estimated/Assessed Needs    Additional Documentation Calorie Requirements (Group); Fluid Requirements (Group); Protein Requirements (Group)            Calorie Requirements    Estimated Calorie Need Method [place]-St Jeor     Estimated Calorie Requirement Comment 0808-9061 kcal ( mifflin 0-1.2)            Protein Requirements    Est Protein Requirement Amount (gms/kg) 0.8 gm protein  74 gm pro            Fluid Requirements    Estimated Fluid Requirement Method other (see comments)  8714-5862 ml CHF guidelines                Nutrition Prescription Ordered     Row Name 01/16/22 7121          Nutrition Prescription PO    Common  Modifiers Cardiac                Evaluation of Received Nutrient/Fluid Intake     Row Name 01/16/22 1337          Fluid Intake Evaluation    Oral Fluid (mL) 240  insufficent data            PO Evaluation    Number of Meals 3     % PO Intake 83                     Problem/Interventions:   Problem 1     Row Name 01/16/22 1337          Nutrition Diagnoses Problem 1    Problem 1 Overweight/Obesity     Etiology (related to) Factors Affecting Nutrition     Signs/Symptoms (evidenced by) Report/Observation; BMI     BMI 30 - 34.9                      Intervention Goal     Row Name 01/16/22 1338          Intervention Goal    General Meet nutritional needs for age/condition     PO Intake % 75 %  or greater                Nutrition Intervention     Row Name 01/16/22 1338          Nutrition Intervention    RD/Tech Action Follow Tx progress                  Education/Evaluation     Row Name 01/16/22 1339          Education    Education Education not appropriate at this time            Monitor/Evaluation    Monitor Per protocol; I&O; PO intake; Pertinent labs; Weight; Symptoms                 Electronically signed by:  Aleksandra Maria RD  01/16/22 13:39 EST

## 2022-01-16 NOTE — PROGRESS NOTES
"[facility]    Patient Name: Akila Amezcua  :1960  61 y.o.  LOS: 2  Encounter Provider: Emil Aiken Jr, MD      Patient Care Team:  Melly Holliday APRN as PCP - General (Nurse Practitioner)    Chief Complaint: Follow-up acute on chronic diastolic heart failure, severe valvular heart disease, atrial fibrillation with RVR, COPD, pulmonary hypertension    Interval History: Negative fluid balance over last 24 hours.  She looks much better this morning.  Heart rate is much better controlled after dosing of digoxin yesterday.       Objective   Vital Signs  Temp:  [97.5 °F (36.4 °C)-98.3 °F (36.8 °C)] 98 °F (36.7 °C)  Heart Rate:  [] 91  Resp:  [20] 20  BP: ()/(58-73) 104/68    Intake/Output Summary (Last 24 hours) at 2022 0842  Last data filed at 2022 0814  Gross per 24 hour   Intake 1268 ml   Output 5325 ml   Net -4057 ml     Flowsheet Rows      First Filed Value   Admission Height 170 cm (66.93\") Documented at 2022 1845   Admission Weight 93.4 kg (206 lb) Documented at 2022 1700            Physical Exam  Vitals reviewed.   Constitutional:       Appearance: Frail. Acutely ill-appearing.   Eyes:      Conjunctiva/sclera: Conjunctivae normal.      Pupils: Pupils are equal, round, and reactive to light.   Neck:      Vascular: JVR present. JVD elevated.   Pulmonary:      Effort: Pulmonary effort is normal.      Breath sounds: No wheezing. No rhonchi. Rales present.   Chest:      Chest wall: Not tender to palpatation.   Cardiovascular:      PMI at left midclavicular line. Tachycardia present. Irregularly irregular rhythm. Normal S1. Normal S2.      Murmurs: There is no murmur.      No gallop. No click. No rub.   Pulses:     Intact distal pulses.   Edema:     Thigh: bilateral 2+ edema of the thigh.     Pretibial: bilateral 2+ edema of the pretibial area.     Ankle: bilateral 2+ edema of the ankle.     Feet: bilateral 2+ edema of the feet.  Abdominal:      " General: Bowel sounds are normal.      Palpations: Abdomen is soft.      Tenderness: There is no abdominal tenderness.   Musculoskeletal: Normal range of motion.         General: No tenderness. Skin:     General: Skin is warm and dry.   Neurological:      General: No focal deficit present.      Mental Status: Alert and oriented to person, place and time.     Pertinent Test Results:  Results from last 7 days   Lab Units 01/16/22  0423 01/15/22  0914 01/14/22  1627   SODIUM mmol/L 136 142 138   POTASSIUM mmol/L 3.3* 3.8 4.3   CHLORIDE mmol/L 88* 95* 100   CO2 mmol/L 40.2* 39.3* 27.0   BUN mg/dL 16 11 12   CREATININE mg/dL 0.86 0.82 0.76   GLUCOSE mg/dL 103* 155* 113*   CALCIUM mg/dL 9.1 9.4 10.0   AST (SGOT) U/L  --   --  23   ALT (SGPT) U/L  --   --  22     Results from last 7 days   Lab Units 01/14/22  1627   TROPONIN T ng/mL <0.010     Results from last 7 days   Lab Units 01/16/22  0423 01/14/22  1627   WBC 10*3/mm3 9.22 11.22*   HEMOGLOBIN g/dL 9.7* 10.9*   HEMATOCRIT % 31.9* 36.8   PLATELETS 10*3/mm3 264 297     Results from last 7 days   Lab Units 01/14/22  1627   INR  1.63*     Results from last 7 days   Lab Units 01/16/22  0423 01/15/22  0914   MAGNESIUM mg/dL 1.8 1.3*           Invalid input(s): LDLCALC  Results from last 7 days   Lab Units 01/14/22  1627   PROBNP pg/mL 1,595.0*     Results from last 7 days   Lab Units 01/14/22  1627   TSH uIU/mL 3.400           Medication Review:   budesonide-formoterol, 2 puff, Inhalation, BID - RT   And  tiotropium bromide monohydrate, 2 puff, Inhalation, Daily - RT  bumetanide, 2 mg, Intravenous, Q8H  dilTIAZem CD, 240 mg, Oral, Daily  enoxaparin, 1 mg/kg, Subcutaneous, Q12H  famotidine, 20 mg, Oral, BID AC  sodium chloride, 10 mL, Intravenous, Q12H  sodium chloride, 10 mL, Intravenous, Q12H  warfarin, 3 mg, Oral, Daily              Assessment/Plan   1. Acute on chronic diastolic heart failure -secondary to valvular heart disease and persistent atrial fibrillation.     Diuresed very well overnight.   Heart rate much better after digoxin dosing yesterday.  She has severe valvular heart disease on repeat echo.  Will need to optimize volume status, heart rate and blood pressure control.  Elevated D-dimer which is unchanged from previous.  CTA chest done with no evidence of PE.  2. Atrial fibrillation with RVR -better controlled after digoxin dose. Continue diltiazem.  Anticoagulation plan as above.  3. AAA -4.2 on echo.    No evidence of aneurysmal dilatation on CT abdomen.  4. COPD -on 3 L home oxygen.  Pulmonary status is improved.  5. Pulmonary hypertension -optimize volume status     Severe valvular heart disease and patient who is a less than optimal candidate for surgical intervention.  We will need to optimize afterload reduction, volume status and heart rate and repeat echocardiogram at a future date.  Guarded prognosis.  We will follow clinical progress closely.    Emil Aiken Jr, MD  [facility]  01/16/22  08:42 EST

## 2022-01-16 NOTE — PROGRESS NOTES
"Hospital Medicine Team    LOS 2 days      Patient Care Team:  Melly Holliday APRN as PCP - General (Nurse Practitioner)          Chief Complaint: Shortness of breath, lower extremity edema    Subjective      Some mild shortness of breath remains but overall she says she is improving.  Leg swelling improving  Review of Systems   Respiratory: Positive for shortness of breath. Negative for cough.    Cardiovascular: Positive for leg swelling.       Objective    Vital Signs  Temp:  [97.5 °F (36.4 °C)-98.3 °F (36.8 °C)] 98 °F (36.7 °C)  Heart Rate:  [] 105  Resp:  [20-21] 21  BP: ()/(58-70) 101/67  Oxygen Therapy  SpO2: 93 %  Pulse Oximetry Type: Continuous  Device (Oxygen Therapy): humidified, high-flow nasal cannula  $ High Flow Nasal Cannula Set-Up: yes  Flow (L/min): 7  ETCO2 (mmHg): 30 mmHg  Flowsheet Rows      First Filed Value   Admission Height 170 cm (66.93\") Documented at 2022 1845   Admission Weight 93.4 kg (206 lb) Documented at 2022 1700              Physical Exam:  Physical Exam  Vitals reviewed.   Constitutional:       Appearance: She is ill-appearing.   Cardiovascular:      Rate and Rhythm: Tachycardia present. Rhythm irregular.   Pulmonary:      Effort: Tachypnea present.      Breath sounds: Rales present.   Abdominal:      Palpations: Abdomen is soft.      Tenderness: There is no abdominal tenderness.   Musculoskeletal:      Right lower le+ Edema present.      Left lower le+ Edema present.   Skin:     General: Skin is warm and dry.   Neurological:      Mental Status: She is oriented to person, place, and time.   Psychiatric:         Mood and Affect: Mood normal.         Behavior: Behavior normal.           Results Review:    I reviewed the patient's new clinical results.    Results from last 7 days   Lab Units 22  0423 22  1627   WBC 10*3/mm3 9.22 11.22*   HEMOGLOBIN g/dL 9.7* 10.9*   HEMATOCRIT % 31.9* 36.8   PLATELETS 10*3/mm3 264 297     Results " from last 7 days   Lab Units 01/16/22  0423 01/15/22  0914 01/14/22  1627   SODIUM mmol/L 136 142 138   POTASSIUM mmol/L 3.3* 3.8 4.3   CHLORIDE mmol/L 88* 95* 100   CO2 mmol/L 40.2* 39.3* 27.0   BUN mg/dL 16 11 12   CREATININE mg/dL 0.86 0.82 0.76   CALCIUM mg/dL 9.1 9.4 10.0   BILIRUBIN mg/dL  --   --  1.4*   ALK PHOS U/L  --   --  70   ALT (SGPT) U/L  --   --  22   AST (SGOT) U/L  --   --  23   GLUCOSE mg/dL 103* 155* 113*     Results from last 7 days   Lab Units 01/16/22  0423 01/15/22  0914   MAGNESIUM mg/dL 1.8 1.3*       Microbiology Results (last 10 days)     Procedure Component Value - Date/Time    Respiratory Panel PCR w/COVID-19(SARS-CoV-2) [facility]/MENDY/PAD/COR/MAD/AUDELIA In-House, NP Swab in UTM/VTM, 3-4 HR TAT - Swab, Nasopharynx [[id number]]  (Normal) Collected: 01/14/22 1646    Lab Status: Final result Specimen: Swab from Nasopharynx Updated: 01/14/22 2009     ADENOVIRUS, PCR Not Detected     Coronavirus 229E Not Detected     Coronavirus HKU1 Not Detected     Coronavirus NL63 Not Detected     Coronavirus OC43 Not Detected     COVID19 Not Detected     Human Metapneumovirus Not Detected     Human Rhinovirus/Enterovirus Not Detected     Influenza A PCR Not Detected     Influenza B PCR Not Detected     Parainfluenza Virus 1 Not Detected     Parainfluenza Virus 2 Not Detected     Parainfluenza Virus 3 Not Detected     Parainfluenza Virus 4 Not Detected     RSV, PCR Not Detected     Bordetella pertussis pcr Not Detected     Bordetella parapertussis PCR Not Detected     Chlamydophila pneumoniae PCR Not Detected     Mycoplasma pneumo by PCR Not Detected    Narrative:      In the setting of a positive respiratory panel with a viral infection PLUS a negative procalcitonin without other underlying concern for bacterial infection, consider observing off antibiotics or discontinuation of antibiotics and continue supportive care. If the respiratory panel is positive for atypical bacterial infection (Bordetella  pertussis, Chlamydophila pneumoniae, or Mycoplasma pneumoniae), consider antibiotic de-escalation to target atypical bacterial infection.              Results for orders placed during the hospital encounter of 01/14/22    Adult Transthoracic Echo Complete w/ Color, Spectral and Contrast if Necessary Per Protocol    Interpretation Summary  · Atrial fibrillation with tachycardia noted through much of the study.  · Calculated left ventricular EF = 77.9% Estimated left ventricular EF = 78% Estimated left ventricular EF was in agreement with the calculated left ventricular EF. Left ventricular systolic function is hyperdynamic (EF > 70%). The left ventricular cavity is small in size. Left ventricular wall thickness is consistent with moderate concentric hypertrophy. All left ventricular wall segments contract normally. Left ventricular diastolic function was indeterminate.  · The right ventricular cavity is moderately dilated. Normal right ventricular wall thickness noted. Moderately reduced right ventricular systolic function noted.  · The left atrial cavity is severely dilated.  · The right atrial cavity is severely dilated.  · There is severe calcification of the aortic valve. Moderate aortic valve regurgitation is present. Mild aortic valve stenosis is present. Aortic valve mean pressure gradient is 13.3 mmHg. Aortic valve area is 1.7 cm2. Aortic valve dimensionless index is 0.30 .  · Severe mitral annular calcification is present. There is mild, anterior mitral leaflet thickening present. There is severe, posterior mitral leaflet thickening present. Moderate to severe mitral valve regurgitation is present. Moderate mitral valve stenosis is present. The mitral valve peak gradient is 27 mmHg. The mitral valve mean gradient is 9 mmHg. Mean mitral gradient is 9 mmHg at a heart rate of 111 bpm.  · Severe tricuspid valve regurgitation is present. Estimated right ventricular systolic pressure from tricuspid regurgitation  is markedly elevated (>55 mmHg). Calculated right ventricular systolic pressure from tricuspid regurgitation is 57 mmHg. Pulmonary hypertension is likely underestimated utilizing RV systolic pressure due to concomitant findings of RV systolic dysfunction  · Abdominal aorta = 4.3 cm Subcostal imaging is limited but from the views available may be a large abdominal aortic aneurysm. Images are quite limited and would consider dedicated imaging to assess further.      CT Angiogram Chest With & Without Contrast    Result Date: 1/15/2022  Impression: 1. No evidence of pulmonary embolus on this study. 2. Extensive bilateral groundglass infiltrates with atelectatic/infiltrative changes in the right lung base. Small bilateral pleural effusions. Imaging features can be seen with COVID-19 pneumonia, although are nonspecific and may occur with a variety of infectious and noninfectious processes. Signer Name: Deandre Guan MD  Signed: 1/15/2022 1:36 PM  Work[facility]    CT Abdomen Pelvis With Contrast    Result Date: 1/15/2022  Diffuse hepatic steatosis. No evidence of abdominal aortic aneurysm. Signer Name: Deandre Guan MD  Signed: 1/15/2022 1:42 PM  [facility]station [facility]    XR Chest 1 View    Result Date: 1/14/2022  Extensive bilateral interstitial infiltrates show worsening since the previous examination in November. Probable interstitial edema. Heart size is unchanged. Signer Name: Raheem Carranza MD  Signed: 1/14/2022 4:54 PM  [facility]station [facility]: [id number]      ECG/EMG Results (most recent)     Procedure Component Value Units Date/Time    ECG 12 Lead [[id number]] Collected: 01/14/22 1648     Updated: 01/14/22 1652     QT Interval 345 ms     Narrative:      HEART RATE= 111  bpm  RR Interval= 542  ms  OK Interval=   ms  P Horizontal Axis=   deg  P Front Axis=   deg  QRSD Interval= 99  ms  QT  Interval= 345  ms  QRS Axis= 30  deg  T Wave Axis= 73  deg  - ABNORMAL ECG -  Atrial fibrillation  Borderline ST depression, anterolateral leads  Electronically Signed By:   Date and Time of Study: 2022-01-14 16:48:54    Adult Transthoracic Echo Complete w/ Color, Spectral and Contrast if Necessary Per Protocol [[id number]] Collected: 01/14/22 1851     Updated: 01/15/22 0944     BSA 2.0 m^2      IVSd 1.6 cm      LVIDd 3.9 cm      LVIDs 3.2 cm      LVPWd 1.1 cm      IVS/LVPW 1.4     FS 18.3 %      EDV(Teich) 65.1 ml      ESV(Teich) 40.0 ml      EF(Teich) 38.5 %      EDV(cubed) 58.4 ml      ESV(cubed) 31.9 ml      EF(cubed) 45.5 %      LV mass(C)d 182.6 grams      LV mass(C)dI 89.4 grams/m^2      SV(Teich) 25.1 ml      SI(Teich) 12.3 ml/m^2      SV(cubed) 26.6 ml      SI(cubed) 13.0 ml/m^2      Ao root diam 2.9 cm      Ao root area 6.6 cm^2      ACS 1.3 cm      asc Aorta Diam 3.2 cm      LVOT diam 2.2 cm      LVOT area 3.8 cm^2      LVOT area(traced) 3.8 cm^2      RVOT diam 2.5 cm      RVOT area 4.9 cm^2      LVLd ap4 6.9 cm      EDV(MOD-sp4) 111.0 ml      LVLs ap4 6.3 cm      ESV(MOD-sp4) 28.6 ml      LVLd ap2 7.8 cm      EDV(MOD-sp2) 95.5 ml      LVLs ap2 6.1 cm      ESV(MOD-sp2) 20.8 ml      SV(MOD-sp4) 82.4 ml      SI(MOD-sp4) 40.4 ml/m^2      SV(MOD-sp2) 74.7 ml      SI(MOD-sp2) 36.6 ml/m^2      Ao root area (BSA corrected) 1.4     LV Adam Vol (BSA corrected) 54.4 ml/m^2      LV Sys Vol (BSA corrected) 14.0 ml/m^2      TAPSE (>1.6) 0.92 cm      EF(MOD-bp) 77.9 %      Ao V2 mean 156.5 cm/sec      Ao mean PG 13.3 mmHg      Ao mean PG (full) 11.3 mmHg      Ao V2 VTI 42.8 cm      FADIA(I,A) 1.7 cm^2      FADIA(I,D) 1.7 cm^2      Ao acc time 0.09 sec      LV V1 mean PG 2.0 mmHg      LV V1 mean 60.6 cm/sec      LV V1 VTI 19.0 cm      MR mean mala 399.0 cm/sec      MR mean PG 74.0 mmHg      MR .0 cm      SV(Ao) 282.9 ml      SI(Ao) 138.5 ml/m^2      SV(LVOT) 72.2 ml      SV(RVOT) 32.6 ml      SI(LVOT) 35.4 ml/m^2       PA V2 max 88.0 cm/sec      PA max PG 3.1 mmHg      PA acc time 0.12 sec      RV V1 mean PG 0 mmHg      RV V1 mean 32.2 cm/sec      RV V1 VTI 6.6 cm      TR max jb 322.3 cm/sec      RVSP(TR) 57 mmHg      RAP systole 15.0 mmHg      PA pr(Accel) 23.2 mmHg      Qp/Qs 0.45     RV Base 3.6 cm      RV Length 7.0 cm      RV Mid 3.7 cm      Lat Peak E' Jb 5.7 cm/sec      Med Peak E' Jb 6.2 cm/sec      RV S' 9.3 cm/sec       CV ECHO VIJI - BZI_BMI 32.2 kilograms/m^2       CV ECHO VIJI - BSA(HAYCOCK) 2.1 m^2       CV ECHO VIJI - BZI_METRIC_WEIGHT 93.0 kg       CV ECHO VIJI - BZI_METRIC_HEIGHT 170.0 cm       CV VAS BP LEFT ARM 98/72 mmHg      Sinus 3.4 cm      STJ 2.9 cm      Ascending aorta 3.2 cm      Aortic arch 2.5 cm      Dimensionless Index 0.30 (DI)      LA Volume Index 22.7 mL/m2      Ao pk jb 291.0 cm/sec      LV V1 max PG 4.0 mmHg      LV V1 max 101.0 cm/sec      Abdo Ao Diam 4.3 cm      Ao max PG 34 mmHg      EF - Contrast (2Ch) 78.2 cm2      EF - Contrast (4Ch) 74.2 cm2      Echo EF Estimated 78 %      MV V2 VTI 49 cm      MV max PG 27 mmHg      MV mean PG 9 mmHg     Narrative:      · Atrial fibrillation with tachycardia noted through much of the study.  · Calculated left ventricular EF = 77.9% Estimated left ventricular EF =   78% Estimated left ventricular EF was in agreement with the calculated   left ventricular EF. Left ventricular systolic function is hyperdynamic   (EF > 70%). The left ventricular cavity is small in size. Left ventricular   wall thickness is consistent with moderate concentric hypertrophy. All   left ventricular wall segments contract normally. Left ventricular   diastolic function was indeterminate.  · The right ventricular cavity is moderately dilated. Normal right   ventricular wall thickness noted. Moderately reduced right ventricular   systolic function noted.  · The left atrial cavity is severely dilated.  · The right atrial cavity is severely dilated.  · There is severe  calcification of the aortic valve. Moderate aortic valve   regurgitation is present. Mild aortic valve stenosis is present. Aortic   valve mean pressure gradient is 13.3 mmHg. Aortic valve area is 1.7 cm2.   Aortic valve dimensionless index is 0.30 .  · Severe mitral annular calcification is present. There is mild, anterior   mitral leaflet thickening present. There is severe, posterior mitral   leaflet thickening present. Moderate to severe mitral valve regurgitation   is present. Moderate mitral valve stenosis is present. The mitral valve   peak gradient is 27 mmHg. The mitral valve mean gradient is 9 mmHg. Mean   mitral gradient is 9 mmHg at a heart rate of 111 bpm.  · Severe tricuspid valve regurgitation is present. Estimated right   ventricular systolic pressure from tricuspid regurgitation is markedly   elevated (>55 mmHg). Calculated right ventricular systolic pressure from   tricuspid regurgitation is 57 mmHg. Pulmonary hypertension is likely   underestimated utilizing RV systolic pressure due to concomitant findings   of RV systolic dysfunction  · Abdominal aorta = 4.3 cm Subcostal imaging is limited but from the views   available may be a large abdominal aortic aneurysm. Images are quite   limited and would consider dedicated imaging to assess further.               X-rays, labs reviewed personally by physician.    Medication Review:   I have reviewed the patient's current medication list    Scheduled Meds  budesonide-formoterol, 2 puff, Inhalation, BID - RT   And  tiotropium bromide monohydrate, 2 puff, Inhalation, Daily - RT  bumetanide, 2 mg, Intravenous, Q8H  dilTIAZem CD, 240 mg, Oral, Daily  famotidine, 20 mg, Oral, BID AC  sodium chloride, 10 mL, Intravenous, Q12H  sodium chloride, 10 mL, Intravenous, Q12H  warfarin, 3 mg, Oral, Daily        Meds Infusions       Meds PRN  •  acetaminophen **OR** acetaminophen **OR** acetaminophen  •  albuterol  •  baclofen  •  ipratropium-albuterol  •  melatonin  •   nitroglycerin  •  ondansetron **OR** ondansetron  •  sodium chloride  •  sodium chloride  •  sodium chloride  •  traMADol            Assessment/Plan  (MDM)    Active Hospital Problems:  Active Hospital Problems    Diagnosis  POA   • (HFpEF) heart failure with preserved ejection fraction (HCC) [I50.30]  Yes      Resolved Hospital Problems   No resolved problems to display.       Acute/chronic hypoxic respiratory failure secondary to acute/chronic dCHF:  Valvular hear disease/mod AI/mod AS, mild to mod MS, PH:  Echo 1/15/2022: EF hyperdynamic, severe valvular heart disease noted  Continue IV Bumex, watch bicarb closely as some evidence of contraction alkalosis today  CT chest 1/15/2022: No PE noted diffuse groundglass opacities, given presentation most likely represents diffuse edema  Per cards poor candidate for surgical intervention for valvular heart disease  Cardiology following    PAF:  RVR noted this a.m., given digoxin per cardiology  Continue rate control with Cardizem  INR improving continue Coumadin     Chronic Bullous COPD:  Oxygen to keep sats 88 to 92%  duonebs only as needed for left heart rate, sub for trelegy  COVID-19 and respiratory viral panel negative     Hypomagnesemia:  Replaced and follow    GERD: Nothing acute, add Pepcid     RA/DDD, chronic pain: No current acute issues, add Tylenol  KIM reviewed     Anxiety: Nothing acute currently     High risk for decompensation    DVT ppx-on Coumadin    This patient has been examined wearing appropriate Personal Protective Equipment . 01/16/22      Plan for disposition:defer for now    Electronically signed by Kevon Sarabia DO, 01/16/22, 10:54 EST.

## 2022-01-17 LAB
ANION GAP SERPL CALCULATED.3IONS-SCNC: 7.2 MMOL/L (ref 5–15)
BUN SERPL-MCNC: 15 MG/DL (ref 6–20)
BUN/CREAT SERPL: 18.5 (ref 7–25)
CALCIUM SPEC-SCNC: 9.3 MG/DL (ref 8.6–10.5)
CHLORIDE SERPL-SCNC: 82 MMOL/L (ref 98–107)
CO2 SERPL-SCNC: 45.8 MMOL/L (ref 22–29)
CREAT SERPL-MCNC: 0.81 MG/DL (ref 0.57–1)
D DIMER PPP FEU-MCNC: 1.63 MCGFEU/ML (ref 0–0.46)
DEPRECATED RDW RBC AUTO: 52.6 FL (ref 37–54)
ERYTHROCYTE [DISTWIDTH] IN BLOOD BY AUTOMATED COUNT: 15.3 % (ref 12.3–15.4)
GFR SERPL CREATININE-BSD FRML MDRD: 72 ML/MIN/1.73
GLUCOSE SERPL-MCNC: 109 MG/DL (ref 65–99)
HCT VFR BLD AUTO: 34.7 % (ref 34–46.6)
HGB BLD-MCNC: 10.7 G/DL (ref 12–15.9)
INR PPP: 1.48 (ref 0.9–1.1)
MAGNESIUM SERPL-MCNC: 1.6 MG/DL (ref 1.6–2.6)
MCH RBC QN AUTO: 28.5 PG (ref 26.6–33)
MCHC RBC AUTO-ENTMCNC: 30.8 G/DL (ref 31.5–35.7)
MCV RBC AUTO: 92.3 FL (ref 79–97)
PLATELET # BLD AUTO: 270 10*3/MM3 (ref 140–450)
PMV BLD AUTO: 9.2 FL (ref 6–12)
POTASSIUM SERPL-SCNC: 3.4 MMOL/L (ref 3.5–5.2)
PROTHROMBIN TIME: 18.2 SECONDS (ref 12.1–15)
RBC # BLD AUTO: 3.76 10*6/MM3 (ref 3.77–5.28)
SODIUM SERPL-SCNC: 135 MMOL/L (ref 136–145)
TROPONIN T SERPL-MCNC: <0.01 NG/ML (ref 0–0.03)
WBC NRBC COR # BLD: 9.06 10*3/MM3 (ref 3.4–10.8)

## 2022-01-17 PROCEDURE — 94761 N-INVAS EAR/PLS OXIMETRY MLT: CPT

## 2022-01-17 PROCEDURE — 85027 COMPLETE CBC AUTOMATED: CPT | Performed by: INTERNAL MEDICINE

## 2022-01-17 PROCEDURE — 85379 FIBRIN DEGRADATION QUANT: CPT | Performed by: NURSE PRACTITIONER

## 2022-01-17 PROCEDURE — 85610 PROTHROMBIN TIME: CPT | Performed by: INTERNAL MEDICINE

## 2022-01-17 PROCEDURE — 99232 SBSQ HOSP IP/OBS MODERATE 35: CPT | Performed by: NURSE PRACTITIONER

## 2022-01-17 PROCEDURE — 25010000002 ACETAZOLAMIDE PER 500 MG: Performed by: NURSE PRACTITIONER

## 2022-01-17 PROCEDURE — 83735 ASSAY OF MAGNESIUM: CPT | Performed by: INTERNAL MEDICINE

## 2022-01-17 PROCEDURE — 84484 ASSAY OF TROPONIN QUANT: CPT | Performed by: NURSE PRACTITIONER

## 2022-01-17 PROCEDURE — 99233 SBSQ HOSP IP/OBS HIGH 50: CPT | Performed by: NURSE PRACTITIONER

## 2022-01-17 PROCEDURE — 80048 BASIC METABOLIC PNL TOTAL CA: CPT | Performed by: INTERNAL MEDICINE

## 2022-01-17 PROCEDURE — 25010000002 ONDANSETRON PER 1 MG: Performed by: NURSE PRACTITIONER

## 2022-01-17 PROCEDURE — 94799 UNLISTED PULMONARY SVC/PX: CPT

## 2022-01-17 PROCEDURE — 25010000002 ENOXAPARIN PER 10 MG: Performed by: NURSE PRACTITIONER

## 2022-01-17 RX ORDER — POTASSIUM CHLORIDE 20 MEQ/1
40 TABLET, EXTENDED RELEASE ORAL AS NEEDED
Status: DISCONTINUED | OUTPATIENT
Start: 2022-01-17 | End: 2022-01-18 | Stop reason: HOSPADM

## 2022-01-17 RX ORDER — MAGNESIUM SULFATE HEPTAHYDRATE 40 MG/ML
4 INJECTION, SOLUTION INTRAVENOUS AS NEEDED
Status: DISCONTINUED | OUTPATIENT
Start: 2022-01-17 | End: 2022-01-18 | Stop reason: HOSPADM

## 2022-01-17 RX ORDER — POTASSIUM CHLORIDE 7.45 MG/ML
10 INJECTION INTRAVENOUS
Status: DISCONTINUED | OUTPATIENT
Start: 2022-01-17 | End: 2022-01-18 | Stop reason: HOSPADM

## 2022-01-17 RX ORDER — MAGNESIUM SULFATE 1 G/100ML
1 INJECTION INTRAVENOUS AS NEEDED
Status: DISCONTINUED | OUTPATIENT
Start: 2022-01-17 | End: 2022-01-18 | Stop reason: HOSPADM

## 2022-01-17 RX ORDER — POTASSIUM CHLORIDE 1.5 G/1.77G
40 POWDER, FOR SOLUTION ORAL AS NEEDED
Status: DISCONTINUED | OUTPATIENT
Start: 2022-01-17 | End: 2022-01-18 | Stop reason: HOSPADM

## 2022-01-17 RX ORDER — MAGNESIUM SULFATE HEPTAHYDRATE 40 MG/ML
2 INJECTION, SOLUTION INTRAVENOUS AS NEEDED
Status: DISCONTINUED | OUTPATIENT
Start: 2022-01-17 | End: 2022-01-18 | Stop reason: HOSPADM

## 2022-01-17 RX ORDER — LISINOPRIL 2.5 MG/1
2.5 TABLET ORAL
Status: DISCONTINUED | OUTPATIENT
Start: 2022-01-17 | End: 2022-01-18

## 2022-01-17 RX ORDER — WARFARIN SODIUM 5 MG/1
5 TABLET ORAL
Status: DISCONTINUED | OUTPATIENT
Start: 2022-01-17 | End: 2022-01-18 | Stop reason: HOSPADM

## 2022-01-17 RX ADMIN — FAMOTIDINE 20 MG: 20 TABLET ORAL at 09:15

## 2022-01-17 RX ADMIN — TIOTROPIUM BROMIDE INHALATION SPRAY 2 PUFF: 3.12 SPRAY, METERED RESPIRATORY (INHALATION) at 07:57

## 2022-01-17 RX ADMIN — BACLOFEN 10 MG: 10 TABLET ORAL at 14:46

## 2022-01-17 RX ADMIN — WARFARIN SODIUM 5 MG: 5 TABLET ORAL at 18:01

## 2022-01-17 RX ADMIN — SODIUM CHLORIDE, PRESERVATIVE FREE 10 ML: 5 INJECTION INTRAVENOUS at 09:15

## 2022-01-17 RX ADMIN — BUDESONIDE AND FORMOTEROL FUMARATE DIHYDRATE 2 PUFF: 160; 4.5 AEROSOL RESPIRATORY (INHALATION) at 07:57

## 2022-01-17 RX ADMIN — SODIUM CHLORIDE, PRESERVATIVE FREE 10 ML: 5 INJECTION INTRAVENOUS at 21:08

## 2022-01-17 RX ADMIN — DILTIAZEM HYDROCHLORIDE 240 MG: 120 CAPSULE, COATED, EXTENDED RELEASE ORAL at 12:47

## 2022-01-17 RX ADMIN — SODIUM CHLORIDE, PRESERVATIVE FREE 10 ML: 5 INJECTION INTRAVENOUS at 20:26

## 2022-01-17 RX ADMIN — BUMETANIDE 2 MG: 0.25 INJECTION, SOLUTION INTRAMUSCULAR; INTRAVENOUS at 12:38

## 2022-01-17 RX ADMIN — TRAMADOL HYDROCHLORIDE 25 MG: 50 TABLET, COATED ORAL at 09:15

## 2022-01-17 RX ADMIN — FAMOTIDINE 20 MG: 20 TABLET ORAL at 16:38

## 2022-01-17 RX ADMIN — LISINOPRIL 2.5 MG: 2.5 TABLET ORAL at 12:39

## 2022-01-17 RX ADMIN — ENOXAPARIN SODIUM 90 MG: 100 INJECTION SUBCUTANEOUS at 12:40

## 2022-01-17 RX ADMIN — ACETAZOLAMIDE 500 MG: 500 INJECTION, POWDER, LYOPHILIZED, FOR SOLUTION INTRAVENOUS at 12:39

## 2022-01-17 RX ADMIN — ONDANSETRON 4 MG: 2 INJECTION INTRAMUSCULAR; INTRAVENOUS at 20:26

## 2022-01-17 RX ADMIN — BUDESONIDE AND FORMOTEROL FUMARATE DIHYDRATE 2 PUFF: 160; 4.5 AEROSOL RESPIRATORY (INHALATION) at 21:34

## 2022-01-17 RX ADMIN — SODIUM CHLORIDE, PRESERVATIVE FREE 10 ML: 5 INJECTION INTRAVENOUS at 09:16

## 2022-01-17 RX ADMIN — BUMETANIDE 2 MG: 0.25 INJECTION, SOLUTION INTRAMUSCULAR; INTRAVENOUS at 05:21

## 2022-01-17 NOTE — CASE MANAGEMENT/SOCIAL WORK
Continued Stay Note  JOHN Hammonds     Patient Name: Akila Amezcua  MRN: [id number]  Today's Date: 1/17/2022    Admit Date: 1/14/2022     Discharge Plan     Row Name 01/17/22 1448       Plan    Plan Home with daughter    Patient/Family in Agreement with Plan yes    Plan Comments Followed up with patient today to review discharge plans. Patient is currently sitting up in the chair and remains on 7L [facility] oxygen. Patient states she lives with her daughter Joyce and plans on returning home at discharge. She is unsure at this time what needs she might have at discharge. She states she is currently on oxygen at home through [employer]. Patient had no other questions or concerns regarding discharge plans. CM will continue to follow for needs.               Discharge Codes    No documentation.                     Shawna Hampton RN

## 2022-01-17 NOTE — PLAN OF CARE
Goal Outcome Evaluation:  Plan of Care Reviewed With: patient           Outcome Summary: pt resting in bed with no issues noted. pt remains afib on the monitor, choe is draining to bedside. will cont to monitor.

## 2022-01-17 NOTE — PROGRESS NOTES
Pharmacy dosing Lovenox consult    Indication- atrial fibrillation requiring full anticoagulation    Coumadin subtherapeutic   1/16/22 INR-1.82   1/17/22 INR-1.48   Target range (2-3)    Add Lovenox to bridge Warfarin to target range.    Start Lovenox 1mg/kg q12h (90mg q12h)    Daily INR's have been ordered.  Will continue to follow daily while on Lovenox.

## 2022-01-17 NOTE — PROGRESS NOTES
"    Patient Name: Akila Amezcua  :1960  61 y.o.      Patient Care Team:  Melly Holliday APRN as PCP - General (Nurse Practitioner)    Chief Complaint: F/u A/C, severe valvular heart disease, afib with RVR, COPD, PHTN    Interval History: She states she is very weak and tired.  She denies any angina, but does still have some shortness of breath.       Objective   Vital Signs  Temp:  [97.7 °F (36.5 °C)-98.1 °F (36.7 °C)] 97.9 °F (36.6 °C)  Heart Rate:  [] 107  Resp:  [19-24] 24  BP: ()/(61-74) 113/74    Intake/Output Summary (Last 24 hours) at 2022 0823  Last data filed at 2022 0539  Gross per 24 hour   Intake 840 ml   Output 1750 ml   Net -910 ml     Flowsheet Rows      First Filed Value   Admission Height 170 cm (66.93\") Documented at 2022 1845   Admission Weight 93.4 kg (206 lb) Documented at 2022 1700          Physical Exam:   General Appearance:    Alert, cooperative, in no acute distress   Lungs:     Crackles in bilateral bases,  Normal respiratory effort and rate.      Heart:    Irregular rhythm and normal rate, normal S1 and S2, no murmurs, gallops or rubs.     Chest Wall:    No abnormalities observed   Abdomen:     Soft, nontender, positive bowel sounds.     Extremities:   no cyanosis, clubbing or edema.  No marked joint deformities.  Adequate musculoskeletal strength.       Results Review:    Results from last 7 days   Lab Units 22  0358   SODIUM mmol/L 135*   POTASSIUM mmol/L 3.4*   CHLORIDE mmol/L 82*   CO2 mmol/L 45.8*   BUN mg/dL 15   CREATININE mg/dL 0.81   GLUCOSE mg/dL 109*   CALCIUM mg/dL 9.3     Results from last 7 days   Lab Units 22  1627   TROPONIN T ng/mL <0.010     Results from last 7 days   Lab Units 22  0358   WBC 10*3/mm3 9.06   HEMOGLOBIN g/dL 10.7*   HEMATOCRIT % 34.7   PLATELETS 10*3/mm3 270     Results from last 7 days   Lab Units 22  0358 22  0423 22  1627   INR  1.48* 1.82* 1.63*     Results from last " 7 days   Lab Units 01/17/22  0358   MAGNESIUM mg/dL 1.6                   Medication Review:   budesonide-formoterol, 2 puff, Inhalation, BID - RT   And  tiotropium bromide monohydrate, 2 puff, Inhalation, Daily - RT  bumetanide, 2 mg, Intravenous, Q8H  dilTIAZem CD, 240 mg, Oral, Daily  famotidine, 20 mg, Oral, BID AC  sodium chloride, 10 mL, Intravenous, Q12H  sodium chloride, 10 mL, Intravenous, Q12H  warfarin, 3 mg, Oral, Daily              Assessment/Plan       1. Acute on chronic diastolic heart failure -secondary to valvular heart disease and persistent atrial fibrillation.  Continues to diurese.  There is no weight recorded yesterday.  I am asking for an accurate weight to be recorded today.  Her lower extremities look excellent compared to last Friday when I last saw her.  She has severe valvular heart disease on repeat echo.  Will need to optimize volume status, heart rate and blood pressure control.  Elevated D-dimer which was unchanged from previous.  CTA chest done with no evidence of PE.  She has crackles in her bilateral bases.  Would continue current diuretics.  2. Atrial fibrillation with RVR -better controlled after digoxin dose. Continue diltiazem.  Anticoagulation warfarin.  She is subtherapeutic today at 1.48.  Pharmacy dosing.  3. AAA -4.2 on echo.    No evidence of aneurysmal dilatation on CT abdomen.  4. COPD -on 3 L home oxygen. Still SOA, but improved  5. Pulmonary hypertension -optimize volume status   6.  Valvular disease - will add  low-dose lisinopril 2.5 mg daily.  Monitor blood pressure closely.    Accurate I & O's and daily weight please    Severe valvular heart disease and patient who is a less than optimal candidate for surgical intervention.  We will need to optimize afterload reduction, volume status and heart rate and repeat echocardiogram at a future date.  Guarded prognosis.  We will follow clinical progress closely.          Aiden Guo, TOYA  [facility]  01/17/22  08:23 EST

## 2022-01-17 NOTE — PROGRESS NOTES
"SERVICE: [facility] HOSPITALIST    CONSULTANTS: Cardiology    CHIEF COMPLAINT: Follow-up acute on chronic heart failure, A. fib RVR    SUBJECTIVE: She notes continuing to feel weak and tired with some dizziness when she stands up. She notes chest pain/pressure all morning, unaffected with breathing, sensation of cramping. She reports she is eating and drinking without difficulty. Dramatically improved lower extremity exam today.    OBJECTIVE:    /70 (Patient Position: Sitting)   Pulse 96   Temp 98.3 °F (36.8 °C) (Oral)   Resp 20   Ht 170 cm (66.93\")   Wt 87.6 kg (193 lb 3.2 oz)   SpO2 90%   BMI 30.32 kg/m²     MEDS/LABS REVIEWED AND ORDERED    acetaZOLAMIDE (DIAMOX) IVPB, 500 mg, Intravenous, Once  budesonide-formoterol, 2 puff, Inhalation, BID - RT   And  tiotropium bromide monohydrate, 2 puff, Inhalation, Daily - RT  bumetanide, 2 mg, Intravenous, Q8H  dilTIAZem CD, 240 mg, Oral, Daily  enoxaparin, 1 mg/kg, Subcutaneous, Q12H  famotidine, 20 mg, Oral, BID AC  lisinopril, 2.5 mg, Oral, Q24H  sodium chloride, 10 mL, Intravenous, Q12H  sodium chloride, 10 mL, Intravenous, Q12H  warfarin, 5 mg, Oral, Daily      Physical Exam  Vitals reviewed.   Constitutional:       General: She is not in acute distress.     Appearance: She is obese. She is ill-appearing.      Comments: Appears much older than stated age, chronically ill appearance   HENT:      Head: Normocephalic and atraumatic.      Mouth/Throat:      Mouth: Mucous membranes are moist.   Eyes:      Extraocular Movements: Extraocular movements intact.      Pupils: Pupils are equal, round, and reactive to light.   Cardiovascular:      Rate and Rhythm: Normal rate. Rhythm irregular.   Pulmonary:      Comments: Mildly tachypneic, Rales RML RLL and LLL  Abdominal:      General: Abdomen is flat. Bowel sounds are normal. There is no distension.      Palpations: Abdomen is soft.      Tenderness: There is no abdominal tenderness. There is no " guarding.   Musculoskeletal:      Comments: Bilateral lower extremities with trace pitting edema, dramatically improved since my exam 3 days ago   Skin:     General: Skin is warm and dry.      Capillary Refill: Capillary refill takes less than 2 seconds.      Findings: No erythema.      Comments: Extensive tattooing of skin   Neurological:      General: No focal deficit present.      Mental Status: She is alert and oriented to person, place, and time.   Psychiatric:         Mood and Affect: Mood normal.         Behavior: Behavior normal.       LAB/DIAGNOSTICS:    Lab Results (last 24 hours)     Procedure Component Value Units Date/Time    D-dimer, Quantitative [[id number]] Collected: 01/17/22 0358    Specimen: Blood Updated: 01/17/22 1108    Troponin [[id number]] Collected: 01/17/22 0358    Specimen: Blood Updated: 01/17/22 1105    Basic Metabolic Panel [[id number]]  (Abnormal) Collected: 01/17/22 0358    Specimen: Blood Updated: 01/17/22 0529     Glucose 109 mg/dL      BUN 15 mg/dL      Creatinine 0.81 mg/dL      Sodium 135 mmol/L      Potassium 3.4 mmol/L      Chloride 82 mmol/L      CO2 45.8 mmol/L      Calcium 9.3 mg/dL      eGFR Non African Amer 72 mL/min/1.73      BUN/Creatinine Ratio 18.5     Anion Gap 7.2 mmol/L     Narrative:      GFR Normal >60  Chronic Kidney Disease <60  Kidney Failure <15      Magnesium [[id number]]  (Normal) Collected: 01/17/22 0358    Specimen: Blood Updated: 01/17/22 0529     Magnesium 1.6 mg/dL     Protime-INR [[id number]]  (Abnormal) Collected: 01/17/22 0358    Specimen: Blood Updated: 01/17/22 0511     Protime 18.2 Seconds      INR 1.48    Narrative:      Therapeutic Ranges for INR: 2.0-3.0 (PT 20-30)                              2.5-3.5 (PT 25-34)    CBC (No Diff) [[id number]]  (Abnormal) Collected: 01/17/22 0358    Specimen: Blood Updated: 01/17/22 0504     WBC 9.06 10*3/mm3      RBC 3.76 10*6/mm3      Hemoglobin 10.7 g/dL      Hematocrit 34.7 %      MCV 92.3 fL      MCH 28.5 pg       MCHC 30.8 g/dL      RDW 15.3 %      RDW-SD 52.6 fl      MPV 9.2 fL      Platelets 270 10*3/mm3         ECG 12 Lead   Preliminary Result   HEART RATE= 111  bpm   RR Interval= 542  ms   KS Interval=   ms   P Horizontal Axis=   deg   P Front Axis=   deg   QRSD Interval= 99  ms   QT Interval= 345  ms   QRS Axis= 30  deg   T Wave Axis= 73  deg   - ABNORMAL ECG -   Atrial fibrillation   Borderline ST depression, anterolateral leads   Electronically Signed By:    Date and Time of Study: 2022-01-14 16:48:54        Results for orders placed during the hospital encounter of 01/14/22    Adult Transthoracic Echo Complete w/ Color, Spectral and Contrast if Necessary Per Protocol    Interpretation Summary  · Atrial fibrillation with tachycardia noted through much of the study.  · Calculated left ventricular EF = 77.9% Estimated left ventricular EF = 78% Estimated left ventricular EF was in agreement with the calculated left ventricular EF. Left ventricular systolic function is hyperdynamic (EF > 70%). The left ventricular cavity is small in size. Left ventricular wall thickness is consistent with moderate concentric hypertrophy. All left ventricular wall segments contract normally. Left ventricular diastolic function was indeterminate.  · The right ventricular cavity is moderately dilated. Normal right ventricular wall thickness noted. Moderately reduced right ventricular systolic function noted.  · The left atrial cavity is severely dilated.  · The right atrial cavity is severely dilated.  · There is severe calcification of the aortic valve. Moderate aortic valve regurgitation is present. Mild aortic valve stenosis is present. Aortic valve mean pressure gradient is 13.3 mmHg. Aortic valve area is 1.7 cm2. Aortic valve dimensionless index is 0.30 .  · Severe mitral annular calcification is present. There is mild, anterior mitral leaflet thickening present. There is severe, posterior mitral leaflet thickening present. Moderate to  severe mitral valve regurgitation is present. Moderate mitral valve stenosis is present. The mitral valve peak gradient is 27 mmHg. The mitral valve mean gradient is 9 mmHg. Mean mitral gradient is 9 mmHg at a heart rate of 111 bpm.  · Severe tricuspid valve regurgitation is present. Estimated right ventricular systolic pressure from tricuspid regurgitation is markedly elevated (>55 mmHg). Calculated right ventricular systolic pressure from tricuspid regurgitation is 57 mmHg. Pulmonary hypertension is likely underestimated utilizing RV systolic pressure due to concomitant findings of RV systolic dysfunction  · Abdominal aorta = 4.3 cm Subcostal imaging is limited but from the views available may be a large abdominal aortic aneurysm. Images are quite limited and would consider dedicated imaging to assess further.    CT Angiogram Chest With & Without Contrast    Result Date: 1/15/2022  Impression: 1. No evidence of pulmonary embolus on this study. 2. Extensive bilateral groundglass infiltrates with atelectatic/infiltrative changes in the right lung base. Small bilateral pleural effusions. Imaging features can be seen with COVID-19 pneumonia, although are nonspecific and may occur with a variety of infectious and noninfectious processes. Signer Name: Deandre Guan MD  Signed: 1/15/2022 1:36 PM  Workstation Name: [facility]  [facility]    CT Abdomen Pelvis With Contrast    Result Date: 1/15/2022  Diffuse hepatic steatosis. No evidence of abdominal aortic aneurysm. Signer Name: Deandre Guan MD  Signed: 1/15/2022 1:42 PM  Workstation Name: [facility]  [facility]    ASSESSMENT/PLAN:  Acute hypoxic respiratory failure secondary to acute on chronic diastolic CHF:   Valvular heart disease:   Permanent A. Fib:  Subtherapeutic INR:  Hypertension with hypotension:  Chest pain: Cardiology following  Add Lovenox to bridge warfarin increase warfarin to 5 mg daily, daily  "INRs  Check D-dimer and troponin now with subtherapeutic INR and chest pain  CTA previously negative for PE  Rate at goal on diltiazem 240 mg daily  BP low, getting dizzy with standing, likely will need to hold lisinopril  Echo with hyperdynamic LV with EF over 70%, severe valvular disease see full report above    Acute contraction metabolic alkalosis: Secondary to diuretic, add Diamox single dose today    Electrolyte imbalance: Add replacement protocols  Hyponatremia:    Pulmonary hypertension:  Chronic bullous oxygen dependent COPD without exacerbation:  Continue Symbicort and Spiriva with as needed nebs    GERD: No current acute issues on Pepcid    DDD/RA with chronic pain: No current acute issues on Tylenol and tramadol    Anxiety: No current acute issues    PLAN FOR DISPOSITION: Home when able    TOYA Camejo  Hospitalist, [facility]  01/17/22  10:38 EST    \"Dictated utilizing Dragon dictation\"    "

## 2022-01-18 ENCOUNTER — HOSPITAL ENCOUNTER (INPATIENT)
Facility: HOSPITAL | Age: 62
LOS: 13 days | Discharge: HOME HEALTH | End: 2022-01-31
Attending: INTERNAL MEDICINE | Admitting: THORACIC SURGERY (CARDIOTHORACIC VASCULAR SURGERY)

## 2022-01-18 ENCOUNTER — APPOINTMENT (OUTPATIENT)
Dept: ULTRASOUND IMAGING | Facility: HOSPITAL | Age: 62
End: 2022-01-18

## 2022-01-18 ENCOUNTER — APPOINTMENT (OUTPATIENT)
Dept: GENERAL RADIOLOGY | Facility: HOSPITAL | Age: 62
End: 2022-01-18

## 2022-01-18 VITALS
BODY MASS INDEX: 30.32 KG/M2 | HEART RATE: 146 BPM | WEIGHT: 193.2 LBS | RESPIRATION RATE: 22 BRPM | HEIGHT: 67 IN | TEMPERATURE: 98.9 F | SYSTOLIC BLOOD PRESSURE: 74 MMHG | OXYGEN SATURATION: 90 % | DIASTOLIC BLOOD PRESSURE: 47 MMHG

## 2022-01-18 DIAGNOSIS — Z95.2 S/P AVR (AORTIC VALVE REPLACEMENT): ICD-10-CM

## 2022-01-18 DIAGNOSIS — I09.1 RHEUMATIC VALVULAR DISEASE: Primary | ICD-10-CM

## 2022-01-18 DIAGNOSIS — Z95.2 S/P MVR (MITRAL VALVE REPLACEMENT): ICD-10-CM

## 2022-01-18 DIAGNOSIS — I50.9 CONGESTIVE HEART FAILURE, UNSPECIFIED HF CHRONICITY, UNSPECIFIED HEART FAILURE TYPE: ICD-10-CM

## 2022-01-18 PROBLEM — J43.9 COPD WITH EMPHYSEMA (HCC): Status: ACTIVE | Noted: 2022-01-18

## 2022-01-18 PROBLEM — J12.3 HUMAN METAPNEUMOVIRUS PNEUMONIA: Status: RESOLVED | Noted: 2021-11-30 | Resolved: 2022-01-18

## 2022-01-18 PROBLEM — J44.1 COPD WITH ACUTE EXACERBATION (HCC): Status: RESOLVED | Noted: 2021-11-29 | Resolved: 2022-01-18

## 2022-01-18 PROBLEM — N17.9 ACUTE RENAL FAILURE (ARF) (HCC): Status: ACTIVE | Noted: 2022-01-18

## 2022-01-18 LAB
ALBUMIN SERPL-MCNC: 3.5 G/DL (ref 3.5–5.2)
ALBUMIN/GLOB SERPL: 0.8 G/DL
ALP SERPL-CCNC: 61 U/L (ref 39–117)
ALT SERPL W P-5'-P-CCNC: 17 U/L (ref 1–33)
ANION GAP SERPL CALCULATED.3IONS-SCNC: 10 MMOL/L (ref 5–15)
ANION GAP SERPL CALCULATED.3IONS-SCNC: 10 MMOL/L (ref 5–15)
AST SERPL-CCNC: 16 U/L (ref 1–32)
BILIRUB SERPL-MCNC: 0.8 MG/DL (ref 0–1.2)
BUN SERPL-MCNC: 28 MG/DL (ref 6–20)
BUN SERPL-MCNC: 33 MG/DL (ref 6–20)
BUN/CREAT SERPL: 10.9 (ref 7–25)
BUN/CREAT SERPL: 12.2 (ref 7–25)
CALCIUM SPEC-SCNC: 8.3 MG/DL (ref 8.6–10.5)
CALCIUM SPEC-SCNC: 9.5 MG/DL (ref 8.6–10.5)
CHLORIDE SERPL-SCNC: 79 MMOL/L (ref 98–107)
CHLORIDE SERPL-SCNC: 86 MMOL/L (ref 98–107)
CO2 SERPL-SCNC: 37 MMOL/L (ref 22–29)
CO2 SERPL-SCNC: 42 MMOL/L (ref 22–29)
CREAT SERPL-MCNC: 2.58 MG/DL (ref 0.57–1)
CREAT SERPL-MCNC: 2.71 MG/DL (ref 0.57–1)
GFR SERPL CREATININE-BSD FRML MDRD: 18 ML/MIN/1.73
GFR SERPL CREATININE-BSD FRML MDRD: 19 ML/MIN/1.73
GLOBULIN UR ELPH-MCNC: 4.4 GM/DL
GLUCOSE SERPL-MCNC: 122 MG/DL (ref 65–99)
GLUCOSE SERPL-MCNC: 92 MG/DL (ref 65–99)
INR PPP: 1.48 (ref 0.9–1.1)
POTASSIUM SERPL-SCNC: 3.6 MMOL/L (ref 3.5–5.2)
POTASSIUM SERPL-SCNC: 4 MMOL/L (ref 3.5–5.2)
PROT SERPL-MCNC: 7.9 G/DL (ref 6–8.5)
PROTHROMBIN TIME: 18.2 SECONDS (ref 12.1–15)
QT INTERVAL: 345 MS
SODIUM SERPL-SCNC: 131 MMOL/L (ref 136–145)
SODIUM SERPL-SCNC: 133 MMOL/L (ref 136–145)

## 2022-01-18 PROCEDURE — 94799 UNLISTED PULMONARY SVC/PX: CPT

## 2022-01-18 PROCEDURE — 99233 SBSQ HOSP IP/OBS HIGH 50: CPT | Performed by: INTERNAL MEDICINE

## 2022-01-18 PROCEDURE — 82962 GLUCOSE BLOOD TEST: CPT

## 2022-01-18 PROCEDURE — 25010000002 PHENYLEPHRINE 10 MG/ML SOLUTION: Performed by: INTERNAL MEDICINE

## 2022-01-18 PROCEDURE — 25010000002 DOBUTAMINE PER 250 MG: Performed by: NURSE PRACTITIONER

## 2022-01-18 PROCEDURE — 25010000002 ENOXAPARIN PER 10 MG: Performed by: NURSE PRACTITIONER

## 2022-01-18 PROCEDURE — 99239 HOSP IP/OBS DSCHRG MGMT >30: CPT | Performed by: NURSE PRACTITIONER

## 2022-01-18 PROCEDURE — 85610 PROTHROMBIN TIME: CPT | Performed by: INTERNAL MEDICINE

## 2022-01-18 PROCEDURE — 76775 US EXAM ABDO BACK WALL LIM: CPT

## 2022-01-18 PROCEDURE — C1751 CATH, INF, PER/CENT/MIDLINE: HCPCS

## 2022-01-18 PROCEDURE — 71045 X-RAY EXAM CHEST 1 VIEW: CPT

## 2022-01-18 PROCEDURE — 94640 AIRWAY INHALATION TREATMENT: CPT

## 2022-01-18 PROCEDURE — 80053 COMPREHEN METABOLIC PANEL: CPT | Performed by: NURSE PRACTITIONER

## 2022-01-18 PROCEDURE — B548ZZA ULTRASONOGRAPHY OF SUPERIOR VENA CAVA, GUIDANCE: ICD-10-PCS | Performed by: INTERNAL MEDICINE

## 2022-01-18 PROCEDURE — 82533 TOTAL CORTISOL: CPT | Performed by: INTERNAL MEDICINE

## 2022-01-18 PROCEDURE — 94664 DEMO&/EVAL PT USE INHALER: CPT

## 2022-01-18 PROCEDURE — 02HV33Z INSERTION OF INFUSION DEVICE INTO SUPERIOR VENA CAVA, PERCUTANEOUS APPROACH: ICD-10-PCS | Performed by: INTERNAL MEDICINE

## 2022-01-18 PROCEDURE — 25010000002 DOBUTAMINE PER 250 MG: Performed by: INTERNAL MEDICINE

## 2022-01-18 PROCEDURE — 25010000002 DIGOXIN PER 500 MCG: Performed by: INTERNAL MEDICINE

## 2022-01-18 RX ORDER — MAGNESIUM SULFATE HEPTAHYDRATE 40 MG/ML
2 INJECTION, SOLUTION INTRAVENOUS AS NEEDED
Status: DISCONTINUED | OUTPATIENT
Start: 2022-01-18 | End: 2022-01-26

## 2022-01-18 RX ORDER — MAGNESIUM SULFATE HEPTAHYDRATE 40 MG/ML
4 INJECTION, SOLUTION INTRAVENOUS AS NEEDED
Status: DISCONTINUED | OUTPATIENT
Start: 2022-01-18 | End: 2022-01-26

## 2022-01-18 RX ORDER — ONDANSETRON 4 MG/1
4 TABLET, FILM COATED ORAL EVERY 6 HOURS PRN
Status: DISCONTINUED | OUTPATIENT
Start: 2022-01-18 | End: 2022-01-26

## 2022-01-18 RX ORDER — ACETAMINOPHEN 650 MG/1
650 SUPPOSITORY RECTAL EVERY 4 HOURS PRN
Status: DISCONTINUED | OUTPATIENT
Start: 2022-01-18 | End: 2022-01-26

## 2022-01-18 RX ORDER — DOBUTAMINE HYDROCHLORIDE 100 MG/100ML
5 INJECTION INTRAVENOUS CONTINUOUS
Status: DISCONTINUED | OUTPATIENT
Start: 2022-01-18 | End: 2022-01-21

## 2022-01-18 RX ORDER — PHENYLEPHRINE HCL IN 0.9% NACL 0.5 MG/5ML
.5-3 SYRINGE (ML) INTRAVENOUS
Status: DISCONTINUED | OUTPATIENT
Start: 2022-01-18 | End: 2022-01-26

## 2022-01-18 RX ORDER — SODIUM CHLORIDE 0.9 % (FLUSH) 0.9 %
10 SYRINGE (ML) INJECTION AS NEEDED
Status: DISCONTINUED | OUTPATIENT
Start: 2022-01-18 | End: 2022-01-26

## 2022-01-18 RX ORDER — SODIUM CHLORIDE 9 MG/ML
200 INJECTION, SOLUTION INTRAVENOUS CONTINUOUS
Status: DISCONTINUED | OUTPATIENT
Start: 2022-01-18 | End: 2022-01-18 | Stop reason: HOSPADM

## 2022-01-18 RX ORDER — ONDANSETRON 2 MG/ML
4 INJECTION INTRAMUSCULAR; INTRAVENOUS EVERY 6 HOURS PRN
Status: DISCONTINUED | OUTPATIENT
Start: 2022-01-18 | End: 2022-01-26

## 2022-01-18 RX ORDER — MAGNESIUM SULFATE 1 G/100ML
1 INJECTION INTRAVENOUS AS NEEDED
Status: DISCONTINUED | OUTPATIENT
Start: 2022-01-18 | End: 2022-01-26

## 2022-01-18 RX ORDER — BUDESONIDE AND FORMOTEROL FUMARATE DIHYDRATE 160; 4.5 UG/1; UG/1
2 AEROSOL RESPIRATORY (INHALATION)
Status: DISCONTINUED | OUTPATIENT
Start: 2022-01-18 | End: 2022-01-26

## 2022-01-18 RX ORDER — CHOLECALCIFEROL (VITAMIN D3) 125 MCG
5 CAPSULE ORAL NIGHTLY PRN
Status: DISCONTINUED | OUTPATIENT
Start: 2022-01-18 | End: 2022-01-26

## 2022-01-18 RX ORDER — ACETAMINOPHEN 160 MG/5ML
650 SOLUTION ORAL EVERY 4 HOURS PRN
Status: DISCONTINUED | OUTPATIENT
Start: 2022-01-18 | End: 2022-01-26

## 2022-01-18 RX ORDER — FAMOTIDINE 20 MG/1
20 TABLET, FILM COATED ORAL
Status: DISCONTINUED | OUTPATIENT
Start: 2022-01-18 | End: 2022-01-26

## 2022-01-18 RX ORDER — CALCIUM GLUCONATE 20 MG/ML
1 INJECTION, SOLUTION INTRAVENOUS AS NEEDED
Status: DISCONTINUED | OUTPATIENT
Start: 2022-01-18 | End: 2022-01-26

## 2022-01-18 RX ORDER — IPRATROPIUM BROMIDE AND ALBUTEROL SULFATE 2.5; .5 MG/3ML; MG/3ML
3 SOLUTION RESPIRATORY (INHALATION) EVERY 4 HOURS PRN
Status: DISCONTINUED | OUTPATIENT
Start: 2022-01-18 | End: 2022-01-26

## 2022-01-18 RX ORDER — SODIUM CHLORIDE 9 MG/ML
40 INJECTION, SOLUTION INTRAVENOUS AS NEEDED
Status: DISCONTINUED | OUTPATIENT
Start: 2022-01-18 | End: 2022-01-18 | Stop reason: SDUPTHER

## 2022-01-18 RX ORDER — SODIUM CHLORIDE 0.9 % (FLUSH) 0.9 %
10 SYRINGE (ML) INJECTION EVERY 12 HOURS SCHEDULED
Status: DISCONTINUED | OUTPATIENT
Start: 2022-01-18 | End: 2022-01-26

## 2022-01-18 RX ORDER — ACETAMINOPHEN 325 MG/1
650 TABLET ORAL EVERY 4 HOURS PRN
Status: DISCONTINUED | OUTPATIENT
Start: 2022-01-18 | End: 2022-01-26

## 2022-01-18 RX ORDER — ALBUTEROL SULFATE 2.5 MG/3ML
2.5 SOLUTION RESPIRATORY (INHALATION) EVERY 4 HOURS PRN
Status: DISCONTINUED | OUTPATIENT
Start: 2022-01-18 | End: 2022-01-21

## 2022-01-18 RX ORDER — MIDODRINE HYDROCHLORIDE 5 MG/1
10 TABLET ORAL
Status: DISCONTINUED | OUTPATIENT
Start: 2022-01-18 | End: 2022-01-25

## 2022-01-18 RX ORDER — WARFARIN SODIUM 5 MG/1
5 TABLET ORAL
Status: DISCONTINUED | OUTPATIENT
Start: 2022-01-18 | End: 2022-01-23

## 2022-01-18 RX ORDER — SODIUM CHLORIDE, SODIUM LACTATE, POTASSIUM CHLORIDE, CALCIUM CHLORIDE 600; 310; 30; 20 MG/100ML; MG/100ML; MG/100ML; MG/100ML
50 INJECTION, SOLUTION INTRAVENOUS CONTINUOUS
Status: DISCONTINUED | OUTPATIENT
Start: 2022-01-18 | End: 2022-01-19

## 2022-01-18 RX ORDER — DIGOXIN 0.25 MG/ML
250 INJECTION INTRAMUSCULAR; INTRAVENOUS ONCE
Status: COMPLETED | OUTPATIENT
Start: 2022-01-18 | End: 2022-01-18

## 2022-01-18 RX ORDER — NITROGLYCERIN 0.4 MG/1
0.4 TABLET SUBLINGUAL
Status: DISCONTINUED | OUTPATIENT
Start: 2022-01-18 | End: 2022-01-26

## 2022-01-18 RX ORDER — DOBUTAMINE HYDROCHLORIDE 100 MG/100ML
5 INJECTION INTRAVENOUS CONTINUOUS
Status: DISCONTINUED | OUTPATIENT
Start: 2022-01-18 | End: 2022-01-18 | Stop reason: HOSPADM

## 2022-01-18 RX ORDER — TRAMADOL HYDROCHLORIDE 50 MG/1
25 TABLET ORAL EVERY 6 HOURS PRN
Status: ACTIVE | OUTPATIENT
Start: 2022-01-18 | End: 2022-01-25

## 2022-01-18 RX ADMIN — SODIUM CHLORIDE 200 ML/HR: 9 INJECTION, SOLUTION INTRAVENOUS at 12:17

## 2022-01-18 RX ADMIN — SODIUM CHLORIDE 200 ML/HR: 9 INJECTION, SOLUTION INTRAVENOUS at 10:01

## 2022-01-18 RX ADMIN — DOBUTAMINE IN DEXTROSE 10 MCG/KG/MIN: 100 INJECTION, SOLUTION INTRAVENOUS at 21:46

## 2022-01-18 RX ADMIN — DOBUTAMINE IN DEXTROSE 8 MCG/KG/MIN: 100 INJECTION, SOLUTION INTRAVENOUS at 20:04

## 2022-01-18 RX ADMIN — SODIUM CHLORIDE, POTASSIUM CHLORIDE, SODIUM LACTATE AND CALCIUM CHLORIDE 50 ML/HR: 600; 310; 30; 20 INJECTION, SOLUTION INTRAVENOUS at 19:59

## 2022-01-18 RX ADMIN — SODIUM CHLORIDE, PRESERVATIVE FREE 10 ML: 5 INJECTION INTRAVENOUS at 21:12

## 2022-01-18 RX ADMIN — FAMOTIDINE 20 MG: 20 TABLET ORAL at 07:55

## 2022-01-18 RX ADMIN — SODIUM CHLORIDE 1000 ML: 9 INJECTION, SOLUTION INTRAVENOUS at 07:40

## 2022-01-18 RX ADMIN — FAMOTIDINE 20 MG: 20 TABLET, FILM COATED ORAL at 19:56

## 2022-01-18 RX ADMIN — BUDESONIDE AND FORMOTEROL FUMARATE DIHYDRATE 2 PUFF: 160; 4.5 AEROSOL RESPIRATORY (INHALATION) at 20:06

## 2022-01-18 RX ADMIN — SODIUM CHLORIDE, PRESERVATIVE FREE 10 ML: 5 INJECTION INTRAVENOUS at 08:20

## 2022-01-18 RX ADMIN — DOBUTAMINE HYDROCHLORIDE 2 MCG/KG/MIN: 100 INJECTION INTRAVENOUS at 14:11

## 2022-01-18 RX ADMIN — SODIUM CHLORIDE 1000 ML: 9 INJECTION, SOLUTION INTRAVENOUS at 22:54

## 2022-01-18 RX ADMIN — PHENYLEPHRINE HYDROCHLORIDE 0.5 MCG/KG/MIN: 10 INJECTION INTRAVENOUS at 23:01

## 2022-01-18 RX ADMIN — TIOTROPIUM BROMIDE INHALATION SPRAY 2 PUFF: 3.12 SPRAY, METERED RESPIRATORY (INHALATION) at 10:07

## 2022-01-18 RX ADMIN — SODIUM CHLORIDE, POTASSIUM CHLORIDE, SODIUM LACTATE AND CALCIUM CHLORIDE 1000 ML: 600; 310; 30; 20 INJECTION, SOLUTION INTRAVENOUS at 18:15

## 2022-01-18 RX ADMIN — BUDESONIDE AND FORMOTEROL FUMARATE DIHYDRATE 2 PUFF: 160; 4.5 AEROSOL RESPIRATORY (INHALATION) at 10:07

## 2022-01-18 RX ADMIN — SODIUM CHLORIDE 500 ML: 9 INJECTION, SOLUTION INTRAVENOUS at 09:15

## 2022-01-18 RX ADMIN — SODIUM CHLORIDE, PRESERVATIVE FREE 10 ML: 5 INJECTION INTRAVENOUS at 08:19

## 2022-01-18 RX ADMIN — DIGOXIN 250 MCG: 0.25 INJECTION INTRAMUSCULAR; INTRAVENOUS at 19:56

## 2022-01-18 RX ADMIN — WARFARIN 5 MG: 5 TABLET ORAL at 20:58

## 2022-01-18 RX ADMIN — ENOXAPARIN SODIUM 90 MG: 100 INJECTION SUBCUTANEOUS at 02:52

## 2022-01-18 NOTE — PROGRESS NOTES
"   LOS: 4 days   Patient Care Team:  Melly Holliday APRN as PCP - General (Nurse Practitioner)    Chief Complaint: CHF     Interval History: She was very hypotensive this AM (her BP on the right arm is ~20mm Hg lower than on the left). Her creatinine has tripled, from 0.8 to 2.5.  She made ~3L urine in the last 24 hours. She was on 5L oxygen with a sat of 99%, but then she was groggy this AM.  She can tell me the year and where she is and her name but her responses are slowed. She can tell that she's \"foggy.\"  She remains short of breath and has had intermittent chest pains overnight.       Objective   Vital Signs  Temp:  [98 °F (36.7 °C)-99 °F (37.2 °C)] 98.9 °F (37.2 °C)  Heart Rate:  [] 84  Resp:  [16-24] 24  BP: ()/(42-72) 76/48    Intake/Output Summary (Last 24 hours) at 1/18/2022 1251  Last data filed at 1/18/2022 0915  Gross per 24 hour   Intake 2100 ml   Output 1350 ml   Net 750 ml       Last Weight and Admission Weight        01/17/22  0913   Weight: 87.6 kg (193 lb 3.2 oz)     Flowsheet Rows      First Filed Value   Admission Height 170 cm (66.93\") Documented at 01/14/2022 1845   Admission Weight 93.4 kg (206 lb) Documented at 01/14/2022 1700                  Physical Exam    Results Review:      Results from last 7 days   Lab Units 01/18/22  0410 01/17/22  0358 01/17/22  0358 01/16/22  0423 01/16/22  0423   SODIUM mmol/L 131*  --  135*  --  136   POTASSIUM mmol/L 4.0  --  3.4*  --  3.3*   CHLORIDE mmol/L 79*  --  82*  --  88*   CO2 mmol/L 42.0*  --  45.8*  --  40.2*   BUN mg/dL 28*  --  15  --  16   CREATININE mg/dL 2.58*  --  0.81  --  0.86   GLUCOSE mg/dL 122*   < > 109*   < > 103*   CALCIUM mg/dL 9.5  --  9.3  --  9.1    < > = values in this interval not displayed.     Results from last 7 days   Lab Units 01/17/22  0358 01/14/22  1627   TROPONIN T ng/mL <0.010 <0.010     Results from last 7 days   Lab Units 01/17/22  0358 01/16/22  0423 01/14/22  1627   WBC 10*3/mm3 9.06 9.22 " 11.22*   HEMOGLOBIN g/dL 10.7* 9.7* 10.9*   HEMATOCRIT % 34.7 31.9* 36.8   PLATELETS 10*3/mm3 270 264 297     Results from last 7 days   Lab Units 01/18/22  0410 01/17/22  0358 01/16/22  0423   INR  1.48* 1.48* 1.82*         Results from last 7 days   Lab Units 01/17/22  0358   MAGNESIUM mg/dL 1.6           I reviewed the patient's new clinical results.  I personally viewed and interpreted the patient's EKG/Telemetry data        Medication Review:   budesonide-formoterol, 2 puff, Inhalation, BID - RT   And  tiotropium bromide monohydrate, 2 puff, Inhalation, Daily - RT  famotidine, 20 mg, Oral, BID AC  sodium chloride, 500 mL, Intravenous, Once  sodium chloride, 10 mL, Intravenous, Q12H  sodium chloride, 10 mL, Intravenous, Q12H  warfarin, 5 mg, Oral, Daily        sodium chloride, 200 mL/hr, Last Rate: 200 mL/hr (01/18/22 1217)        Assessment/Plan     1. Acute on chronic dCHF  2. Acute on chronic right sided CHF (R>>L)  3. Cor pulmonale due to lung disease and left sided heart disease  4. Acute renal failure  5. History of HTN, now with hypotension, with unequal BP in upper extremities  6. Atrial fibrillation  7. Rheumatic valvular disease -- mild AS, moderate AI, moderate MS, mod-severe MR, severe TR    A lot has transpired in the last 24 hours.  She received a dose of lisinopril yesterday and she was diuresed, and now she is in significant acute renal failure.  While this could be intravascular depletion, it certainly brings to mind the possibility of bilateral renal artery stenosis.  Unfortunately, we cannot get a renal artery duplex here, and her renal function precludes a CT angiogram.    I also suspect that she may have right subclavian stenosis given the unequal blood pressures.  Her blood pressure on the left arm is in the 80s and she is groggy, which is likely a combination of low blood pressure and CO2 narcosis.    She needs intravenous fluids at this time.  I Michelle start her on a low-dose of  dobutamine.  I do recommend that this patient be transferred to [facility].  She has severe valvular heart disease, severe pulmonary hypertension, and now has acute renal failure.  She is at high risk of decompensation.  Ultimately, she will not survive unless some type of intervention is performed upon her valves, but she obviously needs a lot of work-up before then.  I am not sure that she would survive a valve surgery, and we would need the input of the CT surgeons.  Her prognosis is extremely guarded.    Damien Fields MD  01/18/22  12:51 EST

## 2022-01-18 NOTE — PROGRESS NOTES
"Pharmacy dosing service  Anticoagulant  Lovenox     Subjective:    Akila Amezcua is a 61 y.o.female was continued on warfarin for atrial fibrillation. Pharmacy was consulted to dose enoxaparin as bridge therapy until INR within goal range.     INR Goal: 2 - 3  Home medication?: Yes, warfarin 3 mg PO every day at 1800  CrCl:  26.6 mL/min  Platelets: 270        Assessment/Plan:    Renal function decreased and now CrCl <30 mL/min. Will reduced to enoxaparin 90 mg (1 mg/kg) q24h.     Provider increased warfarin to 5 mg daily yesterday. INR remains subtherapeutic, but we have not seen full effect of increased dose yet.    Continue to monitor and adjust based on INR.         Date 1/17 1/18          INR 1.48 1.48              Objective:  [Ht: 170 cm (66.93\"); Wt: 87.6 kg (193 lb 3.2 oz); BMI: Body mass index is 30.32 kg/m².]    Lab Results   Component Value Date    ALBUMIN 3.50 01/18/2022     Lab Results   Component Value Date    INR 1.48 (H) 01/18/2022    INR 1.48 (H) 01/17/2022    INR 1.82 (H) 01/16/2022    PROTIME 18.2 (H) 01/18/2022    PROTIME 18.2 (H) 01/17/2022    PROTIME 21.5 (H) 01/16/2022     Lab Results   Component Value Date    HGB 10.7 (L) 01/17/2022    HGB 9.7 (L) 01/16/2022    HGB 10.9 (L) 01/14/2022     Lab Results   Component Value Date    HCT 34.7 01/17/2022    HCT 31.9 (L) 01/16/2022    HCT 36.8 01/14/2022     Thank you-    Bailey Clark, [facility]  01/18/22 07:13 EST     "

## 2022-01-18 NOTE — CASE MANAGEMENT/SOCIAL WORK
Continued Stay Note  JOHN Hammonds     Patient Name: Akila Amezcua  MRN: [id number]  Today's Date: 1/18/2022    Admit Date: 1/14/2022     Discharge Plan     Row Name 01/18/22 1439       Plan    Plan Discharge to [facility]    Plan Comments Per MD notes patient will be discharged to [facility] today for cardiac workup. CM will continue to follow for needs.               Discharge Codes    No documentation.               Expected Discharge Date and Time     Expected Discharge Date Expected Discharge Time    Jan 18, 2022             Shawna Hampton RN

## 2022-01-18 NOTE — PROGRESS NOTES
"SERVICE: [facility] HOSPITALIST    CONSULTANTS: Cardiology    CHIEF COMPLAINT: Follow up acute on chronic heart failure    SUBJECTIVE: On exam this morning, patient is lethargic.  Staff notes patient has been difficult to wake up.  Respiratory therapy notes that patient's end-tidal CO2 showed 60s with 100% oxygen saturation.  She was decreased to 2 L with improvement of mentation.  Staff also notes hypotension overnight, receiving fluid bolus at time of exam, diuretics were stopped. The patient notes that she slept well last night.    OBJECTIVE:    BP (!) 74/48 (BP Location: Left arm, Patient Position: Lying)   Pulse 86   Temp 98.3 °F (36.8 °C) (Oral)   Resp 20   Ht 170 cm (66.93\")   Wt 87.6 kg (193 lb 3.2 oz)   SpO2 91%   BMI 30.32 kg/m²     MEDS/LABS REVIEWED AND ORDERED    budesonide-formoterol, 2 puff, Inhalation, BID - RT   And  tiotropium bromide monohydrate, 2 puff, Inhalation, Daily - RT  dilTIAZem CD, 240 mg, Oral, Daily  famotidine, 20 mg, Oral, BID AC  sodium chloride, 500 mL, Intravenous, Once  sodium chloride, 10 mL, Intravenous, Q12H  sodium chloride, 10 mL, Intravenous, Q12H  warfarin, 5 mg, Oral, Daily      Physical Exam    LAB/DIAGNOSTICS:    Lab Results (last 24 hours)     Procedure Component Value Units Date/Time    Protime-INR [[id number]]  (Abnormal) Collected: 01/18/22 0410    Specimen: Blood Updated: 01/18/22 0520     Protime 18.2 Seconds      INR 1.48    Narrative:      Therapeutic Ranges for INR: 2.0-3.0 (PT 20-30)                              2.5-3.5 (PT 25-34)    Comprehensive Metabolic Panel [[id number]]  (Abnormal) Collected: 01/18/22 0410    Specimen: Blood Updated: 01/18/22 0512     Glucose 122 mg/dL      BUN 28 mg/dL      Creatinine 2.58 mg/dL      Sodium 131 mmol/L      Potassium 4.0 mmol/L      Chloride 79 mmol/L      CO2 42.0 mmol/L      Calcium 9.5 mg/dL      Total Protein 7.9 g/dL      Albumin 3.50 g/dL      ALT (SGPT) 17 U/L      AST (SGOT) 16 U/L      " Alkaline Phosphatase 61 U/L      Total Bilirubin 0.8 mg/dL      eGFR Non African Amer 19 mL/min/1.73      Globulin 4.4 gm/dL      A/G Ratio 0.8 g/dL      BUN/Creatinine Ratio 10.9     Anion Gap 10.0 mmol/L     Narrative:      GFR Normal >60  Chronic Kidney Disease <60  Kidney Failure <15      D-dimer, Quantitative [[id number]]  (Abnormal) Collected: 01/17/22 0358    Specimen: Blood Updated: 01/17/22 1132     D-Dimer, Quantitative 1.63 MCGFEU/mL     Narrative:      Can be elevated in, but is not diagnostic for deep vein thrombosis (DVT) or pulmonary embolis (PE).  It is also elevated in other medical conditions.  Clinical correlation is required.  The negative cut-off value for the D-Dimer is 0.50 mcg FEU/mL for DVT and PE.      Troponin [[id number]]  (Normal) Collected: 01/17/22 0358    Specimen: Blood Updated: 01/17/22 1126     Troponin T <0.010 ng/mL     Narrative:      Troponin T Reference Range:  <= 0.03 ng/mL-   Negative for AMI  >0.03 ng/mL-     Abnormal for myocardial necrosis.  Clinicians would have to utilize clinical acumen, EKG, Troponin and serial changes to determine if it is an Acute Myocardial Infarction or myocardial injury due to an underlying chronic condition.       Results may be falsely decreased if patient taking Biotin.          ECG 12 Lead   Preliminary Result   HEART RATE= 111  bpm   RR Interval= 542  ms   VT Interval=   ms   P Horizontal Axis=   deg   P Front Axis=   deg   QRSD Interval= 99  ms   QT Interval= 345  ms   QRS Axis= 30  deg   T Wave Axis= 73  deg   - ABNORMAL ECG -   Atrial fibrillation   Borderline ST depression, anterolateral leads   Electronically Signed By:    Date and Time of Study: 2022-01-14 16:48:54        Results for orders placed during the hospital encounter of 01/14/22    Adult Transthoracic Echo Complete w/ Color, Spectral and Contrast if Necessary Per Protocol    Interpretation Summary  · Atrial fibrillation with tachycardia noted through much of the study.  ·  Calculated left ventricular EF = 77.9% Estimated left ventricular EF = 78% Estimated left ventricular EF was in agreement with the calculated left ventricular EF. Left ventricular systolic function is hyperdynamic (EF > 70%). The left ventricular cavity is small in size. Left ventricular wall thickness is consistent with moderate concentric hypertrophy. All left ventricular wall segments contract normally. Left ventricular diastolic function was indeterminate.  · The right ventricular cavity is moderately dilated. Normal right ventricular wall thickness noted. Moderately reduced right ventricular systolic function noted.  · The left atrial cavity is severely dilated.  · The right atrial cavity is severely dilated.  · There is severe calcification of the aortic valve. Moderate aortic valve regurgitation is present. Mild aortic valve stenosis is present. Aortic valve mean pressure gradient is 13.3 mmHg. Aortic valve area is 1.7 cm2. Aortic valve dimensionless index is 0.30 .  · Severe mitral annular calcification is present. There is mild, anterior mitral leaflet thickening present. There is severe, posterior mitral leaflet thickening present. Moderate to severe mitral valve regurgitation is present. Moderate mitral valve stenosis is present. The mitral valve peak gradient is 27 mmHg. The mitral valve mean gradient is 9 mmHg. Mean mitral gradient is 9 mmHg at a heart rate of 111 bpm.  · Severe tricuspid valve regurgitation is present. Estimated right ventricular systolic pressure from tricuspid regurgitation is markedly elevated (>55 mmHg). Calculated right ventricular systolic pressure from tricuspid regurgitation is 57 mmHg. Pulmonary hypertension is likely underestimated utilizing RV systolic pressure due to concomitant findings of RV systolic dysfunction  · Abdominal aorta = 4.3 cm Subcostal imaging is limited but from the views available may be a large abdominal aortic aneurysm. Images are quite limited and would  "consider dedicated imaging to assess further.    No radiology results for the last day  ASSESSMENT/PLAN:  Acute hypoxic respiratory failure secondary to acute on chronic diastolic CHF:   Valvular heart disease:   Permanent A. Fib:  Subtherapeutic INR:  Hypertension with hypotension:  Chest pain: Cardiology following  D/C lisinopril  Troponin during chest pain negative, d dimer unchanged  Continue lovenox/coumadin bridging, INR unchanged overnight  Recent CTA chest negative for PE  Echo with hyperdynamic EF  Over-diuresed, receiving fluid now  Oxygen requirement back to baseline  Monitor     CALEB:  Acute contraction metabolic alkalosis:   Electrolyte imbalance:   Hyponatremia:  Creatinine 2.58, CO2 improved at 42, diuretics on hold  High likelihood pre-renal with aggressive diuresis and 13 lb weight loss since admit  Check renal ultrasound  Recheck BMP this afternoon  Check orthostatics  BP in left arm manual only  D/C choe when able     Pulmonary hypertension:  Chronic bullous oxygen dependent COPD without exacerbation:  Continue Symbicort and Spiriva with as needed nebs  Oxygen saturation goal 88-92%, now down to 2 L     GERD: No current acute issues on Pepcid     DDD/RA with chronic pain: No current acute issues on Tylenol and tramadol     Anxiety: No current acute issues    Chronic normocytic anemia:  hgb 10.7, no active blood loss noted    Incidental finding on CT:  Hepatic steatosis    PLAN FOR DISPOSITION: home when able    TOYA Camejo  Hospitalist, [facility]  01/18/22  08:01 EST    \"Dictated utilizing Dragon dictation\"    "

## 2022-01-18 NOTE — PROGRESS NOTES
Persistently hypotensive despite fluid resuscitation, previously suspected over-diuresis. D/W Dr. Fields, will start dobutamine and transfer to ICU. Called  [facility] for transfer to ICU. Will need CT surgery/cardiology/nephrology. Patient is on wait list. Dr. David is accepting when bed is available

## 2022-01-18 NOTE — NURSING NOTE
Pt was to receive Bumex this evening and due to his BP we have had to hold it. Had a discussion with Dr Waldrop, and he decided that I need to hold this medication. Will cont to monitor pt.

## 2022-01-18 NOTE — SIGNIFICANT NOTE
01/18/22 0735   Vital Signs   BP (!) 74/42   BP Location Right arm   BP Method Manual       MD aware, bolus ordered, med changes per MD Deandre Strong, RN

## 2022-01-18 NOTE — SIGNIFICANT NOTE
Primary RN and MD aware     01/18/22 0956   Vital Signs   BP (!) 76/54   BP Location Left arm   BP Method Automatic   Patient Position Lying

## 2022-01-18 NOTE — DISCHARGE SUMMARY
"Akila Amezcua  1960  [id number]    Hospitalists Discharge Summary    Date of Admission: 1/14/2022  Date of Discharge:  1/18/2022    History of Present Illness from [facility] on admit:   \"The patient is a 61-year-old female that presented from cardiology office on direct admission secondary to acute CHF concerns.  The patient reports that she has been short of air since discharge from this facility in December however it has become worse in the last several days.  She notes excessive lower extremity edema, abdominal distention and early satiety and has been unable to tolerate much by mouth due to shortness of air.  She is remained on 3 L of oxygen at home and been using her normal breathing treatments without effect.  Cardiology office has been attempting to keep her at home, managing diuretic therapy.  At time of exam she is extremely dyspneic, unable to speak in full sentences and appears acutely in heart failure.  She reports no sick contacts, no smoking in over 1 year.  She reports compliance with all medications and treatment plans per cardiology.     No diagnostics have been completed at the time of this exam     She has a history of chronic a-fib on OAC, bullous emphysema/chronic oxygen dependent COPD, DDD with chronic pain, anxiety, RA, GERD     She otherwise denies f/c/headache/rhinorrhea/nasal congestion/lightheadedness/syncopal sensation/cough/n/v/d/chest pain/abdominal pain/recent illness/sick exposures/change in bowel or bladder habits/bloody emesis or bloody stools/change in medications or any other new concerns.\"    Primary Discharge diagnoses:  Acute decompensated on chronic diastolic and right sided (R>L) CHF  Rheumatic valvular disease: mild AS, mod AI, mod MS, mod-severe MR, severe TR  Acute renal failure/contraction metabolic alkalosis  Cor pulmonale secondary to COPD and left heart disease  Severe oxygen dependent COPD    Secondary Discharge Diagnoses:   Chronic a-fib on warfarin with " subtherapeutic INR  Hyponatremia  DDD/RA  Chronic normocytic anemia  GERD  Anxiety  Hepatic steatosis    Hospital Course Summary:   The patient was admitted on 1/14/2022 and acute decompensated heart failure.  She was aggressively diuresed (-13 lbs since admit) and blood pressure remained low but stable. Yesterday she was started on low dose lisinopril and overnight and this morning she acutely decompensated with SBP in the 60s and 70s.  Her mentation declined and she became lethargic with end-tidal CO2 noted in the 60s and oxygen saturation near 100% and her oxygen was weaned down to 2 L with some improvement in her mentation.  Her hypotension continued and she received fluid boluses without much response and remains hypotensive with best BP 80s.  She was moved to ICU for dobutamine drip while awaiting a bed at [facility] as cardiology feels that patient will need CT surgery to evaluate her valvular heart disease, cardiology for right and left heart catheterization, nephrology input to rule out renal artery stenosis with renal artery duplex that is not available at [facility].  Dr. Fields is also suspicious for right subclavian stenosis as her blood pressures are unequal, L>R. Her INR was subtherapeutic and she was started on lovenox/coumadin bridge. D/W Dr. Fields and Dr. David, who are accepting providers at [facility], transferring now to ICU at their facility.    PCP  Patient Care Team:  Melly Holilday APRN as PCP - General (Nurse Practitioner)    Consults:   Consults     Date and Time Order Name Status Description    1/14/2022  3:59 PM Inpatient Cardiology Consult          Operations and Procedures Performed:     Adult Transthoracic Echo Complete w/ Color, Spectral and Contrast if Necessary Per Protocol    Result Date: 1/15/2022  Narrative: · Atrial fibrillation with tachycardia noted through much of the study. · Calculated left ventricular EF = 77.9% Estimated left ventricular EF =  78% Estimated left ventricular EF was in agreement with the calculated left ventricular EF. Left ventricular systolic function is hyperdynamic (EF > 70%). The left ventricular cavity is small in size. Left ventricular wall thickness is consistent with moderate concentric hypertrophy. All left ventricular wall segments contract normally. Left ventricular diastolic function was indeterminate. · The right ventricular cavity is moderately dilated. Normal right ventricular wall thickness noted. Moderately reduced right ventricular systolic function noted. · The left atrial cavity is severely dilated. · The right atrial cavity is severely dilated. · There is severe calcification of the aortic valve. Moderate aortic valve regurgitation is present. Mild aortic valve stenosis is present. Aortic valve mean pressure gradient is 13.3 mmHg. Aortic valve area is 1.7 cm2. Aortic valve dimensionless index is 0.30 . · Severe mitral annular calcification is present. There is mild, anterior mitral leaflet thickening present. There is severe, posterior mitral leaflet thickening present. Moderate to severe mitral valve regurgitation is present. Moderate mitral valve stenosis is present. The mitral valve peak gradient is 27 mmHg. The mitral valve mean gradient is 9 mmHg. Mean mitral gradient is 9 mmHg at a heart rate of 111 bpm. · Severe tricuspid valve regurgitation is present. Estimated right ventricular systolic pressure from tricuspid regurgitation is markedly elevated (>55 mmHg). Calculated right ventricular systolic pressure from tricuspid regurgitation is 57 mmHg. Pulmonary hypertension is likely underestimated utilizing RV systolic pressure due to concomitant findings of RV systolic dysfunction · Abdominal aorta = 4.3 cm Subcostal imaging is limited but from the views available may be a large abdominal aortic aneurysm. Images are quite limited and would consider dedicated imaging to assess further.      CT Angiogram Chest With &  Without Contrast    Result Date: 1/15/2022  Narrative: CT ANGIOGRAM CHEST W WO CONTRAST INDICATION: Preop planning for abdominal aortic aneurysm. TECHNIQUE: CT angiogram of the chest with 100 cc of IV contrast. 3-D reconstructions were obtained and reviewed.   Radiation dose reduction techniques included automated exposure control or exposure modulation based on body size. Count of known CT and cardiac nuc med studies performed in previous 12 months: 1. COMPARISON: November 29, 2021 FINDINGS: Contrast timing is appropriate for adequate sensitivity. The main pulmonary trunk is of normal caliber. No filling defects in the central, lobar, or segmental pulmonary arteries. No interventricular septal bowing or hepatic reflux of contrast to suggest right heart strain. Heart size is enlarged. No pericardial effusion. The aorta is non aneurysmal without evidence of dissection. Central airways are patent. No endobronchial lesions. The lungs demonstrate extensive bilateral groundglass infiltrates. There are atelectatic/infiltrative changes in the right lung base. Small right-sided pleural effusion. Trace amount of fluid on the left side. Imaging features can be seen with COVID-19 pneumonia, although are nonspecific and may occur with a variety of infectious and noninfectious processes. No threshold enlarged mediastinal, hilar or axillary lymph nodes. Regional osseous structures show no acute or aggressive bone lesions.     Impression: Impression: 1. No evidence of pulmonary embolus on this study. 2. Extensive bilateral groundglass infiltrates with atelectatic/infiltrative changes in the right lung base. Small bilateral pleural effusions. Imaging features can be seen with COVID-19 pneumonia, although are nonspecific and may occur with a variety of infectious and noninfectious processes. Signer Name: Deandre Guan MD  Signed: 1/15/2022 1:36 PM  Workstation Name: [id number]  [facility]    CT Abdomen  Pelvis With Contrast    Result Date: 1/15/2022  Narrative: CT Abdomen Pelvis W INDICATION: Acute on chronic diastolic heart failure. History of abdominal aortic aneurysm. TECHNIQUE: CT of the abdomen and pelvis with contrast. Coronal and sagittal reconstructions were obtained.  Radiation dose reduction techniques included automated exposure control or exposure modulation based on body size. Radiation audit for number of CT and nuclear cardiology exams performed in the last year: 1.  COMPARISON: No pertinent prior study FINDINGS: Abdomen: The liver shows evidence of diffuse hepatic steatosis. The gallbladder is surgically absent. Both kidneys show normal enhancement. No hydronephrosis. No splenic or pancreatic abnormalities. The adrenal glands are within normal limits. The abdominal aorta shows no evidence of aneurysmal dilatation. No threshold retroperitoneal adenopathy. Pelvis: The bowel pattern is nonobstructive. No free air is seen. No free fluid. Singh catheter demonstrated in the urinary bladder. Small atrophic uterus is retroflexed. Regional osseous structures show no acute or aggressive bone lesions.     Impression: Diffuse hepatic steatosis. No evidence of abdominal aortic aneurysm. Signer Name: Deandre Guan MD  Signed: 1/15/2022 1:42 PM  Workstation Name: [id number]  [facility]    XR Chest 1 View    Result Date: 1/14/2022  Narrative: CR Chest 1 Vw INDICATION: Ingestive heart failure with leg swelling COMPARISON:  11/29/2021 FINDINGS: Portable AP view(s) of the chest. The heart and mediastinum are stable with mild cardiomegaly again noted. In the lungs diffuse infiltrates are seen bilaterally, increased since the previous examination. The infiltrates are nonspecific. Interstitial edema is favored. There is a small pleural fluid volume bilaterally appearing similar to the previous exam.     Impression: Extensive bilateral interstitial infiltrates show worsening since the previous  examination in November. Probable interstitial edema. Heart size is unchanged. Signer Name: Raheem Carranza MD  Signed: 1/14/2022 4:54 PM  Workstation Name: [id number]  [facility] Renal Bilateral    Result Date: 1/18/2022  Narrative: BILATERAL RENAL ULTRASOUND, 01/18/2022  HISTORY: Elevated BUN and 28. Elevated creatinine 2.58.  TECHNIQUE: Grayscale ultrasound imaging of both kidneys and the urinary bladder was performed.  FINDINGS:  Bladder incompletely distended and not well visualized or assessed. The right kidney is nonobstructed. The right kidney measures 11.3 cm. There is an upper pole renal cyst on the right measuring about 10 mm. The left kidney measures 12.7 cm. No hydronephrosis. There is a cyst in the upper pole left kidney measuring up to 2.3 cm. There is no shadowing stone on either side.      Impression:  1. No hydronephrosis of either kidney or shadowing stone. 2. Bilateral renal cysts. 3. Bladder not well visualized or assessed.  This report was finalized on 1/18/2022 11:00 AM by Dr. Petr Jalloh MD.      Allergies:  is allergic to penicillins; anesthetics, amide; and latex.    Hans  No medications noted per report, reviewed by me    Discharge Medications: see discharge MAR    Last Lab Results:   Lab Results (most recent)     Procedure Component Value Units Date/Time    Basic Metabolic Panel [[id number]]  (Abnormal) Collected: 01/18/22 1337    Specimen: Blood Updated: 01/18/22 1354     Glucose 92 mg/dL      BUN 33 mg/dL      Creatinine 2.71 mg/dL      Sodium 133 mmol/L      Potassium 3.6 mmol/L      Chloride 86 mmol/L      CO2 37.0 mmol/L      Calcium 8.3 mg/dL      eGFR Non African Amer 18 mL/min/1.73      BUN/Creatinine Ratio 12.2     Anion Gap 10.0 mmol/L     Narrative:      GFR Normal >60  Chronic Kidney Disease <60  Kidney Failure <15      Protime-INR [[id number]]  (Abnormal) Collected: 01/18/22 0410    Specimen: Blood Updated: 01/18/22 0520     Protime 18.2  Seconds      INR 1.48    Narrative:      Therapeutic Ranges for INR: 2.0-3.0 (PT 20-30)                              2.5-3.5 (PT 25-34)    Comprehensive Metabolic Panel [[id number]]  (Abnormal) Collected: 01/18/22 0410    Specimen: Blood Updated: 01/18/22 0512     Glucose 122 mg/dL      BUN 28 mg/dL      Creatinine 2.58 mg/dL      Sodium 131 mmol/L      Potassium 4.0 mmol/L      Chloride 79 mmol/L      CO2 42.0 mmol/L      Calcium 9.5 mg/dL      Total Protein 7.9 g/dL      Albumin 3.50 g/dL      ALT (SGPT) 17 U/L      AST (SGOT) 16 U/L      Alkaline Phosphatase 61 U/L      Total Bilirubin 0.8 mg/dL      eGFR Non African Amer 19 mL/min/1.73      Globulin 4.4 gm/dL      A/G Ratio 0.8 g/dL      BUN/Creatinine Ratio 10.9     Anion Gap 10.0 mmol/L     Narrative:      GFR Normal >60  Chronic Kidney Disease <60  Kidney Failure <15      D-dimer, Quantitative [[id number]]  (Abnormal) Collected: 01/17/22 0358    Specimen: Blood Updated: 01/17/22 1132     D-Dimer, Quantitative 1.63 MCGFEU/mL     Narrative:      Can be elevated in, but is not diagnostic for deep vein thrombosis (DVT) or pulmonary embolis (PE).  It is also elevated in other medical conditions.  Clinical correlation is required.  The negative cut-off value for the D-Dimer is 0.50 mcg FEU/mL for DVT and PE.      Troponin [[id number]]  (Normal) Collected: 01/17/22 0358    Specimen: Blood Updated: 01/17/22 1126     Troponin T <0.010 ng/mL     Narrative:      Troponin T Reference Range:  <= 0.03 ng/mL-   Negative for AMI  >0.03 ng/mL-     Abnormal for myocardial necrosis.  Clinicians would have to utilize clinical acumen, EKG, Troponin and serial changes to determine if it is an Acute Myocardial Infarction or myocardial injury due to an underlying chronic condition.       Results may be falsely decreased if patient taking Biotin.      Basic Metabolic Panel [[id number]]  (Abnormal) Collected: 01/17/22 0358    Specimen: Blood Updated: 01/17/22 0529     Glucose 109 mg/dL       BUN 15 mg/dL      Creatinine 0.81 mg/dL      Sodium 135 mmol/L      Potassium 3.4 mmol/L      Chloride 82 mmol/L      CO2 45.8 mmol/L      Calcium 9.3 mg/dL      eGFR Non African Amer 72 mL/min/1.73      BUN/Creatinine Ratio 18.5     Anion Gap 7.2 mmol/L     Narrative:      GFR Normal >60  Chronic Kidney Disease <60  Kidney Failure <15      Magnesium [[id number]]  (Normal) Collected: 01/17/22 0358    Specimen: Blood Updated: 01/17/22 0529     Magnesium 1.6 mg/dL     Protime-INR [[id number]]  (Abnormal) Collected: 01/17/22 0358    Specimen: Blood Updated: 01/17/22 0511     Protime 18.2 Seconds      INR 1.48    Narrative:      Therapeutic Ranges for INR: 2.0-3.0 (PT 20-30)                              2.5-3.5 (PT 25-34)    CBC (No Diff) [[id number]]  (Abnormal) Collected: 01/17/22 0358    Specimen: Blood Updated: 01/17/22 0504     WBC 9.06 10*3/mm3      RBC 3.76 10*6/mm3      Hemoglobin 10.7 g/dL      Hematocrit 34.7 %      MCV 92.3 fL      MCH 28.5 pg      MCHC 30.8 g/dL      RDW 15.3 %      RDW-SD 52.6 fl      MPV 9.2 fL      Platelets 270 10*3/mm3     Magnesium [[id number]]  (Normal) Collected: 01/16/22 0423    Specimen: Blood Updated: 01/16/22 0751     Magnesium 1.8 mg/dL     Renal Function Panel [[id number]]  (Abnormal) Collected: 01/16/22 0423    Specimen: Blood Updated: 01/16/22 0500     Glucose 103 mg/dL      BUN 16 mg/dL      Creatinine 0.86 mg/dL      Sodium 136 mmol/L      Potassium 3.3 mmol/L      Chloride 88 mmol/L      CO2 40.2 mmol/L      Calcium 9.1 mg/dL      Albumin 3.30 g/dL      Phosphorus 3.9 mg/dL      Anion Gap 7.8 mmol/L      BUN/Creatinine Ratio 18.6     eGFR Non African Amer 68 mL/min/1.73     Narrative:      GFR Normal >60  Chronic Kidney Disease <60  Kidney Failure <15      CBC (No Diff) [[id number]]  (Abnormal) Collected: 01/16/22 0423    Specimen: Blood Updated: 01/16/22 0444     WBC 9.22 10*3/mm3      RBC 3.44 10*6/mm3      Hemoglobin 9.7 g/dL      Hematocrit 31.9 %      MCV 92.7 fL       MCH 28.2 pg      MCHC 30.4 g/dL      RDW 15.7 %      RDW-SD 53.6 fl      MPV 9.3 fL      Platelets 264 10*3/mm3     Respiratory Panel PCR w/COVID-19(SARS-CoV-2) [facility]/RUSS/MENDY/PAD/COR/MAD/AUDELIA In-House, NP Swab in UTM/VTM, 3-4 HR TAT - Swab, Nasopharynx [[id number]]  (Normal) Collected: 01/14/22 1646    Specimen: Swab from Nasopharynx Updated: 01/14/22 2009     ADENOVIRUS, PCR Not Detected     Coronavirus 229E Not Detected     Coronavirus HKU1 Not Detected     Coronavirus NL63 Not Detected     Coronavirus OC43 Not Detected     COVID19 Not Detected     Human Metapneumovirus Not Detected     Human Rhinovirus/Enterovirus Not Detected     Influenza A PCR Not Detected     Influenza B PCR Not Detected     Parainfluenza Virus 1 Not Detected     Parainfluenza Virus 2 Not Detected     Parainfluenza Virus 3 Not Detected     Parainfluenza Virus 4 Not Detected     RSV, PCR Not Detected     Bordetella pertussis pcr Not Detected     Bordetella parapertussis PCR Not Detected     Chlamydophila pneumoniae PCR Not Detected     Mycoplasma pneumo by PCR Not Detected    Narrative:      In the setting of a positive respiratory panel with a viral infection PLUS a negative procalcitonin without other underlying concern for bacterial infection, consider observing off antibiotics or discontinuation of antibiotics and continue supportive care. If the respiratory panel is positive for atypical bacterial infection (Bordetella pertussis, Chlamydophila pneumoniae, or Mycoplasma pneumoniae), consider antibiotic de-escalation to target atypical bacterial infection.    Procalcitonin [[id number]]  (Normal) Collected: 01/14/22 1627    Specimen: Blood Updated: 01/14/22 1731     Procalcitonin 0.04 ng/mL     D-dimer, Quantitative [[id number]]  (Abnormal) Collected: 01/14/22 1627    Specimen: Blood Updated: 01/14/22 1708     D-Dimer, Quantitative 1.94 MCGFEU/mL     Narrative:      Can be elevated in, but is not diagnostic for deep vein thrombosis (DVT) or  pulmonary embolis (PE).  It is also elevated in other medical conditions.  Clinical correlation is required.  The negative cut-off value for the D-Dimer is 0.50 mcg FEU/mL for DVT and PE.      Hemoglobin A1c [[id number]]  (Normal) Collected: 01/14/22 1627    Specimen: Blood Updated: 01/14/22 1701     Hemoglobin A1C 5.50 %     Narrative:      Hemoglobin A1C Ranges:    Increased Risk for Diabetes  5.7% to 6.4%  Diabetes                     >= 6.5%  Diabetic Goal                < 7.0%    TSH [[id number]]  (Normal) Collected: 01/14/22 1627    Specimen: Blood Updated: 01/14/22 1659     TSH 3.400 uIU/mL     BNP [[id number]]  (Abnormal) Collected: 01/14/22 1627    Specimen: Blood Updated: 01/14/22 1657     proBNP 1,595.0 pg/mL     Narrative:      Among patients with dyspnea, NT-proBNP is highly sensitive for the detection of acute congestive heart failure. In addition NT-proBNP of <300 pg/ml effectively rules out acute congestive heart failure with 99% negative predictive value.    Results may be falsely decreased if patient taking Biotin.      Troponin [[id number]]  (Normal) Collected: 01/14/22 1627    Specimen: Blood Updated: 01/14/22 1655     Troponin T <0.010 ng/mL     Narrative:      Troponin T Reference Range:  <= 0.03 ng/mL-   Negative for AMI  >0.03 ng/mL-     Abnormal for myocardial necrosis.  Clinicians would have to utilize clinical acumen, EKG, Troponin and serial changes to determine if it is an Acute Myocardial Infarction or myocardial injury due to an underlying chronic condition.       Results may be falsely decreased if patient taking Biotin.      Comprehensive Metabolic Panel [[id number]]  (Abnormal) Collected: 01/14/22 1627    Specimen: Blood Updated: 01/14/22 1648     Glucose 113 mg/dL      BUN 12 mg/dL      Creatinine 0.76 mg/dL      Sodium 138 mmol/L      Potassium 4.3 mmol/L      Chloride 100 mmol/L      CO2 27.0 mmol/L      Calcium 10.0 mg/dL      Total Protein 8.2 g/dL      Albumin 3.70 g/dL      ALT  (SGPT) 22 U/L      AST (SGOT) 23 U/L      Alkaline Phosphatase 70 U/L      Total Bilirubin 1.4 mg/dL      eGFR Non African Amer 78 mL/min/1.73      Globulin 4.5 gm/dL      A/G Ratio 0.8 g/dL      BUN/Creatinine Ratio 15.8     Anion Gap 11.0 mmol/L     Narrative:      GFR Normal >60  Chronic Kidney Disease <60  Kidney Failure <15      CBC & Differential [[id number]]  (Abnormal) Collected: 01/14/22 1627    Specimen: Blood Updated: 01/14/22 1644    Narrative:      The following orders were created for panel order CBC & Differential.  Procedure                               Abnormality         Status                     ---------                               -----------         ------                     CBC Auto Differential[[id number]]        Abnormal            Final result                 Please view results for these tests on the individual orders.    CBC Auto Differential [[id number]]  (Abnormal) Collected: 01/14/22 1627    Specimen: Blood Updated: 01/14/22 1644     WBC 11.22 10*3/mm3      RBC 3.85 10*6/mm3      Hemoglobin 10.9 g/dL      Hematocrit 36.8 %      MCV 95.6 fL      MCH 28.3 pg      MCHC 29.6 g/dL      RDW 16.1 %      RDW-SD 56.7 fl      MPV 9.4 fL      Platelets 297 10*3/mm3      Neutrophil % 79.8 %      Lymphocyte % 12.2 %      Monocyte % 5.6 %      Eosinophil % 1.3 %      Basophil % 0.8 %      Immature Grans % 0.3 %      Neutrophils, Absolute 8.95 10*3/mm3      Lymphocytes, Absolute 1.37 10*3/mm3      Monocytes, Absolute 0.63 10*3/mm3      Eosinophils, Absolute 0.15 10*3/mm3      Basophils, Absolute 0.09 10*3/mm3      Immature Grans, Absolute 0.03 10*3/mm3         Imaging Results (Most Recent)     Procedure Component Value Units Date/Time    US Renal Bilateral [[id number]] Collected: 01/18/22 1057     Updated: 01/18/22 1102    Narrative:      BILATERAL RENAL ULTRASOUND, 01/18/2022     HISTORY:  Elevated BUN and 28. Elevated creatinine 2.58.     TECHNIQUE:  Grayscale ultrasound imaging of both kidneys  and the urinary bladder was  performed.     FINDINGS:     Bladder incompletely distended and not well visualized or assessed. The  right kidney is nonobstructed. The right kidney measures 11.3 cm. There  is an upper pole renal cyst on the right measuring about 10 mm. The left  kidney measures 12.7 cm. No hydronephrosis. There is a cyst in the upper  pole left kidney measuring up to 2.3 cm. There is no shadowing stone on  either side.       Impression:         1. No hydronephrosis of either kidney or shadowing stone.  2. Bilateral renal cysts.  3. Bladder not well visualized or assessed.     This report was finalized on 1/18/2022 11:00 AM by Dr. Petr Jalloh MD.       CT Abdomen Pelvis With Contrast [[id number]] Collected: 01/15/22 1342     Updated: 01/15/22 1344    Narrative:      CT Abdomen Pelvis W    INDICATION:   Acute on chronic diastolic heart failure. History of abdominal aortic aneurysm.    TECHNIQUE:   CT of the abdomen and pelvis with contrast. Coronal and sagittal reconstructions were obtained.  Radiation dose reduction techniques included automated exposure control or exposure modulation based on body size. Radiation audit for number of CT and  nuclear cardiology exams performed in the last year: 1.      COMPARISON:   No pertinent prior study    FINDINGS:    Abdomen: The liver shows evidence of diffuse hepatic steatosis. The gallbladder is surgically absent. Both kidneys show normal enhancement. No hydronephrosis. No splenic or pancreatic abnormalities. The adrenal glands are within normal limits.    The abdominal aorta shows no evidence of aneurysmal dilatation. No threshold retroperitoneal adenopathy.    Pelvis: The bowel pattern is nonobstructive. No free air is seen. No free fluid. Singh catheter demonstrated in the urinary bladder. Small atrophic uterus is retroflexed.    Regional osseous structures show no acute or aggressive bone lesions.      Impression:        Diffuse hepatic steatosis.    No  evidence of abdominal aortic aneurysm.          Signer Name: Deandre Guan MD   Signed: 1/15/2022 1:42 PM   Workstation Name: [id number]    [facility]    CT Angiogram Chest With & Without Contrast [[id number]] Collected: 01/15/22 1336     Updated: 01/15/22 1338    Narrative:      CT ANGIOGRAM CHEST W WO CONTRAST    INDICATION:   Preop planning for abdominal aortic aneurysm.    TECHNIQUE:   CT angiogram of the chest with 100 cc of IV contrast. 3-D reconstructions were obtained and reviewed.   Radiation dose reduction techniques included automated exposure control or exposure modulation based on body size. Count of known CT and cardiac nuc  med studies performed in previous 12 months: 1.     COMPARISON:   November 29, 2021    FINDINGS:   Contrast timing is appropriate for adequate sensitivity.     The main pulmonary trunk is of normal caliber. No filling defects in the central, lobar, or segmental pulmonary arteries. No interventricular septal bowing or hepatic reflux of contrast to suggest right heart strain.    Heart size is enlarged. No pericardial effusion. The aorta is non aneurysmal without evidence of dissection.    Central airways are patent. No endobronchial lesions.    The lungs demonstrate extensive bilateral groundglass infiltrates. There are atelectatic/infiltrative changes in the right lung base. Small right-sided pleural effusion. Trace amount of fluid on the left side. Imaging features can be seen with COVID-19  pneumonia, although are nonspecific and may occur with a variety of infectious and noninfectious processes.    No threshold enlarged mediastinal, hilar or axillary lymph nodes.    Regional osseous structures show no acute or aggressive bone lesions.      Impression:      Impression:    1. No evidence of pulmonary embolus on this study.    2. Extensive bilateral groundglass infiltrates with atelectatic/infiltrative changes in the right lung base. Small bilateral pleural  effusions. Imaging features can be seen with COVID-19 pneumonia, although are nonspecific and may occur with a variety of  infectious and noninfectious processes.    Signer Name: Deandre Guan MD   Signed: 1/15/2022 1:36 PM   Workstation Name: [id number]    [facility]    XR Chest 1 View [[id number]] Collected: 01/14/22 1654     Updated: 01/14/22 1656    Narrative:      CR Chest 1 Vw    INDICATION:   Ingestive heart failure with leg swelling     COMPARISON:    11/29/2021    FINDINGS:  Portable AP view(s) of the chest.        The heart and mediastinum are stable with mild cardiomegaly again noted. In the lungs diffuse infiltrates are seen bilaterally, increased since the previous examination. The infiltrates are nonspecific. Interstitial edema is favored. There is a small  pleural fluid volume bilaterally appearing similar to the previous exam.       Impression:      Extensive bilateral interstitial infiltrates show worsening since the previous examination in November. Probable interstitial edema. Heart size is unchanged.    Signer Name: Raheem Carranza MD   Signed: 1/14/2022 4:54 PM   Workstation Name: [id number]    [facility]          PROCEDURES: none    Condition on Discharge:  guarded    Physical Exam at Discharge  Vital Signs  Temp:  [98 °F (36.7 °C)-99 °F (37.2 °C)] 98.9 °F (37.2 °C)  Heart Rate:  [] 84  Resp:  [16-24] 24  BP: ()/(42-72) 76/48   Body mass index is 30.32 kg/m².    Physical Exam  Vitals reviewed.   Constitutional:       General: She is not in acute distress.     Appearance: She is ill-appearing.      Comments: Lethargic, chronically ill appearance, appears older than stated age   HENT:      Head: Normocephalic and atraumatic.      Mouth/Throat:      Mouth: Mucous membranes are moist.   Eyes:      Extraocular Movements: Extraocular movements intact.      Pupils: Pupils are equal, round, and reactive to light.   Cardiovascular:      Rate  and Rhythm: Normal rate. Rhythm irregular.   Pulmonary:      Effort: Pulmonary effort is normal.      Comments: Diminished throughout, rhonchi upper lobes, Rales lower lobes  Abdominal:      General: Abdomen is flat. Bowel sounds are normal. There is no distension.      Palpations: Abdomen is soft.      Tenderness: There is no abdominal tenderness. There is no guarding.   Musculoskeletal:         General: No swelling.   Skin:     General: Skin is warm and dry.      Capillary Refill: Capillary refill takes less than 2 seconds.      Findings: No erythema.      Comments: Extensive tattooing of skin lower extremities   Neurological:      General: No focal deficit present.      Mental Status: She is oriented to person, place, and time.   Psychiatric:      Comments: Lethargic       Discharge Disposition  [facility]    Discharge Diet:      Dietary Orders (From admission, onward)     Start     Ordered    01/14/22 0289  Diet Regular; Cardiac  Diet Effective Now        Question Answer Comment   Diet Texture / Consistency Regular    Common Modifiers Cardiac        01/14/22 1559              Test Results Pending at Discharge: None     TOYA Steiner  01/18/22  14:21 EST    Time: Discharge Over 30 min (if over 30 minutes give explanation as to why it took greater than 30 minutes)  Secondary to:   Coordination of transfer  Medication reconciliation  D/W patient and specialists

## 2022-01-18 NOTE — SIGNIFICANT NOTE
01/18/22 1152   Vital Signs   BP (!) 76/48   BP Location Left arm   BP Method Automatic   Patient Position Lying       Primary RN aware

## 2022-01-18 NOTE — NURSING NOTE
Increased tachycardia and low BP noted prior to EMS transport.  Reported to Dr. Fields per EMS request.  No intervention ordered.  En route to [facility] now.    Deandre Strong RN

## 2022-01-19 ENCOUNTER — APPOINTMENT (OUTPATIENT)
Dept: CARDIOLOGY | Facility: HOSPITAL | Age: 62
End: 2022-01-19

## 2022-01-19 PROBLEM — I50.9 CONGESTIVE HEART FAILURE (CHF) (HCC): Status: ACTIVE | Noted: 2022-01-19

## 2022-01-19 LAB
ANION GAP SERPL CALCULATED.3IONS-SCNC: 6.5 MMOL/L (ref 5–15)
BUN SERPL-MCNC: 28 MG/DL (ref 6–20)
BUN/CREAT SERPL: 20 (ref 7–25)
CALCIUM SPEC-SCNC: 8.4 MG/DL (ref 8.6–10.5)
CHLORIDE SERPL-SCNC: 94 MMOL/L (ref 98–107)
CO2 SERPL-SCNC: 35.5 MMOL/L (ref 22–29)
CORTIS SERPL-MCNC: 11.3 MCG/DL
CREAT SERPL-MCNC: 1.4 MG/DL (ref 0.57–1)
GFR SERPL CREATININE-BSD FRML MDRD: 38 ML/MIN/1.73
GLUCOSE BLDC GLUCOMTR-MCNC: 101 MG/DL (ref 70–130)
GLUCOSE BLDC GLUCOMTR-MCNC: 115 MG/DL (ref 70–130)
GLUCOSE BLDC GLUCOMTR-MCNC: 117 MG/DL (ref 70–130)
GLUCOSE SERPL-MCNC: 106 MG/DL (ref 65–99)
INR PPP: 1.38 (ref 0.9–1.1)
POTASSIUM SERPL-SCNC: 3.8 MMOL/L (ref 3.5–5.2)
PROTHROMBIN TIME: 16.8 SECONDS (ref 11.7–14.2)
SODIUM SERPL-SCNC: 136 MMOL/L (ref 136–145)

## 2022-01-19 PROCEDURE — 94799 UNLISTED PULMONARY SVC/PX: CPT

## 2022-01-19 PROCEDURE — 93975 VASCULAR STUDY: CPT

## 2022-01-19 PROCEDURE — 82962 GLUCOSE BLOOD TEST: CPT

## 2022-01-19 PROCEDURE — 80048 BASIC METABOLIC PNL TOTAL CA: CPT | Performed by: INTERNAL MEDICINE

## 2022-01-19 PROCEDURE — 25010000002 PHENYLEPHRINE 10 MG/ML SOLUTION: Performed by: INTERNAL MEDICINE

## 2022-01-19 PROCEDURE — 94761 N-INVAS EAR/PLS OXIMETRY MLT: CPT

## 2022-01-19 PROCEDURE — 25010000002 DOBUTAMINE PER 250 MG: Performed by: INTERNAL MEDICINE

## 2022-01-19 PROCEDURE — 99232 SBSQ HOSP IP/OBS MODERATE 35: CPT | Performed by: INTERNAL MEDICINE

## 2022-01-19 PROCEDURE — 85610 PROTHROMBIN TIME: CPT | Performed by: INTERNAL MEDICINE

## 2022-01-19 RX ADMIN — MIDODRINE HYDROCHLORIDE 10 MG: 5 TABLET ORAL at 00:10

## 2022-01-19 RX ADMIN — BUDESONIDE AND FORMOTEROL FUMARATE DIHYDRATE 2 PUFF: 160; 4.5 AEROSOL RESPIRATORY (INHALATION) at 19:55

## 2022-01-19 RX ADMIN — DOBUTAMINE IN DEXTROSE 5 MCG/KG/MIN: 100 INJECTION, SOLUTION INTRAVENOUS at 08:25

## 2022-01-19 RX ADMIN — FAMOTIDINE 20 MG: 20 TABLET, FILM COATED ORAL at 06:36

## 2022-01-19 RX ADMIN — PHENYLEPHRINE HYDROCHLORIDE 2 MCG/KG/MIN: 10 INJECTION INTRAVENOUS at 10:09

## 2022-01-19 RX ADMIN — SODIUM CHLORIDE, PRESERVATIVE FREE 10 ML: 5 INJECTION INTRAVENOUS at 09:00

## 2022-01-19 RX ADMIN — MIDODRINE HYDROCHLORIDE 10 MG: 5 TABLET ORAL at 06:36

## 2022-01-19 RX ADMIN — DOBUTAMINE IN DEXTROSE 10 MCG/KG/MIN: 100 INJECTION, SOLUTION INTRAVENOUS at 14:34

## 2022-01-19 RX ADMIN — TIOTROPIUM BROMIDE INHALATION SPRAY 2 PUFF: 3.12 SPRAY, METERED RESPIRATORY (INHALATION) at 08:33

## 2022-01-19 RX ADMIN — PHENYLEPHRINE HYDROCHLORIDE 2.3 MCG/KG/MIN: 10 INJECTION INTRAVENOUS at 14:35

## 2022-01-19 RX ADMIN — PHENYLEPHRINE HYDROCHLORIDE 2.2 MCG/KG/MIN: 10 INJECTION INTRAVENOUS at 04:33

## 2022-01-19 RX ADMIN — DOBUTAMINE IN DEXTROSE 10 MCG/KG/MIN: 100 INJECTION, SOLUTION INTRAVENOUS at 03:18

## 2022-01-19 RX ADMIN — SODIUM CHLORIDE, PRESERVATIVE FREE 10 ML: 5 INJECTION INTRAVENOUS at 20:49

## 2022-01-19 RX ADMIN — PHENYLEPHRINE HYDROCHLORIDE 2.3 MCG/KG/MIN: 10 INJECTION INTRAVENOUS at 19:59

## 2022-01-19 RX ADMIN — MIDODRINE HYDROCHLORIDE 10 MG: 5 TABLET ORAL at 11:52

## 2022-01-19 RX ADMIN — BUDESONIDE AND FORMOTEROL FUMARATE DIHYDRATE 2 PUFF: 160; 4.5 AEROSOL RESPIRATORY (INHALATION) at 08:33

## 2022-01-19 RX ADMIN — FAMOTIDINE 20 MG: 20 TABLET, FILM COATED ORAL at 16:30

## 2022-01-19 RX ADMIN — MIDODRINE HYDROCHLORIDE 10 MG: 5 TABLET ORAL at 16:30

## 2022-01-19 RX ADMIN — DOBUTAMINE IN DEXTROSE 10 MCG/KG/MIN: 100 INJECTION, SOLUTION INTRAVENOUS at 20:54

## 2022-01-19 RX ADMIN — WARFARIN 5 MG: 5 TABLET ORAL at 17:20

## 2022-01-19 NOTE — CASE MANAGEMENT/SOCIAL WORK
Case Management Discharge Note      Final Note: Discharged to [facility]    Provided Post Acute Provider List?: Refused  Refused Provider List Comment: Pt declined  Provided Post Acute Provider Quality & Resource List?: Refused  Refused Quality and Resource List Comment: Pt declined    Selected Continued Care - Discharged on 1/18/2022 Admission date: 1/14/2022 - Discharge disposition: Short Term Hospital (DC - External)    Destination Coordination complete.    Service Provider Selected Services Address Phone Fax Patient Preferred    [facility]  Inpatient Rehabilitation [address] [address] [phone] -- --          Durable Medical Equipment    No services have been selected for the patient.              Dialysis/Infusion    No services have been selected for the patient.              Home Medical Care    No services have been selected for the patient.              Therapy    No services have been selected for the patient.              Community Resources    No services have been selected for the patient.              Community & DME    No services have been selected for the patient.                       Final Discharge Disposition Code: 02 - short term hospital for  care

## 2022-01-20 LAB
ANION GAP SERPL CALCULATED.3IONS-SCNC: 10.8 MMOL/L (ref 5–15)
BH CV ECHO MEAS - DIST REN A EDV LEFT: 32 CM/S
BH CV ECHO MEAS - DIST REN A PSV LEFT: 98 CM/S
BH CV ECHO MEAS - MID REN A EDV LEFT: 13 CM/S
BH CV ECHO MEAS - MID REN A PSV LEFT: 69 CM/S
BH CV VAS BP LEFT ARM: NORMAL MMHG
BH CV VAS RENAL AORTIC MID PSV: 106 CM/S
BH CV VAS RENAL HILUM LEFT EDV: 19 CM/S
BH CV VAS RENAL HILUM LEFT PSV: 88 CM/S
BH CV VAS RENAL HILUM RIGHT EDV: 12 CM/S
BH CV VAS RENAL HILUM RIGHT PSV: 93 CM/S
BH CV XLRA MEAS - KID L LEFT: 13.1 CM
BH CV XLRA MEAS DIST REN A EDV RIGHT: 24 CM/S
BH CV XLRA MEAS DIST REN A PSV RIGHT: 128 CM/S
BH CV XLRA MEAS KID L RIGHT: 11.9 CM
BH CV XLRA MEAS KID W RIGHT: 5.5 CM
BH CV XLRA MEAS MID REN A EDV RIGHT: 23 CM/S
BH CV XLRA MEAS MID REN A PSV RIGHT: 154 CM/S
BH CV XLRA MEAS PROX REN A EDV RIGHT: 26 CM/S
BH CV XLRA MEAS PROX REN A PSV RIGHT: 114 CM/S
BH CV XLRA MEAS RAR LEFT: 1.14
BH CV XLRA MEAS RAR RIGHT: 1.45
BH CV XLRA MEAS RENAL A ORG EDV RIGHT: 14 CM/S
BH CV XLRA MEAS RENAL A ORG PSV RIGHT: 102 CM/S
BUN SERPL-MCNC: 12 MG/DL (ref 6–20)
BUN/CREAT SERPL: 18.5 (ref 7–25)
CALCIUM SPEC-SCNC: 8.9 MG/DL (ref 8.6–10.5)
CHLORIDE SERPL-SCNC: 101 MMOL/L (ref 98–107)
CO2 SERPL-SCNC: 29.2 MMOL/L (ref 22–29)
CREAT SERPL-MCNC: 0.65 MG/DL (ref 0.57–1)
GFR SERPL CREATININE-BSD FRML MDRD: 93 ML/MIN/1.73
GLUCOSE BLDC GLUCOMTR-MCNC: 100 MG/DL (ref 70–130)
GLUCOSE BLDC GLUCOMTR-MCNC: 112 MG/DL (ref 70–130)
GLUCOSE SERPL-MCNC: 98 MG/DL (ref 65–99)
INR PPP: 1.39 (ref 0.9–1.1)
LEFT KIDNEY WIDTH: 6 CM
LEFT RENAL UPPER PARENCHYMA MAX: 19 CM/S
LEFT RENAL UPPER PARENCHYMA MIN: 5 CM/S
LEFT RENAL UPPER PARENCHYMA RI: 0.74
MAXIMAL PREDICTED HEART RATE: 161 BPM
POTASSIUM SERPL-SCNC: 3.9 MMOL/L (ref 3.5–5.2)
PROTHROMBIN TIME: 16.8 SECONDS (ref 11.7–14.2)
RIGHT RENAL UPPER PARENCHYMA MAX: 31 CM/S
RIGHT RENAL UPPER PARENCHYMA MIN: 8 CM/S
RIGHT RENAL UPPER PARENCHYMA RI: 0.74
SODIUM SERPL-SCNC: 141 MMOL/L (ref 136–145)
STRESS TARGET HR: 137 BPM

## 2022-01-20 PROCEDURE — 25010000002 PHENYLEPHRINE 10 MG/ML SOLUTION: Performed by: INTERNAL MEDICINE

## 2022-01-20 PROCEDURE — 85610 PROTHROMBIN TIME: CPT | Performed by: INTERNAL MEDICINE

## 2022-01-20 PROCEDURE — 99233 SBSQ HOSP IP/OBS HIGH 50: CPT | Performed by: INTERNAL MEDICINE

## 2022-01-20 PROCEDURE — 94799 UNLISTED PULMONARY SVC/PX: CPT

## 2022-01-20 PROCEDURE — 94761 N-INVAS EAR/PLS OXIMETRY MLT: CPT

## 2022-01-20 PROCEDURE — 25010000002 DOBUTAMINE PER 250 MG: Performed by: INTERNAL MEDICINE

## 2022-01-20 PROCEDURE — 80048 BASIC METABOLIC PNL TOTAL CA: CPT | Performed by: INTERNAL MEDICINE

## 2022-01-20 RX ORDER — FUROSEMIDE 20 MG/1
40 TABLET ORAL DAILY
Status: ON HOLD | COMMUNITY
End: 2022-01-31 | Stop reason: SDUPTHER

## 2022-01-20 RX ADMIN — DOBUTAMINE IN DEXTROSE 10 MCG/KG/MIN: 100 INJECTION, SOLUTION INTRAVENOUS at 07:37

## 2022-01-20 RX ADMIN — SODIUM CHLORIDE, PRESERVATIVE FREE 10 ML: 5 INJECTION INTRAVENOUS at 10:51

## 2022-01-20 RX ADMIN — BUDESONIDE AND FORMOTEROL FUMARATE DIHYDRATE 2 PUFF: 160; 4.5 AEROSOL RESPIRATORY (INHALATION) at 08:35

## 2022-01-20 RX ADMIN — MIDODRINE HYDROCHLORIDE 10 MG: 5 TABLET ORAL at 06:42

## 2022-01-20 RX ADMIN — MIDODRINE HYDROCHLORIDE 10 MG: 5 TABLET ORAL at 10:51

## 2022-01-20 RX ADMIN — FAMOTIDINE 20 MG: 20 TABLET, FILM COATED ORAL at 06:41

## 2022-01-20 RX ADMIN — TIOTROPIUM BROMIDE INHALATION SPRAY 2 PUFF: 3.12 SPRAY, METERED RESPIRATORY (INHALATION) at 08:35

## 2022-01-20 RX ADMIN — MIDODRINE HYDROCHLORIDE 10 MG: 5 TABLET ORAL at 06:13

## 2022-01-20 RX ADMIN — FAMOTIDINE 20 MG: 20 TABLET, FILM COATED ORAL at 19:13

## 2022-01-20 RX ADMIN — DOBUTAMINE IN DEXTROSE 10 MCG/KG/MIN: 100 INJECTION, SOLUTION INTRAVENOUS at 02:47

## 2022-01-20 RX ADMIN — FAMOTIDINE 20 MG: 20 TABLET, FILM COATED ORAL at 06:01

## 2022-01-20 RX ADMIN — BUDESONIDE AND FORMOTEROL FUMARATE DIHYDRATE 2 PUFF: 160; 4.5 AEROSOL RESPIRATORY (INHALATION) at 19:55

## 2022-01-20 RX ADMIN — WARFARIN 5 MG: 5 TABLET ORAL at 19:12

## 2022-01-20 RX ADMIN — MIDODRINE HYDROCHLORIDE 10 MG: 5 TABLET ORAL at 19:13

## 2022-01-20 RX ADMIN — PHENYLEPHRINE HYDROCHLORIDE 1.5 MCG/KG/MIN: 10 INJECTION INTRAVENOUS at 10:53

## 2022-01-20 RX ADMIN — PHENYLEPHRINE HYDROCHLORIDE 2.3 MCG/KG/MIN: 10 INJECTION INTRAVENOUS at 00:35

## 2022-01-20 RX ADMIN — SODIUM CHLORIDE, PRESERVATIVE FREE 10 ML: 5 INJECTION INTRAVENOUS at 20:22

## 2022-01-20 RX ADMIN — SODIUM CHLORIDE, PRESERVATIVE FREE 10 ML: 5 INJECTION INTRAVENOUS at 20:23

## 2022-01-21 ENCOUNTER — APPOINTMENT (OUTPATIENT)
Dept: GENERAL RADIOLOGY | Facility: HOSPITAL | Age: 62
End: 2022-01-21

## 2022-01-21 LAB
ANION GAP SERPL CALCULATED.3IONS-SCNC: 6.2 MMOL/L (ref 5–15)
BUN SERPL-MCNC: 14 MG/DL (ref 6–20)
BUN/CREAT SERPL: 23.7 (ref 7–25)
CALCIUM SPEC-SCNC: 9.1 MG/DL (ref 8.6–10.5)
CHLORIDE SERPL-SCNC: 102 MMOL/L (ref 98–107)
CO2 SERPL-SCNC: 31.8 MMOL/L (ref 22–29)
CREAT SERPL-MCNC: 0.59 MG/DL (ref 0.57–1)
GFR SERPL CREATININE-BSD FRML MDRD: 104 ML/MIN/1.73
GLUCOSE SERPL-MCNC: 113 MG/DL (ref 65–99)
HCT VFR BLDA CALC: 29 % (ref 38–51)
HCT VFR BLDA CALC: 31 % (ref 38–51)
HGB BLDA-MCNC: 10.5 G/DL (ref 12–17)
HGB BLDA-MCNC: 9.9 G/DL (ref 12–17)
INR PPP: 1.47 (ref 0.9–1.1)
POTASSIUM SERPL-SCNC: 3.9 MMOL/L (ref 3.5–5.2)
PROTHROMBIN TIME: 17.6 SECONDS (ref 11.7–14.2)
SAO2 % BLDA: 58 % (ref 95–98)
SAO2 % BLDA: 99 % (ref 95–98)
SODIUM SERPL-SCNC: 140 MMOL/L (ref 136–145)

## 2022-01-21 PROCEDURE — B2151ZZ FLUOROSCOPY OF LEFT HEART USING LOW OSMOLAR CONTRAST: ICD-10-PCS | Performed by: INTERNAL MEDICINE

## 2022-01-21 PROCEDURE — 99153 MOD SED SAME PHYS/QHP EA: CPT | Performed by: INTERNAL MEDICINE

## 2022-01-21 PROCEDURE — 85018 HEMOGLOBIN: CPT

## 2022-01-21 PROCEDURE — 25010000002 FUROSEMIDE PER 20 MG: Performed by: INTERNAL MEDICINE

## 2022-01-21 PROCEDURE — C1769 GUIDE WIRE: HCPCS | Performed by: INTERNAL MEDICINE

## 2022-01-21 PROCEDURE — 25010000002 METHYLPREDNISOLONE PER 125 MG: Performed by: INTERNAL MEDICINE

## 2022-01-21 PROCEDURE — 93460 R&L HRT ART/VENTRICLE ANGIO: CPT | Performed by: INTERNAL MEDICINE

## 2022-01-21 PROCEDURE — 85610 PROTHROMBIN TIME: CPT | Performed by: INTERNAL MEDICINE

## 2022-01-21 PROCEDURE — 99222 1ST HOSP IP/OBS MODERATE 55: CPT | Performed by: THORACIC SURGERY (CARDIOTHORACIC VASCULAR SURGERY)

## 2022-01-21 PROCEDURE — 25010000002 FENTANYL CITRATE (PF) 50 MCG/ML SOLUTION: Performed by: INTERNAL MEDICINE

## 2022-01-21 PROCEDURE — 99152 MOD SED SAME PHYS/QHP 5/>YRS: CPT | Performed by: INTERNAL MEDICINE

## 2022-01-21 PROCEDURE — 0 IOPAMIDOL PER 1 ML: Performed by: INTERNAL MEDICINE

## 2022-01-21 PROCEDURE — B2111ZZ FLUOROSCOPY OF MULTIPLE CORONARY ARTERIES USING LOW OSMOLAR CONTRAST: ICD-10-PCS | Performed by: INTERNAL MEDICINE

## 2022-01-21 PROCEDURE — 94660 CPAP INITIATION&MGMT: CPT

## 2022-01-21 PROCEDURE — 80048 BASIC METABOLIC PNL TOTAL CA: CPT | Performed by: INTERNAL MEDICINE

## 2022-01-21 PROCEDURE — 25010000002 DOBUTAMINE PER 250 MG: Performed by: INTERNAL MEDICINE

## 2022-01-21 PROCEDURE — 94799 UNLISTED PULMONARY SVC/PX: CPT

## 2022-01-21 PROCEDURE — 99232 SBSQ HOSP IP/OBS MODERATE 35: CPT | Performed by: INTERNAL MEDICINE

## 2022-01-21 PROCEDURE — 94761 N-INVAS EAR/PLS OXIMETRY MLT: CPT

## 2022-01-21 PROCEDURE — 85014 HEMATOCRIT: CPT

## 2022-01-21 PROCEDURE — 25010000002 MIDAZOLAM PER 1 MG: Performed by: INTERNAL MEDICINE

## 2022-01-21 PROCEDURE — 25010000002 HEPARIN (PORCINE) PER 1000 UNITS: Performed by: INTERNAL MEDICINE

## 2022-01-21 PROCEDURE — 4A023N8 MEASUREMENT OF CARDIAC SAMPLING AND PRESSURE, BILATERAL, PERCUTANEOUS APPROACH: ICD-10-PCS | Performed by: INTERNAL MEDICINE

## 2022-01-21 PROCEDURE — C1894 INTRO/SHEATH, NON-LASER: HCPCS | Performed by: INTERNAL MEDICINE

## 2022-01-21 PROCEDURE — 73501 X-RAY EXAM HIP UNI 1 VIEW: CPT

## 2022-01-21 RX ORDER — LIDOCAINE HYDROCHLORIDE 20 MG/ML
INJECTION, SOLUTION INFILTRATION; PERINEURAL AS NEEDED
Status: DISCONTINUED | OUTPATIENT
Start: 2022-01-21 | End: 2022-01-21 | Stop reason: HOSPADM

## 2022-01-21 RX ORDER — FUROSEMIDE 10 MG/ML
40 INJECTION INTRAMUSCULAR; INTRAVENOUS ONCE
Status: COMPLETED | OUTPATIENT
Start: 2022-01-21 | End: 2022-01-21

## 2022-01-21 RX ORDER — SODIUM CHLORIDE 9 MG/ML
INJECTION, SOLUTION INTRAVENOUS CONTINUOUS PRN
Status: COMPLETED | OUTPATIENT
Start: 2022-01-21 | End: 2022-01-21

## 2022-01-21 RX ORDER — MIDAZOLAM HYDROCHLORIDE 1 MG/ML
INJECTION INTRAMUSCULAR; INTRAVENOUS AS NEEDED
Status: DISCONTINUED | OUTPATIENT
Start: 2022-01-21 | End: 2022-01-21 | Stop reason: HOSPADM

## 2022-01-21 RX ORDER — METHYLPREDNISOLONE SODIUM SUCCINATE 125 MG/2ML
125 INJECTION, POWDER, LYOPHILIZED, FOR SOLUTION INTRAMUSCULAR; INTRAVENOUS ONCE
Status: COMPLETED | OUTPATIENT
Start: 2022-01-21 | End: 2022-01-21

## 2022-01-21 RX ORDER — FENTANYL CITRATE 50 UG/ML
INJECTION, SOLUTION INTRAMUSCULAR; INTRAVENOUS AS NEEDED
Status: DISCONTINUED | OUTPATIENT
Start: 2022-01-21 | End: 2022-01-21 | Stop reason: HOSPADM

## 2022-01-21 RX ORDER — ACETAMINOPHEN 325 MG/1
650 TABLET ORAL EVERY 4 HOURS PRN
Status: DISCONTINUED | OUTPATIENT
Start: 2022-01-21 | End: 2022-01-26

## 2022-01-21 RX ADMIN — MIDODRINE HYDROCHLORIDE 10 MG: 5 TABLET ORAL at 11:59

## 2022-01-21 RX ADMIN — SODIUM CHLORIDE, PRESERVATIVE FREE 10 ML: 5 INJECTION INTRAVENOUS at 20:14

## 2022-01-21 RX ADMIN — FAMOTIDINE 20 MG: 20 TABLET, FILM COATED ORAL at 17:25

## 2022-01-21 RX ADMIN — FUROSEMIDE 40 MG: 10 INJECTION, SOLUTION INTRAMUSCULAR; INTRAVENOUS at 09:19

## 2022-01-21 RX ADMIN — BUDESONIDE AND FORMOTEROL FUMARATE DIHYDRATE 2 PUFF: 160; 4.5 AEROSOL RESPIRATORY (INHALATION) at 05:25

## 2022-01-21 RX ADMIN — TIOTROPIUM BROMIDE INHALATION SPRAY 2 PUFF: 3.12 SPRAY, METERED RESPIRATORY (INHALATION) at 05:25

## 2022-01-21 RX ADMIN — BUDESONIDE AND FORMOTEROL FUMARATE DIHYDRATE 2 PUFF: 160; 4.5 AEROSOL RESPIRATORY (INHALATION) at 20:57

## 2022-01-21 RX ADMIN — MIDODRINE HYDROCHLORIDE 10 MG: 5 TABLET ORAL at 17:26

## 2022-01-21 RX ADMIN — METOPROLOL TARTRATE 2.5 MG: 5 INJECTION, SOLUTION INTRAVENOUS at 09:22

## 2022-01-21 RX ADMIN — DOBUTAMINE IN DEXTROSE 2.5 MCG/KG/MIN: 100 INJECTION, SOLUTION INTRAVENOUS at 01:46

## 2022-01-21 RX ADMIN — WARFARIN 5 MG: 5 TABLET ORAL at 17:27

## 2022-01-21 RX ADMIN — METHYLPREDNISOLONE SODIUM SUCCINATE 125 MG: 125 INJECTION, POWDER, FOR SOLUTION INTRAMUSCULAR; INTRAVENOUS at 09:27

## 2022-01-22 LAB
ANION GAP SERPL CALCULATED.3IONS-SCNC: 7.6 MMOL/L (ref 5–15)
BUN SERPL-MCNC: 19 MG/DL (ref 6–20)
BUN/CREAT SERPL: 31.1 (ref 7–25)
CALCIUM SPEC-SCNC: 9.1 MG/DL (ref 8.6–10.5)
CHLORIDE SERPL-SCNC: 99 MMOL/L (ref 98–107)
CO2 SERPL-SCNC: 32.4 MMOL/L (ref 22–29)
CREAT SERPL-MCNC: 0.61 MG/DL (ref 0.57–1)
GFR SERPL CREATININE-BSD FRML MDRD: 100 ML/MIN/1.73
GLUCOSE SERPL-MCNC: 107 MG/DL (ref 65–99)
INR PPP: 1.52 (ref 0.9–1.1)
POTASSIUM SERPL-SCNC: 4.3 MMOL/L (ref 3.5–5.2)
PROTHROMBIN TIME: 18.1 SECONDS (ref 11.7–14.2)
QT INTERVAL: 334 MS
SODIUM SERPL-SCNC: 139 MMOL/L (ref 136–145)

## 2022-01-22 PROCEDURE — 25010000002 PHENYLEPHRINE 10 MG/ML SOLUTION: Performed by: INTERNAL MEDICINE

## 2022-01-22 PROCEDURE — 93005 ELECTROCARDIOGRAM TRACING: CPT | Performed by: INTERNAL MEDICINE

## 2022-01-22 PROCEDURE — 99232 SBSQ HOSP IP/OBS MODERATE 35: CPT | Performed by: INTERNAL MEDICINE

## 2022-01-22 PROCEDURE — 93010 ELECTROCARDIOGRAM REPORT: CPT | Performed by: INTERNAL MEDICINE

## 2022-01-22 PROCEDURE — 82962 GLUCOSE BLOOD TEST: CPT

## 2022-01-22 PROCEDURE — 80048 BASIC METABOLIC PNL TOTAL CA: CPT | Performed by: INTERNAL MEDICINE

## 2022-01-22 PROCEDURE — 94799 UNLISTED PULMONARY SVC/PX: CPT

## 2022-01-22 PROCEDURE — 85610 PROTHROMBIN TIME: CPT | Performed by: INTERNAL MEDICINE

## 2022-01-22 PROCEDURE — 99024 POSTOP FOLLOW-UP VISIT: CPT | Performed by: THORACIC SURGERY (CARDIOTHORACIC VASCULAR SURGERY)

## 2022-01-22 RX ADMIN — MIDODRINE HYDROCHLORIDE 10 MG: 5 TABLET ORAL at 16:58

## 2022-01-22 RX ADMIN — SODIUM CHLORIDE, PRESERVATIVE FREE 10 ML: 5 INJECTION INTRAVENOUS at 08:54

## 2022-01-22 RX ADMIN — SODIUM CHLORIDE, PRESERVATIVE FREE 10 ML: 5 INJECTION INTRAVENOUS at 20:06

## 2022-01-22 RX ADMIN — MIDODRINE HYDROCHLORIDE 10 MG: 5 TABLET ORAL at 08:53

## 2022-01-22 RX ADMIN — MIDODRINE HYDROCHLORIDE 10 MG: 5 TABLET ORAL at 12:01

## 2022-01-22 RX ADMIN — BUDESONIDE AND FORMOTEROL FUMARATE DIHYDRATE 2 PUFF: 160; 4.5 AEROSOL RESPIRATORY (INHALATION) at 20:35

## 2022-01-22 RX ADMIN — WARFARIN 5 MG: 5 TABLET ORAL at 20:05

## 2022-01-22 RX ADMIN — FAMOTIDINE 20 MG: 20 TABLET, FILM COATED ORAL at 08:53

## 2022-01-22 RX ADMIN — TIOTROPIUM BROMIDE INHALATION SPRAY 2 PUFF: 3.12 SPRAY, METERED RESPIRATORY (INHALATION) at 09:44

## 2022-01-22 RX ADMIN — PHENYLEPHRINE HYDROCHLORIDE 0.3 MCG/KG/MIN: 10 INJECTION INTRAVENOUS at 05:36

## 2022-01-22 RX ADMIN — BUDESONIDE AND FORMOTEROL FUMARATE DIHYDRATE 2 PUFF: 160; 4.5 AEROSOL RESPIRATORY (INHALATION) at 09:44

## 2022-01-22 RX ADMIN — FAMOTIDINE 20 MG: 20 TABLET, FILM COATED ORAL at 16:57

## 2022-01-23 LAB
ANION GAP SERPL CALCULATED.3IONS-SCNC: 8.8 MMOL/L (ref 5–15)
BUN SERPL-MCNC: 24 MG/DL (ref 6–20)
BUN/CREAT SERPL: 37.5 (ref 7–25)
CALCIUM SPEC-SCNC: 9.3 MG/DL (ref 8.6–10.5)
CHLORIDE SERPL-SCNC: 101 MMOL/L (ref 98–107)
CO2 SERPL-SCNC: 31.2 MMOL/L (ref 22–29)
CREAT SERPL-MCNC: 0.64 MG/DL (ref 0.57–1)
GFR SERPL CREATININE-BSD FRML MDRD: 95 ML/MIN/1.73
GLUCOSE BLDC GLUCOMTR-MCNC: 106 MG/DL (ref 70–130)
GLUCOSE BLDC GLUCOMTR-MCNC: 121 MG/DL (ref 70–130)
GLUCOSE SERPL-MCNC: 97 MG/DL (ref 65–99)
INR PPP: 1.43 (ref 0.9–1.1)
POTASSIUM SERPL-SCNC: 4.2 MMOL/L (ref 3.5–5.2)
PROTHROMBIN TIME: 17.2 SECONDS (ref 11.7–14.2)
SODIUM SERPL-SCNC: 141 MMOL/L (ref 136–145)

## 2022-01-23 PROCEDURE — 94660 CPAP INITIATION&MGMT: CPT

## 2022-01-23 PROCEDURE — 94799 UNLISTED PULMONARY SVC/PX: CPT

## 2022-01-23 PROCEDURE — 85610 PROTHROMBIN TIME: CPT | Performed by: INTERNAL MEDICINE

## 2022-01-23 PROCEDURE — 82962 GLUCOSE BLOOD TEST: CPT

## 2022-01-23 PROCEDURE — 80048 BASIC METABOLIC PNL TOTAL CA: CPT | Performed by: INTERNAL MEDICINE

## 2022-01-23 PROCEDURE — 94761 N-INVAS EAR/PLS OXIMETRY MLT: CPT

## 2022-01-23 PROCEDURE — 99232 SBSQ HOSP IP/OBS MODERATE 35: CPT | Performed by: INTERNAL MEDICINE

## 2022-01-23 PROCEDURE — 25010000002 DIGOXIN PER 500 MCG: Performed by: INTERNAL MEDICINE

## 2022-01-23 PROCEDURE — 25010000002 ENOXAPARIN PER 10 MG: Performed by: INTERNAL MEDICINE

## 2022-01-23 RX ORDER — HYDROCODONE BITARTRATE AND ACETAMINOPHEN 5; 325 MG/1; MG/1
1 TABLET ORAL EVERY 6 HOURS PRN
Status: DISCONTINUED | OUTPATIENT
Start: 2022-01-23 | End: 2022-01-26

## 2022-01-23 RX ORDER — DIGOXIN 0.25 MG/ML
250 INJECTION INTRAMUSCULAR; INTRAVENOUS ONCE
Status: COMPLETED | OUTPATIENT
Start: 2022-01-23 | End: 2022-01-23

## 2022-01-23 RX ADMIN — FAMOTIDINE 20 MG: 20 TABLET, FILM COATED ORAL at 17:37

## 2022-01-23 RX ADMIN — SODIUM CHLORIDE, PRESERVATIVE FREE 10 ML: 5 INJECTION INTRAVENOUS at 20:35

## 2022-01-23 RX ADMIN — HYDROCODONE BITARTRATE AND ACETAMINOPHEN 1 TABLET: 5; 325 TABLET ORAL at 19:41

## 2022-01-23 RX ADMIN — BUDESONIDE AND FORMOTEROL FUMARATE DIHYDRATE 2 PUFF: 160; 4.5 AEROSOL RESPIRATORY (INHALATION) at 08:53

## 2022-01-23 RX ADMIN — BUDESONIDE AND FORMOTEROL FUMARATE DIHYDRATE 2 PUFF: 160; 4.5 AEROSOL RESPIRATORY (INHALATION) at 20:37

## 2022-01-23 RX ADMIN — SODIUM CHLORIDE, PRESERVATIVE FREE 10 ML: 5 INJECTION INTRAVENOUS at 08:26

## 2022-01-23 RX ADMIN — FAMOTIDINE 20 MG: 20 TABLET, FILM COATED ORAL at 08:04

## 2022-01-23 RX ADMIN — MIDODRINE HYDROCHLORIDE 10 MG: 5 TABLET ORAL at 17:37

## 2022-01-23 RX ADMIN — DIGOXIN 250 MCG: 0.25 INJECTION INTRAMUSCULAR; INTRAVENOUS at 02:53

## 2022-01-23 RX ADMIN — MIDODRINE HYDROCHLORIDE 10 MG: 5 TABLET ORAL at 07:59

## 2022-01-23 RX ADMIN — TIOTROPIUM BROMIDE INHALATION SPRAY 2 PUFF: 3.12 SPRAY, METERED RESPIRATORY (INHALATION) at 08:57

## 2022-01-23 RX ADMIN — HYDROCODONE BITARTRATE AND ACETAMINOPHEN 1 TABLET: 5; 325 TABLET ORAL at 12:09

## 2022-01-23 RX ADMIN — MIDODRINE HYDROCHLORIDE 10 MG: 5 TABLET ORAL at 11:19

## 2022-01-23 RX ADMIN — ENOXAPARIN SODIUM 90 MG: 100 INJECTION SUBCUTANEOUS at 20:35

## 2022-01-23 RX ADMIN — SODIUM CHLORIDE, PRESERVATIVE FREE 10 ML: 5 INJECTION INTRAVENOUS at 08:27

## 2022-01-24 ENCOUNTER — APPOINTMENT (OUTPATIENT)
Dept: CARDIOLOGY | Facility: HOSPITAL | Age: 62
End: 2022-01-24

## 2022-01-24 LAB
ABO GROUP BLD: NORMAL
ALBUMIN SERPL-MCNC: 3.3 G/DL (ref 3.5–5.2)
ALBUMIN/GLOB SERPL: 0.9 G/DL
ALP SERPL-CCNC: 57 U/L (ref 39–117)
ALT SERPL W P-5'-P-CCNC: 16 U/L (ref 1–33)
ANION GAP SERPL CALCULATED.3IONS-SCNC: 6.2 MMOL/L (ref 5–15)
ANION GAP SERPL CALCULATED.3IONS-SCNC: 9 MMOL/L (ref 5–15)
APTT PPP: 29.3 SECONDS (ref 22.7–35.4)
APTT PPP: 42.3 SECONDS (ref 22.7–35.4)
ARTERIAL PATENCY WRIST A: POSITIVE
AST SERPL-CCNC: 16 U/L (ref 1–32)
ATMOSPHERIC PRESS: 744.8 MMHG
BACTERIA UR QL AUTO: ABNORMAL /HPF
BASE EXCESS BLDA CALC-SCNC: 7.6 MMOL/L (ref 0–2)
BASOPHILS # BLD AUTO: 0.07 10*3/MM3 (ref 0–0.2)
BASOPHILS NFR BLD AUTO: 1 % (ref 0–1.5)
BDY SITE: ABNORMAL
BH CV XLRA MEAS LEFT DIST CCA EDV: 23.6 CM/SEC
BH CV XLRA MEAS LEFT DIST CCA PSV: 84 CM/SEC
BH CV XLRA MEAS LEFT DIST ICA EDV: 23.2 CM/SEC
BH CV XLRA MEAS LEFT DIST ICA PSV: 73 CM/SEC
BH CV XLRA MEAS LEFT ICA/CCA RATIO: 0.87
BH CV XLRA MEAS LEFT MID ICA EDV: 22 CM/SEC
BH CV XLRA MEAS LEFT MID ICA PSV: 65.9 CM/SEC
BH CV XLRA MEAS LEFT PROX CCA EDV: 17.7 CM/SEC
BH CV XLRA MEAS LEFT PROX CCA PSV: 87.5 CM/SEC
BH CV XLRA MEAS LEFT PROX ECA EDV: 8.85 CM/SEC
BH CV XLRA MEAS LEFT PROX ECA PSV: 83.1 CM/SEC
BH CV XLRA MEAS LEFT PROX ICA EDV: 25 CM/SEC
BH CV XLRA MEAS LEFT PROX ICA PSV: 58.4 CM/SEC
BH CV XLRA MEAS LEFT PROX SCLA PSV: 82.3 CM/SEC
BH CV XLRA MEAS LEFT VERTEBRAL A EDV: 18 CM/SEC
BH CV XLRA MEAS LEFT VERTEBRAL A PSV: 59.7 CM/SEC
BH CV XLRA MEAS RIGHT DIST CCA EDV: 17.6 CM/SEC
BH CV XLRA MEAS RIGHT DIST CCA PSV: 58.7 CM/SEC
BH CV XLRA MEAS RIGHT DIST ICA EDV: 18.2 CM/SEC
BH CV XLRA MEAS RIGHT DIST ICA PSV: 59.9 CM/SEC
BH CV XLRA MEAS RIGHT ICA/CCA RATIO: 1.19
BH CV XLRA MEAS RIGHT MID ICA EDV: 20.3 CM/SEC
BH CV XLRA MEAS RIGHT MID ICA PSV: 66.4 CM/SEC
BH CV XLRA MEAS RIGHT PROX CCA EDV: 20.9 CM/SEC
BH CV XLRA MEAS RIGHT PROX CCA PSV: 63.7 CM/SEC
BH CV XLRA MEAS RIGHT PROX ECA EDV: 7.68 CM/SEC
BH CV XLRA MEAS RIGHT PROX ECA PSV: 52.7 CM/SEC
BH CV XLRA MEAS RIGHT PROX ICA EDV: 14.3 CM/SEC
BH CV XLRA MEAS RIGHT PROX ICA PSV: 69.7 CM/SEC
BH CV XLRA MEAS RIGHT PROX SCLA PSV: 122 CM/SEC
BH CV XLRA MEAS RIGHT VERTEBRAL A EDV: 8.34 CM/SEC
BH CV XLRA MEAS RIGHT VERTEBRAL A PSV: 39.7 CM/SEC
BILIRUB SERPL-MCNC: 0.6 MG/DL (ref 0–1.2)
BILIRUB UR QL STRIP: NEGATIVE
BLD GP AB SCN SERPL QL: NEGATIVE
BUN SERPL-MCNC: 14 MG/DL (ref 6–20)
BUN SERPL-MCNC: 21 MG/DL (ref 6–20)
BUN/CREAT SERPL: 17.1 (ref 7–25)
BUN/CREAT SERPL: 31.3 (ref 7–25)
CALCIUM SPEC-SCNC: 8.7 MG/DL (ref 8.6–10.5)
CALCIUM SPEC-SCNC: 9.2 MG/DL (ref 8.6–10.5)
CHLORIDE SERPL-SCNC: 100 MMOL/L (ref 98–107)
CHLORIDE SERPL-SCNC: 100 MMOL/L (ref 98–107)
CHOLEST SERPL-MCNC: 146 MG/DL (ref 0–200)
CLARITY UR: CLEAR
CLOSE TME COLL+ADP + EPINEP PNL BLD: 71 % (ref 86–100)
CO2 SERPL-SCNC: 30.8 MMOL/L (ref 22–29)
CO2 SERPL-SCNC: 31 MMOL/L (ref 22–29)
COLOR UR: YELLOW
CREAT SERPL-MCNC: 0.67 MG/DL (ref 0.57–1)
CREAT SERPL-MCNC: 0.82 MG/DL (ref 0.57–1)
DEPRECATED RDW RBC AUTO: 46.3 FL (ref 37–54)
EOSINOPHIL # BLD AUTO: 0.59 10*3/MM3 (ref 0–0.4)
EOSINOPHIL NFR BLD AUTO: 8.8 % (ref 0.3–6.2)
ERYTHROCYTE [DISTWIDTH] IN BLOOD BY AUTOMATED COUNT: 14.3 % (ref 12.3–15.4)
GAS FLOW AIRWAY: 6 LPM
GFR SERPL CREATININE-BSD FRML MDRD: 71 ML/MIN/1.73
GFR SERPL CREATININE-BSD FRML MDRD: 90 ML/MIN/1.73
GLOBULIN UR ELPH-MCNC: 3.5 GM/DL
GLUCOSE SERPL-MCNC: 102 MG/DL (ref 65–99)
GLUCOSE SERPL-MCNC: 130 MG/DL (ref 65–99)
GLUCOSE UR STRIP-MCNC: NEGATIVE MG/DL
HBA1C MFR BLD: 5.71 % (ref 4.8–5.6)
HCO3 BLDA-SCNC: 33.9 MMOL/L (ref 22–28)
HCT VFR BLD AUTO: 30.1 % (ref 34–46.6)
HDLC SERPL-MCNC: 35 MG/DL (ref 40–60)
HGB BLD-MCNC: 9.3 G/DL (ref 12–15.9)
HGB UR QL STRIP.AUTO: ABNORMAL
HYALINE CASTS UR QL AUTO: ABNORMAL /LPF
IMM GRANULOCYTES # BLD AUTO: 0.02 10*3/MM3 (ref 0–0.05)
IMM GRANULOCYTES NFR BLD AUTO: 0.3 % (ref 0–0.5)
INR PPP: 1.32 (ref 0.9–1.1)
INR PPP: 1.48 (ref 0.9–1.1)
KETONES UR QL STRIP: NEGATIVE
LDLC SERPL CALC-MCNC: 89 MG/DL (ref 0–100)
LDLC/HDLC SERPL: 2.46 {RATIO}
LEFT ARM BP: NORMAL MMHG
LEUKOCYTE ESTERASE UR QL STRIP.AUTO: NEGATIVE
LYMPHOCYTES # BLD AUTO: 1.6 10*3/MM3 (ref 0.7–3.1)
LYMPHOCYTES NFR BLD AUTO: 24 % (ref 19.6–45.3)
MAGNESIUM SERPL-MCNC: 1.5 MG/DL (ref 1.6–2.6)
MAXIMAL PREDICTED HEART RATE: 161 BPM
MCH RBC QN AUTO: 27.7 PG (ref 26.6–33)
MCHC RBC AUTO-ENTMCNC: 30.9 G/DL (ref 31.5–35.7)
MCV RBC AUTO: 89.6 FL (ref 79–97)
MODALITY: ABNORMAL
MONOCYTES # BLD AUTO: 0.74 10*3/MM3 (ref 0.1–0.9)
MONOCYTES NFR BLD AUTO: 11.1 % (ref 5–12)
NEUTROPHILS NFR BLD AUTO: 3.65 10*3/MM3 (ref 1.7–7)
NEUTROPHILS NFR BLD AUTO: 54.8 % (ref 42.7–76)
NITRITE UR QL STRIP: NEGATIVE
NRBC BLD AUTO-RTO: 0 /100 WBC (ref 0–0.2)
NT-PROBNP SERPL-MCNC: 2237 PG/ML (ref 0–900)
PCO2 BLDA: 55.8 MM HG (ref 35–45)
PH BLDA: 7.39 PH UNITS (ref 7.35–7.45)
PH UR STRIP.AUTO: 7.5 [PH] (ref 5–8)
PLATELET # BLD AUTO: 256 10*3/MM3 (ref 140–450)
PMV BLD AUTO: 9.7 FL (ref 6–12)
PO2 BLDA: 67.2 MM HG (ref 80–100)
POTASSIUM SERPL-SCNC: 4.4 MMOL/L (ref 3.5–5.2)
POTASSIUM SERPL-SCNC: 4.5 MMOL/L (ref 3.5–5.2)
PROT SERPL-MCNC: 6.8 G/DL (ref 6–8.5)
PROT UR QL STRIP: NEGATIVE
PROTHROMBIN TIME: 16.2 SECONDS (ref 11.7–14.2)
PROTHROMBIN TIME: 17.7 SECONDS (ref 11.7–14.2)
RBC # BLD AUTO: 3.36 10*6/MM3 (ref 3.77–5.28)
RBC # UR STRIP: ABNORMAL /HPF
REF LAB TEST METHOD: ABNORMAL
RH BLD: POSITIVE
RIGHT ARM BP: NORMAL MMHG
SAO2 % BLDCOA: 92.3 % (ref 92–99)
SARS-COV-2 ORF1AB RESP QL NAA+PROBE: NOT DETECTED
SODIUM SERPL-SCNC: 137 MMOL/L (ref 136–145)
SODIUM SERPL-SCNC: 140 MMOL/L (ref 136–145)
SP GR UR STRIP: 1.01 (ref 1–1.03)
SQUAMOUS #/AREA URNS HPF: ABNORMAL /HPF
STRESS TARGET HR: 137 BPM
T&S EXPIRATION DATE: NORMAL
TOTAL RATE: 24 BREATHS/MINUTE
TRIGL SERPL-MCNC: 124 MG/DL (ref 0–150)
UROBILINOGEN UR QL STRIP: ABNORMAL
VLDLC SERPL-MCNC: 22 MG/DL (ref 5–40)
WBC # UR STRIP: ABNORMAL /HPF
WBC NRBC COR # BLD: 6.67 10*3/MM3 (ref 3.4–10.8)

## 2022-01-24 PROCEDURE — 94799 UNLISTED PULMONARY SVC/PX: CPT

## 2022-01-24 PROCEDURE — 86900 BLOOD TYPING SEROLOGIC ABO: CPT | Performed by: NURSE PRACTITIONER

## 2022-01-24 PROCEDURE — 85610 PROTHROMBIN TIME: CPT | Performed by: NURSE PRACTITIONER

## 2022-01-24 PROCEDURE — 80053 COMPREHEN METABOLIC PANEL: CPT | Performed by: INTERNAL MEDICINE

## 2022-01-24 PROCEDURE — 25010000002 HEPARIN (PORCINE) 25000-0.45 UT/250ML-% SOLUTION: Performed by: NURSE PRACTITIONER

## 2022-01-24 PROCEDURE — 99024 POSTOP FOLLOW-UP VISIT: CPT | Performed by: THORACIC SURGERY (CARDIOTHORACIC VASCULAR SURGERY)

## 2022-01-24 PROCEDURE — 93880 EXTRACRANIAL BILAT STUDY: CPT

## 2022-01-24 PROCEDURE — 86923 COMPATIBILITY TEST ELECTRIC: CPT

## 2022-01-24 PROCEDURE — 85730 THROMBOPLASTIN TIME PARTIAL: CPT | Performed by: NURSE PRACTITIONER

## 2022-01-24 PROCEDURE — 85610 PROTHROMBIN TIME: CPT | Performed by: INTERNAL MEDICINE

## 2022-01-24 PROCEDURE — 86850 RBC ANTIBODY SCREEN: CPT | Performed by: NURSE PRACTITIONER

## 2022-01-24 PROCEDURE — 85025 COMPLETE CBC W/AUTO DIFF WBC: CPT | Performed by: NURSE PRACTITIONER

## 2022-01-24 PROCEDURE — 82803 BLOOD GASES ANY COMBINATION: CPT

## 2022-01-24 PROCEDURE — 94660 CPAP INITIATION&MGMT: CPT

## 2022-01-24 PROCEDURE — 36600 WITHDRAWAL OF ARTERIAL BLOOD: CPT

## 2022-01-24 PROCEDURE — 83036 HEMOGLOBIN GLYCOSYLATED A1C: CPT | Performed by: NURSE PRACTITIONER

## 2022-01-24 PROCEDURE — 83735 ASSAY OF MAGNESIUM: CPT | Performed by: NURSE PRACTITIONER

## 2022-01-24 PROCEDURE — 81001 URINALYSIS AUTO W/SCOPE: CPT | Performed by: NURSE PRACTITIONER

## 2022-01-24 PROCEDURE — 83880 ASSAY OF NATRIURETIC PEPTIDE: CPT | Performed by: NURSE PRACTITIONER

## 2022-01-24 PROCEDURE — 99233 SBSQ HOSP IP/OBS HIGH 50: CPT | Performed by: INTERNAL MEDICINE

## 2022-01-24 PROCEDURE — 86901 BLOOD TYPING SEROLOGIC RH(D): CPT

## 2022-01-24 PROCEDURE — 85576 BLOOD PLATELET AGGREGATION: CPT | Performed by: NURSE PRACTITIONER

## 2022-01-24 PROCEDURE — 80061 LIPID PANEL: CPT | Performed by: NURSE PRACTITIONER

## 2022-01-24 PROCEDURE — 86900 BLOOD TYPING SEROLOGIC ABO: CPT

## 2022-01-24 PROCEDURE — 86901 BLOOD TYPING SEROLOGIC RH(D): CPT | Performed by: NURSE PRACTITIONER

## 2022-01-24 PROCEDURE — U0004 COV-19 TEST NON-CDC HGH THRU: HCPCS | Performed by: INTERNAL MEDICINE

## 2022-01-24 PROCEDURE — 94761 N-INVAS EAR/PLS OXIMETRY MLT: CPT

## 2022-01-24 RX ORDER — ALBUTEROL SULFATE 2.5 MG/3ML
2.5 SOLUTION RESPIRATORY (INHALATION) ONCE AS NEEDED
Status: COMPLETED | OUTPATIENT
Start: 2022-01-24 | End: 2022-01-25

## 2022-01-24 RX ORDER — CHLORHEXIDINE GLUCONATE 500 MG/1
1 CLOTH TOPICAL EVERY 12 HOURS
Status: COMPLETED | OUTPATIENT
Start: 2022-01-25 | End: 2022-01-26

## 2022-01-24 RX ORDER — CHLORHEXIDINE GLUCONATE 0.12 MG/ML
15 RINSE ORAL EVERY 12 HOURS SCHEDULED
Status: DISCONTINUED | OUTPATIENT
Start: 2022-01-25 | End: 2022-01-26

## 2022-01-24 RX ORDER — HEPARIN SODIUM 10000 [USP'U]/100ML
11 INJECTION, SOLUTION INTRAVENOUS
Status: DISCONTINUED | OUTPATIENT
Start: 2022-01-24 | End: 2022-01-26

## 2022-01-24 RX ADMIN — BUDESONIDE AND FORMOTEROL FUMARATE DIHYDRATE 2 PUFF: 160; 4.5 AEROSOL RESPIRATORY (INHALATION) at 08:17

## 2022-01-24 RX ADMIN — SODIUM CHLORIDE, PRESERVATIVE FREE 10 ML: 5 INJECTION INTRAVENOUS at 20:46

## 2022-01-24 RX ADMIN — MIDODRINE HYDROCHLORIDE 10 MG: 5 TABLET ORAL at 17:01

## 2022-01-24 RX ADMIN — SODIUM CHLORIDE, PRESERVATIVE FREE 10 ML: 5 INJECTION INTRAVENOUS at 07:47

## 2022-01-24 RX ADMIN — SODIUM CHLORIDE, PRESERVATIVE FREE 10 ML: 5 INJECTION INTRAVENOUS at 10:25

## 2022-01-24 RX ADMIN — MIDODRINE HYDROCHLORIDE 10 MG: 5 TABLET ORAL at 13:03

## 2022-01-24 RX ADMIN — HYDROCODONE BITARTRATE AND ACETAMINOPHEN 1 TABLET: 5; 325 TABLET ORAL at 15:50

## 2022-01-24 RX ADMIN — TIOTROPIUM BROMIDE INHALATION SPRAY 2 PUFF: 3.12 SPRAY, METERED RESPIRATORY (INHALATION) at 08:18

## 2022-01-24 RX ADMIN — MIDODRINE HYDROCHLORIDE 10 MG: 5 TABLET ORAL at 07:46

## 2022-01-24 RX ADMIN — MUPIROCIN 1 APPLICATION: 20 OINTMENT TOPICAL at 18:47

## 2022-01-24 RX ADMIN — FAMOTIDINE 20 MG: 20 TABLET, FILM COATED ORAL at 07:46

## 2022-01-24 RX ADMIN — SODIUM CHLORIDE, PRESERVATIVE FREE 10 ML: 5 INJECTION INTRAVENOUS at 10:26

## 2022-01-24 RX ADMIN — BUDESONIDE AND FORMOTEROL FUMARATE DIHYDRATE 2 PUFF: 160; 4.5 AEROSOL RESPIRATORY (INHALATION) at 20:45

## 2022-01-24 RX ADMIN — HYDROCODONE BITARTRATE AND ACETAMINOPHEN 1 TABLET: 5; 325 TABLET ORAL at 04:10

## 2022-01-24 RX ADMIN — FAMOTIDINE 20 MG: 20 TABLET, FILM COATED ORAL at 17:01

## 2022-01-24 RX ADMIN — METOPROLOL TARTRATE 5 MG: 5 INJECTION, SOLUTION INTRAVENOUS at 01:49

## 2022-01-24 RX ADMIN — HEPARIN SODIUM 11 UNITS/KG/HR: 10000 INJECTION, SOLUTION INTRAVENOUS at 15:53

## 2022-01-24 RX ADMIN — SODIUM CHLORIDE, PRESERVATIVE FREE 10 ML: 5 INJECTION INTRAVENOUS at 07:46

## 2022-01-25 ENCOUNTER — APPOINTMENT (OUTPATIENT)
Dept: RESPIRATORY THERAPY | Facility: HOSPITAL | Age: 62
End: 2022-01-25

## 2022-01-25 ENCOUNTER — APPOINTMENT (OUTPATIENT)
Dept: CARDIOLOGY | Facility: HOSPITAL | Age: 62
End: 2022-01-25

## 2022-01-25 LAB
ANION GAP SERPL CALCULATED.3IONS-SCNC: 7.9 MMOL/L (ref 5–15)
APTT PPP: 50.3 SECONDS (ref 22.7–35.4)
APTT PPP: 72.2 SECONDS (ref 22.7–35.4)
APTT PPP: 74.5 SECONDS (ref 22.7–35.4)
ASCENDING AORTA: 3.3 CM
BASOPHILS # BLD AUTO: 0.11 10*3/MM3 (ref 0–0.2)
BASOPHILS NFR BLD AUTO: 1.6 % (ref 0–1.5)
BH CV ECHO MEAS - ASC AORTA: 3.3 CM
BH CV ECHO MEAS - BSA(HAYCOCK): 2.1 M^2
BH CV ECHO MEAS - BSA: 2 M^2
BH CV ECHO MEAS - BZI_BMI: 32.4 KILOGRAMS/M^2
BH CV ECHO MEAS - BZI_METRIC_HEIGHT: 167.6 CM
BH CV ECHO MEAS - BZI_METRIC_WEIGHT: 91.2 KG
BH CV ECHO MEAS - LVOT AREA (M): 3.1 CM^2
BH CV ECHO MEAS - LVOT AREA: 3.1 CM^2
BH CV ECHO MEAS - LVOT DIAM: 2 CM
BH CV ECHO MEAS - MR MAX PG: 144.1 MMHG
BH CV ECHO MEAS - MR MAX VEL: 600.2 CM/SEC
BH CV ECHO MEAS - MV MAX PG: 17.6 MMHG
BH CV ECHO MEAS - MV MEAN PG: 12 MMHG
BH CV ECHO MEAS - MV V2 MAX: 210 CM/SEC
BH CV ECHO MEAS - MV V2 MEAN: 148.9 CM/SEC
BH CV ECHO MEAS - MV V2 VTI: 29.1 CM
BH CV ECHO MEAS - RAP SYSTOLE: 8 MMHG
BH CV ECHO MEAS - RVSP: 72 MMHG
BH CV ECHO MEAS - TR MAX VEL: 399 CM/SEC
BUN SERPL-MCNC: 17 MG/DL (ref 6–20)
BUN/CREAT SERPL: 28.8 (ref 7–25)
CALCIUM SPEC-SCNC: 9 MG/DL (ref 8.6–10.5)
CHLORIDE SERPL-SCNC: 99 MMOL/L (ref 98–107)
CO2 SERPL-SCNC: 31.1 MMOL/L (ref 22–29)
CREAT SERPL-MCNC: 0.59 MG/DL (ref 0.57–1)
DEPRECATED RDW RBC AUTO: 48.7 FL (ref 37–54)
EOSINOPHIL # BLD AUTO: 0.68 10*3/MM3 (ref 0–0.4)
EOSINOPHIL NFR BLD AUTO: 10 % (ref 0.3–6.2)
ERYTHROCYTE [DISTWIDTH] IN BLOOD BY AUTOMATED COUNT: 14.5 % (ref 12.3–15.4)
GFR SERPL CREATININE-BSD FRML MDRD: 104 ML/MIN/1.73
GLUCOSE BLDC GLUCOMTR-MCNC: 278 MG/DL (ref 70–130)
GLUCOSE BLDC GLUCOMTR-MCNC: 95 MG/DL (ref 70–130)
GLUCOSE SERPL-MCNC: 98 MG/DL (ref 65–99)
HCT VFR BLD AUTO: 30.7 % (ref 34–46.6)
HGB BLD-MCNC: 9.4 G/DL (ref 12–15.9)
IMM GRANULOCYTES # BLD AUTO: 0.05 10*3/MM3 (ref 0–0.05)
IMM GRANULOCYTES NFR BLD AUTO: 0.7 % (ref 0–0.5)
INR PPP: 1.22 (ref 0.9–1.1)
LYMPHOCYTES # BLD AUTO: 1.86 10*3/MM3 (ref 0.7–3.1)
LYMPHOCYTES NFR BLD AUTO: 27.4 % (ref 19.6–45.3)
MAXIMAL PREDICTED HEART RATE: 161 BPM
MCH RBC QN AUTO: 27.9 PG (ref 26.6–33)
MCHC RBC AUTO-ENTMCNC: 30.6 G/DL (ref 31.5–35.7)
MCV RBC AUTO: 91.1 FL (ref 79–97)
MONOCYTES # BLD AUTO: 0.86 10*3/MM3 (ref 0.1–0.9)
MONOCYTES NFR BLD AUTO: 12.6 % (ref 5–12)
MR PISA EROA: 0.25 CM2
NEUTROPHILS NFR BLD AUTO: 3.24 10*3/MM3 (ref 1.7–7)
NEUTROPHILS NFR BLD AUTO: 47.7 % (ref 42.7–76)
NRBC BLD AUTO-RTO: 0 /100 WBC (ref 0–0.2)
PISA ALIASING VEL: 31 M/S
PISA RADIUS: 0.9 CM
PLATELET # BLD AUTO: 285 10*3/MM3 (ref 140–450)
PMV BLD AUTO: 10 FL (ref 6–12)
POTASSIUM SERPL-SCNC: 4.5 MMOL/L (ref 3.5–5.2)
PROTHROMBIN TIME: 15.3 SECONDS (ref 11.7–14.2)
RBC # BLD AUTO: 3.37 10*6/MM3 (ref 3.77–5.28)
SINUS: 3.4 CM
SODIUM SERPL-SCNC: 138 MMOL/L (ref 136–145)
STJ: 3 CM
STRESS TARGET HR: 137 BPM
WBC NRBC COR # BLD: 6.8 10*3/MM3 (ref 3.4–10.8)

## 2022-01-25 PROCEDURE — 94761 N-INVAS EAR/PLS OXIMETRY MLT: CPT

## 2022-01-25 PROCEDURE — 94799 UNLISTED PULMONARY SVC/PX: CPT

## 2022-01-25 PROCEDURE — 85610 PROTHROMBIN TIME: CPT | Performed by: INTERNAL MEDICINE

## 2022-01-25 PROCEDURE — 93319 3D ECHO IMG CGEN CAR ANOMAL: CPT | Performed by: INTERNAL MEDICINE

## 2022-01-25 PROCEDURE — 93320 DOPPLER ECHO COMPLETE: CPT

## 2022-01-25 PROCEDURE — 25010000002 DIGOXIN PER 500 MCG: Performed by: INTERNAL MEDICINE

## 2022-01-25 PROCEDURE — 85025 COMPLETE CBC W/AUTO DIFF WBC: CPT | Performed by: NURSE PRACTITIONER

## 2022-01-25 PROCEDURE — 93319 3D ECHO IMG CGEN CAR ANOMAL: CPT

## 2022-01-25 PROCEDURE — 82962 GLUCOSE BLOOD TEST: CPT

## 2022-01-25 PROCEDURE — 63710000001 INSULIN LISPRO (HUMAN) PER 5 UNITS: Performed by: INTERNAL MEDICINE

## 2022-01-25 PROCEDURE — 93312 ECHO TRANSESOPHAGEAL: CPT | Performed by: INTERNAL MEDICINE

## 2022-01-25 PROCEDURE — 99024 POSTOP FOLLOW-UP VISIT: CPT | Performed by: THORACIC SURGERY (CARDIOTHORACIC VASCULAR SURGERY)

## 2022-01-25 PROCEDURE — 80048 BASIC METABOLIC PNL TOTAL CA: CPT | Performed by: INTERNAL MEDICINE

## 2022-01-25 PROCEDURE — 85730 THROMBOPLASTIN TIME PARTIAL: CPT | Performed by: INTERNAL MEDICINE

## 2022-01-25 PROCEDURE — B245ZZ4 ULTRASONOGRAPHY OF LEFT HEART, TRANSESOPHAGEAL: ICD-10-PCS | Performed by: INTERNAL MEDICINE

## 2022-01-25 PROCEDURE — 25010000002 MIDAZOLAM PER 1 MG: Performed by: INTERNAL MEDICINE

## 2022-01-25 PROCEDURE — 25010000002 HEPARIN (PORCINE) 25000-0.45 UT/250ML-% SOLUTION: Performed by: NURSE PRACTITIONER

## 2022-01-25 PROCEDURE — 94060 EVALUATION OF WHEEZING: CPT

## 2022-01-25 PROCEDURE — 99233 SBSQ HOSP IP/OBS HIGH 50: CPT | Performed by: INTERNAL MEDICINE

## 2022-01-25 PROCEDURE — 93325 DOPPLER ECHO COLOR FLOW MAPG: CPT

## 2022-01-25 PROCEDURE — 25010000002 AMIODARONE IN DEXTROSE 5% 150-4.21 MG/100ML-% SOLUTION: Performed by: PHYSICIAN ASSISTANT

## 2022-01-25 PROCEDURE — 93312 ECHO TRANSESOPHAGEAL: CPT

## 2022-01-25 PROCEDURE — 93320 DOPPLER ECHO COMPLETE: CPT | Performed by: INTERNAL MEDICINE

## 2022-01-25 PROCEDURE — 76376 3D RENDER W/INTRP POSTPROCES: CPT

## 2022-01-25 PROCEDURE — 99152 MOD SED SAME PHYS/QHP 5/>YRS: CPT

## 2022-01-25 PROCEDURE — 94660 CPAP INITIATION&MGMT: CPT

## 2022-01-25 PROCEDURE — 85730 THROMBOPLASTIN TIME PARTIAL: CPT | Performed by: NURSE PRACTITIONER

## 2022-01-25 PROCEDURE — 99153 MOD SED SAME PHYS/QHP EA: CPT

## 2022-01-25 PROCEDURE — 25010000002 FENTANYL CITRATE (PF) 50 MCG/ML SOLUTION: Performed by: INTERNAL MEDICINE

## 2022-01-25 RX ORDER — SODIUM CHLORIDE 0.9 % (FLUSH) 0.9 %
10 SYRINGE (ML) INJECTION EVERY 12 HOURS SCHEDULED
Status: DISCONTINUED | OUTPATIENT
Start: 2022-01-25 | End: 2022-01-26 | Stop reason: HOSPADM

## 2022-01-25 RX ORDER — MIDAZOLAM HYDROCHLORIDE 1 MG/ML
INJECTION INTRAMUSCULAR; INTRAVENOUS
Status: COMPLETED | OUTPATIENT
Start: 2022-01-25 | End: 2022-01-25

## 2022-01-25 RX ORDER — INSULIN LISPRO 100 [IU]/ML
0-7 INJECTION, SOLUTION INTRAVENOUS; SUBCUTANEOUS
Status: DISCONTINUED | OUTPATIENT
Start: 2022-01-25 | End: 2022-01-26

## 2022-01-25 RX ORDER — SODIUM CHLORIDE 9 MG/ML
INJECTION, SOLUTION INTRAVENOUS
Status: COMPLETED | OUTPATIENT
Start: 2022-01-25 | End: 2022-01-25

## 2022-01-25 RX ORDER — DEXTROSE MONOHYDRATE 25 G/50ML
25 INJECTION, SOLUTION INTRAVENOUS
Status: DISCONTINUED | OUTPATIENT
Start: 2022-01-25 | End: 2022-01-26

## 2022-01-25 RX ORDER — LIDOCAINE HYDROCHLORIDE 20 MG/ML
SOLUTION OROPHARYNGEAL
Status: COMPLETED | OUTPATIENT
Start: 2022-01-25 | End: 2022-01-25

## 2022-01-25 RX ORDER — SODIUM CHLORIDE 0.9 % (FLUSH) 0.9 %
10 SYRINGE (ML) INJECTION AS NEEDED
Status: DISCONTINUED | OUTPATIENT
Start: 2022-01-25 | End: 2022-01-26 | Stop reason: HOSPADM

## 2022-01-25 RX ORDER — DIGOXIN 0.25 MG/ML
500 INJECTION INTRAMUSCULAR; INTRAVENOUS ONCE
Status: COMPLETED | OUTPATIENT
Start: 2022-01-25 | End: 2022-01-25

## 2022-01-25 RX ORDER — NICOTINE POLACRILEX 4 MG
15 LOZENGE BUCCAL
Status: DISCONTINUED | OUTPATIENT
Start: 2022-01-25 | End: 2022-01-26

## 2022-01-25 RX ORDER — FENTANYL CITRATE 50 UG/ML
INJECTION, SOLUTION INTRAMUSCULAR; INTRAVENOUS
Status: COMPLETED | OUTPATIENT
Start: 2022-01-25 | End: 2022-01-25

## 2022-01-25 RX ADMIN — MIDODRINE HYDROCHLORIDE 7.5 MG: 5 TABLET ORAL at 11:30

## 2022-01-25 RX ADMIN — MUPIROCIN 1 APPLICATION: 20 OINTMENT TOPICAL at 08:06

## 2022-01-25 RX ADMIN — HYDROCODONE BITARTRATE AND ACETAMINOPHEN 1 TABLET: 5; 325 TABLET ORAL at 08:03

## 2022-01-25 RX ADMIN — AMIODARONE HYDROCHLORIDE 150 MG: 1.5 INJECTION, SOLUTION INTRAVENOUS at 11:29

## 2022-01-25 RX ADMIN — INSULIN LISPRO 4 UNITS: 100 INJECTION, SOLUTION INTRAVENOUS; SUBCUTANEOUS at 12:01

## 2022-01-25 RX ADMIN — ALBUTEROL SULFATE 2.5 MG: 2.5 SOLUTION RESPIRATORY (INHALATION) at 11:48

## 2022-01-25 RX ADMIN — CHLORHEXIDINE GLUCONATE 1 APPLICATION: 500 CLOTH TOPICAL at 21:04

## 2022-01-25 RX ADMIN — MIDAZOLAM 2 MG: 1 INJECTION INTRAMUSCULAR; INTRAVENOUS at 09:53

## 2022-01-25 RX ADMIN — BENZOCAINE, BUTAMBEN, AND TETRACAINE HYDROCHLORIDE 1 SPRAY: .028; .004; .004 AEROSOL, SPRAY TOPICAL at 09:39

## 2022-01-25 RX ADMIN — TIOTROPIUM BROMIDE INHALATION SPRAY 2 PUFF: 3.12 SPRAY, METERED RESPIRATORY (INHALATION) at 09:15

## 2022-01-25 RX ADMIN — HYDROCODONE BITARTRATE AND ACETAMINOPHEN 1 TABLET: 5; 325 TABLET ORAL at 00:09

## 2022-01-25 RX ADMIN — FAMOTIDINE 20 MG: 20 TABLET, FILM COATED ORAL at 07:50

## 2022-01-25 RX ADMIN — SODIUM CHLORIDE, PRESERVATIVE FREE 10 ML: 5 INJECTION INTRAVENOUS at 20:14

## 2022-01-25 RX ADMIN — FENTANYL CITRATE 25 MCG: 0.05 INJECTION, SOLUTION INTRAMUSCULAR; INTRAVENOUS at 09:53

## 2022-01-25 RX ADMIN — MIDAZOLAM 1 MG: 1 INJECTION INTRAMUSCULAR; INTRAVENOUS at 09:57

## 2022-01-25 RX ADMIN — SODIUM CHLORIDE, PRESERVATIVE FREE 10 ML: 5 INJECTION INTRAVENOUS at 08:04

## 2022-01-25 RX ADMIN — HEPARIN SODIUM 17 UNITS/KG/HR: 10000 INJECTION, SOLUTION INTRAVENOUS at 13:53

## 2022-01-25 RX ADMIN — BUDESONIDE AND FORMOTEROL FUMARATE DIHYDRATE 2 PUFF: 160; 4.5 AEROSOL RESPIRATORY (INHALATION) at 20:15

## 2022-01-25 RX ADMIN — FAMOTIDINE 20 MG: 20 TABLET, FILM COATED ORAL at 17:08

## 2022-01-25 RX ADMIN — MIDAZOLAM 1 MG: 1 INJECTION INTRAMUSCULAR; INTRAVENOUS at 09:55

## 2022-01-25 RX ADMIN — MIDODRINE HYDROCHLORIDE 7.5 MG: 5 TABLET ORAL at 17:05

## 2022-01-25 RX ADMIN — DIGOXIN 500 MCG: 0.25 INJECTION INTRAMUSCULAR; INTRAVENOUS at 10:39

## 2022-01-25 RX ADMIN — BUDESONIDE AND FORMOTEROL FUMARATE DIHYDRATE 2 PUFF: 160; 4.5 AEROSOL RESPIRATORY (INHALATION) at 09:15

## 2022-01-25 RX ADMIN — MUPIROCIN 1 APPLICATION: 20 OINTMENT TOPICAL at 20:13

## 2022-01-25 RX ADMIN — SODIUM CHLORIDE 50 ML/HR: 9 INJECTION, SOLUTION INTRAVENOUS at 09:38

## 2022-01-25 RX ADMIN — MIDODRINE HYDROCHLORIDE 10 MG: 5 TABLET ORAL at 07:50

## 2022-01-25 RX ADMIN — FENTANYL CITRATE 25 MCG: 0.05 INJECTION, SOLUTION INTRAMUSCULAR; INTRAVENOUS at 09:55

## 2022-01-25 RX ADMIN — LIDOCAINE HYDROCHLORIDE 10 ML: 20 SOLUTION ORAL; TOPICAL at 09:38

## 2022-01-26 ENCOUNTER — APPOINTMENT (OUTPATIENT)
Dept: GENERAL RADIOLOGY | Facility: HOSPITAL | Age: 62
End: 2022-01-26

## 2022-01-26 ENCOUNTER — ANESTHESIA (OUTPATIENT)
Dept: PERIOP | Facility: HOSPITAL | Age: 62
End: 2022-01-26

## 2022-01-26 ENCOUNTER — ANESTHESIA EVENT (OUTPATIENT)
Dept: PERIOP | Facility: HOSPITAL | Age: 62
End: 2022-01-26

## 2022-01-26 ENCOUNTER — ANCILLARY PROCEDURE (OUTPATIENT)
Dept: PERIOP | Facility: HOSPITAL | Age: 62
End: 2022-01-26

## 2022-01-26 LAB
ACT BLD: 124 SECONDS (ref 82–152)
ACT BLD: 136 SECONDS (ref 82–152)
ACT BLD: 368 SECONDS (ref 82–152)
ACT BLD: 380 SECONDS (ref 82–152)
ACT BLD: 428 SECONDS (ref 82–152)
ACT BLD: 452 SECONDS (ref 82–152)
ACT BLD: 493 SECONDS (ref 82–152)
ACT BLD: 517 SECONDS (ref 82–152)
ALBUMIN SERPL-MCNC: 3.5 G/DL (ref 3.5–5.2)
ALBUMIN SERPL-MCNC: 3.6 G/DL (ref 3.5–5.2)
ANION GAP SERPL CALCULATED.3IONS-SCNC: 10 MMOL/L (ref 5–15)
ANION GAP SERPL CALCULATED.3IONS-SCNC: 8.1 MMOL/L (ref 5–15)
ANION GAP SERPL CALCULATED.3IONS-SCNC: 9 MMOL/L (ref 5–15)
APTT PPP: 30.3 SECONDS (ref 22.7–35.4)
APTT PPP: 36.7 SECONDS (ref 22.7–35.4)
APTT PPP: 53.9 SECONDS (ref 22.7–35.4)
ARTERIAL PATENCY WRIST A: ABNORMAL
ATMOSPHERIC PRESS: 761.6 MMHG
BASE EXCESS BLDA CALC-SCNC: 19 MMOL/L (ref -5–5)
BASE EXCESS BLDA CALC-SCNC: 2 MMOL/L (ref -5–5)
BASE EXCESS BLDA CALC-SCNC: 3 MMOL/L (ref -5–5)
BASE EXCESS BLDA CALC-SCNC: 4 MMOL/L (ref -5–5)
BASE EXCESS BLDA CALC-SCNC: 4.2 MMOL/L (ref 0–2)
BASE EXCESS BLDA CALC-SCNC: 6 MMOL/L (ref -5–5)
BASE EXCESS BLDA CALC-SCNC: 6 MMOL/L (ref -5–5)
BASE EXCESS BLDA CALC-SCNC: 7 MMOL/L (ref -5–5)
BASE EXCESS BLDA CALC-SCNC: 9 MMOL/L (ref -5–5)
BASOPHILS # BLD AUTO: 0.05 10*3/MM3 (ref 0–0.2)
BASOPHILS # BLD AUTO: 0.1 10*3/MM3 (ref 0–0.2)
BASOPHILS NFR BLD AUTO: 0.4 % (ref 0–1.5)
BASOPHILS NFR BLD AUTO: 1.3 % (ref 0–1.5)
BDY SITE: ABNORMAL
BUN SERPL-MCNC: 12 MG/DL (ref 6–20)
BUN SERPL-MCNC: 13 MG/DL (ref 6–20)
BUN SERPL-MCNC: 15 MG/DL (ref 6–20)
BUN/CREAT SERPL: 17.1 (ref 7–25)
BUN/CREAT SERPL: 17.1 (ref 7–25)
BUN/CREAT SERPL: 20 (ref 7–25)
CA-I BLD-MCNC: 5.9 MG/DL (ref 4.6–5.4)
CA-I BLD-MCNC: 5.9 MG/DL (ref 4.6–5.4)
CA-I BLDA-SCNC: ABNORMAL MMOL/L
CA-I SERPL ISE-MCNC: 1.47 MMOL/L (ref 1.15–1.35)
CA-I SERPL ISE-MCNC: 1.48 MMOL/L (ref 1.15–1.35)
CALCIUM SPEC-SCNC: 10.1 MG/DL (ref 8.6–10.5)
CALCIUM SPEC-SCNC: 11.2 MG/DL (ref 8.6–10.5)
CALCIUM SPEC-SCNC: 9.2 MG/DL (ref 8.6–10.5)
CHLORIDE SERPL-SCNC: 101 MMOL/L (ref 98–107)
CHLORIDE SERPL-SCNC: 105 MMOL/L (ref 98–107)
CHLORIDE SERPL-SCNC: 107 MMOL/L (ref 98–107)
CO2 BLDA-SCNC: 28 MMOL/L (ref 24–29)
CO2 BLDA-SCNC: 29 MMOL/L (ref 24–29)
CO2 BLDA-SCNC: 30 MMOL/L (ref 24–29)
CO2 BLDA-SCNC: 30 MMOL/L (ref 24–29)
CO2 BLDA-SCNC: 31 MMOL/L (ref 24–29)
CO2 BLDA-SCNC: 32 MMOL/L (ref 24–29)
CO2 BLDA-SCNC: 34 MMOL/L (ref 24–29)
CO2 BLDA-SCNC: 34 MMOL/L (ref 24–29)
CO2 BLDA-SCNC: 35 MMOL/L (ref 24–29)
CO2 BLDA-SCNC: 44 MMOL/L (ref 24–29)
CO2 SERPL-SCNC: 25 MMOL/L (ref 22–29)
CO2 SERPL-SCNC: 29 MMOL/L (ref 22–29)
CO2 SERPL-SCNC: 29.9 MMOL/L (ref 22–29)
CREAT SERPL-MCNC: 0.7 MG/DL (ref 0.57–1)
CREAT SERPL-MCNC: 0.75 MG/DL (ref 0.57–1)
CREAT SERPL-MCNC: 0.76 MG/DL (ref 0.57–1)
DEPRECATED RDW RBC AUTO: 46.1 FL (ref 37–54)
DEPRECATED RDW RBC AUTO: 47.7 FL (ref 37–54)
DEPRECATED RDW RBC AUTO: 48.6 FL (ref 37–54)
EOSINOPHIL # BLD AUTO: 0.2 10*3/MM3 (ref 0–0.4)
EOSINOPHIL # BLD AUTO: 0.77 10*3/MM3 (ref 0–0.4)
EOSINOPHIL NFR BLD AUTO: 1.6 % (ref 0.3–6.2)
EOSINOPHIL NFR BLD AUTO: 10.1 % (ref 0.3–6.2)
ERYTHROCYTE [DISTWIDTH] IN BLOOD BY AUTOMATED COUNT: 14.4 % (ref 12.3–15.4)
ERYTHROCYTE [DISTWIDTH] IN BLOOD BY AUTOMATED COUNT: 14.5 % (ref 12.3–15.4)
ERYTHROCYTE [DISTWIDTH] IN BLOOD BY AUTOMATED COUNT: 14.8 % (ref 12.3–15.4)
FIBRINOGEN PPP-MCNC: 283 MG/DL (ref 219–464)
FIBRINOGEN PPP-MCNC: 390 MG/DL (ref 219–464)
GFR SERPL CREATININE-BSD FRML MDRD: 78 ML/MIN/1.73
GFR SERPL CREATININE-BSD FRML MDRD: 79 ML/MIN/1.73
GFR SERPL CREATININE-BSD FRML MDRD: 86 ML/MIN/1.73
GLUCOSE BLDC GLUCOMTR-MCNC: 101 MG/DL (ref 70–130)
GLUCOSE BLDC GLUCOMTR-MCNC: 101 MG/DL (ref 70–130)
GLUCOSE BLDC GLUCOMTR-MCNC: 113 MG/DL (ref 70–130)
GLUCOSE BLDC GLUCOMTR-MCNC: 120 MG/DL (ref 70–130)
GLUCOSE BLDC GLUCOMTR-MCNC: 121 MG/DL (ref 70–130)
GLUCOSE BLDC GLUCOMTR-MCNC: 123 MG/DL (ref 70–130)
GLUCOSE BLDC GLUCOMTR-MCNC: 125 MG/DL (ref 70–130)
GLUCOSE BLDC GLUCOMTR-MCNC: 125 MG/DL (ref 70–130)
GLUCOSE BLDC GLUCOMTR-MCNC: 127 MG/DL (ref 70–130)
GLUCOSE BLDC GLUCOMTR-MCNC: 128 MG/DL (ref 70–130)
GLUCOSE BLDC GLUCOMTR-MCNC: 129 MG/DL (ref 70–130)
GLUCOSE BLDC GLUCOMTR-MCNC: 129 MG/DL (ref 70–130)
GLUCOSE BLDC GLUCOMTR-MCNC: 131 MG/DL (ref 70–130)
GLUCOSE BLDC GLUCOMTR-MCNC: 132 MG/DL (ref 70–130)
GLUCOSE BLDC GLUCOMTR-MCNC: 134 MG/DL (ref 70–130)
GLUCOSE BLDC GLUCOMTR-MCNC: 146 MG/DL (ref 70–130)
GLUCOSE BLDC GLUCOMTR-MCNC: 152 MG/DL (ref 70–130)
GLUCOSE BLDC GLUCOMTR-MCNC: 153 MG/DL (ref 70–130)
GLUCOSE SERPL-MCNC: 102 MG/DL (ref 65–99)
GLUCOSE SERPL-MCNC: 124 MG/DL (ref 65–99)
GLUCOSE SERPL-MCNC: 136 MG/DL (ref 65–99)
HCO3 BLDA-SCNC: 26.6 MMOL/L (ref 22–26)
HCO3 BLDA-SCNC: 27.8 MMOL/L (ref 22–26)
HCO3 BLDA-SCNC: 28.6 MMOL/L (ref 22–26)
HCO3 BLDA-SCNC: 29.2 MMOL/L (ref 22–26)
HCO3 BLDA-SCNC: 29.3 MMOL/L (ref 22–26)
HCO3 BLDA-SCNC: 29.6 MMOL/L (ref 22–28)
HCO3 BLDA-SCNC: 30.7 MMOL/L (ref 22–26)
HCO3 BLDA-SCNC: 32 MMOL/L (ref 22–26)
HCO3 BLDA-SCNC: 32.2 MMOL/L (ref 22–26)
HCO3 BLDA-SCNC: 33.2 MMOL/L (ref 22–26)
HCO3 BLDA-SCNC: 42.1 MMOL/L (ref 22–26)
HCT VFR BLD AUTO: 22.8 % (ref 34–46.6)
HCT VFR BLD AUTO: 23.3 % (ref 34–46.6)
HCT VFR BLD AUTO: 29.4 % (ref 34–46.6)
HCT VFR BLDA CALC: 19 % (ref 38–51)
HCT VFR BLDA CALC: 21 % (ref 38–51)
HCT VFR BLDA CALC: 22 % (ref 38–51)
HCT VFR BLDA CALC: 23 % (ref 38–51)
HCT VFR BLDA CALC: 28 % (ref 38–51)
HGB BLD-MCNC: 7.1 G/DL (ref 12–15.9)
HGB BLD-MCNC: 7.4 G/DL (ref 12–15.9)
HGB BLD-MCNC: 9.2 G/DL (ref 12–15.9)
HGB BLDA-MCNC: 6.5 G/DL (ref 12–17)
HGB BLDA-MCNC: 7.1 G/DL (ref 12–17)
HGB BLDA-MCNC: 7.5 G/DL (ref 12–17)
HGB BLDA-MCNC: 7.8 G/DL (ref 12–17)
HGB BLDA-MCNC: 9.5 G/DL (ref 12–17)
IMM GRANULOCYTES # BLD AUTO: 0.06 10*3/MM3 (ref 0–0.05)
IMM GRANULOCYTES # BLD AUTO: 0.19 10*3/MM3 (ref 0–0.05)
IMM GRANULOCYTES NFR BLD AUTO: 0.8 % (ref 0–0.5)
IMM GRANULOCYTES NFR BLD AUTO: 1.5 % (ref 0–0.5)
INHALED O2 CONCENTRATION: 100 %
INR PPP: 1.17 (ref 0.9–1.1)
INR PPP: 1.24 (ref 0.9–1.1)
INR PPP: 1.84 (ref 0.9–1.1)
LYMPHOCYTES # BLD AUTO: 0.95 10*3/MM3 (ref 0.7–3.1)
LYMPHOCYTES # BLD AUTO: 1.78 10*3/MM3 (ref 0.7–3.1)
LYMPHOCYTES NFR BLD AUTO: 23.2 % (ref 19.6–45.3)
LYMPHOCYTES NFR BLD AUTO: 7.4 % (ref 19.6–45.3)
MAGNESIUM SERPL-MCNC: 2.1 MG/DL (ref 1.6–2.6)
MAGNESIUM SERPL-MCNC: 2.5 MG/DL (ref 1.6–2.6)
MCH RBC QN AUTO: 28.1 PG (ref 26.6–33)
MCH RBC QN AUTO: 28.2 PG (ref 26.6–33)
MCH RBC QN AUTO: 28.4 PG (ref 26.6–33)
MCHC RBC AUTO-ENTMCNC: 31.1 G/DL (ref 31.5–35.7)
MCHC RBC AUTO-ENTMCNC: 31.3 G/DL (ref 31.5–35.7)
MCHC RBC AUTO-ENTMCNC: 31.8 G/DL (ref 31.5–35.7)
MCV RBC AUTO: 88.9 FL (ref 79–97)
MCV RBC AUTO: 89.9 FL (ref 79–97)
MCV RBC AUTO: 91.2 FL (ref 79–97)
MODALITY: ABNORMAL
MONOCYTES # BLD AUTO: 0.96 10*3/MM3 (ref 0.1–0.9)
MONOCYTES # BLD AUTO: 1.45 10*3/MM3 (ref 0.1–0.9)
MONOCYTES NFR BLD AUTO: 11.4 % (ref 5–12)
MONOCYTES NFR BLD AUTO: 12.5 % (ref 5–12)
NEUTROPHILS NFR BLD AUTO: 3.99 10*3/MM3 (ref 1.7–7)
NEUTROPHILS NFR BLD AUTO: 52.1 % (ref 42.7–76)
NEUTROPHILS NFR BLD AUTO: 77.7 % (ref 42.7–76)
NEUTROPHILS NFR BLD AUTO: 9.93 10*3/MM3 (ref 1.7–7)
NRBC BLD AUTO-RTO: 0 /100 WBC (ref 0–0.2)
NRBC BLD AUTO-RTO: 0 /100 WBC (ref 0–0.2)
O2 A-A PPRESDIFF RESPIRATORY: 0.6 MMHG
PCO2 BLDA: 36.5 MM HG (ref 35–45)
PCO2 BLDA: 39.6 MM HG (ref 35–45)
PCO2 BLDA: 40.2 MM HG (ref 35–45)
PCO2 BLDA: 40.5 MM HG (ref 35–45)
PCO2 BLDA: 44.7 MM HG (ref 35–45)
PCO2 BLDA: 47.8 MM HG (ref 35–45)
PCO2 BLDA: 48.3 MM HG (ref 35–45)
PCO2 BLDA: 50.3 MM HG (ref 35–45)
PCO2 BLDA: 51.2 MM HG (ref 35–45)
PCO2 BLDA: 52.3 MM HG (ref 35–45)
PCO2 BLDA: 57 MM HG (ref 35–45)
PEEP RESPIRATORY: 7.5 CM[H2O]
PH BLDA: 7.36 PH UNITS (ref 7.35–7.6)
PH BLDA: 7.39 PH UNITS (ref 7.35–7.6)
PH BLDA: 7.4 PH UNITS (ref 7.35–7.45)
PH BLDA: 7.4 PH UNITS (ref 7.35–7.6)
PH BLDA: 7.4 PH UNITS (ref 7.35–7.6)
PH BLDA: 7.42 PH UNITS (ref 7.35–7.6)
PH BLDA: 7.43 PH UNITS (ref 7.35–7.6)
PH BLDA: 7.48 PH UNITS (ref 7.35–7.6)
PH BLDA: 7.49 PH UNITS (ref 7.35–7.6)
PH BLDA: 7.5 PH UNITS (ref 7.35–7.6)
PH BLDA: 7.52 PH UNITS (ref 7.35–7.6)
PHOSPHATE SERPL-MCNC: 2.9 MG/DL (ref 2.5–4.5)
PHOSPHATE SERPL-MCNC: 3 MG/DL (ref 2.5–4.5)
PLATELET # BLD AUTO: 163 10*3/MM3 (ref 140–450)
PLATELET # BLD AUTO: 187 10*3/MM3 (ref 140–450)
PLATELET # BLD AUTO: 334 10*3/MM3 (ref 140–450)
PMV BLD AUTO: 9 FL (ref 6–12)
PMV BLD AUTO: 9.5 FL (ref 6–12)
PMV BLD AUTO: 9.9 FL (ref 6–12)
PO2 BLDA: 36 MMHG (ref 80–105)
PO2 BLDA: 394 MMHG (ref 80–105)
PO2 BLDA: 418 MMHG (ref 80–105)
PO2 BLDA: 434.1 MM HG (ref 80–100)
PO2 BLDA: 443 MMHG (ref 80–105)
PO2 BLDA: 444 MMHG (ref 80–105)
PO2 BLDA: 470 MMHG (ref 80–105)
PO2 BLDA: 479 MMHG (ref 80–105)
PO2 BLDA: 506 MMHG (ref 80–105)
PO2 BLDA: 514 MMHG (ref 80–105)
PO2 BLDA: 581 MMHG (ref 80–105)
POTASSIUM BLDA-SCNC: 3.8 MMOL/L (ref 3.5–4.9)
POTASSIUM BLDA-SCNC: 3.9 MMOL/L (ref 3.5–4.9)
POTASSIUM BLDA-SCNC: 4.3 MMOL/L (ref 3.5–4.9)
POTASSIUM BLDA-SCNC: 4.4 MMOL/L (ref 3.5–4.9)
POTASSIUM BLDA-SCNC: 4.8 MMOL/L (ref 3.5–4.9)
POTASSIUM BLDA-SCNC: 5 MMOL/L (ref 3.5–4.9)
POTASSIUM BLDA-SCNC: 5 MMOL/L (ref 3.5–4.9)
POTASSIUM SERPL-SCNC: 4.3 MMOL/L (ref 3.5–5.2)
POTASSIUM SERPL-SCNC: 4.4 MMOL/L (ref 3.5–5.2)
POTASSIUM SERPL-SCNC: 4.7 MMOL/L (ref 3.5–5.2)
PROTHROMBIN TIME: 14.7 SECONDS (ref 11.7–14.2)
PROTHROMBIN TIME: 15.4 SECONDS (ref 11.7–14.2)
PROTHROMBIN TIME: 21 SECONDS (ref 11.7–14.2)
QT INTERVAL: 391 MS
RBC # BLD AUTO: 2.5 10*6/MM3 (ref 3.77–5.28)
RBC # BLD AUTO: 2.62 10*6/MM3 (ref 3.77–5.28)
RBC # BLD AUTO: 3.27 10*6/MM3 (ref 3.77–5.28)
SAO2 % BLDA: 100 % (ref 95–98)
SAO2 % BLDA: 67 % (ref 95–98)
SAO2 % BLDCOA: 100 % (ref 92–99)
SET MECH RESP RATE: 20
SODIUM SERPL-SCNC: 139 MMOL/L (ref 136–145)
SODIUM SERPL-SCNC: 142 MMOL/L (ref 136–145)
SODIUM SERPL-SCNC: 143 MMOL/L (ref 136–145)
TOTAL RATE: 20 BREATHS/MINUTE
VENTILATOR MODE: AC
VT ON VENT VENT: 550 ML
WBC NRBC COR # BLD: 12.77 10*3/MM3 (ref 3.4–10.8)
WBC NRBC COR # BLD: 17.14 10*3/MM3 (ref 3.4–10.8)
WBC NRBC COR # BLD: 7.66 10*3/MM3 (ref 3.4–10.8)

## 2022-01-26 PROCEDURE — C1729 CATH, DRAINAGE: HCPCS | Performed by: THORACIC SURGERY (CARDIOTHORACIC VASCULAR SURGERY)

## 2022-01-26 PROCEDURE — 25010000002 MIDAZOLAM PER 1 MG: Performed by: ANESTHESIOLOGY

## 2022-01-26 PROCEDURE — 33259 ABLATE ATRIA W/BYPASS ADD-ON: CPT | Performed by: PHYSICIAN ASSISTANT

## 2022-01-26 PROCEDURE — 25010000002 AMIODARONE IN DEXTROSE 5% 360-4.14 MG/200ML-% SOLUTION: Performed by: ANESTHESIOLOGY

## 2022-01-26 PROCEDURE — 33405 REPLACEMENT AORTIC VALVE OPN: CPT | Performed by: THORACIC SURGERY (CARDIOTHORACIC VASCULAR SURGERY)

## 2022-01-26 PROCEDURE — 83735 ASSAY OF MAGNESIUM: CPT | Performed by: NURSE PRACTITIONER

## 2022-01-26 PROCEDURE — 02L70ZK OCCLUSION OF LEFT ATRIAL APPENDAGE, OPEN APPROACH: ICD-10-PCS | Performed by: THORACIC SURGERY (CARDIOTHORACIC VASCULAR SURGERY)

## 2022-01-26 PROCEDURE — 33259 ABLATE ATRIA W/BYPASS ADD-ON: CPT | Performed by: THORACIC SURGERY (CARDIOTHORACIC VASCULAR SURGERY)

## 2022-01-26 PROCEDURE — 33464 VALVULOPLASTY TRICUSPID: CPT | Performed by: PHYSICIAN ASSISTANT

## 2022-01-26 PROCEDURE — P9035 PLATELET PHERES LEUKOREDUCED: HCPCS

## 2022-01-26 PROCEDURE — 80069 RENAL FUNCTION PANEL: CPT | Performed by: NURSE PRACTITIONER

## 2022-01-26 PROCEDURE — 33405 REPLACEMENT AORTIC VALVE OPN: CPT | Performed by: PHYSICIAN ASSISTANT

## 2022-01-26 PROCEDURE — 85610 PROTHROMBIN TIME: CPT | Performed by: NURSE PRACTITIONER

## 2022-01-26 PROCEDURE — P9047 ALBUMIN (HUMAN), 25%, 50ML: HCPCS

## 2022-01-26 PROCEDURE — P9100 PATHOGEN TEST FOR PLATELETS: HCPCS

## 2022-01-26 PROCEDURE — 85025 COMPLETE CBC W/AUTO DIFF WBC: CPT | Performed by: NURSE PRACTITIONER

## 2022-01-26 PROCEDURE — C1889 IMPLANT/INSERT DEVICE, NOC: HCPCS | Performed by: THORACIC SURGERY (CARDIOTHORACIC VASCULAR SURGERY)

## 2022-01-26 PROCEDURE — 94799 UNLISTED PULMONARY SVC/PX: CPT

## 2022-01-26 PROCEDURE — 02RG08Z REPLACEMENT OF MITRAL VALVE WITH ZOOPLASTIC TISSUE, OPEN APPROACH: ICD-10-PCS | Performed by: THORACIC SURGERY (CARDIOTHORACIC VASCULAR SURGERY)

## 2022-01-26 PROCEDURE — 0 MILRINONE LACTATE 10 MG/10ML SOLUTION 10 ML VIAL: Performed by: ANESTHESIOLOGY

## 2022-01-26 PROCEDURE — 85610 PROTHROMBIN TIME: CPT | Performed by: INTERNAL MEDICINE

## 2022-01-26 PROCEDURE — 85730 THROMBOPLASTIN TIME PARTIAL: CPT | Performed by: NURSE PRACTITIONER

## 2022-01-26 PROCEDURE — 99232 SBSQ HOSP IP/OBS MODERATE 35: CPT | Performed by: INTERNAL MEDICINE

## 2022-01-26 PROCEDURE — C1713 ANCHOR/SCREW BN/BN,TIS/BN: HCPCS | Performed by: THORACIC SURGERY (CARDIOTHORACIC VASCULAR SURGERY)

## 2022-01-26 PROCEDURE — 82803 BLOOD GASES ANY COMBINATION: CPT

## 2022-01-26 PROCEDURE — P9017 PLASMA 1 DONOR FRZ W/IN 8 HR: HCPCS

## 2022-01-26 PROCEDURE — 36430 TRANSFUSION BLD/BLD COMPNT: CPT

## 2022-01-26 PROCEDURE — 33430 REPLACEMENT OF MITRAL VALVE: CPT | Performed by: PHYSICIAN ASSISTANT

## 2022-01-26 PROCEDURE — 02UJ0JZ SUPPLEMENT TRICUSPID VALVE WITH SYNTHETIC SUBSTITUTE, OPEN APPROACH: ICD-10-PCS | Performed by: THORACIC SURGERY (CARDIOTHORACIC VASCULAR SURGERY)

## 2022-01-26 PROCEDURE — 86927 PLASMA FRESH FROZEN: CPT

## 2022-01-26 PROCEDURE — 85730 THROMBOPLASTIN TIME PARTIAL: CPT | Performed by: THORACIC SURGERY (CARDIOTHORACIC VASCULAR SURGERY)

## 2022-01-26 PROCEDURE — 33464 VALVULOPLASTY TRICUSPID: CPT | Performed by: THORACIC SURGERY (CARDIOTHORACIC VASCULAR SURGERY)

## 2022-01-26 PROCEDURE — 33430 REPLACEMENT OF MITRAL VALVE: CPT | Performed by: THORACIC SURGERY (CARDIOTHORACIC VASCULAR SURGERY)

## 2022-01-26 PROCEDURE — 82947 ASSAY GLUCOSE BLOOD QUANT: CPT

## 2022-01-26 PROCEDURE — 25010000002 ALBUMIN HUMAN 5% PER 50 ML: Performed by: NURSE PRACTITIONER

## 2022-01-26 PROCEDURE — P9012 CRYOPRECIPITATE EACH UNIT: HCPCS

## 2022-01-26 PROCEDURE — 71045 X-RAY EXAM CHEST 1 VIEW: CPT

## 2022-01-26 PROCEDURE — 85610 PROTHROMBIN TIME: CPT | Performed by: THORACIC SURGERY (CARDIOTHORACIC VASCULAR SURGERY)

## 2022-01-26 PROCEDURE — B245ZZ4 ULTRASONOGRAPHY OF LEFT HEART, TRANSESOPHAGEAL: ICD-10-PCS | Performed by: THORACIC SURGERY (CARDIOTHORACIC VASCULAR SURGERY)

## 2022-01-26 PROCEDURE — 82962 GLUCOSE BLOOD TEST: CPT

## 2022-01-26 PROCEDURE — 25010000002 PROPOFOL 10 MG/ML EMULSION: Performed by: ANESTHESIOLOGY

## 2022-01-26 PROCEDURE — 25010000002 FENTANYL CITRATE (PF) 50 MCG/ML SOLUTION: Performed by: ANESTHESIOLOGY

## 2022-01-26 PROCEDURE — 88305 TISSUE EXAM BY PATHOLOGIST: CPT | Performed by: THORACIC SURGERY (CARDIOTHORACIC VASCULAR SURGERY)

## 2022-01-26 PROCEDURE — 85384 FIBRINOGEN ACTIVITY: CPT | Performed by: NURSE PRACTITIONER

## 2022-01-26 PROCEDURE — 93005 ELECTROCARDIOGRAM TRACING: CPT | Performed by: NURSE PRACTITIONER

## 2022-01-26 PROCEDURE — 82330 ASSAY OF CALCIUM: CPT | Performed by: NURSE PRACTITIONER

## 2022-01-26 PROCEDURE — 02RF08Z REPLACEMENT OF AORTIC VALVE WITH ZOOPLASTIC TISSUE, OPEN APPROACH: ICD-10-PCS | Performed by: THORACIC SURGERY (CARDIOTHORACIC VASCULAR SURGERY)

## 2022-01-26 PROCEDURE — 93318 ECHO TRANSESOPHAGEAL INTRAOP: CPT | Performed by: ANESTHESIOLOGY

## 2022-01-26 PROCEDURE — 85384 FIBRINOGEN ACTIVITY: CPT | Performed by: THORACIC SURGERY (CARDIOTHORACIC VASCULAR SURGERY)

## 2022-01-26 PROCEDURE — 25010000002 HEPARIN (PORCINE) 25000-0.45 UT/250ML-% SOLUTION: Performed by: NURSE PRACTITIONER

## 2022-01-26 PROCEDURE — 25010000002 PROTAMINE SULFATE PER 10 MG: Performed by: ANESTHESIOLOGY

## 2022-01-26 PROCEDURE — 25010000002 PROPOFOL 10 MG/ML EMULSION: Performed by: NURSE PRACTITIONER

## 2022-01-26 PROCEDURE — 25010000002 PHENYLEPHRINE 10 MG/ML SOLUTION: Performed by: NURSE PRACTITIONER

## 2022-01-26 PROCEDURE — 86900 BLOOD TYPING SEROLOGIC ABO: CPT

## 2022-01-26 PROCEDURE — 25010000002 AMIODARONE IN DEXTROSE 5% 360-4.14 MG/200ML-% SOLUTION: Performed by: THORACIC SURGERY (CARDIOTHORACIC VASCULAR SURGERY)

## 2022-01-26 PROCEDURE — 85347 COAGULATION TIME ACTIVATED: CPT

## 2022-01-26 PROCEDURE — 02580ZZ DESTRUCTION OF CONDUCTION MECHANISM, OPEN APPROACH: ICD-10-PCS | Performed by: THORACIC SURGERY (CARDIOTHORACIC VASCULAR SURGERY)

## 2022-01-26 PROCEDURE — 82330 ASSAY OF CALCIUM: CPT | Performed by: THORACIC SURGERY (CARDIOTHORACIC VASCULAR SURGERY)

## 2022-01-26 PROCEDURE — 80048 BASIC METABOLIC PNL TOTAL CA: CPT | Performed by: INTERNAL MEDICINE

## 2022-01-26 PROCEDURE — 63710000001 INSULIN REGULAR HUMAN PER 5 UNITS: Performed by: ANESTHESIOLOGY

## 2022-01-26 PROCEDURE — 25010000002 AMIODARONE PER 30 MG: Performed by: ANESTHESIOLOGY

## 2022-01-26 PROCEDURE — P9041 ALBUMIN (HUMAN),5%, 50ML: HCPCS | Performed by: NURSE PRACTITIONER

## 2022-01-26 PROCEDURE — C1751 CATH, INF, PER/CENT/MIDLINE: HCPCS | Performed by: ANESTHESIOLOGY

## 2022-01-26 PROCEDURE — 25010000002 VANCOMYCIN 1 G RECONSTITUTED SOLUTION

## 2022-01-26 PROCEDURE — 25010000002 ALBUMIN HUMAN 25% PER 50 ML

## 2022-01-26 PROCEDURE — 93010 ELECTROCARDIOGRAM REPORT: CPT | Performed by: INTERNAL MEDICINE

## 2022-01-26 PROCEDURE — 85027 COMPLETE CBC AUTOMATED: CPT | Performed by: THORACIC SURGERY (CARDIOTHORACIC VASCULAR SURGERY)

## 2022-01-26 PROCEDURE — 5A1221Z PERFORMANCE OF CARDIAC OUTPUT, CONTINUOUS: ICD-10-PCS | Performed by: THORACIC SURGERY (CARDIOTHORACIC VASCULAR SURGERY)

## 2022-01-26 PROCEDURE — 25010000002 HEPARIN (PORCINE) PER 1000 UNITS: Performed by: ANESTHESIOLOGY

## 2022-01-26 PROCEDURE — 85018 HEMOGLOBIN: CPT

## 2022-01-26 PROCEDURE — 25010000002 MAGNESIUM SULFATE PER 500 MG OF MAGNESIUM: Performed by: ANESTHESIOLOGY

## 2022-01-26 PROCEDURE — P9016 RBC LEUKOCYTES REDUCED: HCPCS

## 2022-01-26 PROCEDURE — 85014 HEMATOCRIT: CPT

## 2022-01-26 PROCEDURE — 25010000002 HEPARIN (PORCINE) PER 1000 UNITS

## 2022-01-26 DEVICE — IMPLANTABLE DEVICE: Type: IMPLANTABLE DEVICE | Site: HEART | Status: FUNCTIONAL

## 2022-01-26 DEVICE — ABSORBABLE HEMOSTAT (OXIDIZED REGENERATED CELLULOSE, U.S.P.)
Type: IMPLANTABLE DEVICE | Site: HEART | Status: FUNCTIONAL
Brand: SURGICEL

## 2022-01-26 DEVICE — SS SUTURE, 3 PER SLEEVE
Type: IMPLANTABLE DEVICE | Site: STERNUM | Status: FUNCTIONAL
Brand: MYO/WIRE II

## 2022-01-26 DEVICE — COR-KNOT® QUICK LOAD® SINGLES
Type: IMPLANTABLE DEVICE | Site: HEART | Status: FUNCTIONAL
Brand: COR-KNOT® QUICK LOAD®

## 2022-01-26 DEVICE — VLV MITRAL HANCOCK 2 ULTR T510 29MM: Type: IMPLANTABLE DEVICE | Site: HEART | Status: FUNCTIONAL

## 2022-01-26 DEVICE — COR-KNOT MINI® COMBO KITBASE PACKAGE TYPE - KITEACH STERILE PACKAGE KIT CONTAINS (2) SINGLE PATIENT USE COR-KNOT MINI® DEVICES AND (12) COR-KNOT® QUICK LOADS®.
Type: IMPLANTABLE DEVICE | Site: HEART | Status: FUNCTIONAL
Brand: COR-KNOT MINI®

## 2022-01-26 DEVICE — VLV PERICARD PERIMOUNT MAGNA EASE 25: Type: IMPLANTABLE DEVICE | Site: HEART | Status: FUNCTIONAL

## 2022-01-26 DEVICE — SS SUTURE, 4 PER SLEEVE
Type: IMPLANTABLE DEVICE | Site: STERNUM | Status: FUNCTIONAL
Brand: MYO/WIRE II

## 2022-01-26 RX ORDER — ACETAMINOPHEN 160 MG/5ML
650 SOLUTION ORAL EVERY 4 HOURS PRN
Status: DISCONTINUED | OUTPATIENT
Start: 2022-01-27 | End: 2022-01-31 | Stop reason: HOSPADM

## 2022-01-26 RX ORDER — PROPOFOL 10 MG/ML
VIAL (ML) INTRAVENOUS CONTINUOUS PRN
Status: DISCONTINUED | OUTPATIENT
Start: 2022-01-26 | End: 2022-01-26 | Stop reason: SURG

## 2022-01-26 RX ORDER — LIDOCAINE HYDROCHLORIDE 10 MG/ML
0.5 INJECTION, SOLUTION EPIDURAL; INFILTRATION; INTRACAUDAL; PERINEURAL ONCE AS NEEDED
Status: DISCONTINUED | OUTPATIENT
Start: 2022-01-26 | End: 2022-01-26 | Stop reason: HOSPADM

## 2022-01-26 RX ORDER — POTASSIUM CHLORIDE 7.45 MG/ML
10 INJECTION INTRAVENOUS
Status: DISCONTINUED | OUTPATIENT
Start: 2022-01-26 | End: 2022-01-31 | Stop reason: HOSPADM

## 2022-01-26 RX ORDER — ACETAMINOPHEN 160 MG/5ML
650 SOLUTION ORAL EVERY 4 HOURS
Status: ACTIVE | OUTPATIENT
Start: 2022-01-26 | End: 2022-01-27

## 2022-01-26 RX ORDER — MIDAZOLAM HYDROCHLORIDE 1 MG/ML
1 INJECTION INTRAMUSCULAR; INTRAVENOUS
Status: DISCONTINUED | OUTPATIENT
Start: 2022-01-26 | End: 2022-01-26 | Stop reason: HOSPADM

## 2022-01-26 RX ORDER — MIDAZOLAM HYDROCHLORIDE 1 MG/ML
INJECTION INTRAMUSCULAR; INTRAVENOUS
Status: COMPLETED | OUTPATIENT
Start: 2022-01-26 | End: 2022-01-26

## 2022-01-26 RX ORDER — FENTANYL CITRATE 50 UG/ML
INJECTION, SOLUTION INTRAMUSCULAR; INTRAVENOUS AS NEEDED
Status: DISCONTINUED | OUTPATIENT
Start: 2022-01-26 | End: 2022-01-26 | Stop reason: SURG

## 2022-01-26 RX ORDER — MEPERIDINE HYDROCHLORIDE 25 MG/ML
25 INJECTION INTRAMUSCULAR; INTRAVENOUS; SUBCUTANEOUS EVERY 4 HOURS PRN
Status: ACTIVE | OUTPATIENT
Start: 2022-01-26 | End: 2022-01-27

## 2022-01-26 RX ORDER — MIDAZOLAM HYDROCHLORIDE 1 MG/ML
INJECTION INTRAMUSCULAR; INTRAVENOUS AS NEEDED
Status: DISCONTINUED | OUTPATIENT
Start: 2022-01-26 | End: 2022-01-26 | Stop reason: SURG

## 2022-01-26 RX ORDER — ATORVASTATIN CALCIUM 20 MG/1
40 TABLET, FILM COATED ORAL NIGHTLY
Status: DISCONTINUED | OUTPATIENT
Start: 2022-01-26 | End: 2022-01-31 | Stop reason: HOSPADM

## 2022-01-26 RX ORDER — FAMOTIDINE 10 MG/ML
20 INJECTION, SOLUTION INTRAVENOUS ONCE
Status: COMPLETED | OUTPATIENT
Start: 2022-01-26 | End: 2022-01-26

## 2022-01-26 RX ORDER — ACETAMINOPHEN 325 MG/1
650 TABLET ORAL EVERY 4 HOURS PRN
Status: DISCONTINUED | OUTPATIENT
Start: 2022-01-27 | End: 2022-01-31 | Stop reason: HOSPADM

## 2022-01-26 RX ORDER — CALCIUM CHLORIDE 100 MG/ML
INJECTION INTRAVENOUS; INTRAVENTRICULAR AS NEEDED
Status: DISCONTINUED | OUTPATIENT
Start: 2022-01-26 | End: 2022-01-26 | Stop reason: SURG

## 2022-01-26 RX ORDER — SODIUM CHLORIDE 9 MG/ML
30 INJECTION, SOLUTION INTRAVENOUS CONTINUOUS PRN
Status: DISCONTINUED | OUTPATIENT
Start: 2022-01-26 | End: 2022-01-31 | Stop reason: HOSPADM

## 2022-01-26 RX ORDER — METOCLOPRAMIDE HYDROCHLORIDE 5 MG/ML
10 INJECTION INTRAMUSCULAR; INTRAVENOUS EVERY 6 HOURS
Status: DISPENSED | OUTPATIENT
Start: 2022-01-26 | End: 2022-01-27

## 2022-01-26 RX ORDER — MAGNESIUM SULFATE HEPTAHYDRATE 500 MG/ML
INJECTION, SOLUTION INTRAMUSCULAR; INTRAVENOUS AS NEEDED
Status: DISCONTINUED | OUTPATIENT
Start: 2022-01-26 | End: 2022-01-26 | Stop reason: SURG

## 2022-01-26 RX ORDER — AMINOCAPROIC ACID 250 MG/ML
INJECTION, SOLUTION INTRAVENOUS AS NEEDED
Status: DISCONTINUED | OUTPATIENT
Start: 2022-01-26 | End: 2022-01-26 | Stop reason: SURG

## 2022-01-26 RX ORDER — SODIUM CHLORIDE 0.9 % (FLUSH) 0.9 %
3-10 SYRINGE (ML) INJECTION AS NEEDED
Status: DISCONTINUED | OUTPATIENT
Start: 2022-01-26 | End: 2022-01-26 | Stop reason: HOSPADM

## 2022-01-26 RX ORDER — AMOXICILLIN 250 MG
2 CAPSULE ORAL 2 TIMES DAILY
Status: DISCONTINUED | OUTPATIENT
Start: 2022-01-26 | End: 2022-01-31 | Stop reason: HOSPADM

## 2022-01-26 RX ORDER — MORPHINE SULFATE 2 MG/ML
1 INJECTION, SOLUTION INTRAMUSCULAR; INTRAVENOUS EVERY 4 HOURS PRN
Status: DISCONTINUED | OUTPATIENT
Start: 2022-01-26 | End: 2022-01-31 | Stop reason: HOSPADM

## 2022-01-26 RX ORDER — POTASSIUM CHLORIDE 750 MG/1
40 TABLET, FILM COATED, EXTENDED RELEASE ORAL AS NEEDED
Status: DISCONTINUED | OUTPATIENT
Start: 2022-01-26 | End: 2022-01-31 | Stop reason: HOSPADM

## 2022-01-26 RX ORDER — ACETAMINOPHEN 650 MG/1
650 SUPPOSITORY RECTAL EVERY 4 HOURS PRN
Status: DISCONTINUED | OUTPATIENT
Start: 2022-01-27 | End: 2022-01-31 | Stop reason: HOSPADM

## 2022-01-26 RX ORDER — PANTOPRAZOLE SODIUM 40 MG/1
40 TABLET, DELAYED RELEASE ORAL EVERY MORNING
Status: COMPLETED | OUTPATIENT
Start: 2022-01-27 | End: 2022-01-29

## 2022-01-26 RX ORDER — FENTANYL CITRATE 50 UG/ML
25 INJECTION, SOLUTION INTRAMUSCULAR; INTRAVENOUS
Status: DISCONTINUED | OUTPATIENT
Start: 2022-01-26 | End: 2022-01-26 | Stop reason: HOSPADM

## 2022-01-26 RX ORDER — PANTOPRAZOLE SODIUM 40 MG/10ML
40 INJECTION, POWDER, LYOPHILIZED, FOR SOLUTION INTRAVENOUS ONCE
Status: COMPLETED | OUTPATIENT
Start: 2022-01-26 | End: 2022-01-26

## 2022-01-26 RX ORDER — SODIUM CHLORIDE 0.9 % (FLUSH) 0.9 %
30 SYRINGE (ML) INJECTION ONCE AS NEEDED
Status: DISCONTINUED | OUTPATIENT
Start: 2022-01-26 | End: 2022-01-31 | Stop reason: HOSPADM

## 2022-01-26 RX ORDER — ROCURONIUM BROMIDE 10 MG/ML
INJECTION, SOLUTION INTRAVENOUS AS NEEDED
Status: DISCONTINUED | OUTPATIENT
Start: 2022-01-26 | End: 2022-01-26 | Stop reason: SURG

## 2022-01-26 RX ORDER — ASPIRIN 81 MG/1
81 TABLET ORAL DAILY
Status: DISCONTINUED | OUTPATIENT
Start: 2022-01-27 | End: 2022-01-31 | Stop reason: HOSPADM

## 2022-01-26 RX ORDER — ONDANSETRON 2 MG/ML
4 INJECTION INTRAMUSCULAR; INTRAVENOUS EVERY 6 HOURS PRN
Status: DISCONTINUED | OUTPATIENT
Start: 2022-01-26 | End: 2022-01-31 | Stop reason: HOSPADM

## 2022-01-26 RX ORDER — CHLORHEXIDINE GLUCONATE 0.12 MG/ML
15 RINSE ORAL EVERY 12 HOURS
Status: DISCONTINUED | OUTPATIENT
Start: 2022-01-26 | End: 2022-01-31 | Stop reason: HOSPADM

## 2022-01-26 RX ORDER — HYDROCODONE BITARTRATE AND ACETAMINOPHEN 5; 325 MG/1; MG/1
2 TABLET ORAL EVERY 4 HOURS PRN
Status: DISCONTINUED | OUTPATIENT
Start: 2022-01-26 | End: 2022-01-31 | Stop reason: HOSPADM

## 2022-01-26 RX ORDER — NALOXONE HCL 0.4 MG/ML
0.4 VIAL (ML) INJECTION
Status: DISCONTINUED | OUTPATIENT
Start: 2022-01-26 | End: 2022-01-31 | Stop reason: HOSPADM

## 2022-01-26 RX ORDER — POLYETHYLENE GLYCOL 3350 17 G/17G
17 POWDER, FOR SOLUTION ORAL DAILY PRN
Status: DISCONTINUED | OUTPATIENT
Start: 2022-01-26 | End: 2022-01-31 | Stop reason: HOSPADM

## 2022-01-26 RX ORDER — POTASSIUM CHLORIDE 29.8 MG/ML
20 INJECTION INTRAVENOUS
Status: DISCONTINUED | OUTPATIENT
Start: 2022-01-26 | End: 2022-01-31 | Stop reason: HOSPADM

## 2022-01-26 RX ORDER — NOREPINEPHRINE BITARTRATE 1 MG/ML
INJECTION, SOLUTION INTRAVENOUS CONTINUOUS PRN
Status: DISCONTINUED | OUTPATIENT
Start: 2022-01-26 | End: 2022-01-26 | Stop reason: SURG

## 2022-01-26 RX ORDER — SODIUM CHLORIDE, SODIUM LACTATE, POTASSIUM CHLORIDE, CALCIUM CHLORIDE 600; 310; 30; 20 MG/100ML; MG/100ML; MG/100ML; MG/100ML
9 INJECTION, SOLUTION INTRAVENOUS CONTINUOUS
Status: DISCONTINUED | OUTPATIENT
Start: 2022-01-26 | End: 2022-01-26

## 2022-01-26 RX ORDER — SODIUM CHLORIDE 9 MG/ML
9 INJECTION, SOLUTION INTRAVENOUS CONTINUOUS
Status: DISCONTINUED | OUTPATIENT
Start: 2022-01-26 | End: 2022-01-26

## 2022-01-26 RX ORDER — NITROGLYCERIN 20 MG/100ML
5-200 INJECTION INTRAVENOUS
Status: DISCONTINUED | OUTPATIENT
Start: 2022-01-26 | End: 2022-01-27

## 2022-01-26 RX ORDER — DOPAMINE HYDROCHLORIDE 160 MG/100ML
2-20 INJECTION, SOLUTION INTRAVENOUS CONTINUOUS PRN
Status: DISCONTINUED | OUTPATIENT
Start: 2022-01-26 | End: 2022-01-27

## 2022-01-26 RX ORDER — ACETAMINOPHEN 650 MG/1
650 SUPPOSITORY RECTAL EVERY 4 HOURS
Status: ACTIVE | OUTPATIENT
Start: 2022-01-26 | End: 2022-01-27

## 2022-01-26 RX ORDER — MILRINONE LACTATE 0.2 MG/ML
.25-.75 INJECTION, SOLUTION INTRAVENOUS CONTINUOUS PRN
Status: DISCONTINUED | OUTPATIENT
Start: 2022-01-26 | End: 2022-01-28

## 2022-01-26 RX ORDER — HEPARIN SODIUM 1000 [USP'U]/ML
INJECTION, SOLUTION INTRAVENOUS; SUBCUTANEOUS AS NEEDED
Status: DISCONTINUED | OUTPATIENT
Start: 2022-01-26 | End: 2022-01-26 | Stop reason: SURG

## 2022-01-26 RX ORDER — PHENYLEPHRINE HCL IN 0.9% NACL 0.5 MG/5ML
.2-3 SYRINGE (ML) INTRAVENOUS CONTINUOUS PRN
Status: DISCONTINUED | OUTPATIENT
Start: 2022-01-26 | End: 2022-01-28

## 2022-01-26 RX ORDER — POTASSIUM CHLORIDE 1.5 G/1.77G
40 POWDER, FOR SOLUTION ORAL AS NEEDED
Status: DISCONTINUED | OUTPATIENT
Start: 2022-01-26 | End: 2022-01-31 | Stop reason: HOSPADM

## 2022-01-26 RX ORDER — MORPHINE SULFATE 2 MG/ML
4 INJECTION, SOLUTION INTRAMUSCULAR; INTRAVENOUS
Status: DISCONTINUED | OUTPATIENT
Start: 2022-01-26 | End: 2022-01-31 | Stop reason: HOSPADM

## 2022-01-26 RX ORDER — AMIODARONE HYDROCHLORIDE 50 MG/ML
INJECTION, SOLUTION INTRAVENOUS AS NEEDED
Status: DISCONTINUED | OUTPATIENT
Start: 2022-01-26 | End: 2022-01-26 | Stop reason: SURG

## 2022-01-26 RX ORDER — FENTANYL CITRATE 50 UG/ML
INJECTION, SOLUTION INTRAMUSCULAR; INTRAVENOUS
Status: COMPLETED | OUTPATIENT
Start: 2022-01-26 | End: 2022-01-26

## 2022-01-26 RX ORDER — BISACODYL 5 MG/1
10 TABLET, DELAYED RELEASE ORAL DAILY PRN
Status: DISCONTINUED | OUTPATIENT
Start: 2022-01-26 | End: 2022-01-31 | Stop reason: HOSPADM

## 2022-01-26 RX ORDER — OXYCODONE HYDROCHLORIDE 5 MG/1
10 TABLET ORAL EVERY 4 HOURS PRN
Status: DISCONTINUED | OUTPATIENT
Start: 2022-01-26 | End: 2022-01-31 | Stop reason: HOSPADM

## 2022-01-26 RX ORDER — PROTAMINE SULFATE 10 MG/ML
INJECTION, SOLUTION INTRAVENOUS AS NEEDED
Status: DISCONTINUED | OUTPATIENT
Start: 2022-01-26 | End: 2022-01-26 | Stop reason: SURG

## 2022-01-26 RX ORDER — NOREPINEPHRINE BIT/0.9 % NACL 8 MG/250ML
.02-.3 INFUSION BOTTLE (ML) INTRAVENOUS CONTINUOUS PRN
Status: DISCONTINUED | OUTPATIENT
Start: 2022-01-26 | End: 2022-01-28

## 2022-01-26 RX ORDER — SODIUM CHLORIDE 9 MG/ML
30 INJECTION, SOLUTION INTRAVENOUS CONTINUOUS
Status: DISCONTINUED | OUTPATIENT
Start: 2022-01-26 | End: 2022-01-31 | Stop reason: HOSPADM

## 2022-01-26 RX ORDER — SODIUM CHLORIDE 0.9 % (FLUSH) 0.9 %
3 SYRINGE (ML) INJECTION EVERY 12 HOURS SCHEDULED
Status: DISCONTINUED | OUTPATIENT
Start: 2022-01-26 | End: 2022-01-26 | Stop reason: HOSPADM

## 2022-01-26 RX ORDER — FENTANYL CITRATE 50 UG/ML
50 INJECTION, SOLUTION INTRAMUSCULAR; INTRAVENOUS
Status: DISCONTINUED | OUTPATIENT
Start: 2022-01-26 | End: 2022-01-26 | Stop reason: HOSPADM

## 2022-01-26 RX ORDER — ALBUMIN, HUMAN INJ 5% 5 %
1000 SOLUTION INTRAVENOUS AS NEEDED
Status: DISPENSED | OUTPATIENT
Start: 2022-01-26 | End: 2022-01-27

## 2022-01-26 RX ORDER — MAGNESIUM SULFATE HEPTAHYDRATE 40 MG/ML
2 INJECTION, SOLUTION INTRAVENOUS EVERY 8 HOURS
Status: DISPENSED | OUTPATIENT
Start: 2022-01-26 | End: 2022-01-27

## 2022-01-26 RX ORDER — ACETAMINOPHEN 325 MG/1
650 TABLET ORAL EVERY 4 HOURS
Status: ACTIVE | OUTPATIENT
Start: 2022-01-26 | End: 2022-01-27

## 2022-01-26 RX ORDER — BISACODYL 10 MG
10 SUPPOSITORY, RECTAL RECTAL DAILY PRN
Status: DISCONTINUED | OUTPATIENT
Start: 2022-01-27 | End: 2022-01-31 | Stop reason: HOSPADM

## 2022-01-26 RX ORDER — MIDAZOLAM HYDROCHLORIDE 1 MG/ML
2 INJECTION INTRAMUSCULAR; INTRAVENOUS
Status: DISCONTINUED | OUTPATIENT
Start: 2022-01-26 | End: 2022-01-27

## 2022-01-26 RX ORDER — PROPOFOL 10 MG/ML
VIAL (ML) INTRAVENOUS AS NEEDED
Status: DISCONTINUED | OUTPATIENT
Start: 2022-01-26 | End: 2022-01-26 | Stop reason: SURG

## 2022-01-26 RX ADMIN — PROPOFOL 40 MG: 10 INJECTION, EMULSION INTRAVENOUS at 12:39

## 2022-01-26 RX ADMIN — CALCIUM CHLORIDE 250 MG: 100 INJECTION, SOLUTION INTRAVENOUS at 17:35

## 2022-01-26 RX ADMIN — SODIUM CHLORIDE, PRESERVATIVE FREE 10 ML: 5 INJECTION INTRAVENOUS at 08:13

## 2022-01-26 RX ADMIN — FENTANYL CITRATE 25 MCG: 50 INJECTION INTRAMUSCULAR; INTRAVENOUS at 12:04

## 2022-01-26 RX ADMIN — MUPIROCIN 1 APPLICATION: 20 OINTMENT TOPICAL at 20:56

## 2022-01-26 RX ADMIN — MIDAZOLAM 2 MG: 1 INJECTION INTRAMUSCULAR; INTRAVENOUS at 12:39

## 2022-01-26 RX ADMIN — FENTANYL CITRATE 150 MCG: 0.05 INJECTION, SOLUTION INTRAMUSCULAR; INTRAVENOUS at 12:39

## 2022-01-26 RX ADMIN — MIDAZOLAM 2 MG: 1 INJECTION INTRAMUSCULAR; INTRAVENOUS at 10:02

## 2022-01-26 RX ADMIN — PROTAMINE SULFATE 250 MG: 10 INJECTION, SOLUTION INTRAVENOUS at 16:10

## 2022-01-26 RX ADMIN — CHLORHEXIDINE GLUCONATE 15 ML: 1.2 RINSE ORAL at 08:09

## 2022-01-26 RX ADMIN — HEPARIN SODIUM 7000 UNITS: 1000 INJECTION INTRAVENOUS; SUBCUTANEOUS at 13:34

## 2022-01-26 RX ADMIN — SODIUM CHLORIDE 9 ML: 9 INJECTION, SOLUTION INTRAVENOUS at 10:22

## 2022-01-26 RX ADMIN — SODIUM CHLORIDE, PRESERVATIVE FREE 10 ML: 5 INJECTION INTRAVENOUS at 08:12

## 2022-01-26 RX ADMIN — PANTOPRAZOLE SODIUM 40 MG: 40 INJECTION, POWDER, FOR SOLUTION INTRAVENOUS at 19:39

## 2022-01-26 RX ADMIN — DEXMEDETOMIDINE HYDROCHLORIDE 0.5 MCG/KG/HR: 100 INJECTION, SOLUTION, CONCENTRATE INTRAVENOUS at 21:28

## 2022-01-26 RX ADMIN — ROCURONIUM BROMIDE 30 MG: 50 INJECTION INTRAVENOUS at 15:45

## 2022-01-26 RX ADMIN — MIDAZOLAM 2 MG: 1 INJECTION INTRAMUSCULAR; INTRAVENOUS at 09:19

## 2022-01-26 RX ADMIN — MUPIROCIN 1 APPLICATION: 20 OINTMENT TOPICAL at 08:09

## 2022-01-26 RX ADMIN — FENTANYL CITRATE 50 MCG: 0.05 INJECTION, SOLUTION INTRAMUSCULAR; INTRAVENOUS at 09:19

## 2022-01-26 RX ADMIN — ROCURONIUM BROMIDE 30 MG: 50 INJECTION INTRAVENOUS at 13:38

## 2022-01-26 RX ADMIN — AMIODARONE HYDROCHLORIDE 150 MG: 50 INJECTION, SOLUTION INTRAVENOUS at 15:41

## 2022-01-26 RX ADMIN — SODIUM CHLORIDE 1.8 UNITS/HR: 9 INJECTION, SOLUTION INTRAVENOUS at 14:01

## 2022-01-26 RX ADMIN — PROPOFOL 50 MCG/KG/MIN: 10 INJECTION, EMULSION INTRAVENOUS at 20:53

## 2022-01-26 RX ADMIN — NOREPINEPHRINE BITARTRATE 0.03 MCG/KG/MIN: 1 INJECTION, SOLUTION, CONCENTRATE INTRAVENOUS at 12:38

## 2022-01-26 RX ADMIN — METOPROLOL TARTRATE 12.5 MG: 25 TABLET, FILM COATED ORAL at 08:20

## 2022-01-26 RX ADMIN — ALBUMIN HUMAN 250 ML: 0.05 INJECTION, SOLUTION INTRAVENOUS at 21:39

## 2022-01-26 RX ADMIN — AMINOCAPROIC ACID 10 G: 250 INJECTION, SOLUTION INTRAVENOUS at 16:13

## 2022-01-26 RX ADMIN — CHLORHEXIDINE GLUCONATE 1 APPLICATION: 500 CLOTH TOPICAL at 08:09

## 2022-01-26 RX ADMIN — FENTANYL CITRATE 100 MCG: 0.05 INJECTION, SOLUTION INTRAMUSCULAR; INTRAVENOUS at 16:24

## 2022-01-26 RX ADMIN — SODIUM BICARBONATE 25 MEQ: 84 INJECTION, SOLUTION INTRAVENOUS at 17:30

## 2022-01-26 RX ADMIN — SODIUM CHLORIDE 0.25 MCG/KG/MIN: 9 INJECTION, SOLUTION INTRAVENOUS at 15:39

## 2022-01-26 RX ADMIN — FAMOTIDINE 20 MG: 10 INJECTION, SOLUTION INTRAVENOUS at 09:27

## 2022-01-26 RX ADMIN — Medication 1250 MG: at 12:33

## 2022-01-26 RX ADMIN — PHENYLEPHRINE HYDROCHLORIDE 0.2 MCG/KG/MIN: 10 INJECTION INTRAVENOUS at 22:06

## 2022-01-26 RX ADMIN — AMIODARONE HYDROCHLORIDE 1 MG/MIN: 1.8 INJECTION, SOLUTION INTRAVENOUS at 15:41

## 2022-01-26 RX ADMIN — HEPARIN SODIUM 30000 UNITS: 1000 INJECTION INTRAVENOUS; SUBCUTANEOUS at 13:25

## 2022-01-26 RX ADMIN — CALCIUM CHLORIDE 250 MG: 100 INJECTION, SOLUTION INTRAVENOUS at 17:30

## 2022-01-26 RX ADMIN — FENTANYL CITRATE 100 MCG: 0.05 INJECTION, SOLUTION INTRAMUSCULAR; INTRAVENOUS at 16:28

## 2022-01-26 RX ADMIN — ROCURONIUM BROMIDE 70 MG: 50 INJECTION INTRAVENOUS at 12:39

## 2022-01-26 RX ADMIN — DEXMEDETOMIDINE HYDROCHLORIDE 0.5 MCG/KG/HR: 100 INJECTION, SOLUTION, CONCENTRATE INTRAVENOUS at 13:08

## 2022-01-26 RX ADMIN — HEPARIN SODIUM 20 UNITS/KG/HR: 10000 INJECTION, SOLUTION INTRAVENOUS at 05:28

## 2022-01-26 RX ADMIN — INSULIN HUMAN 2 UNITS: 100 INJECTION, SOLUTION PARENTERAL at 14:28

## 2022-01-26 RX ADMIN — MAGNESIUM SULFATE HEPTAHYDRATE 2 G: 500 INJECTION, SOLUTION INTRAMUSCULAR; INTRAVENOUS at 15:40

## 2022-01-26 RX ADMIN — FENTANYL CITRATE 100 MCG: 0.05 INJECTION, SOLUTION INTRAMUSCULAR; INTRAVENOUS at 13:14

## 2022-01-26 RX ADMIN — AMINOCAPROIC ACID 10 G: 250 INJECTION, SOLUTION INTRAVENOUS at 13:12

## 2022-01-26 RX ADMIN — CHLORHEXIDINE GLUCONATE 15 ML: 1.2 RINSE ORAL at 19:39

## 2022-01-26 RX ADMIN — Medication 25 MCG/KG/MIN: at 12:46

## 2022-01-26 RX ADMIN — INSULIN HUMAN 2 UNITS: 100 INJECTION, SOLUTION PARENTERAL at 14:01

## 2022-01-26 RX ADMIN — AMIODARONE HYDROCHLORIDE 1 MG/MIN: 1.8 INJECTION, SOLUTION INTRAVENOUS at 21:28

## 2022-01-26 RX ADMIN — FENTANYL CITRATE 50 MCG: 0.05 INJECTION, SOLUTION INTRAMUSCULAR; INTRAVENOUS at 10:02

## 2022-01-26 NOTE — ANESTHESIA PROCEDURE NOTES
Airway  Urgency: elective    Date/Time: 1/26/2022 12:42 PM  Airway not difficult    General Information and Staff    Patient location during procedure: OR  Anesthesiologist: Chinyere Garcia MD    Indications and Patient Condition  Indications for airway management: airway protection    Preoxygenated: yes  MILS maintained throughout  Mask difficulty assessment: 2 - vent by mask + OA or adjuvant +/- NMBA    Final Airway Details  Final airway type: endotracheal airway      Successful airway: ETT  Cuffed: yes   Successful intubation technique: direct laryngoscopy  Facilitating devices/methods: intubating stylet  Endotracheal tube insertion site: oral  Blade: [place]  Blade size: 3  ETT size (mm): 8.0  Cormack-Lehane Classification: grade I - full view of glottis  Placement verified by: chest auscultation and capnometry   Measured from: teeth  Number of attempts at approach: 1  Assessment: lips, teeth, and gum same as pre-op and atraumatic intubation

## 2022-01-26 NOTE — ANESTHESIA PROCEDURE NOTES
Left [place] Catheter insertion, LATEX FREE      Patient reassessed immediately prior to procedure    Patient location during procedure: holding area  Start time: 1/26/2022 10:14 AM  Stop Time:1/26/2022 10:20 AM  Indications: central pressure monitoring and MD/Surgeon request  Staff  Anesthesiologist: Jolanta Fry MD  Preanesthetic Checklist  Completed: patient identified, IV checked, site marked, risks and benefits discussed, surgical consent, monitors and equipment checked, pre-op evaluation and timeout performed  Central Line Prep  Sterile Tech:cap, gloves, gown, mask and sterile barriers  Prep: chloraprep  no medical exclusion documented for following all elements of maximal sterile barrier technique  Patient monitoring: blood pressure monitoring, continuous pulse oximetry and EKG  Central Line Procedure  Laterality:left  Location:internal jugular  Catheter Type:[place]-Mi  Catheter Size:7.5 Fr  Assessment  Post procedure:biopatch applied, line sutured and occlusive dressing applied  Assessement:blood return through all ports, free fluid flow and chest x-ray ordered  Complications:no  Patient Tolerance:patient tolerated the procedure well with no apparent complications  Additional Notes  Attempted x 2 to float swan, no change in PA pressure tracing or pressure levels. To 25cm and secured. Dr. Garcia notified.

## 2022-01-26 NOTE — ANESTHESIA PROCEDURE NOTES
Left IJ MAC Cordis      Patient reassessed immediately prior to procedure    Patient location during procedure: holding area  Start time: 1/26/2022 9:26 AM  Stop Time:1/26/2022 10:14 AM  Indications: central pressure monitoring, vascular access and MD/Surgeon request  Staff  Anesthesiologist: Jolanta Fry MD  Preanesthetic Checklist  Completed: patient identified, IV checked, site marked, risks and benefits discussed, surgical consent, monitors and equipment checked, pre-op evaluation and timeout performed  Central Line Prep  Sterile Tech:gloves, cap, gown, mask and sterile barriers  Prep: chloraprep  no medical exclusion documented for following all elements of maximal sterile barrier technique  Patient monitoring: blood pressure monitoring, continuous pulse oximetry and EKG  Central Line Procedure  Laterality:left  Location:internal jugular  Catheter Type:Cordis  Catheter Size:9 Fr  Guidance:ultrasound guided  PROCEDURE NOTE/ULTRASOUND INTERPRETATION.  Using ultrasound guidance the potential vascular sites for insertion of the catheter were visualized to determine the patency of the vessel to be used for vascular access.  After selecting the appropriate site for insertion, the needle was visualized under ultrasound being inserted into the internal jugular vein, followed by ultrasound confirmation of wire and catheter placement. There were no abnormalities seen on ultrasound; an image was taken; and the patient tolerated the procedure with no complications. Images: still images obtained, printed/placed on chart  Assessment  Post procedure:biopatch applied, line sutured and occlusive dressing applied  Assessement:blood return through all ports, free fluid flow and chest x-ray ordered  Complications:no  Patient Tolerance:patient tolerated the procedure well with no apparent complications  Additional Notes  Attempted x 2 by Dr. Fuller in Left IJ without ability to easily insert introducer. Attempted again by  me with a slightly more caudal needle trajectory, introducer threaded easily. All lines easily drawn and flushed.

## 2022-01-26 NOTE — ANESTHESIA PROCEDURE NOTES
Procedure Performed: Diagnostic Intraop MARGE       Start Time:  1/26/2022 1:16 PM       End Time:   1/26/2022 1:42 PM    Preanesthesia Checklist:  Patient identified, IV assessed, risks and benefits discussed, monitors and equipment assessed, procedure being performed at surgeon's request and anesthesia consent obtained.    General Procedure Information  Diagnostic Indications for Echo:  assessment of ascending aorta, assessment of surgical repair, defect repair evaluation and hemodynamic monitoring  Physician Requesting Echo: Kingsley Zuleta MD  ICD Code for Medical Necessity:  Non rheumatic Tricuspid insufficiency   Location performed:  OR  Intubated  Bite block placed  Heart visualized  Probe Insertion:  Easy  Probe Type:  Multiplane  Modalities:  Color flow mapping, continuous wave Doppler and pulse wave Doppler    Echocardiographic and Doppler Measurements    Ventricles    Right Ventricle:  Cavity size dilated.  Hypertrophy not present.  Thrombus not present.  Global function moderately impaired.  Ejection Fraction 35%.    Left Ventricle:  Hypertrophy present.  Thrombus not present.  Global Function normal.  Ejection Fraction 60%.          Valves    Aortic Valve:  Annulus normal.  Stenosis mild.  Area: 1.6 cm².  Mean Gradient: 11/20 mmHg.  Pressure Half-time: 333 ms.  Regurgitation moderate.  Leaflets calcified and thickened.  Leaflet motions restricted.      Mitral Valve:  Annulus dilated.  Stenosis moderate.  Area: 1.7 cm².  Mean Gradient: 9/15 mmHg.  Vena Contracta Width: 1.1 cm.  Regurgitation severe.  Leaflets thickened.  Leaflet motions restricted.      Tricuspid Valve:  Annulus dilated.  Stenosis not present.  Regurgitation severe.  Leaflets normal.  Leaflet motions restricted.    Pulmonic Valve:  Regurgitation trace.          Aorta    Ascending Aorta:  Size normal.  Diameter 3.2 cm.  Dissection not present.  Plaque thickness less than 3 mm.  Mobile plaque not present.    Aortic Arch:  Size normal.   Diameter 2.3 cm.  Dissection not present.  Plaque thickness less than 3 mm.  Mobile plaque not present.    Descending Aorta:  Size normal.  Diameter 2.2 cm.  Dissection not present.  Plaque thickness less than 3 mm.  Mobile plaque not present.          Atria    Right Atrium:  Size dilated.  Spontaneous echo contrast not present.  Thrombus not present.  Tumor not present.  Device present.    Left Atrium:  Size dilated.  Spontaneous echo contrast not present.  Thrombus not present.  Tumor not present.  Device not present.    Left atrial appendage normal.      Septa    Atrial Septum:  Intra-atrial septal morphology lipomatous hypertrophy.      Other atrial septal defect findings:       Bows to the right        Diastolic Function Measurements:  Diastolic Dysfunction Grade= indeterminate  E= ms  A= ms  E/A Ratio=   DT= ms  S/D=  IVRT=    Other Findings  Pericardium:  normal  Pleural Effusion:  none  Pulmonary Arteries:  dilated  Pulmonary Venous Flow:  systolic flow reversal    Anesthesia Information  Performed Personally  Anesthesiologist:  Chinyere Garcia MD      Echocardiogram Comments:       Mildly hypertrophied LV with normal function.  Dilated RV with moderately reduced function.   Thickened trileaflet AV with mild AS, moderate AI.   Thickened rheumatic appearing MV with severe central MR and moderate to severe MS. Dilated LA, no apparent clot in [place]. Ant leaflet length 2.8cm.   Dilated tricuspid annulus (4.1cm) with severe TR, septal/posterior oriented jet, No apparent flail or prolasing leaflet.     Post CPB  S/p AVR 25mm Bioprosthetic valve, well seated, no rocking motion, no paravalvular or intravalvular leak, mean gradient 8mmHg  S/p MVR 29mm Bioprosthetic valve, slightly septally oriented but still with laminar flow, mean gradient 3-4mmHg immediately after separation from bypass. No paravalvular leak, trace intravalvular leak. LA remains grossly dilated with interatrial septum bowing to the right.  S/p  tricuspid valve repair with annuloplasty ring, TR now mild. Tricuspid annulus tethered, not moving well. RV free wall moves but is moderately depressed, especially after chest closure.  LVEF normal, more underfilled since AI decreased and still with RV dysfunction.   Aorta intact after decannulation.

## 2022-01-26 NOTE — ANESTHESIA PROCEDURE NOTES
Arterial Line      Patient reassessed immediately prior to procedure    Patient location during procedure: pre-op  Start time: 1/26/2022 9:20 AM  Stop Time:1/26/2022 9:24 AM       Line placed for respiratory failure, hemodynamic monitoring and MD/Surgeon request.  Performed By   Anesthesiologist: Jolanta Fry MD  Preanesthetic Checklist  Completed: patient identified, IV checked, site marked, risks and benefits discussed, surgical consent, monitors and equipment checked, pre-op evaluation and timeout performed  Arterial Line Prep   Sterile Tech: cap, gloves, sterile barriers and mask  Prep: ChloraPrep  Patient monitoring: EKG, continuous pulse oximetry and blood pressure monitoring  Arterial Line Procedure   Laterality:left  Location:  radial artery  Catheter size: 20 G   Guidance: ultrasound guided  PROCEDURE NOTE/ULTRASOUND INTERPRETATION.  Using ultrasound guidance the potential vascular sites for insertion of the catheter were visualized to determine the patency of the vessel to be used for vascular access.  After selecting the appropriate site for insertion, the needle was visualized under ultrasound being inserted into the radial artery, followed by ultrasound confirmation of wire and catheter placement. There were no abnormalities seen on ultrasound; an image was taken; and the patient tolerated the procedure with no complications.   Number of attempts: 1  Successful placement: yes  Post Assessment   Dressing Type: wrist guard applied, secured with tape and occlusive dressing applied.   Complications no  Circ/Move/Sens Assessment: normal and unchanged.   Patient Tolerance: patient tolerated the procedure well with no apparent complications  Additional Notes  Using ultrasound guidance the potential vascular sites for insertion of the catheter were visualized to determine the patency of the vessel to be used for vascular access.  After selecting the appropriate site for insertion, the needle was  visualized under ultrasound being inserted into the artery, followed by ultrasound confirmation of wire and catheter placement.  There were no abnormalities seen on ultrasound; an image was taken/ and the patient tolerated the procedure with no complications.

## 2022-01-26 NOTE — ANESTHESIA PREPROCEDURE EVALUATION
Anesthesia Evaluation     Patient summary reviewed and Nursing notes reviewed   no history of anesthetic complications:  NPO Solid Status: > 8 hours  NPO Liquid Status: > 2 hours           Airway   Mallampati: II  TM distance: >3 FB  Neck ROM: full  no difficulty expected  Dental - normal exam     Pulmonary - normal exam   (+) a smoker Former, COPD mild,   (-) lung cancer  Cardiovascular - normal exam  Exercise tolerance: poor (<4 METS)    ECG reviewed  PT is on anticoagulation therapy  Rhythm: regular  Rate: normal    (+) hypertension well controlled less than 2 medications, valvular problems/murmurs AS, MR and TI, dysrhythmias Paroxysmal Atrial Fib, CHF Diastolic >=55%,   (-) past MI, CAD, cardiac stents, CABG    ROS comment: Rheumatic valvular heart disease.    TTE 01/2022:  ·Estimated right ventricular systolic pressure from tricuspid regurgitation is markedly elevated (>55 mmHg).  ·Severe pulmonary hypertension is present.  ·Left ventricular ejection fraction appears to be 56 - 60%. Left ventricular systolic function is normal.  ·Mildly reduced right ventricular systolic function noted.  ·The right atrial cavity is severely dilated.  · The left atrial cavity is severely dilated. No evidence of a left atrial thrombus present. Left atrial appendage was found to be multilobar in nature. Left atrial appendage morphology best described as windsock. No evidence of a left atrial appendage thrombus was present. Saline test results are negative.  ·Severe tricuspid valve regurgitation is present.  ·Moderate to severe mitral valve regurgitation is present. Severe mitral valve stenosis is present with rheumatic changes of the mitral valve apparatus. The calculated mitral valve Julian' score is 3. Abnormal anterior mitral leaflet mobility. The posterior leaflet is immobile  ·The aortic valve is abnormal in structure. There is calcification of the aortic valve. The aortic valve appears trileaflet. Moderate aortic valve  regurgitation is present. Aortic stenosis is present, see gradients from TTE        Neuro/Psych  (+) seizures well controlled, psychiatric history Anxiety,     (-) TIA, CVA  GI/Hepatic/Renal/Endo    (+)  GERD well controlled,  renal disease ARF,   (-) hiatal hernia, PUD, hepatitis, liver disease, diabetes, GI bleed, no thyroid disorder    Musculoskeletal     (+) back pain,   Abdominal  - normal exam   Substance History - negative use     OB/GYN negative ob/gyn ROS         Other   arthritis, autoimmune disease rheumatoid arthritis,                      Anesthesia Plan    ASA 4     general   (GA w/ secondary PIV, A line, [place]/Introducer and MARGE.  Pt will need left IJ introducer secondary to RIJ quad lumen)  intravenous induction     Anesthetic plan, all risks, benefits, and alternatives have been provided, discussed and informed consent has been obtained with: patient.    Plan discussed with CRNA.        CODE STATUS:    Level Of Support Discussed With: Patient  Code Status (Patient has no pulse and is not breathing): CPR (Attempt to Resuscitate)  Medical Interventions (Patient has pulse or is breathing): Full Support

## 2022-01-27 ENCOUNTER — APPOINTMENT (OUTPATIENT)
Dept: GENERAL RADIOLOGY | Facility: HOSPITAL | Age: 62
End: 2022-01-27

## 2022-01-27 LAB
ANION GAP SERPL CALCULATED.3IONS-SCNC: 7.1 MMOL/L (ref 5–15)
ARTERIAL PATENCY WRIST A: ABNORMAL
ATMOSPHERIC PRESS: 759.2 MMHG
ATMOSPHERIC PRESS: 762.1 MMHG
BASE EXCESS BLDA CALC-SCNC: 2.8 MMOL/L (ref 0–2)
BASE EXCESS BLDV CALC-SCNC: 0.4 MMOL/L (ref -2–2)
BASOPHILS # BLD AUTO: 0.04 10*3/MM3 (ref 0–0.2)
BASOPHILS NFR BLD AUTO: 0.4 % (ref 0–1.5)
BDY SITE: ABNORMAL
BDY SITE: ABNORMAL
BH BB BLOOD EXPIRATION DATE: NORMAL
BH BB BLOOD TYPE BARCODE: 1700
BH BB BLOOD TYPE BARCODE: 7300
BH BB BLOOD TYPE BARCODE: 8400
BH BB DISPENSE STATUS: NORMAL
BH BB PRODUCT CODE: NORMAL
BH BB UNIT NUMBER: NORMAL
BUN SERPL-MCNC: 12 MG/DL (ref 6–20)
BUN/CREAT SERPL: 20.7 (ref 7–25)
CA-I BLD-MCNC: 5.8 MG/DL (ref 4.6–5.4)
CA-I SERPL ISE-MCNC: 1.44 MMOL/L (ref 1.15–1.35)
CALCIUM SPEC-SCNC: 9.4 MG/DL (ref 8.6–10.5)
CHLORIDE SERPL-SCNC: 106 MMOL/L (ref 98–107)
CO2 SERPL-SCNC: 25.9 MMOL/L (ref 22–29)
CREAT SERPL-MCNC: 0.58 MG/DL (ref 0.57–1)
CROSSMATCH INTERPRETATION: NORMAL
CROSSMATCH INTERPRETATION: NORMAL
DEPRECATED RDW RBC AUTO: 45.2 FL (ref 37–54)
EOSINOPHIL # BLD AUTO: 0.29 10*3/MM3 (ref 0–0.4)
EOSINOPHIL NFR BLD AUTO: 3 % (ref 0.3–6.2)
ERYTHROCYTE [DISTWIDTH] IN BLOOD BY AUTOMATED COUNT: 14.2 % (ref 12.3–15.4)
GAS FLOW AIRWAY: 5 LPM
GFR SERPL CREATININE-BSD FRML MDRD: 106 ML/MIN/1.73
GLUCOSE BLDC GLUCOMTR-MCNC: 101 MG/DL (ref 70–130)
GLUCOSE BLDC GLUCOMTR-MCNC: 108 MG/DL (ref 70–130)
GLUCOSE BLDC GLUCOMTR-MCNC: 111 MG/DL (ref 70–130)
GLUCOSE BLDC GLUCOMTR-MCNC: 115 MG/DL (ref 70–130)
GLUCOSE BLDC GLUCOMTR-MCNC: 116 MG/DL (ref 70–130)
GLUCOSE BLDC GLUCOMTR-MCNC: 118 MG/DL (ref 70–130)
GLUCOSE BLDC GLUCOMTR-MCNC: 119 MG/DL (ref 70–130)
GLUCOSE BLDC GLUCOMTR-MCNC: 119 MG/DL (ref 70–130)
GLUCOSE BLDC GLUCOMTR-MCNC: 124 MG/DL (ref 70–130)
GLUCOSE SERPL-MCNC: 117 MG/DL (ref 65–99)
HCO3 BLDA-SCNC: 29 MMOL/L (ref 22–28)
HCO3 BLDV-SCNC: 28.2 MMOL/L (ref 22–28)
HCT VFR BLD AUTO: 27.9 % (ref 34–46.6)
HGB BLD-MCNC: 9.1 G/DL (ref 12–15.9)
IMM GRANULOCYTES # BLD AUTO: 0.11 10*3/MM3 (ref 0–0.05)
IMM GRANULOCYTES NFR BLD AUTO: 1.1 % (ref 0–0.5)
INHALED O2 CONCENTRATION: 40 %
INR PPP: 1.16 (ref 0.9–1.1)
LYMPHOCYTES # BLD AUTO: 1.07 10*3/MM3 (ref 0.7–3.1)
LYMPHOCYTES NFR BLD AUTO: 11 % (ref 19.6–45.3)
MCH RBC QN AUTO: 28.7 PG (ref 26.6–33)
MCHC RBC AUTO-ENTMCNC: 32.6 G/DL (ref 31.5–35.7)
MCV RBC AUTO: 88 FL (ref 79–97)
MODALITY: ABNORMAL
MODALITY: ABNORMAL
MONOCYTES # BLD AUTO: 1.17 10*3/MM3 (ref 0.1–0.9)
MONOCYTES NFR BLD AUTO: 12.1 % (ref 5–12)
NEUTROPHILS NFR BLD AUTO: 7.02 10*3/MM3 (ref 1.7–7)
NEUTROPHILS NFR BLD AUTO: 72.4 % (ref 42.7–76)
NRBC BLD AUTO-RTO: 0 /100 WBC (ref 0–0.2)
O2 A-A PPRESDIFF RESPIRATORY: 0.4 MMHG
PCO2 BLDA: 51.9 MM HG (ref 35–45)
PCO2 BLDV: 63 MM HG (ref 41–51)
PEEP RESPIRATORY: 7.5 CM[H2O]
PH BLDA: 7.36 PH UNITS (ref 7.35–7.45)
PH BLDV: 7.26 PH UNITS (ref 7.31–7.41)
PLATELET # BLD AUTO: 177 10*3/MM3 (ref 140–450)
PMV BLD AUTO: 9.6 FL (ref 6–12)
PO2 BLDA: 95.2 MM HG (ref 80–100)
PO2 BLDV: 34.7 MM HG (ref 35–45)
POTASSIUM SERPL-SCNC: 4.4 MMOL/L (ref 3.5–5.2)
PROTHROMBIN TIME: 14.6 SECONDS (ref 11.7–14.2)
PSV: 8 CMH2O
QT INTERVAL: 401 MS
RBC # BLD AUTO: 3.17 10*6/MM3 (ref 3.77–5.28)
SAO2 % BLDCOA: 55.9 % (ref 92–99)
SAO2 % BLDCOA: 96.9 % (ref 92–99)
SET MECH RESP RATE: 23
SODIUM SERPL-SCNC: 139 MMOL/L (ref 136–145)
TOTAL RATE: 20 BREATHS/MINUTE
TOTAL RATE: 23 BREATHS/MINUTE
UNIT  ABO: NORMAL
UNIT  RH: NORMAL
VENTILATOR MODE: ABNORMAL
VT ON VENT VENT: 446 ML
WBC NRBC COR # BLD: 9.7 10*3/MM3 (ref 3.4–10.8)

## 2022-01-27 PROCEDURE — 25010000002 VANCOMYCIN 10 G RECONSTITUTED SOLUTION: Performed by: NURSE PRACTITIONER

## 2022-01-27 PROCEDURE — 94799 UNLISTED PULMONARY SVC/PX: CPT

## 2022-01-27 PROCEDURE — 25010000002 FUROSEMIDE PER 20 MG: Performed by: INTERNAL MEDICINE

## 2022-01-27 PROCEDURE — 94640 AIRWAY INHALATION TREATMENT: CPT

## 2022-01-27 PROCEDURE — 93005 ELECTROCARDIOGRAM TRACING: CPT | Performed by: NURSE PRACTITIONER

## 2022-01-27 PROCEDURE — 97162 PT EVAL MOD COMPLEX 30 MIN: CPT

## 2022-01-27 PROCEDURE — 25010000002 MAGNESIUM SULFATE 2 GM/50ML SOLUTION: Performed by: NURSE PRACTITIONER

## 2022-01-27 PROCEDURE — 85025 COMPLETE CBC W/AUTO DIFF WBC: CPT | Performed by: NURSE PRACTITIONER

## 2022-01-27 PROCEDURE — 82962 GLUCOSE BLOOD TEST: CPT

## 2022-01-27 PROCEDURE — 82803 BLOOD GASES ANY COMBINATION: CPT

## 2022-01-27 PROCEDURE — 99232 SBSQ HOSP IP/OBS MODERATE 35: CPT | Performed by: INTERNAL MEDICINE

## 2022-01-27 PROCEDURE — 71045 X-RAY EXAM CHEST 1 VIEW: CPT

## 2022-01-27 PROCEDURE — 82330 ASSAY OF CALCIUM: CPT | Performed by: NURSE PRACTITIONER

## 2022-01-27 PROCEDURE — 25010000002 METOCLOPRAMIDE PER 10 MG: Performed by: NURSE PRACTITIONER

## 2022-01-27 PROCEDURE — 80048 BASIC METABOLIC PNL TOTAL CA: CPT | Performed by: NURSE PRACTITIONER

## 2022-01-27 PROCEDURE — 25010000002 AMIODARONE IN DEXTROSE 5% 360-4.14 MG/200ML-% SOLUTION: Performed by: THORACIC SURGERY (CARDIOTHORACIC VASCULAR SURGERY)

## 2022-01-27 PROCEDURE — 97530 THERAPEUTIC ACTIVITIES: CPT

## 2022-01-27 PROCEDURE — 25010000002 ENOXAPARIN PER 10 MG: Performed by: NURSE PRACTITIONER

## 2022-01-27 PROCEDURE — P9041 ALBUMIN (HUMAN),5%, 50ML: HCPCS | Performed by: THORACIC SURGERY (CARDIOTHORACIC VASCULAR SURGERY)

## 2022-01-27 PROCEDURE — 93010 ELECTROCARDIOGRAM REPORT: CPT | Performed by: INTERNAL MEDICINE

## 2022-01-27 PROCEDURE — 94761 N-INVAS EAR/PLS OXIMETRY MLT: CPT

## 2022-01-27 PROCEDURE — 0 MILRINONE LACTATE IN DEXTROSE 20-5 MG/100ML-% SOLUTION: Performed by: NURSE PRACTITIONER

## 2022-01-27 PROCEDURE — 85610 PROTHROMBIN TIME: CPT | Performed by: NURSE PRACTITIONER

## 2022-01-27 PROCEDURE — 94760 N-INVAS EAR/PLS OXIMETRY 1: CPT

## 2022-01-27 PROCEDURE — 99024 POSTOP FOLLOW-UP VISIT: CPT | Performed by: THORACIC SURGERY (CARDIOTHORACIC VASCULAR SURGERY)

## 2022-01-27 PROCEDURE — 25010000002 ALBUMIN HUMAN 5% PER 50 ML: Performed by: THORACIC SURGERY (CARDIOTHORACIC VASCULAR SURGERY)

## 2022-01-27 RX ORDER — IPRATROPIUM BROMIDE AND ALBUTEROL SULFATE 2.5; .5 MG/3ML; MG/3ML
3 SOLUTION RESPIRATORY (INHALATION) EVERY 4 HOURS PRN
Status: DISCONTINUED | OUTPATIENT
Start: 2022-01-27 | End: 2022-01-31 | Stop reason: HOSPADM

## 2022-01-27 RX ORDER — SODIUM CHLORIDE FOR INHALATION 7 %
4 VIAL, NEBULIZER (ML) INHALATION
Status: COMPLETED | OUTPATIENT
Start: 2022-01-27 | End: 2022-01-30

## 2022-01-27 RX ORDER — IPRATROPIUM BROMIDE AND ALBUTEROL SULFATE 2.5; .5 MG/3ML; MG/3ML
3 SOLUTION RESPIRATORY (INHALATION)
Status: DISCONTINUED | OUTPATIENT
Start: 2022-01-27 | End: 2022-01-30

## 2022-01-27 RX ORDER — ALBUMIN, HUMAN INJ 5% 5 %
250 SOLUTION INTRAVENOUS ONCE
Status: COMPLETED | OUTPATIENT
Start: 2022-01-27 | End: 2022-01-27

## 2022-01-27 RX ORDER — GABAPENTIN 100 MG/1
100 CAPSULE ORAL EVERY 12 HOURS SCHEDULED
Status: COMPLETED | OUTPATIENT
Start: 2022-01-27 | End: 2022-01-29

## 2022-01-27 RX ORDER — FUROSEMIDE 10 MG/ML
40 INJECTION INTRAMUSCULAR; INTRAVENOUS ONCE
Status: COMPLETED | OUTPATIENT
Start: 2022-01-27 | End: 2022-01-27

## 2022-01-27 RX ORDER — GUAIFENESIN 600 MG/1
1200 TABLET, EXTENDED RELEASE ORAL EVERY 12 HOURS SCHEDULED
Status: DISCONTINUED | OUTPATIENT
Start: 2022-01-27 | End: 2022-01-31 | Stop reason: HOSPADM

## 2022-01-27 RX ADMIN — FUROSEMIDE 40 MG: 10 INJECTION, SOLUTION INTRAMUSCULAR; INTRAVENOUS at 09:23

## 2022-01-27 RX ADMIN — ACETAMINOPHEN 650 MG: 325 TABLET ORAL at 18:00

## 2022-01-27 RX ADMIN — MAGNESIUM SULFATE HEPTAHYDRATE 2 G: 2 INJECTION, SOLUTION INTRAVENOUS at 10:11

## 2022-01-27 RX ADMIN — ATORVASTATIN CALCIUM 40 MG: 20 TABLET, FILM COATED ORAL at 21:07

## 2022-01-27 RX ADMIN — MUPIROCIN 1 APPLICATION: 20 OINTMENT TOPICAL at 09:22

## 2022-01-27 RX ADMIN — IPRATROPIUM BROMIDE AND ALBUTEROL SULFATE 3 ML: .5; 3 SOLUTION RESPIRATORY (INHALATION) at 15:23

## 2022-01-27 RX ADMIN — HYDROCODONE BITARTRATE AND ACETAMINOPHEN 2 TABLET: 5; 325 TABLET ORAL at 02:05

## 2022-01-27 RX ADMIN — VANCOMYCIN HYDROCHLORIDE 1250 MG: 10 INJECTION, POWDER, LYOPHILIZED, FOR SOLUTION INTRAVENOUS at 11:25

## 2022-01-27 RX ADMIN — MILRINONE LACTATE 0.25 MCG/KG/MIN: 0.2 INJECTION, SOLUTION INTRAVENOUS at 05:09

## 2022-01-27 RX ADMIN — IPRATROPIUM BROMIDE AND ALBUTEROL SULFATE 3 ML: .5; 3 SOLUTION RESPIRATORY (INHALATION) at 20:32

## 2022-01-27 RX ADMIN — METOCLOPRAMIDE HYDROCHLORIDE 10 MG: 5 INJECTION INTRAMUSCULAR; INTRAVENOUS at 14:31

## 2022-01-27 RX ADMIN — CHLORHEXIDINE GLUCONATE 15 ML: 1.2 RINSE ORAL at 18:02

## 2022-01-27 RX ADMIN — HYDROCODONE BITARTRATE AND ACETAMINOPHEN 2 TABLET: 5; 325 TABLET ORAL at 06:32

## 2022-01-27 RX ADMIN — PANTOPRAZOLE SODIUM 40 MG: 40 TABLET, DELAYED RELEASE ORAL at 06:32

## 2022-01-27 RX ADMIN — AMIODARONE HYDROCHLORIDE 1 MG/MIN: 1.8 INJECTION, SOLUTION INTRAVENOUS at 03:20

## 2022-01-27 RX ADMIN — GABAPENTIN 100 MG: 100 CAPSULE ORAL at 21:07

## 2022-01-27 RX ADMIN — ASPIRIN 81 MG: 81 TABLET, COATED ORAL at 09:22

## 2022-01-27 RX ADMIN — METOCLOPRAMIDE HYDROCHLORIDE 10 MG: 5 INJECTION INTRAMUSCULAR; INTRAVENOUS at 06:32

## 2022-01-27 RX ADMIN — VANCOMYCIN HYDROCHLORIDE 1250 MG: 10 INJECTION, POWDER, LYOPHILIZED, FOR SOLUTION INTRAVENOUS at 00:31

## 2022-01-27 RX ADMIN — MUPIROCIN 1 APPLICATION: 20 OINTMENT TOPICAL at 21:10

## 2022-01-27 RX ADMIN — GUAIFENESIN 1200 MG: 600 TABLET, EXTENDED RELEASE ORAL at 09:22

## 2022-01-27 RX ADMIN — ENOXAPARIN SODIUM 40 MG: 100 INJECTION SUBCUTANEOUS at 18:00

## 2022-01-27 RX ADMIN — OXYCODONE 10 MG: 5 TABLET ORAL at 18:26

## 2022-01-27 RX ADMIN — SODIUM CHLORIDE SOLN NEBU 7% 4 ML: 7 NEBU SOLN at 20:32

## 2022-01-27 RX ADMIN — HYDROCODONE BITARTRATE AND ACETAMINOPHEN 2 TABLET: 5; 325 TABLET ORAL at 21:07

## 2022-01-27 RX ADMIN — MAGNESIUM SULFATE HEPTAHYDRATE 2 G: 2 INJECTION, SOLUTION INTRAVENOUS at 03:29

## 2022-01-27 RX ADMIN — OXYCODONE 10 MG: 5 TABLET ORAL at 14:31

## 2022-01-27 RX ADMIN — ALBUMIN HUMAN 250 ML: 0.05 INJECTION, SOLUTION INTRAVENOUS at 06:58

## 2022-01-27 RX ADMIN — DOCUSATE SODIUM 50MG AND SENNOSIDES 8.6MG 2 TABLET: 8.6; 5 TABLET, FILM COATED ORAL at 21:08

## 2022-01-27 RX ADMIN — GUAIFENESIN 1200 MG: 600 TABLET, EXTENDED RELEASE ORAL at 21:08

## 2022-01-27 RX ADMIN — METOPROLOL TARTRATE 12.5 MG: 25 TABLET, FILM COATED ORAL at 21:08

## 2022-01-27 RX ADMIN — CHLORHEXIDINE GLUCONATE 15 ML: 1.2 RINSE ORAL at 06:32

## 2022-01-27 RX ADMIN — GABAPENTIN 100 MG: 100 CAPSULE ORAL at 09:22

## 2022-01-27 RX ADMIN — OXYCODONE 10 MG: 5 TABLET ORAL at 10:19

## 2022-01-27 RX ADMIN — DOCUSATE SODIUM 50MG AND SENNOSIDES 8.6MG 2 TABLET: 8.6; 5 TABLET, FILM COATED ORAL at 09:22

## 2022-01-27 NOTE — ANESTHESIA POSTPROCEDURE EVALUATION
Patient: Akila Amezcua    Procedure Summary     Date: 01/26/22 Room / Location: [address] CARDIOVASCULAR OPERATING ROOM    Anesthesia Start: 1223 Anesthesia Stop: 1822    Procedures:       MEDIAN STERNOTOMY, MITRAL VALVE REPLACEMENT AORTIC VALVE REPLACEMENT TRICUSPID VALVE REPAIR, PRP (N/A Chest)      TRANSESOPHAGEAL ECHOCARDIOGRAM WITH ANESTHESIA (N/A Chest) Diagnosis:       Rheumatic valvular disease      (Rheumatic valvular disease [I09.1])    Surgeons: Kingsley Zuleta MD Provider: Chinyere Garcia MD    Anesthesia Type: general ASA Status: 4          Anesthesia Type: general    Vitals  Vitals Value Taken Time   /78 01/26/22 1815   Temp 35.5 °C (95.9 °F) 01/26/22 1928   Pulse 89 01/26/22 1928   Resp     SpO2 96 % 01/26/22 1928   Vitals shown include unvalidated device data.        Post Anesthesia Care and Evaluation    Pain management: adequate  Airway patency: patent  Anesthetic complications: No anesthetic complications  PONV Status: controlled  Cardiovascular status: acceptable  Respiratory status: acceptable  Hydration status: acceptable

## 2022-01-28 ENCOUNTER — APPOINTMENT (OUTPATIENT)
Dept: GENERAL RADIOLOGY | Facility: HOSPITAL | Age: 62
End: 2022-01-28

## 2022-01-28 LAB
ANION GAP SERPL CALCULATED.3IONS-SCNC: 11.4 MMOL/L (ref 5–15)
BUN SERPL-MCNC: 13 MG/DL (ref 6–20)
BUN/CREAT SERPL: 18.3 (ref 7–25)
CALCIUM SPEC-SCNC: 9.2 MG/DL (ref 8.6–10.5)
CHLORIDE SERPL-SCNC: 98 MMOL/L (ref 98–107)
CO2 SERPL-SCNC: 24.6 MMOL/L (ref 22–29)
CREAT SERPL-MCNC: 0.71 MG/DL (ref 0.57–1)
DEPRECATED RDW RBC AUTO: 43.8 FL (ref 37–54)
ERYTHROCYTE [DISTWIDTH] IN BLOOD BY AUTOMATED COUNT: 13.9 % (ref 12.3–15.4)
GFR SERPL CREATININE-BSD FRML MDRD: 84 ML/MIN/1.73
GLUCOSE BLDC GLUCOMTR-MCNC: 107 MG/DL (ref 70–130)
GLUCOSE BLDC GLUCOMTR-MCNC: 108 MG/DL (ref 70–130)
GLUCOSE BLDC GLUCOMTR-MCNC: 148 MG/DL (ref 70–130)
GLUCOSE BLDC GLUCOMTR-MCNC: 172 MG/DL (ref 70–130)
GLUCOSE BLDC GLUCOMTR-MCNC: 176 MG/DL (ref 70–130)
GLUCOSE SERPL-MCNC: 141 MG/DL (ref 65–99)
HCT VFR BLD AUTO: 26.2 % (ref 34–46.6)
HGB BLD-MCNC: 8.5 G/DL (ref 12–15.9)
LAB AP CASE REPORT: NORMAL
MCH RBC QN AUTO: 28.4 PG (ref 26.6–33)
MCHC RBC AUTO-ENTMCNC: 32.4 G/DL (ref 31.5–35.7)
MCV RBC AUTO: 87.6 FL (ref 79–97)
PATH REPORT.FINAL DX SPEC: NORMAL
PATH REPORT.GROSS SPEC: NORMAL
PLATELET # BLD AUTO: 197 10*3/MM3 (ref 140–450)
PMV BLD AUTO: 9.9 FL (ref 6–12)
POTASSIUM SERPL-SCNC: 4.1 MMOL/L (ref 3.5–5.2)
QT INTERVAL: 379 MS
RBC # BLD AUTO: 2.99 10*6/MM3 (ref 3.77–5.28)
SODIUM SERPL-SCNC: 134 MMOL/L (ref 136–145)
WBC NRBC COR # BLD: 10.6 10*3/MM3 (ref 3.4–10.8)

## 2022-01-28 PROCEDURE — 93005 ELECTROCARDIOGRAM TRACING: CPT | Performed by: NURSE PRACTITIONER

## 2022-01-28 PROCEDURE — 82962 GLUCOSE BLOOD TEST: CPT

## 2022-01-28 PROCEDURE — 94799 UNLISTED PULMONARY SVC/PX: CPT

## 2022-01-28 PROCEDURE — 25010000002 ONDANSETRON PER 1 MG: Performed by: NURSE PRACTITIONER

## 2022-01-28 PROCEDURE — 25010000002 VANCOMYCIN 10 G RECONSTITUTED SOLUTION: Performed by: NURSE PRACTITIONER

## 2022-01-28 PROCEDURE — 80048 BASIC METABOLIC PNL TOTAL CA: CPT | Performed by: NURSE PRACTITIONER

## 2022-01-28 PROCEDURE — 85027 COMPLETE CBC AUTOMATED: CPT | Performed by: NURSE PRACTITIONER

## 2022-01-28 PROCEDURE — 93010 ELECTROCARDIOGRAM REPORT: CPT | Performed by: INTERNAL MEDICINE

## 2022-01-28 PROCEDURE — 97530 THERAPEUTIC ACTIVITIES: CPT

## 2022-01-28 PROCEDURE — 71045 X-RAY EXAM CHEST 1 VIEW: CPT

## 2022-01-28 PROCEDURE — 25010000002 ENOXAPARIN PER 10 MG: Performed by: NURSE PRACTITIONER

## 2022-01-28 PROCEDURE — 25010000002 FUROSEMIDE PER 20 MG: Performed by: INTERNAL MEDICINE

## 2022-01-28 PROCEDURE — 63710000001 INSULIN LISPRO (HUMAN) PER 5 UNITS: Performed by: NURSE PRACTITIONER

## 2022-01-28 PROCEDURE — 25010000002 CALCIUM GLUCONATE-NACL 1-0.675 GM/50ML-% SOLUTION: Performed by: NURSE PRACTITIONER

## 2022-01-28 PROCEDURE — 99232 SBSQ HOSP IP/OBS MODERATE 35: CPT | Performed by: INTERNAL MEDICINE

## 2022-01-28 RX ORDER — DEXTROSE MONOHYDRATE 25 G/50ML
25 INJECTION, SOLUTION INTRAVENOUS
Status: DISCONTINUED | OUTPATIENT
Start: 2022-01-28 | End: 2022-01-31 | Stop reason: HOSPADM

## 2022-01-28 RX ORDER — CALCIUM GLUCONATE 20 MG/ML
2 INJECTION, SOLUTION INTRAVENOUS ONCE
Status: COMPLETED | OUTPATIENT
Start: 2022-01-28 | End: 2022-01-28

## 2022-01-28 RX ORDER — INSULIN LISPRO 100 [IU]/ML
0-7 INJECTION, SOLUTION INTRAVENOUS; SUBCUTANEOUS
Status: DISCONTINUED | OUTPATIENT
Start: 2022-01-28 | End: 2022-01-31 | Stop reason: HOSPADM

## 2022-01-28 RX ORDER — FUROSEMIDE 10 MG/ML
40 INJECTION INTRAMUSCULAR; INTRAVENOUS EVERY 12 HOURS
Status: COMPLETED | OUTPATIENT
Start: 2022-01-28 | End: 2022-01-28

## 2022-01-28 RX ORDER — NITROGLYCERIN 0.4 MG/1
0.4 TABLET SUBLINGUAL
Status: DISCONTINUED | OUTPATIENT
Start: 2022-01-28 | End: 2022-01-31 | Stop reason: HOSPADM

## 2022-01-28 RX ORDER — NICOTINE POLACRILEX 4 MG
15 LOZENGE BUCCAL
Status: DISCONTINUED | OUTPATIENT
Start: 2022-01-28 | End: 2022-01-31 | Stop reason: HOSPADM

## 2022-01-28 RX ADMIN — INSULIN LISPRO 2 UNITS: 100 INJECTION, SOLUTION INTRAVENOUS; SUBCUTANEOUS at 16:10

## 2022-01-28 RX ADMIN — SODIUM CHLORIDE SOLN NEBU 7% 4 ML: 7 NEBU SOLN at 09:10

## 2022-01-28 RX ADMIN — IPRATROPIUM BROMIDE AND ALBUTEROL SULFATE 3 ML: .5; 3 SOLUTION RESPIRATORY (INHALATION) at 20:29

## 2022-01-28 RX ADMIN — OXYCODONE 10 MG: 5 TABLET ORAL at 04:58

## 2022-01-28 RX ADMIN — CALCIUM GLUCONATE 2 G: 20 INJECTION, SOLUTION INTRAVENOUS at 13:10

## 2022-01-28 RX ADMIN — FUROSEMIDE 40 MG: 10 INJECTION, SOLUTION INTRAMUSCULAR; INTRAVENOUS at 10:56

## 2022-01-28 RX ADMIN — VANCOMYCIN HYDROCHLORIDE 1250 MG: 10 INJECTION, POWDER, LYOPHILIZED, FOR SOLUTION INTRAVENOUS at 02:40

## 2022-01-28 RX ADMIN — FUROSEMIDE 40 MG: 10 INJECTION, SOLUTION INTRAMUSCULAR; INTRAVENOUS at 21:01

## 2022-01-28 RX ADMIN — ENOXAPARIN SODIUM 40 MG: 100 INJECTION SUBCUTANEOUS at 08:39

## 2022-01-28 RX ADMIN — DOCUSATE SODIUM 50MG AND SENNOSIDES 8.6MG 2 TABLET: 8.6; 5 TABLET, FILM COATED ORAL at 20:24

## 2022-01-28 RX ADMIN — HYDROCODONE BITARTRATE AND ACETAMINOPHEN 2 TABLET: 5; 325 TABLET ORAL at 10:56

## 2022-01-28 RX ADMIN — HYDROCODONE BITARTRATE AND ACETAMINOPHEN 2 TABLET: 5; 325 TABLET ORAL at 06:32

## 2022-01-28 RX ADMIN — IPRATROPIUM BROMIDE AND ALBUTEROL SULFATE 3 ML: .5; 3 SOLUTION RESPIRATORY (INHALATION) at 13:21

## 2022-01-28 RX ADMIN — CHLORHEXIDINE GLUCONATE 15 ML: 1.2 RINSE ORAL at 06:29

## 2022-01-28 RX ADMIN — GABAPENTIN 100 MG: 100 CAPSULE ORAL at 20:24

## 2022-01-28 RX ADMIN — ASPIRIN 81 MG: 81 TABLET, COATED ORAL at 08:38

## 2022-01-28 RX ADMIN — ONDANSETRON 4 MG: 2 INJECTION INTRAMUSCULAR; INTRAVENOUS at 13:11

## 2022-01-28 RX ADMIN — GABAPENTIN 100 MG: 100 CAPSULE ORAL at 08:38

## 2022-01-28 RX ADMIN — HYDROCODONE BITARTRATE AND ACETAMINOPHEN 2 TABLET: 5; 325 TABLET ORAL at 19:03

## 2022-01-28 RX ADMIN — HYDROCODONE BITARTRATE AND ACETAMINOPHEN 2 TABLET: 5; 325 TABLET ORAL at 01:23

## 2022-01-28 RX ADMIN — GUAIFENESIN 1200 MG: 600 TABLET, EXTENDED RELEASE ORAL at 20:24

## 2022-01-28 RX ADMIN — ONDANSETRON 4 MG: 2 INJECTION INTRAMUSCULAR; INTRAVENOUS at 06:35

## 2022-01-28 RX ADMIN — MUPIROCIN 1 APPLICATION: 20 OINTMENT TOPICAL at 20:24

## 2022-01-28 RX ADMIN — ATORVASTATIN CALCIUM 40 MG: 20 TABLET, FILM COATED ORAL at 20:24

## 2022-01-28 RX ADMIN — DOCUSATE SODIUM 50MG AND SENNOSIDES 8.6MG 2 TABLET: 8.6; 5 TABLET, FILM COATED ORAL at 08:38

## 2022-01-28 RX ADMIN — IPRATROPIUM BROMIDE AND ALBUTEROL SULFATE 3 ML: .5; 3 SOLUTION RESPIRATORY (INHALATION) at 09:10

## 2022-01-28 RX ADMIN — GUAIFENESIN 1200 MG: 600 TABLET, EXTENDED RELEASE ORAL at 08:38

## 2022-01-28 RX ADMIN — SODIUM CHLORIDE SOLN NEBU 7% 4 ML: 7 NEBU SOLN at 20:29

## 2022-01-28 RX ADMIN — INSULIN LISPRO 2 UNITS: 100 INJECTION, SOLUTION INTRAVENOUS; SUBCUTANEOUS at 11:11

## 2022-01-28 RX ADMIN — PANTOPRAZOLE SODIUM 40 MG: 40 TABLET, DELAYED RELEASE ORAL at 06:29

## 2022-01-28 RX ADMIN — HYDROCODONE BITARTRATE AND ACETAMINOPHEN 2 TABLET: 5; 325 TABLET ORAL at 15:21

## 2022-01-29 ENCOUNTER — APPOINTMENT (OUTPATIENT)
Dept: GENERAL RADIOLOGY | Facility: HOSPITAL | Age: 62
End: 2022-01-29

## 2022-01-29 LAB
ANION GAP SERPL CALCULATED.3IONS-SCNC: 10.3 MMOL/L (ref 5–15)
BUN SERPL-MCNC: 12 MG/DL (ref 6–20)
BUN/CREAT SERPL: 19 (ref 7–25)
CALCIUM SPEC-SCNC: 9.6 MG/DL (ref 8.6–10.5)
CHLORIDE SERPL-SCNC: 96 MMOL/L (ref 98–107)
CO2 SERPL-SCNC: 27.7 MMOL/L (ref 22–29)
CREAT SERPL-MCNC: 0.63 MG/DL (ref 0.57–1)
DEPRECATED RDW RBC AUTO: 47.6 FL (ref 37–54)
ERYTHROCYTE [DISTWIDTH] IN BLOOD BY AUTOMATED COUNT: 14.3 % (ref 12.3–15.4)
GFR SERPL CREATININE-BSD FRML MDRD: 97 ML/MIN/1.73
GLUCOSE BLDC GLUCOMTR-MCNC: 124 MG/DL (ref 70–130)
GLUCOSE BLDC GLUCOMTR-MCNC: 124 MG/DL (ref 70–130)
GLUCOSE BLDC GLUCOMTR-MCNC: 127 MG/DL (ref 70–130)
GLUCOSE BLDC GLUCOMTR-MCNC: 131 MG/DL (ref 70–130)
GLUCOSE SERPL-MCNC: 116 MG/DL (ref 65–99)
HCT VFR BLD AUTO: 28.1 % (ref 34–46.6)
HGB BLD-MCNC: 8.9 G/DL (ref 12–15.9)
MCH RBC QN AUTO: 28.8 PG (ref 26.6–33)
MCHC RBC AUTO-ENTMCNC: 31.7 G/DL (ref 31.5–35.7)
MCV RBC AUTO: 90.9 FL (ref 79–97)
PLATELET # BLD AUTO: 220 10*3/MM3 (ref 140–450)
PMV BLD AUTO: 10 FL (ref 6–12)
POTASSIUM SERPL-SCNC: 3.9 MMOL/L (ref 3.5–5.2)
RBC # BLD AUTO: 3.09 10*6/MM3 (ref 3.77–5.28)
SODIUM SERPL-SCNC: 134 MMOL/L (ref 136–145)
WBC NRBC COR # BLD: 10.95 10*3/MM3 (ref 3.4–10.8)

## 2022-01-29 PROCEDURE — 99024 POSTOP FOLLOW-UP VISIT: CPT | Performed by: NURSE PRACTITIONER

## 2022-01-29 PROCEDURE — 80048 BASIC METABOLIC PNL TOTAL CA: CPT | Performed by: NURSE PRACTITIONER

## 2022-01-29 PROCEDURE — 94761 N-INVAS EAR/PLS OXIMETRY MLT: CPT

## 2022-01-29 PROCEDURE — 94799 UNLISTED PULMONARY SVC/PX: CPT

## 2022-01-29 PROCEDURE — 94760 N-INVAS EAR/PLS OXIMETRY 1: CPT

## 2022-01-29 PROCEDURE — 25010000002 FUROSEMIDE PER 20 MG: Performed by: NURSE PRACTITIONER

## 2022-01-29 PROCEDURE — 82962 GLUCOSE BLOOD TEST: CPT

## 2022-01-29 PROCEDURE — 25010000002 ENOXAPARIN PER 10 MG: Performed by: NURSE PRACTITIONER

## 2022-01-29 PROCEDURE — 85027 COMPLETE CBC AUTOMATED: CPT | Performed by: NURSE PRACTITIONER

## 2022-01-29 PROCEDURE — 99232 SBSQ HOSP IP/OBS MODERATE 35: CPT | Performed by: NURSE PRACTITIONER

## 2022-01-29 PROCEDURE — 25010000002 ONDANSETRON PER 1 MG: Performed by: NURSE PRACTITIONER

## 2022-01-29 PROCEDURE — 71046 X-RAY EXAM CHEST 2 VIEWS: CPT

## 2022-01-29 RX ORDER — FUROSEMIDE 10 MG/ML
40 INJECTION INTRAMUSCULAR; INTRAVENOUS ONCE
Status: COMPLETED | OUTPATIENT
Start: 2022-01-29 | End: 2022-01-29

## 2022-01-29 RX ORDER — POTASSIUM CHLORIDE 750 MG/1
20 TABLET, FILM COATED, EXTENDED RELEASE ORAL ONCE
Status: COMPLETED | OUTPATIENT
Start: 2022-01-29 | End: 2022-01-29

## 2022-01-29 RX ORDER — POTASSIUM CHLORIDE 750 MG/1
30 TABLET, FILM COATED, EXTENDED RELEASE ORAL ONCE
Status: COMPLETED | OUTPATIENT
Start: 2022-01-29 | End: 2022-01-29

## 2022-01-29 RX ORDER — METOPROLOL SUCCINATE 25 MG/1
12.5 TABLET, EXTENDED RELEASE ORAL
Status: DISCONTINUED | OUTPATIENT
Start: 2022-01-29 | End: 2022-01-31 | Stop reason: HOSPADM

## 2022-01-29 RX ADMIN — ENOXAPARIN SODIUM 40 MG: 100 INJECTION SUBCUTANEOUS at 08:25

## 2022-01-29 RX ADMIN — POTASSIUM CHLORIDE 30 MEQ: 750 TABLET, EXTENDED RELEASE ORAL at 10:40

## 2022-01-29 RX ADMIN — MUPIROCIN 1 APPLICATION: 20 OINTMENT TOPICAL at 21:01

## 2022-01-29 RX ADMIN — SODIUM CHLORIDE SOLN NEBU 7% 4 ML: 7 NEBU SOLN at 19:59

## 2022-01-29 RX ADMIN — HYDROCODONE BITARTRATE AND ACETAMINOPHEN 2 TABLET: 5; 325 TABLET ORAL at 04:12

## 2022-01-29 RX ADMIN — IPRATROPIUM BROMIDE AND ALBUTEROL SULFATE 3 ML: .5; 3 SOLUTION RESPIRATORY (INHALATION) at 19:59

## 2022-01-29 RX ADMIN — HYDROCODONE BITARTRATE AND ACETAMINOPHEN 2 TABLET: 5; 325 TABLET ORAL at 00:07

## 2022-01-29 RX ADMIN — FUROSEMIDE 40 MG: 10 INJECTION INTRAMUSCULAR; INTRAVENOUS at 10:41

## 2022-01-29 RX ADMIN — ONDANSETRON 4 MG: 2 INJECTION INTRAMUSCULAR; INTRAVENOUS at 17:07

## 2022-01-29 RX ADMIN — DOCUSATE SODIUM 50MG AND SENNOSIDES 8.6MG 2 TABLET: 8.6; 5 TABLET, FILM COATED ORAL at 23:04

## 2022-01-29 RX ADMIN — MUPIROCIN 1 APPLICATION: 20 OINTMENT TOPICAL at 08:25

## 2022-01-29 RX ADMIN — PANTOPRAZOLE SODIUM 40 MG: 40 TABLET, DELAYED RELEASE ORAL at 06:30

## 2022-01-29 RX ADMIN — GABAPENTIN 100 MG: 100 CAPSULE ORAL at 08:25

## 2022-01-29 RX ADMIN — IPRATROPIUM BROMIDE AND ALBUTEROL SULFATE 3 ML: .5; 3 SOLUTION RESPIRATORY (INHALATION) at 16:28

## 2022-01-29 RX ADMIN — ASPIRIN 81 MG: 81 TABLET, COATED ORAL at 08:25

## 2022-01-29 RX ADMIN — MAGNESIUM OXIDE 400 MG (241.3 MG MAGNESIUM) TABLET 400 MG: TABLET at 23:04

## 2022-01-29 RX ADMIN — ATORVASTATIN CALCIUM 40 MG: 20 TABLET, FILM COATED ORAL at 21:01

## 2022-01-29 RX ADMIN — FUROSEMIDE 40 MG: 10 INJECTION INTRAMUSCULAR; INTRAVENOUS at 17:00

## 2022-01-29 RX ADMIN — IPRATROPIUM BROMIDE AND ALBUTEROL SULFATE 3 ML: .5; 3 SOLUTION RESPIRATORY (INHALATION) at 12:07

## 2022-01-29 RX ADMIN — GUAIFENESIN 1200 MG: 600 TABLET, EXTENDED RELEASE ORAL at 08:25

## 2022-01-29 RX ADMIN — MAGNESIUM OXIDE 400 MG (241.3 MG MAGNESIUM) TABLET 400 MG: TABLET at 10:41

## 2022-01-29 RX ADMIN — CHLORHEXIDINE GLUCONATE 15 ML: 1.2 RINSE ORAL at 21:08

## 2022-01-29 RX ADMIN — POTASSIUM CHLORIDE 20 MEQ: 750 TABLET, EXTENDED RELEASE ORAL at 17:00

## 2022-01-29 RX ADMIN — DOCUSATE SODIUM 50MG AND SENNOSIDES 8.6MG 2 TABLET: 8.6; 5 TABLET, FILM COATED ORAL at 08:25

## 2022-01-29 RX ADMIN — SODIUM CHLORIDE SOLN NEBU 7% 4 ML: 7 NEBU SOLN at 08:06

## 2022-01-29 RX ADMIN — METOPROLOL SUCCINATE 12.5 MG: 25 TABLET, EXTENDED RELEASE ORAL at 14:06

## 2022-01-29 RX ADMIN — IPRATROPIUM BROMIDE AND ALBUTEROL SULFATE 3 ML: .5; 3 SOLUTION RESPIRATORY (INHALATION) at 08:05

## 2022-01-29 RX ADMIN — GABAPENTIN 100 MG: 100 CAPSULE ORAL at 21:01

## 2022-01-29 RX ADMIN — GUAIFENESIN 1200 MG: 600 TABLET, EXTENDED RELEASE ORAL at 21:01

## 2022-01-29 RX ADMIN — HYDROCODONE BITARTRATE AND ACETAMINOPHEN 2 TABLET: 5; 325 TABLET ORAL at 21:04

## 2022-01-30 LAB
ANION GAP SERPL CALCULATED.3IONS-SCNC: 8 MMOL/L (ref 5–15)
BUN SERPL-MCNC: 10 MG/DL (ref 6–20)
BUN/CREAT SERPL: 16.9 (ref 7–25)
CALCIUM SPEC-SCNC: 9.4 MG/DL (ref 8.6–10.5)
CHLORIDE SERPL-SCNC: 93 MMOL/L (ref 98–107)
CO2 SERPL-SCNC: 34 MMOL/L (ref 22–29)
CREAT SERPL-MCNC: 0.59 MG/DL (ref 0.57–1)
DEPRECATED RDW RBC AUTO: 46.5 FL (ref 37–54)
ERYTHROCYTE [DISTWIDTH] IN BLOOD BY AUTOMATED COUNT: 14.1 % (ref 12.3–15.4)
GFR SERPL CREATININE-BSD FRML MDRD: 104 ML/MIN/1.73
GLUCOSE BLDC GLUCOMTR-MCNC: 101 MG/DL (ref 70–130)
GLUCOSE BLDC GLUCOMTR-MCNC: 119 MG/DL (ref 70–130)
GLUCOSE BLDC GLUCOMTR-MCNC: 125 MG/DL (ref 70–130)
GLUCOSE BLDC GLUCOMTR-MCNC: 126 MG/DL (ref 70–130)
GLUCOSE SERPL-MCNC: 104 MG/DL (ref 65–99)
HCT VFR BLD AUTO: 25.8 % (ref 34–46.6)
HGB BLD-MCNC: 8.2 G/DL (ref 12–15.9)
MCH RBC QN AUTO: 28.6 PG (ref 26.6–33)
MCHC RBC AUTO-ENTMCNC: 31.8 G/DL (ref 31.5–35.7)
MCV RBC AUTO: 89.9 FL (ref 79–97)
PLATELET # BLD AUTO: 263 10*3/MM3 (ref 140–450)
PMV BLD AUTO: 9.8 FL (ref 6–12)
POTASSIUM SERPL-SCNC: 4.4 MMOL/L (ref 3.5–5.2)
RBC # BLD AUTO: 2.87 10*6/MM3 (ref 3.77–5.28)
SODIUM SERPL-SCNC: 135 MMOL/L (ref 136–145)
WBC NRBC COR # BLD: 12.58 10*3/MM3 (ref 3.4–10.8)

## 2022-01-30 PROCEDURE — 94762 N-INVAS EAR/PLS OXIMTRY CONT: CPT

## 2022-01-30 PROCEDURE — 99232 SBSQ HOSP IP/OBS MODERATE 35: CPT | Performed by: NURSE PRACTITIONER

## 2022-01-30 PROCEDURE — 25010000002 ENOXAPARIN PER 10 MG: Performed by: NURSE PRACTITIONER

## 2022-01-30 PROCEDURE — 85027 COMPLETE CBC AUTOMATED: CPT | Performed by: NURSE PRACTITIONER

## 2022-01-30 PROCEDURE — 94799 UNLISTED PULMONARY SVC/PX: CPT

## 2022-01-30 PROCEDURE — 94761 N-INVAS EAR/PLS OXIMETRY MLT: CPT

## 2022-01-30 PROCEDURE — 97110 THERAPEUTIC EXERCISES: CPT

## 2022-01-30 PROCEDURE — 82962 GLUCOSE BLOOD TEST: CPT

## 2022-01-30 PROCEDURE — 99024 POSTOP FOLLOW-UP VISIT: CPT | Performed by: NURSE PRACTITIONER

## 2022-01-30 PROCEDURE — 80048 BASIC METABOLIC PNL TOTAL CA: CPT | Performed by: NURSE PRACTITIONER

## 2022-01-30 RX ORDER — FLUTICASONE PROPIONATE 50 MCG
2 SPRAY, SUSPENSION (ML) NASAL DAILY
Status: DISCONTINUED | OUTPATIENT
Start: 2022-01-30 | End: 2022-01-31 | Stop reason: HOSPADM

## 2022-01-30 RX ORDER — POTASSIUM CHLORIDE 750 MG/1
20 TABLET, FILM COATED, EXTENDED RELEASE ORAL DAILY
Status: DISCONTINUED | OUTPATIENT
Start: 2022-01-30 | End: 2022-01-31 | Stop reason: HOSPADM

## 2022-01-30 RX ORDER — FUROSEMIDE 40 MG/1
40 TABLET ORAL DAILY
Status: DISCONTINUED | OUTPATIENT
Start: 2022-01-30 | End: 2022-01-31 | Stop reason: HOSPADM

## 2022-01-30 RX ORDER — MONTELUKAST SODIUM 10 MG/1
10 TABLET ORAL NIGHTLY
Status: DISCONTINUED | OUTPATIENT
Start: 2022-01-30 | End: 2022-01-31 | Stop reason: HOSPADM

## 2022-01-30 RX ORDER — TORSEMIDE 20 MG/1
20 TABLET ORAL DAILY
Status: DISCONTINUED | OUTPATIENT
Start: 2022-01-30 | End: 2022-01-30

## 2022-01-30 RX ADMIN — HYDROCODONE BITARTRATE AND ACETAMINOPHEN 2 TABLET: 5; 325 TABLET ORAL at 23:23

## 2022-01-30 RX ADMIN — MUPIROCIN 1 APPLICATION: 20 OINTMENT TOPICAL at 08:14

## 2022-01-30 RX ADMIN — FLUTICASONE PROPIONATE 2 SPRAY: 50 SPRAY, METERED NASAL at 11:31

## 2022-01-30 RX ADMIN — METOPROLOL SUCCINATE 12.5 MG: 25 TABLET, EXTENDED RELEASE ORAL at 08:13

## 2022-01-30 RX ADMIN — POTASSIUM CHLORIDE 20 MEQ: 750 TABLET, EXTENDED RELEASE ORAL at 11:29

## 2022-01-30 RX ADMIN — DOCUSATE SODIUM 50MG AND SENNOSIDES 8.6MG 2 TABLET: 8.6; 5 TABLET, FILM COATED ORAL at 08:13

## 2022-01-30 RX ADMIN — MONTELUKAST SODIUM 10 MG: 10 TABLET, FILM COATED ORAL at 21:22

## 2022-01-30 RX ADMIN — IPRATROPIUM BROMIDE AND ALBUTEROL SULFATE 3 ML: .5; 3 SOLUTION RESPIRATORY (INHALATION) at 07:47

## 2022-01-30 RX ADMIN — DOCUSATE SODIUM 50MG AND SENNOSIDES 8.6MG 2 TABLET: 8.6; 5 TABLET, FILM COATED ORAL at 21:22

## 2022-01-30 RX ADMIN — GUAIFENESIN 1200 MG: 600 TABLET, EXTENDED RELEASE ORAL at 08:13

## 2022-01-30 RX ADMIN — FUROSEMIDE 40 MG: 40 TABLET ORAL at 11:30

## 2022-01-30 RX ADMIN — SODIUM CHLORIDE SOLN NEBU 7% 4 ML: 7 NEBU SOLN at 07:51

## 2022-01-30 RX ADMIN — ASPIRIN 81 MG: 81 TABLET, COATED ORAL at 08:13

## 2022-01-30 RX ADMIN — MUPIROCIN 1 APPLICATION: 20 OINTMENT TOPICAL at 21:22

## 2022-01-30 RX ADMIN — CHLORHEXIDINE GLUCONATE 15 ML: 1.2 RINSE ORAL at 06:17

## 2022-01-30 RX ADMIN — HYDROCODONE BITARTRATE AND ACETAMINOPHEN 2 TABLET: 5; 325 TABLET ORAL at 06:17

## 2022-01-30 RX ADMIN — ENOXAPARIN SODIUM 40 MG: 100 INJECTION SUBCUTANEOUS at 08:13

## 2022-01-30 RX ADMIN — ATORVASTATIN CALCIUM 40 MG: 20 TABLET, FILM COATED ORAL at 21:22

## 2022-01-30 RX ADMIN — GUAIFENESIN 1200 MG: 600 TABLET, EXTENDED RELEASE ORAL at 21:22

## 2022-01-30 RX ADMIN — HYDROCODONE BITARTRATE AND ACETAMINOPHEN 2 TABLET: 5; 325 TABLET ORAL at 19:00

## 2022-01-30 RX ADMIN — CHLORHEXIDINE GLUCONATE 15 ML: 1.2 RINSE ORAL at 21:22

## 2022-01-31 ENCOUNTER — HOME HEALTH ADMISSION (OUTPATIENT)
Dept: HOME HEALTH SERVICES | Facility: HOME HEALTHCARE | Age: 62
End: 2022-01-31

## 2022-01-31 ENCOUNTER — READMISSION MANAGEMENT (OUTPATIENT)
Dept: CALL CENTER | Facility: HOSPITAL | Age: 62
End: 2022-01-31

## 2022-01-31 VITALS
HEIGHT: 66 IN | SYSTOLIC BLOOD PRESSURE: 117 MMHG | OXYGEN SATURATION: 91 % | RESPIRATION RATE: 18 BRPM | TEMPERATURE: 98.1 F | WEIGHT: 202.9 LBS | BODY MASS INDEX: 32.61 KG/M2 | HEART RATE: 83 BPM | DIASTOLIC BLOOD PRESSURE: 72 MMHG

## 2022-01-31 LAB
ANION GAP SERPL CALCULATED.3IONS-SCNC: 8.6 MMOL/L (ref 5–15)
BUN SERPL-MCNC: 7 MG/DL (ref 6–20)
BUN/CREAT SERPL: 14 (ref 7–25)
CALCIUM SPEC-SCNC: 9.3 MG/DL (ref 8.6–10.5)
CHLORIDE SERPL-SCNC: 92 MMOL/L (ref 98–107)
CO2 SERPL-SCNC: 34.4 MMOL/L (ref 22–29)
CREAT SERPL-MCNC: 0.5 MG/DL (ref 0.57–1)
GFR SERPL CREATININE-BSD FRML MDRD: 126 ML/MIN/1.73
GLUCOSE BLDC GLUCOMTR-MCNC: 106 MG/DL (ref 70–130)
GLUCOSE BLDC GLUCOMTR-MCNC: 108 MG/DL (ref 70–130)
GLUCOSE SERPL-MCNC: 99 MG/DL (ref 65–99)
POTASSIUM SERPL-SCNC: 3.3 MMOL/L (ref 3.5–5.2)
POTASSIUM SERPL-SCNC: 4.1 MMOL/L (ref 3.5–5.2)
SODIUM SERPL-SCNC: 135 MMOL/L (ref 136–145)

## 2022-01-31 PROCEDURE — 84132 ASSAY OF SERUM POTASSIUM: CPT | Performed by: THORACIC SURGERY (CARDIOTHORACIC VASCULAR SURGERY)

## 2022-01-31 PROCEDURE — 99024 POSTOP FOLLOW-UP VISIT: CPT | Performed by: THORACIC SURGERY (CARDIOTHORACIC VASCULAR SURGERY)

## 2022-01-31 PROCEDURE — 80048 BASIC METABOLIC PNL TOTAL CA: CPT | Performed by: NURSE PRACTITIONER

## 2022-01-31 PROCEDURE — 82962 GLUCOSE BLOOD TEST: CPT

## 2022-01-31 PROCEDURE — 25010000002 ENOXAPARIN PER 10 MG: Performed by: NURSE PRACTITIONER

## 2022-01-31 PROCEDURE — 99232 SBSQ HOSP IP/OBS MODERATE 35: CPT | Performed by: NURSE PRACTITIONER

## 2022-01-31 RX ORDER — FUROSEMIDE 20 MG/1
20 TABLET ORAL DAILY
Qty: 30 TABLET | Refills: 3 | Status: SHIPPED | OUTPATIENT
Start: 2022-01-31 | End: 2022-02-04 | Stop reason: SDUPTHER

## 2022-01-31 RX ORDER — ASPIRIN 81 MG/1
81 TABLET ORAL DAILY
Qty: 30 TABLET | Refills: 3 | Status: SHIPPED | OUTPATIENT
Start: 2022-02-01 | End: 2022-02-18

## 2022-01-31 RX ORDER — METOPROLOL SUCCINATE 25 MG/1
12.5 TABLET, EXTENDED RELEASE ORAL
Qty: 30 TABLET | Refills: 3 | Status: SHIPPED | OUTPATIENT
Start: 2022-02-01 | End: 2022-02-18

## 2022-01-31 RX ORDER — ATORVASTATIN CALCIUM 40 MG/1
40 TABLET, FILM COATED ORAL NIGHTLY
Qty: 30 TABLET | Refills: 3 | Status: SHIPPED | OUTPATIENT
Start: 2022-01-31 | End: 2023-02-02 | Stop reason: SDUPTHER

## 2022-01-31 RX ORDER — HYDROCODONE BITARTRATE AND ACETAMINOPHEN 5; 325 MG/1; MG/1
1 TABLET ORAL EVERY 4 HOURS PRN
Qty: 42 TABLET | Refills: 0 | Status: SHIPPED | OUTPATIENT
Start: 2022-01-31 | End: 2022-02-07

## 2022-01-31 RX ADMIN — METOPROLOL SUCCINATE 12.5 MG: 25 TABLET, EXTENDED RELEASE ORAL at 10:37

## 2022-01-31 RX ADMIN — POTASSIUM CHLORIDE 40 MEQ: 750 TABLET, EXTENDED RELEASE ORAL at 10:36

## 2022-01-31 RX ADMIN — FLUTICASONE PROPIONATE 2 SPRAY: 50 SPRAY, METERED NASAL at 10:36

## 2022-01-31 RX ADMIN — POTASSIUM CHLORIDE 40 MEQ: 750 TABLET, EXTENDED RELEASE ORAL at 07:18

## 2022-01-31 RX ADMIN — CHLORHEXIDINE GLUCONATE 15 ML: 1.2 RINSE ORAL at 10:37

## 2022-01-31 RX ADMIN — ASPIRIN 81 MG: 81 TABLET, COATED ORAL at 10:36

## 2022-01-31 RX ADMIN — POLYETHYLENE GLYCOL 3350 17 G: 17 POWDER, FOR SOLUTION ORAL at 10:36

## 2022-01-31 RX ADMIN — FUROSEMIDE 40 MG: 40 TABLET ORAL at 10:36

## 2022-01-31 RX ADMIN — CHLORHEXIDINE GLUCONATE 15 ML: 1.2 RINSE ORAL at 06:18

## 2022-01-31 RX ADMIN — MUPIROCIN 1 APPLICATION: 20 OINTMENT TOPICAL at 10:36

## 2022-01-31 RX ADMIN — HYDROCODONE BITARTRATE AND ACETAMINOPHEN 2 TABLET: 5; 325 TABLET ORAL at 06:23

## 2022-01-31 RX ADMIN — DOCUSATE SODIUM 50MG AND SENNOSIDES 8.6MG 2 TABLET: 8.6; 5 TABLET, FILM COATED ORAL at 10:36

## 2022-01-31 RX ADMIN — GUAIFENESIN 1200 MG: 600 TABLET, EXTENDED RELEASE ORAL at 10:37

## 2022-01-31 RX ADMIN — ENOXAPARIN SODIUM 40 MG: 100 INJECTION SUBCUTANEOUS at 10:36

## 2022-02-01 NOTE — CASE MANAGEMENT/SOCIAL WORK
Case Management Discharge Note      Final Note: Pt discharged home, 1/31/22 with [facility]  to follow.  Ruth provided rolling walker….[facility]    Provided Post Acute Provider List?: N/A    Selected Continued Care - Discharged on 1/31/2022 Admission date: 1/18/2022 - Discharge disposition: Home or Self Care    Destination    No services have been selected for the patient.              Durable Medical Equipment    No services have been selected for the patient.              Dialysis/Infusion    No services have been selected for the patient.              Home Medical Care Coordination complete.    Service Provider Selected Services Address Phone Fax Patient Preferred     [facility]  Home Health Services [address] [address] [phone] --          Therapy    No services have been selected for the patient.              Community Resources    No services have been selected for the patient.              Community & DME    No services have been selected for the patient.                Selected Continued Care - Prior Encounters Includes selections from prior encounters from 10/20/2021 to 1/31/2022    Discharged on 1/18/2022 Admission date: 1/14/2022 - Discharge disposition: Short Term Hospital (DC - External)    Destination     Service Provider Selected Services Address Phone Fax Patient Preferred    [facility]  Inpatient Rehabilitation [address] [address] [phone] -- --                         Final Discharge Disposition Code: 06 - home with home health care

## 2022-02-02 ENCOUNTER — HOME CARE VISIT (OUTPATIENT)
Dept: HOME HEALTH SERVICES | Facility: HOME HEALTHCARE | Age: 62
End: 2022-02-02

## 2022-02-02 PROCEDURE — G0299 HHS/HOSPICE OF RN EA 15 MIN: HCPCS

## 2022-02-03 ENCOUNTER — READMISSION MANAGEMENT (OUTPATIENT)
Dept: CALL CENTER | Facility: HOSPITAL | Age: 62
End: 2022-02-03

## 2022-02-03 NOTE — OUTREACH NOTE
CT Surgery Week 1 Survey      Responses   [facility] patient discharged from? [place]   Does the patient have one of the following disease processes/diagnoses(primary or secondary)? Cardiothoracic surgery   Week 1 attempt successful? No   Unsuccessful attempts Attempt 1            Ngozi Domingo RN

## 2022-02-04 ENCOUNTER — HOME CARE VISIT (OUTPATIENT)
Dept: HOME HEALTH SERVICES | Facility: HOME HEALTHCARE | Age: 62
End: 2022-02-04

## 2022-02-04 VITALS
DIASTOLIC BLOOD PRESSURE: 76 MMHG | HEIGHT: 66 IN | TEMPERATURE: 98.6 F | SYSTOLIC BLOOD PRESSURE: 128 MMHG | BODY MASS INDEX: 32.34 KG/M2 | OXYGEN SATURATION: 92 % | HEART RATE: 89 BPM | WEIGHT: 201.25 LBS | RESPIRATION RATE: 20 BRPM

## 2022-02-04 RX ORDER — FUROSEMIDE 20 MG/1
40 TABLET ORAL DAILY
Qty: 60 TABLET | Refills: 3 | Status: SHIPPED | OUTPATIENT
Start: 2022-02-04 | End: 2022-02-18 | Stop reason: SDUPTHER

## 2022-02-05 ENCOUNTER — HOME CARE VISIT (OUTPATIENT)
Dept: HOME HEALTH SERVICES | Facility: HOME HEALTHCARE | Age: 62
End: 2022-02-05

## 2022-02-05 ENCOUNTER — LAB REQUISITION (OUTPATIENT)
Dept: LAB | Facility: HOSPITAL | Age: 62
End: 2022-02-05

## 2022-02-05 DIAGNOSIS — Z00.00 ENCOUNTER FOR GENERAL ADULT MEDICAL EXAMINATION WITHOUT ABNORMAL FINDINGS: ICD-10-CM

## 2022-02-05 LAB
ANION GAP SERPL CALCULATED.3IONS-SCNC: 5.2 MMOL/L (ref 5–15)
BUN SERPL-MCNC: 6 MG/DL (ref 6–20)
BUN/CREAT SERPL: 10.9 (ref 7–25)
CALCIUM SPEC-SCNC: 9.6 MG/DL (ref 8.6–10.5)
CHLORIDE SERPL-SCNC: 96 MMOL/L (ref 98–107)
CO2 SERPL-SCNC: 36.8 MMOL/L (ref 22–29)
CREAT SERPL-MCNC: 0.55 MG/DL (ref 0.57–1)
GFR SERPL CREATININE-BSD FRML MDRD: 113 ML/MIN/1.73
GLUCOSE SERPL-MCNC: 103 MG/DL (ref 65–99)
POTASSIUM SERPL-SCNC: 3.9 MMOL/L (ref 3.5–5.2)
SODIUM SERPL-SCNC: 138 MMOL/L (ref 136–145)

## 2022-02-05 PROCEDURE — G0300 HHS/HOSPICE OF LPN EA 15 MIN: HCPCS

## 2022-02-05 PROCEDURE — 80048 BASIC METABOLIC PNL TOTAL CA: CPT | Performed by: THORACIC SURGERY (CARDIOTHORACIC VASCULAR SURGERY)

## 2022-02-06 ENCOUNTER — HOME CARE VISIT (OUTPATIENT)
Dept: HOME HEALTH SERVICES | Facility: HOME HEALTHCARE | Age: 62
End: 2022-02-06

## 2022-02-06 VITALS
HEART RATE: 95 BPM | SYSTOLIC BLOOD PRESSURE: 120 MMHG | DIASTOLIC BLOOD PRESSURE: 78 MMHG | TEMPERATURE: 98.8 F | OXYGEN SATURATION: 96 % | BODY MASS INDEX: 32.6 KG/M2 | WEIGHT: 202 LBS | RESPIRATION RATE: 18 BRPM

## 2022-02-06 NOTE — HOME HEALTH
[facility] for cp assessment, lab and t/i on disease management r/t CABG, Rheumatic valvular disease, CHF, s/p mitral and aortic valve replacement and tricuspid valve repair. Patient sitting in recliner with o2 in place @ 3lpm via N/C. Daughter present during visit. Patient voiced she out of pain medication x 2 days and do not feel like exercising as previous nurse discussed. Nurse given pamplet on Card[facility] Rehab and exercises to preform. Patient instructed to IS 10 repetitions 4 times per day for the first 1-2 weeks. Nurse reviewed:  Make exercises a daily routine. Try to walk every day and gradually increase your distance over time.  Always remember, never to overdo any exercise & to stop if you are exhausted, short of breath, feel dizzy, or have an uneasiness in your chest.  Stay hydrated pre and post workouts.  Always wear comfortable clothes and shoes when exercising.  Do not lift, push or pull objects heavier than 8 lbs until advised by your doctor  Do not engage in any activity that causes pain or pulling across your chest.  Don’t try to do everything at once & make sure that you get plenty of rest in between activities.  Don’t drive for 3-4 weeks from the date of your surgery or while you are still taking narcotic pain pills as your reaction time may be dulled.    Next visit: Did patient get pain medication  IS CHP workig properly  Assess and see if patient may need [facility] x[facility] for chest xray, if so contact MD for order  Encourage to wear compression stockings

## 2022-02-06 NOTE — CASE COMMUNICATION
Seen patient Saturday and patient voiced she is out of pain medication. Rated sternum pain 9/10, Instructed to angeli MD and see if pain medication can be refilled. If not, instructd to take tylenol. Instructed patient if she unable to exercise and become weak and low endurance to let you know for you can get order for therapy. Also, patient having issue with our CHP unit.   Patient states she not feeling well and may need chest xray  order obtain by [facility].   Encourage patient to wear compression stocking d/t edema BLE.

## 2022-02-07 ENCOUNTER — TELEPHONE (OUTPATIENT)
Dept: CARDIAC SURGERY | Facility: CLINIC | Age: 62
End: 2022-02-07

## 2022-02-07 ENCOUNTER — READMISSION MANAGEMENT (OUTPATIENT)
Dept: CALL CENTER | Facility: HOSPITAL | Age: 62
End: 2022-02-07

## 2022-02-07 ENCOUNTER — HOME CARE VISIT (OUTPATIENT)
Dept: HOME HEALTH SERVICES | Facility: HOME HEALTHCARE | Age: 62
End: 2022-02-07

## 2022-02-07 PROCEDURE — G0495 RN CARE TRAIN/EDU IN HH: HCPCS

## 2022-02-07 NOTE — TELEPHONE ENCOUNTER
After speaking with patient and Kaila YAN I called the pharmacy with her order for Tramadol 50mg to be taken as needed every 6 hours 20 tablets with no refills

## 2022-02-07 NOTE — TELEPHONE ENCOUNTER
Patient calls and is asking if Dr Zuleta has called in more pain medication for her She states she discussed this with him this weekend. I will discuss with him today and call her back

## 2022-02-07 NOTE — CASE COMMUNICATION
CALL FROM PATIENT ROUTED  TO [facility] THEN TO MYSELF.  PATIENT HAS CURRENT HEALTH MONITOR THAT IS NOT WORKING.  PATIENT HAS BEEN CONTACTED BY CURRENT HEALTH REGARDING THE SITUATION.  I CALLED LUBA, CLINICAL MANAGER AND HE WILL CONTACT PATIENT.

## 2022-02-07 NOTE — OUTREACH NOTE
CT Surgery Week 1 Survey      Responses   [facility] patient discharged from? [place]   Does the patient have one of the following disease processes/diagnoses(primary or secondary)? Cardiothoracic surgery   Week 1 attempt successful? No   Unsuccessful attempts Attempt 2            Paige Oden RN

## 2022-02-09 ENCOUNTER — READMISSION MANAGEMENT (OUTPATIENT)
Dept: CALL CENTER | Facility: HOSPITAL | Age: 62
End: 2022-02-09

## 2022-02-09 VITALS
SYSTOLIC BLOOD PRESSURE: 128 MMHG | RESPIRATION RATE: 16 BRPM | HEART RATE: 80 BPM | TEMPERATURE: 97.8 F | WEIGHT: 193.44 LBS | BODY MASS INDEX: 31.22 KG/M2 | OXYGEN SATURATION: 97 % | DIASTOLIC BLOOD PRESSURE: 70 MMHG

## 2022-02-09 NOTE — OUTREACH NOTE
CT Surgery Week 1 Survey      Responses   [facility] patient discharged from? [place]   Does the patient have one of the following disease processes/diagnoses(primary or secondary)? Cardiothoracic surgery   Week 1 attempt successful? No   Unsuccessful attempts Attempt 3            Hussein Pisano RN

## 2022-02-10 ENCOUNTER — HOME CARE VISIT (OUTPATIENT)
Dept: HOME HEALTH SERVICES | Facility: HOME HEALTHCARE | Age: 62
End: 2022-02-10

## 2022-02-10 VITALS
TEMPERATURE: 98 F | RESPIRATION RATE: 20 BRPM | HEART RATE: 117 BPM | DIASTOLIC BLOOD PRESSURE: 64 MMHG | OXYGEN SATURATION: 98 % | SYSTOLIC BLOOD PRESSURE: 110 MMHG

## 2022-02-10 PROCEDURE — G0299 HHS/HOSPICE OF RN EA 15 MIN: HCPCS

## 2022-02-12 NOTE — ED TRIAGE NOTES
Pt c/o a red, raised, painful rash on her neck and back for the past two days, no drainage noted, Pt is A&OX3, calm, ambulatory, afebrile, breathes are equal and unlabored. Unable to offer due to clinical condition

## 2022-02-14 ENCOUNTER — HOME CARE VISIT (OUTPATIENT)
Dept: HOME HEALTH SERVICES | Facility: HOME HEALTHCARE | Age: 62
End: 2022-02-14

## 2022-02-14 ENCOUNTER — TELEPHONE (OUTPATIENT)
Dept: CARDIAC REHAB | Facility: HOSPITAL | Age: 62
End: 2022-02-14

## 2022-02-14 VITALS
TEMPERATURE: 97.7 F | OXYGEN SATURATION: 96 % | SYSTOLIC BLOOD PRESSURE: 108 MMHG | HEART RATE: 120 BPM | RESPIRATION RATE: 18 BRPM | DIASTOLIC BLOOD PRESSURE: 60 MMHG

## 2022-02-14 PROCEDURE — G0299 HHS/HOSPICE OF RN EA 15 MIN: HCPCS

## 2022-02-14 NOTE — HOME HEALTH
Arrived at patient's home, welcomed in by daughter. Patient reports that current health monitor continues to not work correctly and she gets frequent calls re: improper readings or readings not being received. Patient and daughter report that they are checking weight, blood pressure, etc. daily as instructed. Patient and daughter feel currentl health monitor is causing more anxiety than anything. Both agree that removing current health montior would be best option and they will manually track and report, weight, blood pressure, SpO2, etc. Education provided on blood pressure, oxygen sats, and weights to report to SN.

## 2022-02-16 ENCOUNTER — READMISSION MANAGEMENT (OUTPATIENT)
Dept: CALL CENTER | Facility: HOSPITAL | Age: 62
End: 2022-02-16

## 2022-02-16 ENCOUNTER — TELEPHONE (OUTPATIENT)
Dept: CARDIOLOGY | Facility: CLINIC | Age: 62
End: 2022-02-16

## 2022-02-16 NOTE — OUTREACH NOTE
CT Surgery Week 2 Survey      Responses   [facility] patient discharged from? [place]   Does the patient have one of the following disease processes/diagnoses(primary or secondary)? Cardiothoracic surgery   Week 2 attempt successful? Yes   Call start time 1023   Call end time 1026   Discharge diagnosis Rheumatic valvular disease, CHF, s/p mitral and aortic valve replacement and tricuspid valve repair   Meds reviewed with patient/caregiver? Yes   Is the patient taking all medications as directed (includes completed medication regime)? Yes   Has the patient kept scheduled appointments due by today? N/A   Comments franklin an appt today   Psychosocial issues? No   What is the patient's perception of their health status since discharge? New symptoms unrelated to diagnosis  [having some SOA--on oxygen at 2L. Non productive cough]   Is the patient /caregiver able to teach back basic post-op care? Lifting as instructed by MD in discharge instructions   Is the patient/caregiver able to teach back signs and symptoms of incisional infection? Increased redness, swelling or pain at the incisonal site,  Increased drainage or bleeding,  Fever   Is the patient/caregiver able to teach back steps to recovery at home? Rest and rebuild strength, gradually increase activity   Is the patient/caregiver able to teach back the hierarchy of who to call/visit for symptoms/problems? PCP, Specialist, Home health nurse, Urgent Care, ED, 911 Yes   Additional teach back comments Enc return to provider and watching incision for s/s of infection. Pt states incision is healing well.    Week 2 call completed? Yes          Carlotta Machuca RN

## 2022-02-16 NOTE — TELEPHONE ENCOUNTER
Update about Pt from Melly PITTMAN, Pt's primary care physician, I attempted to call back today and let her know I received the information but was unable to speak with the APRN.    I contacted the Pt and discussed the information given. Melly PITTMAN with derian arenas advised that the Pt's heart rate at rest was 120-130 and presented as atrial fibrillation. Melly advised the Pt to change her metoprolol to tartrate from succinate. To take this medication twice daily and a whole tablet twice daily at Metoprolol 25 mg BID.     Pt understood this medication change, verbalized agreement. Pt was also instructed to continue to follow up with HH, they are due to come to her tomorrow, she verbalized understanding. Pt was also instructed to keep her appt with Aiden on Friday, Pt verbalized understanding and agreement. Pt advised should symptoms worsen to please go to the ER and not wait for her appointment on Friday. Pt verbalized understanding and agreement.    I spoke to Aiden Guo about this Pt and she confirmed and agreed with these recommendations and medication changes.

## 2022-02-17 ENCOUNTER — HOME CARE VISIT (OUTPATIENT)
Dept: HOME HEALTH SERVICES | Facility: HOME HEALTHCARE | Age: 62
End: 2022-02-17

## 2022-02-17 VITALS
DIASTOLIC BLOOD PRESSURE: 62 MMHG | SYSTOLIC BLOOD PRESSURE: 97 MMHG | TEMPERATURE: 97.9 F | OXYGEN SATURATION: 93 % | HEART RATE: 89 BPM | RESPIRATION RATE: 18 BRPM

## 2022-02-17 PROCEDURE — G0299 HHS/HOSPICE OF RN EA 15 MIN: HCPCS

## 2022-02-18 ENCOUNTER — OFFICE VISIT (OUTPATIENT)
Dept: CARDIOLOGY | Facility: CLINIC | Age: 62
End: 2022-02-18

## 2022-02-18 VITALS
HEART RATE: 122 BPM | WEIGHT: 185 LBS | OXYGEN SATURATION: 92 % | RESPIRATION RATE: 16 BRPM | DIASTOLIC BLOOD PRESSURE: 70 MMHG | BODY MASS INDEX: 29.73 KG/M2 | HEIGHT: 66 IN | SYSTOLIC BLOOD PRESSURE: 92 MMHG

## 2022-02-18 DIAGNOSIS — I48.0 PAF (PAROXYSMAL ATRIAL FIBRILLATION): ICD-10-CM

## 2022-02-18 DIAGNOSIS — I50.33 ACUTE ON CHRONIC HEART FAILURE WITH PRESERVED EJECTION FRACTION: Primary | ICD-10-CM

## 2022-02-18 DIAGNOSIS — I48.92 ATRIAL FLUTTER WITH RAPID VENTRICULAR RESPONSE: ICD-10-CM

## 2022-02-18 DIAGNOSIS — I09.1 RHEUMATIC VALVULAR DISEASE: ICD-10-CM

## 2022-02-18 PROBLEM — I50.31 ACUTE DIASTOLIC (CONGESTIVE) HEART FAILURE (HCC): Status: RESOLVED | Noted: 2021-11-30 | Resolved: 2022-02-18

## 2022-02-18 PROCEDURE — 99214 OFFICE O/P EST MOD 30 MIN: CPT | Performed by: NURSE PRACTITIONER

## 2022-02-18 PROCEDURE — 93000 ELECTROCARDIOGRAM COMPLETE: CPT | Performed by: NURSE PRACTITIONER

## 2022-02-18 RX ORDER — METOPROLOL SUCCINATE 25 MG/1
25 TABLET, EXTENDED RELEASE ORAL 2 TIMES DAILY
Qty: 60 TABLET | Refills: 3 | Status: SHIPPED | OUTPATIENT
Start: 2022-02-18 | End: 2022-06-27

## 2022-02-18 RX ORDER — FUROSEMIDE 20 MG/1
20 TABLET ORAL DAILY
Qty: 90 TABLET | Refills: 3 | Status: SHIPPED | OUTPATIENT
Start: 2022-02-18 | End: 2023-02-10

## 2022-02-18 RX ORDER — ASPIRIN 81 MG/1
81 TABLET ORAL DAILY
Qty: 30 TABLET | Refills: 3 | Status: SHIPPED | OUTPATIENT
Start: 2022-02-18 | End: 2022-06-03

## 2022-02-18 NOTE — PROGRESS NOTES
Date of Office Visit: 2022  Encounter Provider: TOYA Patel  Place of Service: [facility] CARDIOLOGY  Patient Name: Akila Amezcua  :1960  Primary Cardiologist: Dr. Vitale    CC:  1-2 [facility] follow up    Dear Melly    HPI: Akila Amezcua is a pleasant 61 y.o. female who presents 2022 for cardiac follow up.     Ms. Amezcua is a 61-year-old woman who has been evaluated by my partners in  and  for paroxysmal atrial fibrillation in the setting of acute COPD exacerbations.  She has had echocardiograms that have been suggestive of aortic stenosis.  She has not kept any follow-up appointments in our cardiology clinic.     She presented to the emergency department 2021 with acute exacerbation of COPD and aspirin respiratory infection.  She has been having symptoms for approximately 3 days.  She was very short of breath and coughing but could not cough up the sputum.  She is also very congested and could feel her heart beating rapidly.  She did not have any chest pain or chest discomfort.  She was noted to be in rapid atrial fibrillation.  She did receive IV diltiazem and was placed on a dill drip with little effect on her heart rate.  She was given a dose of digoxin 0.25 mg IV followed by another dose of 0.125 mg IV.  She was then started on IV amiodarone.  She was given prophylactic enoxaparin.  She also received IV fluids and IV steroids. She has been diagnosed with human metapneumovirus.  A CTA was performed and was negative for PE, but showed dense bilateral lower lobe consolidations, consistent with pneumonia.  Her proBNP was elevated at 2800.  Her troponin was negative.  Her echo shows normal left ventricular systolic function, indeterminate diastolic function, moderate to severe right ventricular dilation, moderate pulmonary hypertension, and what appears to be rheumatic valvular heart disease affecting the aortic, mitral, and tricuspid  valves.  The study is extremely technically difficult and the valves are not well visualized.  There is moderate aortic stenosis, moderate aortic regurgitation, mild-moderate mitral stenosis, moderate (if not moderate to severe) mitral regurgitation, and moderate tricuspid regurgitation.  The amiodarone drip was stopped, she was started on oral diltiazem and her heart rate responded well.  She was also switched to warfarin due to her valvular disease.  She was discharged home 12/3/2021.     She returns today in follow-up.  She can still feel her heart racing at times.  She can also feel palpitations especially if she is resting quietly.  She states about a week ago her feet and ankle started to swell and she saw you and you started her on Lasix 20 mg daily along with potassium.  She states it really has not made much of a difference in her feet remain edematous.  She is continuing to use oxygen and uses 2 L at rest and does not feel short of breath, however, she does have to turn it up to 4 L if she is up trying to be active and that does still make her feel winded.  She also notices with activity that her heart rate will respond.  At times she has a little bit of dizziness when first standing up.  She does have some chest soreness from coughing and states she now has a productive cough with some thick sputum.  She denies fatigue and states overall she is recuperating.  She states she has been monitoring her PT/INR and that it was a little too thin and you have decreased her dose recently.  I increased the diltiazem to 240 mg daily.    I increased her Lasix to 40 mg daily and potassium to 20 mg daily.  She is to have labs checked in 1 week with primary care.  I have asked her to call me next week if things have not improved.     Her lower extremity edema did not improve so I stopped the Lasix and switch her over to torsemide 20 mg daily.  She did not have her labs checked as ask.  She then called our office and  "stated that her symptoms had not improved, she was continued to have shortness of breath and lower extremity edema.  An appointment was made.     I saw her 1/14/2022.  For follow-up.  She \"feels terrible and is willing to do anything even if that means admission to feel better\".  She states the changes in medication did not help and sometimes when she would check her oxygen sats at home they would be 70%.  She does remain on 3 L of oxygen.  She cannot basically breathe and talk at the same time without getting short of breath.  I asked her why she did not return to the hospital since she was feeling so poorly and she said \"I do not know I should have I just do not know why I did not\".  She is using accessory muscles, she has 2-3+ edema bilaterally up to her knees.  Her abdomen is very hard.  She states she has been taking her medications.  She did complain of orthopnea, worsening shortness of breath, intermittent dizziness and fatigue.  She states she cannot take a deep breath and sometimes it even hurts when she tries to do so.  She was directly admitted to [facility].     She was aggressively diuresed however she started having problem with hypotension, renal failure, and was on IV ionotrops.  She was going into progressive worsening renal failure, there was some question about renal artery stenosis versus cardiorenal syndrome, she was given some IV fluid, she was on incremental amount of oxygen, the decision was made to transfer to [facility] for further care.  Patient has underlying history of COPD and oxygen at home at 3 L/min at baseline,  she was admitted under our care for further management with the plan for cardiology to continue to follow-up on her. Patient was given 1.5 L of crystalloid prior to the transfer for concern about a possible prerenal azotemia.  She was admitted to the ICU and was at one point on dobutamine.  She eventually underwent  AVR. MVR, TV repair + left atrial " appendage ligation on 1/26/22 with Song.  She did well after surgery and progressed well with physical therapy.  She was able to be discharged and remained on 2 L of oxygen per nasal cannula.  She is to follow-up with pulmonary in 3 to 4 months, their goal is to wean her off of oxygen as her COPD was diagnosed is only mild and her chronic respiratory failure was mainly due to the ongoing chronic valvular heart disease.    She presents today in follow-up and looks so much better than when I previously saw her prior to her surgery.  She is able to walk on her own.  She denies any palpitations, shortness of breath, chest pain or chest pressure.  She has surgical discomfort but no angina.  She states she still is weak and fatigued but progressing.  She does have some intermittent dizziness.  Her blood pressure was low normal today.  She is still having home health.  She has a little bit of mild lower extremity edema.  She states she has not had any Lasix for 2 days because she ran out.  I have refilled that.  I have noted all of her labs and testing from her recent hospitalization.  Unfortunately, she is in atrial flutter.  When she saw you earlier this week, you had noted that and increased her metoprolol up to 25 mg twice daily and increased her aspirin up to 81 mg twice daily.     Past Medical History:   Diagnosis Date   • Anxiety    • COPD (chronic obstructive pulmonary disease) (HCC)     LUNG BLEBS   • DJD (degenerative joint disease)    • Gallstones     SCHEDULED FOR SX   • GERD (gastroesophageal reflux disease)    • Injury of back     degenertive disc disease   • Panic attacks    • Paroxysmal atrial fibrillation (HCC)    • Rheumatoid arthritis (HCC)    • Seizures (HCC)     LAST ONE JAN. 2017       Past Surgical History:   Procedure Laterality Date   • AORTIC VALVE REPAIR/REPLACEMENT MITRAL VALVE REPAIR/REPLACEMENT N/A 1/26/2022    Procedure: MEDIAN STERNOTOMY, MITRAL VALVE REPLACEMENT AORTIC VALVE REPLACEMENT  TRICUSPID VALVE REPAIR, PRP;  Surgeon: Kingsley Zuleta MD;  Location: [facility];  Service: Cardiothoracic;  Laterality: N/A;   • APPENDECTOMY     • CARDIAC CATHETERIZATION N/A 2022    Procedure: Right and Left Heart Cath;  Surgeon: Kolby Melissa MD;  Location: [facility] CATH INVASIVE LOC[facility];  Service: Cardiology;  Laterality: N/A;   •  SECTION     • CHOLECYSTECTOMY N/A 2017    Procedure: LAPAROSCOPIC CHOLECYSTECTOMY, laparoscopic adhesiolysis requiring 45 minutes of operative time;  Surgeon: Liliana Monroy MD;  Location: [facility] OR;  Service:    • ESOPHAGEAL ATRESIA REPAIR     • INSERT CENTRAL LINE AT BEDSIDE  2022        • PLEURAL BIOPSY     • PLEURAL SCARIFICATION     • TRANSESOPHAGEAL ECHOCARDIOGRAM (MARGE) N/A 2022    Procedure: TRANSESOPHAGEAL ECHOCARDIOGRAM WITH ANESTHESIA;  Surgeon: Kingsley Zuleta MD;  Location: [facility];  Service: Cardiothoracic;  Laterality: N/A;       Social History     Socioeconomic History   • Marital status:    Tobacco Use   • Smoking status: Former Smoker     Packs/day: 1.00     Years: 35.00     Pack years: 35.00     Quit date:      Years since quittin.1   • Smokeless tobacco: Never Used   • Tobacco comment: NO CAFFIENE   Substance and Sexual Activity   • Alcohol use: No   • Drug use: No   • Sexual activity: Defer       Family History   Problem Relation Age of Onset   • Lung cancer Father        The following portion of the patient's history were reviewed and updated as appropriate: past medical history, past surgical history, past social history, past family history, allergies, current medications, and problem list.    Review of Systems   Constitutional: Positive for malaise/fatigue (improving). Negative for diaphoresis and fever.   HENT: Negative for congestion, hearing loss, hoarse voice, nosebleeds and sore throat.    Eyes: Negative for photophobia, vision loss in left eye, vision loss in right eye and visual disturbance.    Cardiovascular: Negative for chest pain, dyspnea on exertion, irregular heartbeat, leg swelling, near-syncope, orthopnea, palpitations, paroxysmal nocturnal dyspnea and syncope.   Respiratory: Negative for cough, hemoptysis, shortness of breath, sleep disturbances due to breathing, snoring, sputum production and wheezing.    Endocrine: Negative for cold intolerance, heat intolerance, polydipsia, polyphagia and polyuria.   Hematologic/Lymphatic: Negative for bleeding problem. Does not bruise/bleed easily.   Skin: Negative for color change, dry skin, poor wound healing, rash and suspicious lesions.   Musculoskeletal: Negative for arthritis, back pain, falls, gout, joint pain, joint swelling, muscle cramps, muscle weakness and myalgias.   Gastrointestinal: Negative for bloating, abdominal pain, constipation, diarrhea, dysphagia, melena, nausea and vomiting.   Neurological: Positive for dizziness (intermittent). Negative for excessive daytime sleepiness, headaches, light-headedness, loss of balance, numbness, paresthesias, seizures, vertigo and weakness.   Psychiatric/Behavioral: Negative for depression, memory loss and substance abuse. The patient is not nervous/anxious.        Allergies   Allergen Reactions   • Penicillins Anaphylaxis   • Anesthetics, Amide Anxiety   • Latex Rash         Current Outpatient Medications:   •  aspirin 81 MG EC tablet, Take 1 tablet by mouth Daily. (Patient taking differently: Take 1 tablet by mouth 2 (Two) Times a Day.), Disp: 30 tablet, Rfl: 3  •  atorvastatin (LIPITOR) 40 MG tablet, Take 1 tablet by mouth Every Night., Disp: 30 tablet, Rfl: 3  •  furosemide (LASIX) 20 MG tablet, Take 1 tablet by mouth Daily., Disp: 90 tablet, Rfl: 3  •  metoprolol succinate XL (TOPROL-XL) 25 MG 24 hr tablet, Take 0.5 tablets by mouth Daily. (Patient taking differently: Take 25 mg by mouth 2 (Two) Times a Day.), Disp: 30 tablet, Rfl: 3  •  O2 (OXYGEN), Inhale 2 L/min Continuous. can go up to 4lpm if  "needed, Disp: , Rfl:   •  potassium chloride 10 MEQ CR tablet, Take 2 tablets by mouth Daily., Disp: 60 tablet, Rfl: 3        Objective:     Vitals:    02/18/22 1006   BP: 92/70   Pulse: (!) 122   Resp: 16   SpO2: 92%   Weight: 83.9 kg (185 lb)   Height: 167.6 cm (66\")     Body mass index is 29.86 kg/m².      Vitals reviewed.   Constitutional:       General: Not in acute distress.     Appearance: Well-developed and not in distress.   Eyes:      General:         Right eye: No discharge.         Left eye: No discharge.      Conjunctiva/sclera: Conjunctivae normal.   HENT:      Head: Normocephalic and atraumatic.      Right Ear: External ear normal.      Left Ear: External ear normal.      Nose: Nose normal.   Neck:      Thyroid: No thyromegaly.      Vascular: No JVD.      Trachea: No tracheal deviation.      Lymphadenopathy: No cervical adenopathy.   Pulmonary:      Effort: Pulmonary effort is normal. No respiratory distress.      Breath sounds: Normal breath sounds. No wheezing. No rales.   Chest:      Chest wall: Not tender to palpatation.   Cardiovascular:      Normal rate. Regularly irregular rhythm.      No gallop.   Pulses:     Intact distal pulses.   Edema:     Peripheral edema present.     Ankle: bilateral trace edema of the ankle.     Feet: bilateral trace edema of the feet.  Abdominal:      General: There is no distension.      Palpations: Abdomen is soft.      Tenderness: There is no abdominal tenderness.   Musculoskeletal: Normal range of motion.         General: No tenderness or deformity.      Cervical back: Normal range of motion and neck supple. Skin:     General: Skin is warm and dry.      Findings: No erythema or rash.   Neurological:      Mental Status: Alert and oriented to person, place, and time.      Coordination: Coordination normal.   Psychiatric:         Attention and Perception: Attention normal.         Mood and Affect: Mood normal.         Speech: Speech normal.         Behavior: Behavior " normal. Behavior is cooperative.         Thought Content: Thought content normal.         Cognition and Memory: Cognition normal.         Judgment: Judgment normal.               ECG 12 Lead    Date/Time: 2/18/2022 4:21 PM  Performed by: Lorie Guo APRN  Authorized by: Lorie Guo APRN   Comparison: compared with previous ECG from 1/14/2022  Similar to previous ECG  Rhythm: atrial flutter  Rate: tachycardic  Conduction: conduction normal  ST Segments: ST segments normal  T Waves: T waves normal  QRS axis: normal    Clinical impression: abnormal EKG              Assessment:       Diagnosis Plan   1. Acute on chronic heart failure with preserved ejection fraction (HCC)     2. PAF (paroxysmal atrial fibrillation) ([facility])  Adult Transesophageal Echo (MARGE) W/ Cont if Necessary Per Protocol    Cardioversion External in Cardiology Department   3. Rheumatic valvular disease  Adult Transesophageal Echo (MARGE) W/ Cont if Necessary Per Protocol    Cardioversion External in Cardiology Department   4. Atrial flutter with rapid ventricular response (HCC)  Adult Transesophageal Echo (MARGE) W/ Cont if Necessary Per Protocol    Cardioversion External in Cardiology Department          Plan:       1. Valvular heart disease status post AVR. MVR, TV repair + left atrial appendage ligation 1/26/22  2. Acute on chronic congestive heart failure due to severe valvular heart disease- Doing so much better.  Remains on oral lasix with mild LE edema, noted to not have and any for 2 days.    3. Acute on chronic right sided heart failure  4. Cor pulmonale due to lung disease and left sided heart failure  5. Atrial fibrillation with RVR status post MAZE -after surgery she was in sinus rhythm and anticoagulation was stopped.  She saw her primary care earlier this week and was back in A. fib.  Her metoprolol was increased to Toprol-XL 25 mg twice daily.  Also she was instructed to take her aspirin 81 mg twice daily.  7. Acute on chronic hypoxic  respiratory failure, multifactorial but predominantly heart failure. - this has resolved s/p valvular repair.    8. COPD - She remains on oxygen supplementation at 2 L but is to follow-up with pulmonology in 3 to 4 months and hopefully be able to wean off oxygen as her COPD is mild and her chronic respiratory failure was due to her valvular disease.   9.  Atrial flutter-she is in atrial flutter RVR at this point.  I did discuss with Dr. Brownlee.  I have restarted Xarelto 20 mg daily and given patient samples.  She is to have a MARGE and cardioversion next week with Dr. Vitale.  Patient is agreeable to plan of care.    Restart Xarelto 20 mg daily.  MARGE and cardioversion next week with Dr. Vitale in [place].    As always, it has been a pleasure to participate in your patient's care. Thank you.       Sincerely,       TOYA Patel      Current Outpatient Medications:   •  atorvastatin (LIPITOR) 40 MG tablet, Take 1 tablet by mouth Every Night., Disp: 30 tablet, Rfl: 3  •  furosemide (LASIX) 20 MG tablet, Take 1 tablet by mouth Daily., Disp: 90 tablet, Rfl: 3  •  O2 (OXYGEN), Inhale 2 L/min Continuous. can go up to 4lpm if needed, Disp: , Rfl:   •  potassium chloride 10 MEQ CR tablet, Take 2 tablets by mouth Daily., Disp: 60 tablet, Rfl: 3  •  aspirin (aspirin) 81 MG EC tablet, Take 1 tablet by mouth Daily., Disp: 30 tablet, Rfl: 3  •  metoprolol succinate XL (TOPROL-XL) 25 MG 24 hr tablet, Take 1 tablet by mouth 2 (Two) Times a Day., Disp: 60 tablet, Rfl: 3      Dictated utilizing Dragon dictation

## 2022-02-21 ENCOUNTER — HOME CARE VISIT (OUTPATIENT)
Dept: HOME HEALTH SERVICES | Facility: HOME HEALTHCARE | Age: 62
End: 2022-02-21

## 2022-02-21 ENCOUNTER — TELEPHONE (OUTPATIENT)
Dept: CARDIAC REHAB | Facility: HOSPITAL | Age: 62
End: 2022-02-21

## 2022-02-21 VITALS
DIASTOLIC BLOOD PRESSURE: 84 MMHG | TEMPERATURE: 99.2 F | SYSTOLIC BLOOD PRESSURE: 102 MMHG | HEART RATE: 119 BPM | OXYGEN SATURATION: 96 % | RESPIRATION RATE: 18 BRPM

## 2022-02-21 PROCEDURE — G0299 HHS/HOSPICE OF RN EA 15 MIN: HCPCS

## 2022-02-22 ENCOUNTER — TRANSCRIBE ORDERS (OUTPATIENT)
Dept: CARDIOLOGY | Facility: CLINIC | Age: 62
End: 2022-02-22

## 2022-02-22 ENCOUNTER — TRANSCRIBE ORDERS (OUTPATIENT)
Dept: ADMINISTRATIVE | Facility: HOSPITAL | Age: 62
End: 2022-02-22

## 2022-02-22 DIAGNOSIS — Z13.6 SCREENING FOR ISCHEMIC HEART DISEASE: ICD-10-CM

## 2022-02-22 DIAGNOSIS — Z01.818 OTHER SPECIFIED PRE-OPERATIVE EXAMINATION: Primary | ICD-10-CM

## 2022-02-22 DIAGNOSIS — Z01.810 PRE-OPERATIVE CARDIOVASCULAR EXAMINATION: ICD-10-CM

## 2022-02-23 ENCOUNTER — LAB (OUTPATIENT)
Dept: LAB | Facility: HOSPITAL | Age: 62
End: 2022-02-23

## 2022-02-23 DIAGNOSIS — Z01.818 OTHER SPECIFIED PRE-OPERATIVE EXAMINATION: ICD-10-CM

## 2022-02-23 DIAGNOSIS — Z13.6 SCREENING FOR ISCHEMIC HEART DISEASE: ICD-10-CM

## 2022-02-23 DIAGNOSIS — Z01.810 PRE-OPERATIVE CARDIOVASCULAR EXAMINATION: ICD-10-CM

## 2022-02-23 LAB
ANION GAP SERPL CALCULATED.3IONS-SCNC: 14.3 MMOL/L (ref 5–15)
BASOPHILS # BLD AUTO: 0.11 10*3/MM3 (ref 0–0.2)
BASOPHILS NFR BLD AUTO: 1.2 % (ref 0–1.5)
BUN SERPL-MCNC: 10 MG/DL (ref 6–20)
BUN/CREAT SERPL: 12.7 (ref 7–25)
CALCIUM SPEC-SCNC: 10 MG/DL (ref 8.6–10.5)
CHLORIDE SERPL-SCNC: 99 MMOL/L (ref 98–107)
CO2 SERPL-SCNC: 24.7 MMOL/L (ref 22–29)
CREAT SERPL-MCNC: 0.79 MG/DL (ref 0.57–1)
DEPRECATED RDW RBC AUTO: 46.6 FL (ref 37–54)
EOSINOPHIL # BLD AUTO: 0.68 10*3/MM3 (ref 0–0.4)
EOSINOPHIL NFR BLD AUTO: 7.3 % (ref 0.3–6.2)
ERYTHROCYTE [DISTWIDTH] IN BLOOD BY AUTOMATED COUNT: 14.6 % (ref 12.3–15.4)
GFR SERPL CREATININE-BSD FRML MDRD: 74 ML/MIN/1.73
GLUCOSE SERPL-MCNC: 115 MG/DL (ref 65–99)
HCT VFR BLD AUTO: 39.1 % (ref 34–46.6)
HGB BLD-MCNC: 12.2 G/DL (ref 12–15.9)
IMM GRANULOCYTES # BLD AUTO: 0.04 10*3/MM3 (ref 0–0.05)
IMM GRANULOCYTES NFR BLD AUTO: 0.4 % (ref 0–0.5)
LYMPHOCYTES # BLD AUTO: 2.36 10*3/MM3 (ref 0.7–3.1)
LYMPHOCYTES NFR BLD AUTO: 25.3 % (ref 19.6–45.3)
MCH RBC QN AUTO: 26.9 PG (ref 26.6–33)
MCHC RBC AUTO-ENTMCNC: 31.2 G/DL (ref 31.5–35.7)
MCV RBC AUTO: 86.1 FL (ref 79–97)
MONOCYTES # BLD AUTO: 0.9 10*3/MM3 (ref 0.1–0.9)
MONOCYTES NFR BLD AUTO: 9.7 % (ref 5–12)
NEUTROPHILS NFR BLD AUTO: 5.22 10*3/MM3 (ref 1.7–7)
NEUTROPHILS NFR BLD AUTO: 56.1 % (ref 42.7–76)
NRBC BLD AUTO-RTO: 0 /100 WBC (ref 0–0.2)
PLATELET # BLD AUTO: 388 10*3/MM3 (ref 140–450)
PMV BLD AUTO: 10.2 FL (ref 6–12)
POTASSIUM SERPL-SCNC: 4.6 MMOL/L (ref 3.5–5.2)
RBC # BLD AUTO: 4.54 10*6/MM3 (ref 3.77–5.28)
SARS-COV-2 RNA PNL SPEC NAA+PROBE: NOT DETECTED
SODIUM SERPL-SCNC: 138 MMOL/L (ref 136–145)
WBC NRBC COR # BLD: 9.31 10*3/MM3 (ref 3.4–10.8)

## 2022-02-23 PROCEDURE — 36415 COLL VENOUS BLD VENIPUNCTURE: CPT

## 2022-02-23 PROCEDURE — 80048 BASIC METABOLIC PNL TOTAL CA: CPT

## 2022-02-23 PROCEDURE — 87635 SARS-COV-2 COVID-19 AMP PRB: CPT | Performed by: INTERNAL MEDICINE

## 2022-02-23 PROCEDURE — 85025 COMPLETE CBC W/AUTO DIFF WBC: CPT

## 2022-02-23 PROCEDURE — C9803 HOPD COVID-19 SPEC COLLECT: HCPCS

## 2022-02-24 ENCOUNTER — HOSPITAL ENCOUNTER (OUTPATIENT)
Dept: POSTOP/PACU | Facility: HOSPITAL | Age: 62
Discharge: HOME | End: 2022-02-24

## 2022-02-24 ENCOUNTER — READMISSION MANAGEMENT (OUTPATIENT)
Dept: CALL CENTER | Facility: HOSPITAL | Age: 62
End: 2022-02-24

## 2022-02-24 ENCOUNTER — ANESTHESIA EVENT (OUTPATIENT)
Dept: POSTOP/PACU | Facility: HOSPITAL | Age: 62
End: 2022-02-24

## 2022-02-24 ENCOUNTER — ANESTHESIA (OUTPATIENT)
Dept: POSTOP/PACU | Facility: HOSPITAL | Age: 62
End: 2022-02-24

## 2022-02-24 VITALS — HEART RATE: 102 BPM | DIASTOLIC BLOOD PRESSURE: 55 MMHG | SYSTOLIC BLOOD PRESSURE: 94 MMHG | OXYGEN SATURATION: 97 %

## 2022-02-24 VITALS
DIASTOLIC BLOOD PRESSURE: 69 MMHG | SYSTOLIC BLOOD PRESSURE: 118 MMHG | TEMPERATURE: 98 F | HEART RATE: 109 BPM | RESPIRATION RATE: 16 BRPM | OXYGEN SATURATION: 96 %

## 2022-02-24 DIAGNOSIS — I48.0 PAF (PAROXYSMAL ATRIAL FIBRILLATION): ICD-10-CM

## 2022-02-24 DIAGNOSIS — I09.1 RHEUMATIC VALVULAR DISEASE: ICD-10-CM

## 2022-02-24 DIAGNOSIS — I48.92 ATRIAL FLUTTER WITH RAPID VENTRICULAR RESPONSE: ICD-10-CM

## 2022-02-24 LAB
ANION GAP SERPL CALCULATED.3IONS-SCNC: 10.9 MMOL/L (ref 5–15)
BUN SERPL-MCNC: 11 MG/DL (ref 6–20)
BUN/CREAT SERPL: 16.4 (ref 7–25)
CALCIUM SPEC-SCNC: 10.1 MG/DL (ref 8.6–10.5)
CHLORIDE SERPL-SCNC: 99 MMOL/L (ref 98–107)
CO2 SERPL-SCNC: 27.1 MMOL/L (ref 22–29)
CREAT SERPL-MCNC: 0.67 MG/DL (ref 0.57–1)
GFR SERPL CREATININE-BSD FRML MDRD: 90 ML/MIN/1.73
GLUCOSE SERPL-MCNC: 123 MG/DL (ref 65–99)
POTASSIUM SERPL-SCNC: 4.1 MMOL/L (ref 3.5–5.2)
QT INTERVAL: 331 MS
QT INTERVAL: 394 MS
SODIUM SERPL-SCNC: 137 MMOL/L (ref 136–145)

## 2022-02-24 PROCEDURE — 92960 CARDIOVERSION ELECTRIC EXT: CPT

## 2022-02-24 PROCEDURE — S0260 H&P FOR SURGERY: HCPCS | Performed by: INTERNAL MEDICINE

## 2022-02-24 PROCEDURE — 93320 DOPPLER ECHO COMPLETE: CPT | Performed by: INTERNAL MEDICINE

## 2022-02-24 PROCEDURE — 93010 ELECTROCARDIOGRAM REPORT: CPT | Performed by: INTERNAL MEDICINE

## 2022-02-24 PROCEDURE — 93312 ECHO TRANSESOPHAGEAL: CPT

## 2022-02-24 PROCEDURE — 0 LIDOCAINE 1 % SOLUTION

## 2022-02-24 PROCEDURE — 25010000002 PROPOFOL 10 MG/ML EMULSION

## 2022-02-24 PROCEDURE — 80048 BASIC METABOLIC PNL TOTAL CA: CPT | Performed by: INTERNAL MEDICINE

## 2022-02-24 PROCEDURE — 92960 CARDIOVERSION ELECTRIC EXT: CPT | Performed by: INTERNAL MEDICINE

## 2022-02-24 PROCEDURE — 93312 ECHO TRANSESOPHAGEAL: CPT | Performed by: INTERNAL MEDICINE

## 2022-02-24 PROCEDURE — 25010000002 AMIODARONE IN DEXTROSE 5% 150-4.21 MG/100ML-% SOLUTION: Performed by: INTERNAL MEDICINE

## 2022-02-24 PROCEDURE — 93325 DOPPLER ECHO COLOR FLOW MAPG: CPT

## 2022-02-24 PROCEDURE — 93325 DOPPLER ECHO COLOR FLOW MAPG: CPT | Performed by: INTERNAL MEDICINE

## 2022-02-24 PROCEDURE — 93320 DOPPLER ECHO COMPLETE: CPT

## 2022-02-24 PROCEDURE — 93005 ELECTROCARDIOGRAM TRACING: CPT | Performed by: INTERNAL MEDICINE

## 2022-02-24 RX ORDER — PROPOFOL 10 MG/ML
VIAL (ML) INTRAVENOUS AS NEEDED
Status: DISCONTINUED | OUTPATIENT
Start: 2022-02-24 | End: 2022-02-24 | Stop reason: SURG

## 2022-02-24 RX ORDER — PROPOFOL 10 MG/ML
VIAL (ML) INTRAVENOUS CONTINUOUS PRN
Status: DISCONTINUED | OUTPATIENT
Start: 2022-02-24 | End: 2022-02-24 | Stop reason: SURG

## 2022-02-24 RX ORDER — SODIUM CHLORIDE, SODIUM LACTATE, POTASSIUM CHLORIDE, CALCIUM CHLORIDE 600; 310; 30; 20 MG/100ML; MG/100ML; MG/100ML; MG/100ML
INJECTION, SOLUTION INTRAVENOUS CONTINUOUS PRN
Status: DISCONTINUED | OUTPATIENT
Start: 2022-02-24 | End: 2022-02-24 | Stop reason: SURG

## 2022-02-24 RX ORDER — AMIODARONE HYDROCHLORIDE 200 MG/1
200 TABLET ORAL DAILY
Qty: 30 TABLET | Refills: 1 | Status: SHIPPED | OUTPATIENT
Start: 2022-02-24 | End: 2022-04-25

## 2022-02-24 RX ORDER — LIDOCAINE HYDROCHLORIDE 10 MG/ML
INJECTION, SOLUTION INFILTRATION; PERINEURAL AS NEEDED
Status: DISCONTINUED | OUTPATIENT
Start: 2022-02-24 | End: 2022-02-24 | Stop reason: SURG

## 2022-02-24 RX ADMIN — AMIODARONE HYDROCHLORIDE 150 MG: 1.5 INJECTION, SOLUTION INTRAVENOUS at 08:35

## 2022-02-24 RX ADMIN — PROPOFOL 100 MG: 10 INJECTION, EMULSION INTRAVENOUS at 08:16

## 2022-02-24 RX ADMIN — LIDOCAINE HYDROCHLORIDE 60 ML: 10 INJECTION, SOLUTION INFILTRATION; PERINEURAL at 08:16

## 2022-02-24 RX ADMIN — SODIUM CHLORIDE, POTASSIUM CHLORIDE, SODIUM LACTATE AND CALCIUM CHLORIDE: 600; 310; 30; 20 INJECTION, SOLUTION INTRAVENOUS at 08:14

## 2022-02-24 RX ADMIN — Medication 160 MCG/KG/MIN: at 08:17

## 2022-02-24 NOTE — ANESTHESIA PREPROCEDURE EVALUATION
Anesthesia Evaluation     Patient summary reviewed and Nursing notes reviewed   no history of anesthetic complications:  NPO Solid Status: > 8 hours  NPO Liquid Status: > 2 hours           Airway   Mallampati: II  TM distance: <3 FB  Neck ROM: full  Dental    (+) upper dentures, lower dentures and edentulous    Pulmonary    (+) COPD, home oxygen,   Cardiovascular   Exercise tolerance: good (4-7 METS)    ECG reviewed    (+) valvular problems/murmurs, dysrhythmias,     ROS comment: S/p AVR, MVR, TVr    Neuro/Psych  (+) seizures, psychiatric history Anxiety,    GI/Hepatic/Renal/Endo    (+)  GERD,  renal disease,     Musculoskeletal     Abdominal    Substance History      OB/GYN          Other   arthritis,                    Anesthesia Plan    ASA 3     MAC   total IV anesthesia(I have reviewed the patient's history with the patient and the chart, including all pertinent laboratory results and imaging. I have explained the risks of anesthesia including but not limited to dental damage, corneal abrasion, nerve injury, MI, stroke, and death. Questions asked and answered. Anesthetic plan discussed with patient and team as indicated. Patient expressed understanding of the above.  )    Anesthetic plan, all risks, benefits, and alternatives have been provided, discussed and informed consent has been obtained with: patient.        CODE STATUS:

## 2022-02-24 NOTE — OUTREACH NOTE
CT Surgery Week 3 Survey      Responses   [facility] patient discharged from? [facility]   Does the patient have one of the following disease processes/diagnoses(primary or secondary)? Cardiothoracic surgery   Week 3 attempt successful? No   Unsuccessful attempts Attempt 1          Ramya Pinto RN

## 2022-02-24 NOTE — ANESTHESIA POSTPROCEDURE EVALUATION
Patient: Akila Amezcua    Procedure Summary     Date: 02/24/22 Room / Location: [facility] PACU    Anesthesia Start: 0814 Anesthesia Stop: 0835    Procedures:       ADULT TRANSESOPHAGEAL ECHO (MARGE) W/ CONT IF NECESSARY PER PROTOCOL      CARDIOVERSION EXTERNAL IN CARDIOLOGY DEPARTMENT Diagnosis:       PAF (paroxysmal atrial fibrillation) (HCC)      Rheumatic valvular disease      Atrial flutter with rapid ventricular response (HCC)      (Valvular Disease)      (Arrhythmia)      (atrial flutter/r/u clots)    Scheduled Providers: Jenny Vitale MD Provider: Dona Ray MD    Anesthesia Type: MAC ASA Status: 3          Anesthesia Type: MAC    Vitals  Vitals Value Taken Time   /70 02/24/22 0936   Temp 36.7 °C (98 °F) 02/24/22 0905   Pulse 106 02/24/22 0947   Resp 20 02/24/22 0935   SpO2 95 % 02/24/22 0947   Vitals shown include unvalidated device data.        Post Anesthesia Care and Evaluation    Patient location during evaluation: bedside  Patient participation: complete - patient participated  Level of consciousness: sleepy but conscious  Pain score: 0  Pain management: adequate  Airway patency: patent  Anesthetic complications: No anesthetic complications    Cardiovascular status: acceptable  Respiratory status: acceptable  Hydration status: acceptable    Comments: /70   Pulse 105   Temp 36.7 °C (98 °F) (Oral)   Resp 20   SpO2 94%

## 2022-02-24 NOTE — H&P
Date of Hospital Visit: [unfilled]ENC@  Encounter Provider: Jenny Vitale MD  Place of Service: [facility] CARDIOLOGY  Patient Name: Akila Amezcua  :1960  Referral Provider: Lorie Guo APRN    Chief complaint    Atrial flutter.    History of Present Illness           Ms. Amezcua is a 61-year-old woman who has been evaluated by my partners in  and  for paroxysmal atrial fibrillation in the setting of acute COPD exacerbations.  She has had echocardiograms that have been suggestive of aortic stenosis.  She has not kept any follow-up appointments in our cardiology clinic.     She presented to the emergency department 2021 with acute exacerbation of COPD and aspirin respiratory infection.  She has been having symptoms for approximately 3 days.  She was very short of breath and coughing but could not cough up the sputum.  She is also very congested and could feel her heart beating rapidly.  She did not have any chest pain or chest discomfort.  She was noted to be in rapid atrial fibrillation.  She did receive IV diltiazem and was placed on a dill drip with little effect on her heart rate.  She was given a dose of digoxin 0.25 mg IV followed by another dose of 0.125 mg IV.  She was then started on IV amiodarone.  She was given prophylactic enoxaparin.  She also received IV fluids and IV steroids. She has been diagnosed with human metapneumovirus.  A CTA was performed and was negative for PE, but showed dense bilateral lower lobe consolidations, consistent with pneumonia.  Her proBNP was elevated at 2800.  Her troponin was negative.  Her echo shows normal left ventricular systolic function, indeterminate diastolic function, moderate to severe right ventricular dilation, moderate pulmonary hypertension, and what appears to be rheumatic valvular heart disease affecting the aortic, mitral, and tricuspid valves.  The study is extremely technically difficult and the valves  are not well visualized.  There is moderate aortic stenosis, moderate aortic regurgitation, mild-moderate mitral stenosis, moderate (if not moderate to severe) mitral regurgitation, and moderate tricuspid regurgitation.  The amiodarone drip was stopped, she was started on oral diltiazem and her heart rate responded well.  She was also switched to warfarin due to her valvular disease.  She was discharged home 12/3/2021.       I saw her 1/14/2022 and was in decompensated heart failure. She was directly admitted to [facility].      She was aggressively diuresed however she started having problem with hypotension, renal failure, and was on IV ionotrops.  She was going into progressive worsening renal failure, there was some question about renal artery stenosis versus cardiorenal syndrome, she was given some IV fluid, she was on incremental amount of oxygen, the decision was made to transfer to [facility] for further care.  Patient has underlying history of COPD and oxygen at home at 3 L/min at baseline,  she was admitted under our care for further management with the plan for cardiology to continue to follow-up on her. Patient was given 1.5 L of crystalloid prior to the transfer for concern about a possible prerenal azotemia.  She was admitted to the ICU and was at one point on dobutamine.  She eventually underwent  AVR. MVR, TV repair + left atrial appendage ligation on 1/26/22 with Song.  She did well after surgery and progressed well with physical therapy.  She was able to be discharged and remained on 2 L of oxygen per nasal cannula.  She is to follow-up with pulmonary in 3 to 4 months, their goal is to wean her off of oxygen as her COPD was diagnosed is only mild and her chronic respiratory failure was mainly due to the ongoing chronic valvular heart disease.    She is in atrial flutter with rapid ventricular response.  Overall her swelling is much better, her breathing is much better.  She  has no chest pain or pressure.  She does not feel dizzy or weak but does feel her heart racing.  She has no fevers, chills or cough no difficulty swallowing.    Past Medical History:   Diagnosis Date   • Anxiety    • COPD (chronic obstructive pulmonary disease) (HCC)     LUNG BLEBS   • DJD (degenerative joint disease)    • Gallstones     SCHEDULED FOR SX   • GERD (gastroesophageal reflux disease)    • Injury of back     degenertive disc disease   • Panic attacks    • Paroxysmal atrial fibrillation (HCC)    • Rheumatoid arthritis (HCC)    • Seizures (HCC)     LAST ONE 2017       Past Surgical History:   Procedure Laterality Date   • AORTIC VALVE REPAIR/REPLACEMENT MITRAL VALVE REPAIR/REPLACEMENT N/A 2022    Procedure: MEDIAN STERNOTOMY, MITRAL VALVE REPLACEMENT AORTIC VALVE REPLACEMENT TRICUSPID VALVE REPAIR, PRP;  Surgeon: Kingsley Zuleta MD;  Location: [facility];  Service: Cardiothoracic;  Laterality: N/A;   • APPENDECTOMY     • CARDIAC CATHETERIZATION N/A 2022    Procedure: Right and Left Heart Cath;  Surgeon: Kolby Melissa MD;  Location: [facility] INVASIVE LOCATION;  Service: Cardiology;  Laterality: N/A;   •  SECTION     • CHOLECYSTECTOMY N/A 2017    Procedure: LAPAROSCOPIC CHOLECYSTECTOMY, laparoscopic adhesiolysis requiring 45 minutes of operative time;  Surgeon: Liliana Monroy MD;  Location: [facility];  Service:    • ESOPHAGEAL ATRESIA REPAIR     • INSERT CENTRAL LINE AT BEDSIDE  2022        • PLEURAL BIOPSY     • PLEURAL SCARIFICATION     • TRANSESOPHAGEAL ECHOCARDIOGRAM (MARGE) N/A 2022    Procedure: TRANSESOPHAGEAL ECHOCARDIOGRAM WITH ANESTHESIA;  Surgeon: Kingsley Zuleta MD;  Location: [facility];  Service: Cardiothoracic;  Laterality: N/A;       (Not in a hospital admission)      Current Meds  Scheduled Meds:  Continuous Infusions:No current facility-administered medications for this encounter.    PRN Meds:.    Allergies as of 2022 - Reviewed  2022   Allergen Reaction Noted   • Penicillins Anaphylaxis 10/09/2016   • Anesthetics, amide Anxiety 2020   • Latex Rash 2022       Social History     Socioeconomic History   • Marital status:    Tobacco Use   • Smoking status: Former Smoker     Packs/day: 1.00     Years: 35.00     Pack years: 35.00     Quit date:      Years since quittin.1   • Smokeless tobacco: Never Used   • Tobacco comment: NO CAFFIENE   Substance and Sexual Activity   • Alcohol use: No   • Drug use: No   • Sexual activity: Defer       Family History   Problem Relation Age of Onset   • Lung cancer Father        REVIEW OF SYSTEMS:   12 point ROS was performed and is negative except as outlined in HPI            Objective:   Temp:  [98.2 °F (36.8 °C)] 98.2 °F (36.8 °C)  Heart Rate:  [125] 125  Resp:  [22] 22  BP: (122)/(85) 122/85  There is no height or weight on file to calculate BMI.    Vitals:    22 0546   BP: 122/85   Pulse: (!) 125   Resp: 22   Temp: 98.2 °F (36.8 °C)   SpO2: 96%       General Appearance:    Alert, cooperative, in no acute distress   Head:    Normocephalic, without obvious abnormality, atraumatic   Eyes:            Lids and lashes normal, conjunctivae and sclerae normal, no   icterus, no pallor, corneas clear   Ears:    Ears appear intact with no abnormalities noted   Throat:   No oral lesions, no thrush, oral mucosa moist   Neck:   No adenopathy, supple, trachea midline, no thyromegaly, no   carotid bruit, no JVD   Back:     No kyphosis present, no scoliosis present, no skin lesions, erythema or scars, no tenderness to palpation, range of motion normal   Lungs:     Clear to auscultation,respirations regular, even and unlabored    Heart:    Regular rhythm and tachycardic rate, normal S1 and S2, no murmur, no gallop, no rub, no click   Chest Wall:    No abnormalities observed   Abdomen:     Normal bowel sounds, no masses, no organomegaly, soft, nontender, nondistended, no guarding, no  rebound  tenderness   Extremities:   Moves all extremities well, no edema, no cyanosis, no redness   Pulses:   Pulses palpable and equal bilaterally. Normal radial, carotid, dorsalis pedis and posterior tibial pulses bilaterally.    Skin:  Psychiatric:   No bleeding, bruising or rash    Alert and oriented x 3, normal mood and affect                 Lab Review:      Results from last 7 days   Lab Units 02/24/22  0554   SODIUM mmol/L 137   POTASSIUM mmol/L 4.1   CHLORIDE mmol/L 99   CO2 mmol/L 27.1   BUN mg/dL 11   CREATININE mg/dL 0.67   CALCIUM mg/dL 10.1   GLUCOSE mg/dL 123*         @LABRCNTbnp@  Results from last 7 days   Lab Units 02/23/22  1140   WBC 10*3/mm3 9.31   HEMOGLOBIN g/dL 12.2   HEMATOCRIT % 39.1   PLATELETS 10*3/mm3 388             @LABRCNTIP(chol,trig,hdl,ldl)    I personally viewed and interpreted the patient's EKG/Telemetry data  )  Patient Active Problem List   Diagnosis   • Rheumatoid arthritis involving multiple sites ([facility])   • Cor pulmonale (chronic) ([facility])   • Rheumatic valvular disease   • PAF (paroxysmal atrial fibrillation) ([facility])   • (HFpEF) heart failure with preserved ejection fraction ([facility])   • COPD with emphysema ([facility])   • Acute renal failure (ARF) ([facility])     Assessment and Plan:    1. Valvular heart disease status post AVR. MVR, TV repair + left atrial appendage ligation 1/26/22  2. Acute on chronic congestive heart failure due to severe valvular heart disease- Doing so much better.  Remains on oral lasix with mild LE edema, noted to not have and any for 2 days.    3. Acute on chronic right sided heart failure  4. Cor pulmonale due to lung disease and left sided heart failure  5. Atrial fibrillation with RVR status post MAZE -after surgery she was in sinus rhythm and anticoagulation was stopped.  She saw her primary care earlier this week and was back in A. fib.  Her metoprolol was increased to Toprol-XL 25 mg twice daily.  Also she was instructed to take her aspirin 81 mg twice  daily.  7. Acute on chronic hypoxic respiratory failure, multifactorial but predominantly heart failure. - this has resolved s/p valvular repair.    8. COPD - She remains on oxygen supplementation at 2 L but is to follow-up with pulmonology in 3 to 4 months and hopefully be able to wean off oxygen as her COPD is mild and her chronic respiratory failure was due to her valvular disease.   9.  Atrial flutter-rapid.  Now on Xarelto.    MARGE and cardioversion today.  Patient has been on Xarelto.  Benefits Splane to the patient.    Jenny Vitale MD  02/24/22  07:15 EST.  Time spent in reviewing chart, discussion and examination:

## 2022-02-24 NOTE — PROCEDURES
Patient came to the PACU after an overnight fast.  Informed consent was obtained.  She underwent moderate conversation propofol per anesthesia.  She had limited MARGE which showed no left atrial thrombus.  She then underwent cardioversion with 200 J synchronized biphasic and converted from atrial flutter into normal sinus rhythm.  She will go home today on amiodarone 200 mg daily and follow-up in our office in 4 weeks.

## 2022-02-25 ENCOUNTER — HOME CARE VISIT (OUTPATIENT)
Dept: HOME HEALTH SERVICES | Facility: HOME HEALTHCARE | Age: 62
End: 2022-02-25

## 2022-02-25 VITALS
RESPIRATION RATE: 18 BRPM | HEART RATE: 103 BPM | TEMPERATURE: 98.2 F | DIASTOLIC BLOOD PRESSURE: 64 MMHG | SYSTOLIC BLOOD PRESSURE: 120 MMHG | OXYGEN SATURATION: 91 %

## 2022-02-25 PROCEDURE — G0299 HHS/HOSPICE OF RN EA 15 MIN: HCPCS

## 2022-02-28 ENCOUNTER — HOME CARE VISIT (OUTPATIENT)
Dept: HOME HEALTH SERVICES | Facility: HOME HEALTHCARE | Age: 62
End: 2022-02-28

## 2022-02-28 VITALS
OXYGEN SATURATION: 93 % | RESPIRATION RATE: 18 BRPM | HEART RATE: 89 BPM | TEMPERATURE: 98.5 F | SYSTOLIC BLOOD PRESSURE: 101 MMHG | DIASTOLIC BLOOD PRESSURE: 68 MMHG

## 2022-02-28 PROCEDURE — G0299 HHS/HOSPICE OF RN EA 15 MIN: HCPCS

## 2022-03-01 ENCOUNTER — READMISSION MANAGEMENT (OUTPATIENT)
Dept: CALL CENTER | Facility: HOSPITAL | Age: 62
End: 2022-03-01

## 2022-03-01 NOTE — OUTREACH NOTE
CT Surgery Week 3 Survey      Responses   [facility] patient discharged from? [facility]   Does the patient have one of the following disease processes/diagnoses(primary or secondary)? Cardiothoracic surgery   Week 3 attempt successful? No   Unsuccessful attempts Attempt 2          Ekta Haywood LPN

## 2022-03-07 ENCOUNTER — TELEPHONE (OUTPATIENT)
Dept: CARDIOLOGY | Facility: CLINIC | Age: 62
End: 2022-03-07

## 2022-03-07 NOTE — TELEPHONE ENCOUNTER
Patient called and stated that she wanted to know how long she needed to continue on the Xarelto?  If she needs to continue with the medication, she will need a refill on it sent in.  Please advise.    Christa

## 2022-03-09 ENCOUNTER — APPOINTMENT (OUTPATIENT)
Dept: GENERAL RADIOLOGY | Facility: HOSPITAL | Age: 62
End: 2022-03-09

## 2022-03-09 ENCOUNTER — HOME CARE VISIT (OUTPATIENT)
Dept: HOME HEALTH SERVICES | Facility: HOME HEALTHCARE | Age: 62
End: 2022-03-09

## 2022-03-09 ENCOUNTER — TELEPHONE (OUTPATIENT)
Dept: CARDIOLOGY | Facility: CLINIC | Age: 62
End: 2022-03-09

## 2022-03-09 ENCOUNTER — HOSPITAL ENCOUNTER (EMERGENCY)
Facility: HOSPITAL | Age: 62
Discharge: HOME | End: 2022-03-09
Attending: EMERGENCY MEDICINE | Admitting: EMERGENCY MEDICINE

## 2022-03-09 VITALS
HEART RATE: 92 BPM | WEIGHT: 185.1 LBS | RESPIRATION RATE: 16 BRPM | OXYGEN SATURATION: 98 % | SYSTOLIC BLOOD PRESSURE: 103 MMHG | HEIGHT: 66 IN | DIASTOLIC BLOOD PRESSURE: 64 MMHG | TEMPERATURE: 98.1 F | BODY MASS INDEX: 29.75 KG/M2

## 2022-03-09 DIAGNOSIS — J18.9 PNEUMONIA OF LEFT UPPER LOBE DUE TO INFECTIOUS ORGANISM: Primary | ICD-10-CM

## 2022-03-09 LAB
ALBUMIN SERPL-MCNC: 3.9 G/DL (ref 3.5–5.2)
ALBUMIN/GLOB SERPL: 1 G/DL
ALP SERPL-CCNC: 80 U/L (ref 39–117)
ALT SERPL W P-5'-P-CCNC: 14 U/L (ref 1–33)
ANION GAP SERPL CALCULATED.3IONS-SCNC: 8.3 MMOL/L (ref 5–15)
AST SERPL-CCNC: 18 U/L (ref 1–32)
BASOPHILS # BLD AUTO: 0.08 10*3/MM3 (ref 0–0.2)
BASOPHILS NFR BLD AUTO: 0.8 % (ref 0–1.5)
BILIRUB SERPL-MCNC: 0.6 MG/DL (ref 0–1.2)
BUN SERPL-MCNC: 15 MG/DL (ref 6–20)
BUN/CREAT SERPL: 16.9 (ref 7–25)
CALCIUM SPEC-SCNC: 9.9 MG/DL (ref 8.6–10.5)
CHLORIDE SERPL-SCNC: 99 MMOL/L (ref 98–107)
CO2 SERPL-SCNC: 30.7 MMOL/L (ref 22–29)
CREAT SERPL-MCNC: 0.89 MG/DL (ref 0.57–1)
DEPRECATED RDW RBC AUTO: 47 FL (ref 37–54)
EGFRCR SERPLBLD CKD-EPI 2021: 74.8 ML/MIN/1.73
EOSINOPHIL # BLD AUTO: 0.68 10*3/MM3 (ref 0–0.4)
EOSINOPHIL NFR BLD AUTO: 7.1 % (ref 0.3–6.2)
ERYTHROCYTE [DISTWIDTH] IN BLOOD BY AUTOMATED COUNT: 14.6 % (ref 12.3–15.4)
GLOBULIN UR ELPH-MCNC: 3.9 GM/DL
GLUCOSE SERPL-MCNC: 98 MG/DL (ref 65–99)
HCT VFR BLD AUTO: 35.2 % (ref 34–46.6)
HGB BLD-MCNC: 10.6 G/DL (ref 12–15.9)
IMM GRANULOCYTES # BLD AUTO: 0.03 10*3/MM3 (ref 0–0.05)
IMM GRANULOCYTES NFR BLD AUTO: 0.3 % (ref 0–0.5)
LYMPHOCYTES # BLD AUTO: 2.28 10*3/MM3 (ref 0.7–3.1)
LYMPHOCYTES NFR BLD AUTO: 23.7 % (ref 19.6–45.3)
MCH RBC QN AUTO: 26.2 PG (ref 26.6–33)
MCHC RBC AUTO-ENTMCNC: 30.1 G/DL (ref 31.5–35.7)
MCV RBC AUTO: 87.1 FL (ref 79–97)
MONOCYTES # BLD AUTO: 0.84 10*3/MM3 (ref 0.1–0.9)
MONOCYTES NFR BLD AUTO: 8.7 % (ref 5–12)
NEUTROPHILS NFR BLD AUTO: 5.72 10*3/MM3 (ref 1.7–7)
NEUTROPHILS NFR BLD AUTO: 59.4 % (ref 42.7–76)
NRBC BLD AUTO-RTO: 0 /100 WBC (ref 0–0.2)
NT-PROBNP SERPL-MCNC: 293.9 PG/ML (ref 0–900)
PLATELET # BLD AUTO: 281 10*3/MM3 (ref 140–450)
PMV BLD AUTO: 9.9 FL (ref 6–12)
POTASSIUM SERPL-SCNC: 3.9 MMOL/L (ref 3.5–5.2)
PROT SERPL-MCNC: 7.8 G/DL (ref 6–8.5)
RBC # BLD AUTO: 4.04 10*6/MM3 (ref 3.77–5.28)
SODIUM SERPL-SCNC: 138 MMOL/L (ref 136–145)
TROPONIN T SERPL-MCNC: <0.01 NG/ML (ref 0–0.03)
WBC NRBC COR # BLD: 9.63 10*3/MM3 (ref 3.4–10.8)

## 2022-03-09 PROCEDURE — 99283 EMERGENCY DEPT VISIT LOW MDM: CPT

## 2022-03-09 PROCEDURE — 80053 COMPREHEN METABOLIC PANEL: CPT | Performed by: EMERGENCY MEDICINE

## 2022-03-09 PROCEDURE — 93005 ELECTROCARDIOGRAM TRACING: CPT | Performed by: EMERGENCY MEDICINE

## 2022-03-09 PROCEDURE — 99284 EMERGENCY DEPT VISIT MOD MDM: CPT | Performed by: EMERGENCY MEDICINE

## 2022-03-09 PROCEDURE — 85025 COMPLETE CBC W/AUTO DIFF WBC: CPT | Performed by: EMERGENCY MEDICINE

## 2022-03-09 PROCEDURE — 84484 ASSAY OF TROPONIN QUANT: CPT | Performed by: EMERGENCY MEDICINE

## 2022-03-09 PROCEDURE — 71046 X-RAY EXAM CHEST 2 VIEWS: CPT

## 2022-03-09 PROCEDURE — 93010 ELECTROCARDIOGRAM REPORT: CPT | Performed by: INTERNAL MEDICINE

## 2022-03-09 PROCEDURE — 83880 ASSAY OF NATRIURETIC PEPTIDE: CPT | Performed by: EMERGENCY MEDICINE

## 2022-03-09 PROCEDURE — G0495 RN CARE TRAIN/EDU IN HH: HCPCS

## 2022-03-09 RX ORDER — LEVOFLOXACIN 500 MG/1
500 TABLET, FILM COATED ORAL DAILY
Qty: 5 TABLET | Refills: 0 | Status: SHIPPED | OUTPATIENT
Start: 2022-03-09 | End: 2022-03-14

## 2022-03-09 RX ORDER — LEVOFLOXACIN 500 MG/1
500 TABLET, FILM COATED ORAL ONCE
Status: COMPLETED | OUTPATIENT
Start: 2022-03-09 | End: 2022-03-09

## 2022-03-09 RX ORDER — SODIUM CHLORIDE 0.9 % (FLUSH) 0.9 %
10 SYRINGE (ML) INJECTION AS NEEDED
Status: DISCONTINUED | OUTPATIENT
Start: 2022-03-09 | End: 2022-03-09 | Stop reason: HOSPADM

## 2022-03-09 RX ADMIN — LEVOFLOXACIN 500 MG: 500 TABLET, FILM COATED ORAL at 19:01

## 2022-03-09 NOTE — TELEPHONE ENCOUNTER
Called and spoke with patient and let her know that RM would like for her to stay on the Xarelto for another 30 days and refill has been sent in.    Christa

## 2022-03-09 NOTE — ED PROVIDER NOTES
"Subjective     History provided by:  Patient and medical records    History of Present Illness    · Chief complaint: Abnormal lung sounds    · Location: At home    · Quality/Severity: The patient's home health nurse said that her lungs sounded bad and contacted her cardiologist who instructed her to come to the ER.  The patient states she has baseline shortness of breath that is unchanged.    · Timing/Onset: Home health nurse saw her earlier today.    · Modifying Factors: The patient has a history of COPD and congestive heart failure and atrial fibrillation.  She is normally on 2 L of oxygen via nasal cannula and is anticoagulated with Xarelto.    · Associated symptoms: Denies any chest pain.  Denies any swelling of her feet and ankles.    · Narrative: The patient is a 61-year-old white female with a history of COPD due to smoking.  She also has a history of paroxysmal A. fib.  She she was treated for decompensated heart failure on 1/14/2022.  The patient has a history of aortic stenosis and aortic regurgitation, moderate to severe mitral regurgitation and right ventricular dilatation and cor pulmonale.  She underwent mitral valve replacement, aortic valve replacement, T the repair and left atrial appendage ligation by Dr. Zuleta 1/26/2022.  The patient reports she is no more short of breath than she normally is.  She was seen by home health nurse earlier today and told that her lungs sounded \"bad\".  The home health nurse contacted her cardiologist who instructed her to come to the ER to be evaluated.  She denies any chest pain or swelling of her feet and ankles.  She quit smoking a year ago.    Review of Systems   Constitutional: Negative for activity change, appetite change, chills, diaphoresis, fatigue and fever.   HENT: Negative for congestion, dental problem, ear pain, hearing loss, mouth sores, postnasal drip, rhinorrhea, sinus pressure, sore throat and voice change.    Eyes: Negative for photophobia, pain, " "discharge, redness and visual disturbance.   Respiratory: Positive for cough ( Productive of light yellow sputum) and shortness of breath ( Baseline). Negative for chest tightness, wheezing and stridor.    Cardiovascular: Negative for chest pain, palpitations and leg swelling.   Gastrointestinal: Negative for abdominal pain, diarrhea, nausea and vomiting.   Genitourinary: Negative for difficulty urinating, dysuria, flank pain, frequency, hematuria, pelvic pain and urgency.   Musculoskeletal: Negative for arthralgias, back pain, gait problem, joint swelling, myalgias, neck pain and neck stiffness.   Skin: Negative for color change and rash.   Neurological: Negative for dizziness, tremors, seizures, syncope, facial asymmetry, speech difficulty, weakness, light-headedness, numbness and headaches.   Hematological: Negative for adenopathy.   Psychiatric/Behavioral: Negative.  Negative for confusion and decreased concentration. The patient is not nervous/anxious.      Past Medical History:   Diagnosis Date   • Anxiety    • COPD (chronic obstructive pulmonary disease) (HCC)     LUNG BLEBS   • DJD (degenerative joint disease)    • Gallstones     SCHEDULED FOR SX   • GERD (gastroesophageal reflux disease)    • Injury of back     degenertive disc disease   • Panic attacks    • Paroxysmal atrial fibrillation (HCC)    • Rheumatoid arthritis (HCC)    • Seizures (HCC)     LAST ONE JAN. 2017     /64   Pulse 92   Temp 98.1 °F (36.7 °C) (Oral)   Resp 16   Ht 167.6 cm (66\")   Wt 84 kg (185 lb 1.6 oz)   SpO2 98%   BMI 29.88 kg/m²     Past Medical History:   Diagnosis Date   • Anxiety    • COPD (chronic obstructive pulmonary disease) (HCC)     LUNG BLEBS   • DJD (degenerative joint disease)    • Gallstones     SCHEDULED FOR SX   • GERD (gastroesophageal reflux disease)    • Injury of back     degenertive disc disease   • Panic attacks    • Paroxysmal atrial fibrillation (HCC)    • Rheumatoid arthritis (HCC)    • Seizures " ([facility])     LAST ONE 2017       Allergies   Allergen Reactions   • Penicillins Anaphylaxis   • Anesthetics, Amide Anxiety   • Latex Rash       Past Surgical History:   Procedure Laterality Date   • AORTIC VALVE REPAIR/REPLACEMENT MITRAL VALVE REPAIR/REPLACEMENT N/A 2022    Procedure: MEDIAN STERNOTOMY, MITRAL VALVE REPLACEMENT AORTIC VALVE REPLACEMENT TRICUSPID VALVE REPAIR, PRP;  Surgeon: Kingsley Zuleta MD;  Location: [facility];  Service: Cardiothoracic;  Laterality: N/A;   • APPENDECTOMY     • CARDIAC CATHETERIZATION N/A 2022    Procedure: Right and Left Heart Cath;  Surgeon: Kolby Melissa MD;  Location: [facility] CATH INVASIVE LOCATION;  Service: Cardiology;  Laterality: N/A;   •  SECTION     • CHOLECYSTECTOMY N/A 2017    Procedure: LAPAROSCOPIC CHOLECYSTECTOMY, laparoscopic adhesiolysis requiring 45 minutes of operative time;  Surgeon: Liliana Monroy MD;  Location: [facility];  Service:    • ESOPHAGEAL ATRESIA REPAIR     • INSERT CENTRAL LINE AT BEDSIDE  2022        • PLEURAL BIOPSY     • PLEURAL SCARIFICATION     • TRANSESOPHAGEAL ECHOCARDIOGRAM (MARGE) N/A 2022    Procedure: TRANSESOPHAGEAL ECHOCARDIOGRAM WITH ANESTHESIA;  Surgeon: Kingsley Zuleta MD;  Location: [facility];  Service: Cardiothoracic;  Laterality: N/A;       Family History   Problem Relation Age of Onset   • Lung cancer Father        Social History     Socioeconomic History   • Marital status:    Tobacco Use   • Smoking status: Former Smoker     Packs/day: 1.00     Years: 35.00     Pack years: 35.00     Quit date:      Years since quittin.1   • Smokeless tobacco: Never Used   • Tobacco comment: NO CAFFIENE   Substance and Sexual Activity   • Alcohol use: No   • Drug use: No   • Sexual activity: Defer           Objective   Physical Exam  Vitals and nursing note reviewed.   Constitutional:       General: She is not in acute distress.     Appearance: Normal appearance. She is  well-developed and normal weight. She is not ill-appearing, toxic-appearing or diaphoretic.      Comments: The patient appears in no acute distress.  She does not appear ill.  Review of her vital signs: She is afebrile with a temperature of 98.1, slightly tachypneic with a respiratory rate of 20 with a oxygen saturation of 94% on 2 L of oxygen via nasal cannula, is slightly tachycardic 99, but similar to previous visits.  Blood pressure is normal 126/83.   HENT:      Head: Normocephalic and atraumatic.      Nose: Nose normal.      Mouth/Throat:      Mouth: Mucous membranes are moist.      Pharynx: Oropharynx is clear.   Eyes:      General: No scleral icterus.        Right eye: No discharge.         Left eye: No discharge.      Conjunctiva/sclera: Conjunctivae normal.      Pupils: Pupils are equal, round, and reactive to light.   Neck:      Thyroid: No thyromegaly.      Vascular: No JVD.   Cardiovascular:      Rate and Rhythm: Normal rate and regular rhythm.      Heart sounds: Normal heart sounds. No murmur heard.  Pulmonary:      Effort: Pulmonary effort is normal.      Breath sounds: No wheezing, rhonchi or rales.      Comments: The patient has bibasilar crackles, otherwise clear to auscultation  Chest:      Chest wall: No tenderness.   Abdominal:      General: Bowel sounds are normal. There is no distension.      Palpations: Abdomen is soft.      Tenderness: There is no abdominal tenderness.   Musculoskeletal:         General: No tenderness, deformity or signs of injury. Normal range of motion.      Cervical back: Normal range of motion and neck supple.      Right lower leg: No edema.      Left lower leg: No edema.   Lymphadenopathy:      Cervical: No cervical adenopathy.   Skin:     General: Skin is warm and dry.      Coloration: Skin is not pale.      Findings: No erythema or rash.   Neurological:      General: No focal deficit present.      Mental Status: She is alert and oriented to person, place, and time.       Cranial Nerves: No cranial nerve deficit.      Coordination: Coordination normal.      Comments: No focal motor sensory deficit   Psychiatric:         Mood and Affect: Mood normal.         Behavior: Behavior normal.         Thought Content: Thought content normal.         Judgment: Judgment normal.         Procedures           ED Course  ED Course as of 03/09/22 1943   Wed Mar 09, 2022   1732 My interpretation of the patient's EKG tracing performed 17: 28 is normal sinus rhythm with a rate of 94, normal axis, normal conduction, no acute ST segment elevation or depression consistent with ischemia, minimal ST segment depression in the lateral chest leads that is not present in 1 and aVL, and is unchanged in comparison to tracing 2/24/2022.  No ectopy, normal MA and QT intervals. [TP]   1940 Review the patient's test results: Her chest x-ray was interpreted by the radiologist as minimal hazy left upper lobe infiltrate that I was unable to appreciate.  Trace bilateral pleural effusions were noted.  Stable cardiomegaly with normal pulmonary vasculature was noted.  The patient's cardiac troponin was negative.  The proBNP was normal at 293.9.  The CBC the patient was anemic with a hemoglobin 10.6 hematocrit 35.2.  Her white count was normal with a normal differential.  Her CMP had an elevated PCO2 30.7 consistent with COPD, otherwise had normal electrolytes, normal renal and liver function test. [TP]   1941 The radiologist notes a very faint hazy right upper lobe infiltrate.  The patient does have a cough is occasionally productive of yellowish sputum.  She has no evidence of heart failure.  Her lungs are clear to auscultation, and clinically she does not have a COPD exacerbation.  She will be started on Levaquin for her left upper lobe pneumonia.  She is instructed to follow-up with her PCP next Monday. [TP]      ED Course User Index  [TP] Blair Layne MD                                                 [facility]  Number  of Diagnoses or Management Options  Pneumonia of left upper lobe due to infectious organism: new and requires workup     Amount and/or Complexity of Data Reviewed  Clinical lab tests: ordered and reviewed  Tests in the radiology section of CPT®: ordered and reviewed  Tests in the medicine section of CPT®: ordered and reviewed  Review and summarize past medical records: yes    Risk of Complications, Morbidity, and/or Mortality  Presenting problems: high  Diagnostic procedures: high  Management options: high  General comments: My differential diagnosis for cough includes but is not limited to:  Upper respiratory infection, bronchitis, pneumonia, COPD exacerbation, cough variant asthma, cardiac asthma, coronary artery disease, congestive heart failure, bacterial, viral or lung infections, lung cancer, aspiration pneumonitis, aspiration of foreign body and Covid -19        Patient Progress  Patient progress: stable      Final diagnoses:   Pneumonia of left upper lobe due to infectious organism       ED Disposition  ED Disposition     ED Disposition   Discharge    Condition   Stable    Comment   --             Melly Holliday, APRN  [address] [facility] [address] [phone]    Schedule an appointment as soon as possible for a visit in 5 days           Medication List      New Prescriptions    levoFLOXacin 500 MG tablet  Commonly known as: LEVAQUIN  Take 1 tablet by mouth Daily for 5 days.           Where to Get Your Medications      These medications were sent to [facility]/pharmacy #[id number] - [place], [place] - [address] - [phone]  - [phone] [address] [address]    Phone: [phone]   · levoFLOXacin 500 MG tablet         Labs Reviewed   COMPREHENSIVE METABOLIC PANEL - Abnormal; Notable for the following components:       Result Value    CO2 30.7 (*)     All other components within normal limits    Narrative:     GFR Normal >60  Chronic Kidney Disease <60  Kidney  Failure <15     CBC WITH AUTO DIFFERENTIAL - Abnormal; Notable for the following components:    Hemoglobin 10.6 (*)     MCH 26.2 (*)     MCHC 30.1 (*)     Eosinophil % 7.1 (*)     Eosinophils, Absolute 0.68 (*)     All other components within normal limits   BNP (IN-HOUSE) - Normal    Narrative:     Among patients with dyspnea, NT-proBNP is highly sensitive for the detection of acute congestive heart failure. In addition NT-proBNP of <300 pg/ml effectively rules out acute congestive heart failure with 99% negative predictive value.    Results may be falsely decreased if patient taking Biotin.     TROPONIN (IN-HOUSE) - Normal    Narrative:     Troponin T Reference Range:  <= 0.03 ng/mL-   Negative for AMI  >0.03 ng/mL-     Abnormal for myocardial necrosis.  Clinicians would have to utilize clinical acumen, EKG, Troponin and serial changes to determine if it is an Acute Myocardial Infarction or myocardial injury due to an underlying chronic condition.       Results may be falsely decreased if patient taking Biotin.     CBC AND DIFFERENTIAL    Narrative:     The following orders were created for panel order CBC & Differential.  Procedure                               Abnormality         Status                     ---------                               -----------         ------                     CBC Auto Differential[[id number]]        Abnormal            Final result                 Please view results for these tests on the individual orders.     XR Chest 2 View   Final Result      1. Minimal hazy infiltrate in the left upper lobe. Aeration of both lungs is significantly improved since 1/14/2022.   2. Suspected trace bilateral pleural effusions.   3. Stable cardiomegaly.      Signer Name: Arron Guan MD    Signed: 3/9/2022 6:27 PM    Workstation Name: [id number]     [facility]             Medication List      New Prescriptions    levoFLOXacin 500 MG tablet  Commonly known as: LEVAQUIN  Take  1 tablet by mouth Daily for 5 days.           Where to Get Your Medications      These medications were sent to [facility]/pharmacy #[id number] - [place] [facility] [place], [place] - [address] [facility] - [phone]  - [phone]   [address] [facility] [address]    Phone: [phone]   · levoFLOXacin 500 MG tablet              Blair Layne MD  03/09/22 0857

## 2022-03-09 NOTE — TELEPHONE ENCOUNTER
Spoke with Cassandra from [facility].    She calls with concerns about the Pt's continued low BP, 78/67 and 90/70 with heart rate of 90, 98% O2 sats on 2 LPM. Pt weight is stable at this time. She states that she hears diffuse crackles in all [place].      Cassandra [facility] RN, states that the Pt was advised of crackles last week when she was visited by [facility]. Pt was instructed to continue deep breathing techniques and coughing. Pt reports this week that it hurts to take a deep breath and she is now coughing up yellow sputum. RN reports that the Pt is no longer smoking at this time.    Due to the Pt's recent surgery and current symptoms she has been instructed to go to the ER for further evaluation. Pt is agreeable and will go to the ER tonight [facility]

## 2022-03-09 NOTE — ED NOTES
"Pt arrived via PV with c/o health evaluation. Pt has states she had open heart surgery three weeks ago and has had home health coming to house. Pt reports that home health RN stated \"lungs sound bad\" and called cardiologist who referred her to come here. Pt denies any SOB out of the ordinary. Pt states she wears 2L of oxygen and is trying to wean herself off. Pt reports generalized chest pain for a week.   "

## 2022-03-10 ENCOUNTER — HOME CARE VISIT (OUTPATIENT)
Dept: HOME HEALTH SERVICES | Facility: HOME HEALTHCARE | Age: 62
End: 2022-03-10

## 2022-03-10 VITALS
OXYGEN SATURATION: 98 % | HEART RATE: 90 BPM | WEIGHT: 182 LBS | RESPIRATION RATE: 18 BRPM | DIASTOLIC BLOOD PRESSURE: 70 MMHG | BODY MASS INDEX: 29.38 KG/M2 | SYSTOLIC BLOOD PRESSURE: 90 MMHG | TEMPERATURE: 97.1 F

## 2022-03-10 LAB — QT INTERVAL: 398 MS

## 2022-03-10 NOTE — HOME HEALTH
"Patient is A&O x4, no signs of distress, utilizing 2 liters of oxygen continuous. no edema. Reporting to nurse that BP had dropped to 78/67 this morning and she felt dizzy and needed assistance to living room area. States that these episodes of dizziness and low BP has been going on throughout the weekend but this is the lowest it has been. SN assessed BP as 90/76 and heart rate 90. No weight gain, O2 sat 98%. lungs have loud crackles noted throughout middle and lower lobes. States coughing up \"light yellow\" mucus. Does report smoking cessation about 1 year ago. Reporting increased pain in chest horizontally accross upper chest, pain reported with even with deep breathing. Reports pain increase started about a week ago when she started increasing deep breathing and coughing. Reports using incentive spirometer 2 times a day. Advised on using 4 x day. Also has flutter valve and advised to use 4xday. Instructed regarding hydration and exercise. Instructed on walking program.    Call was placed to cardiologist office, was advised that patient go to ER. Cardiology is to call patient and advise."

## 2022-03-28 ENCOUNTER — TELEPHONE (OUTPATIENT)
Dept: CARDIAC REHAB | Facility: HOSPITAL | Age: 62
End: 2022-03-28

## 2022-04-25 RX ORDER — AMIODARONE HYDROCHLORIDE 200 MG/1
TABLET ORAL
Qty: 30 TABLET | Refills: 6 | Status: SHIPPED | OUTPATIENT
Start: 2022-04-25 | End: 2022-10-13

## 2022-04-25 NOTE — TELEPHONE ENCOUNTER
Last OV-02/18/22-JF  Next OV-05/27/22-    Plan:         1. Valvular heart disease status post AVR. MVR, TV repair + left atrial appendage ligation 1/26/22  2. Acute on chronic congestive heart failure due to severe valvular heart disease- Doing so much better.  Remains on oral lasix with mild LE edema, noted to not have and any for 2 days.    3. Acute on chronic right sided heart failure  4. Cor pulmonale due to lung disease and left sided heart failure  5. Atrial fibrillation with RVR status post MAZE -after surgery she was in sinus rhythm and anticoagulation was stopped.  She saw her primary care earlier this week and was back in A. fib.  Her metoprolol was increased to Toprol-XL 25 mg twice daily.  Also she was instructed to take her aspirin 81 mg twice daily.  7. Acute on chronic hypoxic respiratory failure, multifactorial but predominantly heart failure. - this has resolved s/p valvular repair.    8. COPD - She remains on oxygen supplementation at 2 L but is to follow-up with pulmonology in 3 to 4 months and hopefully be able to wean off oxygen as her COPD is mild and her chronic respiratory failure was due to her valvular disease.   9.  Atrial flutter-she is in atrial flutter RVR at this point.  I did discuss with Dr. Brownlee.  I have restarted Xarelto 20 mg daily and given patient samples.  She is to have a MARGE and cardioversion next week with Dr. Vitale.  Patient is agreeable to plan of care.     Restart Xarelto 20 mg daily.  MARGE and cardioversion next week with Dr. Vitale in [place]

## 2022-05-03 ENCOUNTER — HOME CARE VISIT (OUTPATIENT)
Dept: HOME HEALTH SERVICES | Facility: HOME HEALTHCARE | Age: 62
End: 2022-05-03

## 2022-05-17 RX ORDER — RIVAROXABAN 20 MG/1
TABLET, FILM COATED ORAL
Qty: 90 TABLET | Refills: 1 | Status: SHIPPED | OUTPATIENT
Start: 2022-05-17 | End: 2022-08-18

## 2022-05-19 NOTE — CASE COMMUNICATION
Patient missed a SN visit from [facility] on 2.6.22    For your records only.   As per home health protocol, MD must be notified of missed/cancelled visits; therefore the prescribed frequency was not met.

## 2022-06-01 ENCOUNTER — TELEPHONE (OUTPATIENT)
Dept: CARDIAC REHAB | Facility: HOSPITAL | Age: 62
End: 2022-06-01

## 2022-06-03 RX ORDER — ASPIRIN 81 MG/1
TABLET, COATED ORAL
Qty: 30 TABLET | Refills: 3 | Status: SHIPPED | OUTPATIENT
Start: 2022-06-03

## 2022-06-03 NOTE — TELEPHONE ENCOUNTER
Rx Refill Note  Requested Prescriptions     Pending Prescriptions Disp Refills   • Aspirin Low Dose 81 MG EC tablet [Pharmacy Med Name: ASPIRIN EC 81 MG TABLET] 30 tablet 3     Sig: TAKE 1 TABLET BY MOUTH EVERY DAY      Last office visit with prescribing clinician: 2/18/2022      Next office visit with prescribing clinician: Visit date not found            Cassandra Garcia MA  06/03/22, 11:23 EDT

## 2022-06-14 ENCOUNTER — OFFICE VISIT (OUTPATIENT)
Dept: CARDIOLOGY | Facility: CLINIC | Age: 62
End: 2022-06-14

## 2022-06-14 VITALS
BODY MASS INDEX: 31.98 KG/M2 | DIASTOLIC BLOOD PRESSURE: 80 MMHG | SYSTOLIC BLOOD PRESSURE: 112 MMHG | HEART RATE: 91 BPM | WEIGHT: 199 LBS | OXYGEN SATURATION: 90 % | RESPIRATION RATE: 16 BRPM | HEIGHT: 66 IN

## 2022-06-14 DIAGNOSIS — I50.33 ACUTE ON CHRONIC HEART FAILURE WITH PRESERVED EJECTION FRACTION: Primary | ICD-10-CM

## 2022-06-14 DIAGNOSIS — I48.0 PAF (PAROXYSMAL ATRIAL FIBRILLATION): ICD-10-CM

## 2022-06-14 DIAGNOSIS — I09.1 RHEUMATIC VALVULAR DISEASE: ICD-10-CM

## 2022-06-14 DIAGNOSIS — J43.9 PULMONARY EMPHYSEMA, UNSPECIFIED EMPHYSEMA TYPE: ICD-10-CM

## 2022-06-14 PROCEDURE — 93000 ELECTROCARDIOGRAM COMPLETE: CPT | Performed by: NURSE PRACTITIONER

## 2022-06-14 PROCEDURE — 99214 OFFICE O/P EST MOD 30 MIN: CPT | Performed by: NURSE PRACTITIONER

## 2022-06-14 RX ORDER — IPRATROPIUM BROMIDE AND ALBUTEROL SULFATE 2.5; .5 MG/3ML; MG/3ML
SOLUTION RESPIRATORY (INHALATION)
COMMUNITY
Start: 2022-03-27

## 2022-06-14 NOTE — PROGRESS NOTES
Date of Office Visit: 2022  Encounter Provider: TOYA Patel  Place of Service: [facility] CARDIOLOGY  Patient Name: Akila Amezcua  :1960  Primary Cardiologist: Dr. Vitale    CC: 4 month follow up    Dear eMlly    HPI: Akila Amezcua is a pleasant 62 y.o. female who presents 2022 for cardiac follow up. I have reviewed her past medical records including notes, labs and testing in preparation for today's visit.    Ms. Amezcua is a 61-year-old woman who has been evaluated by my partners in 2019 and  for paroxysmal atrial fibrillation in the setting of acute COPD exacerbations.  She has had echocardiograms that have been suggestive of aortic stenosis.  She has not kept any follow-up appointments in our cardiology clinic.     She presented to the emergency department 2021 with acute exacerbation of COPD and aspirin respiratory infection.  She has been having symptoms for approximately 3 days.  She was very short of breath and coughing but could not cough up the sputum.  She is also very congested and could feel her heart beating rapidly.  She did not have any chest pain or chest discomfort.  She was noted to be in rapid atrial fibrillation.  She did receive IV diltiazem and was placed on a dill drip with little effect on her heart rate.  She was given a dose of digoxin 0.25 mg IV followed by another dose of 0.125 mg IV.  She was then started on IV amiodarone.  She was given prophylactic enoxaparin.  She also received IV fluids and IV steroids. She has been diagnosed with human metapneumovirus.  A CTA was performed and was negative for PE, but showed dense bilateral lower lobe consolidations, consistent with pneumonia.  Her proBNP was elevated at 2800.  Her troponin was negative.  Her echo shows normal left ventricular systolic function, indeterminate diastolic function, moderate to severe right ventricular dilation, moderate pulmonary hypertension, and  what appears to be rheumatic valvular heart disease affecting the aortic, mitral, and tricuspid valves.  The study is extremely technically difficult and the valves are not well visualized.  There is moderate aortic stenosis, moderate aortic regurgitation, mild-moderate mitral stenosis, moderate (if not moderate to severe) mitral regurgitation, and moderate tricuspid regurgitation.  The amiodarone drip was stopped, she was started on oral diltiazem and her heart rate responded well.  She was also switched to warfarin due to her valvular disease.  She was discharged home 12/3/2021.     She returns today in follow-up.  She can still feel her heart racing at times.  She can also feel palpitations especially if she is resting quietly.  She states about a week ago her feet and ankle started to swell and she saw you and you started her on Lasix 20 mg daily along with potassium.  She states it really has not made much of a difference in her feet remain edematous.  She is continuing to use oxygen and uses 2 L at rest and does not feel short of breath, however, she does have to turn it up to 4 L if she is up trying to be active and that does still make her feel winded.  She also notices with activity that her heart rate will respond.  At times she has a little bit of dizziness when first standing up.  She does have some chest soreness from coughing and states she now has a productive cough with some thick sputum.  She denies fatigue and states overall she is recuperating.  She states she has been monitoring her PT/INR and that it was a little too thin and you have decreased her dose recently.  I increased the diltiazem to 240 mg daily.    I increased her Lasix to 40 mg daily and potassium to 20 mg daily.  She is to have labs checked in 1 week with primary care.  I have asked her to call me next week if things have not improved.     Her lower extremity edema did not improve so I stopped the Lasix and switch her over to  "torsemide 20 mg daily.  She did not have her labs checked as ask.  She then called our office and stated that her symptoms had not improved, she was continued to have shortness of breath and lower extremity edema.  An appointment was made.     I saw her 1/14/2022.  For follow-up.  She \"feels terrible and is willing to do anything even if that means admission to feel better\".  She states the changes in medication did not help and sometimes when she would check her oxygen sats at home they would be 70%.  She does remain on 3 L of oxygen.  She cannot basically breathe and talk at the same time without getting short of breath.  I asked her why she did not return to the hospital since she was feeling so poorly and she said \"I do not know I should have I just do not know why I did not\".  She is using accessory muscles, she has 2-3+ edema bilaterally up to her knees.  Her abdomen is very hard.  She states she has been taking her medications.  She did complain of orthopnea, worsening shortness of breath, intermittent dizziness and fatigue.  She states she cannot take a deep breath and sometimes it even hurts when she tries to do so.  She was directly admitted to [facility].      She was aggressively diuresed however she started having problem with hypotension, renal failure, and was on IV ionotrops.  She was going into progressive worsening renal failure, there was some question about renal artery stenosis versus cardiorenal syndrome, she was given some IV fluid, she was on incremental amount of oxygen, the decision was made to transfer to [facility] for further care.  Patient has underlying history of COPD and oxygen at home at 3 L/min at baseline,  she was admitted under our care for further management with the plan for cardiology to continue to follow-up on her. Patient was given 1.5 L of crystalloid prior to the transfer for concern about a possible prerenal azotemia.  She was admitted to the ICU " and was at one point on dobutamine.  She eventually underwent  AVR. MVR, TV repair + left atrial appendage ligation on 1/26/22 with Song.  She did well after surgery and progressed well with physical therapy.  She was able to be discharged and remained on 2 L of oxygen per nasal cannula.  She is to follow-up with pulmonary in 3 to 4 months, their goal is to wean her off of oxygen as her COPD was diagnosed is only mild and her chronic respiratory failure was mainly due to the ongoing chronic valvular heart disease.     I saw her 2/18/2022 in follow-up and looks so much better than when I previously saw her prior to her surgery.  She is able to walk on her own.  She denies any palpitations, shortness of breath, chest pain or chest pressure.  She has surgical discomfort but no angina.  She states she still is weak and fatigued but progressing.  She does have some intermittent dizziness.  Her blood pressure was low normal today.  She is still having home health.  She has a little bit of mild lower extremity edema.  She states she has not had any Lasix for 2 days because she ran out.  I have refilled that.  I have noted all of her labs and testing from her recent hospitalization.  Unfortunately, she is in atrial flutter.  When she saw you earlier this week, you had noted that and increased her metoprolol up to 25 mg twice daily and increased her aspirin up to 81 mg twice daily.    She was in the ED on 3/9/2022 because her HH RN felt she had a change in her lung sounds and SOA.  She was treated for pneumonia and was discharged with antibiotics.    She presents today in follow-up.  She looks great.  She states she feels well.  She states she still gets a little short of breath but she recovers quickly to she still feels like she is recovering overall but has felt better than she has in quite a while.  She denies any palpitations, lower extremity edema, dizziness or lightheadedness.  She has not had any syncope or  presyncopal episodes.  She denies any chest pain or chest pressure.  She denies fatigue.  She is taking her medications as directed.  Her blood pressure is well controlled.  She does not have any unexplained bleeding, dark or tarry stools.  She states she has oxygen at home but has not had to use it during the day or in the evening.         Past Medical History:   Diagnosis Date   • Anxiety    • COPD (chronic obstructive pulmonary disease) (HCC)     LUNG BLEBS   • DJD (degenerative joint disease)    • Gallstones     SCHEDULED FOR SX   • GERD (gastroesophageal reflux disease)    • Injury of back     degenertive disc disease   • Panic attacks    • Paroxysmal atrial fibrillation (HCC)    • Rheumatoid arthritis (HCC)    • Seizures (HCC)     LAST ONE 2017       Past Surgical History:   Procedure Laterality Date   • AORTIC VALVE REPAIR/REPLACEMENT MITRAL VALVE REPAIR/REPLACEMENT N/A 2022    Procedure: MEDIAN STERNOTOMY, MITRAL VALVE REPLACEMENT AORTIC VALVE REPLACEMENT TRICUSPID VALVE REPAIR, PRP;  Surgeon: Kingsley Zuleta MD;  Location: [facility];  Service: Cardiothoracic;  Laterality: N/A;   • APPENDECTOMY     • CARDIAC CATHETERIZATION N/A 2022    Procedure: Right and Left Heart Cath;  Surgeon: Kolby Melissa MD;  Location: [facility] INVASIVE LOCATION;  Service: Cardiology;  Laterality: N/A;   •  SECTION     • CHOLECYSTECTOMY N/A 2017    Procedure: LAPAROSCOPIC CHOLECYSTECTOMY, laparoscopic adhesiolysis requiring 45 minutes of operative time;  Surgeon: Liliana Monroy MD;  Location: [facility];  Service:    • ESOPHAGEAL ATRESIA REPAIR     • INSERT CENTRAL LINE AT BEDSIDE  2022        • PLEURAL BIOPSY     • PLEURAL SCARIFICATION     • TRANSESOPHAGEAL ECHOCARDIOGRAM (MARGE) N/A 2022    Procedure: TRANSESOPHAGEAL ECHOCARDIOGRAM WITH ANESTHESIA;  Surgeon: Kingsley Zuleta MD;  Location: [facility];  Service: Cardiothoracic;  Laterality: N/A;       Social History      Socioeconomic History   • Marital status:    Tobacco Use   • Smoking status: Former Smoker     Packs/day: 1.00     Years: 35.00     Pack years: 35.00     Quit date:      Years since quittin.4   • Smokeless tobacco: Never Used   • Tobacco comment: NO CAFFIENE   Substance and Sexual Activity   • Alcohol use: No   • Drug use: No   • Sexual activity: Defer       Family History   Problem Relation Age of Onset   • Lung cancer Father        The following portion of the patient's history were reviewed and updated as appropriate: past medical history, past surgical history, past social history, past family history, allergies, current medications, and problem list.    Review of Systems   Constitutional: Negative for diaphoresis, fever and malaise/fatigue.   HENT: Negative for congestion, hearing loss, hoarse voice, nosebleeds and sore throat.    Eyes: Negative for photophobia, vision loss in left eye, vision loss in right eye and visual disturbance.   Cardiovascular: Negative for chest pain, dyspnea on exertion, irregular heartbeat, leg swelling, near-syncope, orthopnea, palpitations, paroxysmal nocturnal dyspnea and syncope.   Respiratory: Positive for shortness of breath (with exertion). Negative for cough, hemoptysis, sleep disturbances due to breathing, snoring, sputum production and wheezing.    Endocrine: Negative for cold intolerance, heat intolerance, polydipsia, polyphagia and polyuria.   Hematologic/Lymphatic: Negative for bleeding problem. Does not bruise/bleed easily.   Skin: Negative for color change, dry skin, poor wound healing, rash and suspicious lesions.   Musculoskeletal: Negative for arthritis, back pain, falls, gout, joint pain, joint swelling, muscle cramps, muscle weakness and myalgias.   Gastrointestinal: Negative for bloating, abdominal pain, constipation, diarrhea, dysphagia, melena, nausea and vomiting.   Neurological: Negative for excessive daytime sleepiness, dizziness,  "headaches, light-headedness, loss of balance, numbness, paresthesias, seizures, vertigo and weakness.   Psychiatric/Behavioral: Negative for depression, memory loss and substance abuse. The patient is not nervous/anxious.        Allergies   Allergen Reactions   • Penicillins Anaphylaxis   • Anesthetics, Amide Anxiety   • Latex Rash         Current Outpatient Medications:   •  amiodarone (PACERONE) 200 MG tablet, TAKE 1 TABLET BY MOUTH EVERY DAY, Disp: 30 tablet, Rfl: 6  •  Aspirin Low Dose 81 MG EC tablet, TAKE 1 TABLET BY MOUTH EVERY DAY, Disp: 30 tablet, Rfl: 3  •  atorvastatin (LIPITOR) 40 MG tablet, Take 1 tablet by mouth Every Night., Disp: 30 tablet, Rfl: 3  •  furosemide (LASIX) 20 MG tablet, Take 1 tablet by mouth Daily., Disp: 90 tablet, Rfl: 3  •  ipratropium-albuterol (DUO-NEB) 0.5-2.5 mg/3 ml nebulizer, USE ONE VIAL (3 MLS)VIA NEBULIZER EVERY SIX HOURS, DO NOT USE WITH ALBUTEROL NEBULIZER, Disp: , Rfl:   •  metoprolol succinate XL (TOPROL-XL) 25 MG 24 hr tablet, Take 1 tablet by mouth 2 (Two) Times a Day., Disp: 60 tablet, Rfl: 3  •  O2 (OXYGEN), Inhale 2 L/min Continuous. can go up to 4lpm if needed, Disp: , Rfl:   •  potassium chloride 10 MEQ CR tablet, Take 2 tablets by mouth Daily., Disp: 60 tablet, Rfl: 3  •  Xarelto 20 MG tablet, TAKE 1 TABLET BY MOUTH EVERY DAY WITH DINNER, Disp: 90 tablet, Rfl: 1        Objective:     Vitals:    06/14/22 1319   BP: 112/80   Pulse: 91   Resp: 16   SpO2: 90%   Weight: 90.3 kg (199 lb)   Height: 167.6 cm (66\")     Body mass index is 32.12 kg/m².      Vitals reviewed.   Constitutional:       General: Not in acute distress.     Appearance: Well-developed, well-groomed and not in distress.   Eyes:      General:         Right eye: No discharge.         Left eye: No discharge.      Conjunctiva/sclera: Conjunctivae normal.   HENT:      Head: Normocephalic and atraumatic.      Right Ear: External ear normal.      Left Ear: External ear normal.      Nose: Nose normal. "   Neck:      Thyroid: No thyromegaly.      Vascular: No JVD.      Trachea: No tracheal deviation.      Lymphadenopathy: No cervical adenopathy.   Pulmonary:      Effort: Pulmonary effort is normal. No respiratory distress.      Breath sounds: Normal breath sounds. No wheezing. No rales.   Chest:      Chest wall: Not tender to palpatation.   Cardiovascular:      Normal rate. Regular rhythm.      No gallop.   Pulses:     Intact distal pulses.   Edema:     Peripheral edema absent.   Abdominal:      General: There is no distension.      Palpations: Abdomen is soft.      Tenderness: There is no abdominal tenderness.   Musculoskeletal: Normal range of motion.         General: No tenderness or deformity.      Cervical back: Normal range of motion and neck supple. Skin:     General: Skin is warm and dry.      Findings: No erythema or rash.   Neurological:      Mental Status: Alert and oriented to person, place, and time.      Coordination: Coordination normal.   Psychiatric:         Attention and Perception: Attention normal.         Mood and Affect: Mood normal.         Speech: Speech normal.         Behavior: Behavior normal. Behavior is cooperative.         Thought Content: Thought content normal.         Cognition and Memory: Cognition normal.         Judgment: Judgment normal.               ECG 12 Lead    Date/Time: 6/14/2022 1:24 PM  Performed by: Lorie Guo APRN  Authorized by: Lorie Guo APRN   Comparison: compared with previous ECG from 3/9/2022  Similar to previous ECG  Rhythm: sinus rhythm  Ectopy: atrial premature contractions  Rate: normal  Conduction: conduction normal  ST Segments: ST segments normal  T inversion: aVL  T flattening: I and V2  QRS axis: normal    Clinical impression: non-specific ECG              Assessment:       Diagnosis Plan   1. Acute on chronic heart failure with preserved ejection fraction ([facility])     2. Rheumatic valvular disease     3. PAF (paroxysmal atrial fibrillation) ([facility])      4. Pulmonary emphysema, unspecified emphysema type (HCC)            Plan:         1. Valvular heart disease status post AVR. MVR, TV repair + left atrial appendage ligation 1/26/22.  Doing well.  2. Acute on chronic congestive heart failure due to severe valvular heart disease- Appears euvolemic, lungs CTA, no LE edema   3. Acute on chronic right sided heart failure  4. Cor pulmonale due to lung disease and left sided heart failure  5. Atrial fibrillation with RVR status post MAZE -after surgery she was in sinus rhythm and anticoagulation was stopped.  She was back in atrial fibrillation when she saw her primary care.  She had her metoprolol increased and was instructed to take 81 mg or aspirin twice daily.    7. Acute on chronic hypoxic respiratory failure, multifactorial but predominantly heart failure. -  resolved s/p valvular repair.    8. COPD -she has no longer requiring oxygen.  She is supposed to follow-up with pulmonology.  Her she states she does get short of breath but recovers more quickly and is less severe.     9.  Atrial flutter- status post MARGE and cardioversion with Dr. Vitale on 2/24/2020.  She is on amiodarone, metoprolol and Xarelto.      Doing well, RTO in 6 months with RM     As always, it has been a pleasure to participate in your patient's care. Thank you.       Sincerely,       TOYA Patel      Current Outpatient Medications:   •  amiodarone (PACERONE) 200 MG tablet, TAKE 1 TABLET BY MOUTH EVERY DAY, Disp: 30 tablet, Rfl: 6  •  Aspirin Low Dose 81 MG EC tablet, TAKE 1 TABLET BY MOUTH EVERY DAY, Disp: 30 tablet, Rfl: 3  •  atorvastatin (LIPITOR) 40 MG tablet, Take 1 tablet by mouth Every Night., Disp: 30 tablet, Rfl: 3  •  furosemide (LASIX) 20 MG tablet, Take 1 tablet by mouth Daily., Disp: 90 tablet, Rfl: 3  •  ipratropium-albuterol (DUO-NEB) 0.5-2.5 mg/3 ml nebulizer, USE ONE VIAL (3 MLS)VIA NEBULIZER EVERY SIX HOURS, DO NOT USE WITH ALBUTEROL NEBULIZER, Disp: , Rfl:   •   metoprolol succinate XL (TOPROL-XL) 25 MG 24 hr tablet, Take 1 tablet by mouth 2 (Two) Times a Day., Disp: 60 tablet, Rfl: 3  •  O2 (OXYGEN), Inhale 2 L/min Continuous. can go up to 4lpm if needed, Disp: , Rfl:   •  potassium chloride 10 MEQ CR tablet, Take 2 tablets by mouth Daily., Disp: 60 tablet, Rfl: 3  •  Xarelto 20 MG tablet, TAKE 1 TABLET BY MOUTH EVERY DAY WITH DINNER, Disp: 90 tablet, Rfl: 1      Dictated utilizing Dragon dictation

## 2022-06-27 RX ORDER — METOPROLOL SUCCINATE 25 MG/1
TABLET, EXTENDED RELEASE ORAL
Qty: 60 TABLET | Refills: 3 | Status: SHIPPED | OUTPATIENT
Start: 2022-06-27 | End: 2022-11-10 | Stop reason: SDUPTHER

## 2022-08-18 RX ORDER — RIVAROXABAN 20 MG/1
TABLET, FILM COATED ORAL
Qty: 90 TABLET | Refills: 1 | Status: SHIPPED | OUTPATIENT
Start: 2022-08-18 | End: 2023-02-10

## 2022-08-18 RX ORDER — POTASSIUM CHLORIDE 750 MG/1
20 TABLET, FILM COATED, EXTENDED RELEASE ORAL DAILY
Qty: 60 TABLET | Refills: 3 | Status: SHIPPED | OUTPATIENT
Start: 2022-08-18 | End: 2023-01-09 | Stop reason: SDUPTHER

## 2022-10-13 RX ORDER — AMIODARONE HYDROCHLORIDE 200 MG/1
TABLET ORAL
Qty: 30 TABLET | Refills: 6 | Status: SHIPPED | OUTPATIENT
Start: 2022-10-13

## 2022-11-10 RX ORDER — METOPROLOL SUCCINATE 25 MG/1
25 TABLET, EXTENDED RELEASE ORAL 2 TIMES DAILY
Qty: 60 TABLET | Refills: 3 | Status: SHIPPED | OUTPATIENT
Start: 2022-11-10 | End: 2023-03-02

## 2022-11-10 NOTE — TELEPHONE ENCOUNTER
Patient is requesting a refill on Metoprolol succ 25 mg 1 BID to be sent into [facility] in [place].    Next OV-01/13/23-  Last OV-06/14/22-    Plan:          1. Valvular heart disease status post AVR. MVR, TV repair + left atrial appendage ligation 1/26/22.  Doing well.  2. Acute on chronic congestive heart failure due to severe valvular heart disease- Appears euvolemic, lungs CTA, no LE edema   3. Acute on chronic right sided heart failure  4. Cor pulmonale due to lung disease and left sided heart failure  5. Atrial fibrillation with RVR status post MAZE -after surgery she was in sinus rhythm and anticoagulation was stopped.  She was back in atrial fibrillation when she saw her primary care.  She had her metoprolol increased and was instructed to take 81 mg or aspirin twice daily.    7. Acute on chronic hypoxic respiratory failure, multifactorial but predominantly heart failure. -  resolved s/p valvular repair.    8. COPD -she has no longer requiring oxygen.  She is supposed to follow-up with pulmonology.  Her she states she does get short of breath but recovers more quickly and is less severe.     9.  Atrial flutter- status post MARGE and cardioversion with Dr. Vitale on 2/24/2020.  She is on amiodarone, metoprolol and Xarelto.       Doing well, RTO in 6 months with PAM Goodwin

## 2022-11-28 ENCOUNTER — APPOINTMENT (OUTPATIENT)
Dept: GENERAL RADIOLOGY | Facility: HOSPITAL | Age: 62
End: 2022-11-28

## 2022-11-28 ENCOUNTER — HOSPITAL ENCOUNTER (EMERGENCY)
Facility: HOSPITAL | Age: 62
Discharge: HOME | End: 2022-11-28
Attending: EMERGENCY MEDICINE | Admitting: EMERGENCY MEDICINE

## 2022-11-28 VITALS
DIASTOLIC BLOOD PRESSURE: 61 MMHG | SYSTOLIC BLOOD PRESSURE: 99 MMHG | WEIGHT: 224 LBS | RESPIRATION RATE: 26 BRPM | BODY MASS INDEX: 36 KG/M2 | HEART RATE: 68 BPM | OXYGEN SATURATION: 94 % | TEMPERATURE: 98.3 F | HEIGHT: 66 IN

## 2022-11-28 DIAGNOSIS — J44.9 CHRONIC OBSTRUCTIVE PULMONARY DISEASE, UNSPECIFIED COPD TYPE: Primary | ICD-10-CM

## 2022-11-28 DIAGNOSIS — J10.1 INFLUENZA A: ICD-10-CM

## 2022-11-28 LAB
ALBUMIN SERPL-MCNC: 3.6 G/DL (ref 3.5–5.2)
ALBUMIN/GLOB SERPL: 0.7 G/DL
ALP SERPL-CCNC: 136 U/L (ref 39–117)
ALT SERPL W P-5'-P-CCNC: 15 U/L (ref 1–33)
ANION GAP SERPL CALCULATED.3IONS-SCNC: 10.6 MMOL/L (ref 5–15)
AST SERPL-CCNC: 18 U/L (ref 1–32)
BASOPHILS # BLD AUTO: 0.01 10*3/MM3 (ref 0–0.2)
BASOPHILS NFR BLD AUTO: 0.1 % (ref 0–1.5)
BILIRUB SERPL-MCNC: 0.4 MG/DL (ref 0–1.2)
BUN SERPL-MCNC: 12 MG/DL (ref 8–23)
BUN/CREAT SERPL: 9.8 (ref 7–25)
CALCIUM SPEC-SCNC: 8.8 MG/DL (ref 8.6–10.5)
CHLORIDE SERPL-SCNC: 99 MMOL/L (ref 98–107)
CO2 SERPL-SCNC: 27.4 MMOL/L (ref 22–29)
CREAT SERPL-MCNC: 1.23 MG/DL (ref 0.57–1)
DEPRECATED RDW RBC AUTO: 50.4 FL (ref 37–54)
EGFRCR SERPLBLD CKD-EPI 2021: 50.4 ML/MIN/1.73
EOSINOPHIL # BLD AUTO: 0.18 10*3/MM3 (ref 0–0.4)
EOSINOPHIL NFR BLD AUTO: 1.8 % (ref 0.3–6.2)
ERYTHROCYTE [DISTWIDTH] IN BLOOD BY AUTOMATED COUNT: 15.3 % (ref 12.3–15.4)
FLUAV RNA RESP QL NAA+PROBE: DETECTED
FLUBV RNA RESP QL NAA+PROBE: NOT DETECTED
GLOBULIN UR ELPH-MCNC: 4.9 GM/DL
GLUCOSE SERPL-MCNC: 99 MG/DL (ref 65–99)
HCT VFR BLD AUTO: 41.4 % (ref 34–46.6)
HGB BLD-MCNC: 12.4 G/DL (ref 12–15.9)
HOLD SPECIMEN: NORMAL
IMM GRANULOCYTES # BLD AUTO: 0.05 10*3/MM3 (ref 0–0.05)
IMM GRANULOCYTES NFR BLD AUTO: 0.5 % (ref 0–0.5)
LYMPHOCYTES # BLD AUTO: 1.72 10*3/MM3 (ref 0.7–3.1)
LYMPHOCYTES NFR BLD AUTO: 17.2 % (ref 19.6–45.3)
MCH RBC QN AUTO: 27 PG (ref 26.6–33)
MCHC RBC AUTO-ENTMCNC: 30 G/DL (ref 31.5–35.7)
MCV RBC AUTO: 90 FL (ref 79–97)
MONOCYTES # BLD AUTO: 0.93 10*3/MM3 (ref 0.1–0.9)
MONOCYTES NFR BLD AUTO: 9.3 % (ref 5–12)
NEUTROPHILS NFR BLD AUTO: 7.12 10*3/MM3 (ref 1.7–7)
NEUTROPHILS NFR BLD AUTO: 71.1 % (ref 42.7–76)
NT-PROBNP SERPL-MCNC: 613.7 PG/ML (ref 0–900)
PLATELET # BLD AUTO: 260 10*3/MM3 (ref 140–450)
PMV BLD AUTO: 8.8 FL (ref 6–12)
POTASSIUM SERPL-SCNC: 4.4 MMOL/L (ref 3.5–5.2)
PROT SERPL-MCNC: 8.5 G/DL (ref 6–8.5)
QT INTERVAL: 449 MS
RBC # BLD AUTO: 4.6 10*6/MM3 (ref 3.77–5.28)
SARS-COV-2 RNA RESP QL NAA+PROBE: NOT DETECTED
SODIUM SERPL-SCNC: 137 MMOL/L (ref 136–145)
TROPONIN T SERPL-MCNC: <0.01 NG/ML (ref 0–0.03)
WBC NRBC COR # BLD: 10.01 10*3/MM3 (ref 3.4–10.8)
WHOLE BLOOD HOLD COAG: NORMAL
WHOLE BLOOD HOLD SPECIMEN: NORMAL

## 2022-11-28 PROCEDURE — 93010 ELECTROCARDIOGRAM REPORT: CPT | Performed by: INTERNAL MEDICINE

## 2022-11-28 PROCEDURE — 99284 EMERGENCY DEPT VISIT MOD MDM: CPT

## 2022-11-28 PROCEDURE — 87636 SARSCOV2 & INF A&B AMP PRB: CPT | Performed by: EMERGENCY MEDICINE

## 2022-11-28 PROCEDURE — 71045 X-RAY EXAM CHEST 1 VIEW: CPT

## 2022-11-28 PROCEDURE — 93005 ELECTROCARDIOGRAM TRACING: CPT

## 2022-11-28 PROCEDURE — 85025 COMPLETE CBC W/AUTO DIFF WBC: CPT

## 2022-11-28 PROCEDURE — 80053 COMPREHEN METABOLIC PANEL: CPT | Performed by: EMERGENCY MEDICINE

## 2022-11-28 PROCEDURE — 25010000002 METHYLPREDNISOLONE PER 125 MG: Performed by: EMERGENCY MEDICINE

## 2022-11-28 PROCEDURE — 83880 ASSAY OF NATRIURETIC PEPTIDE: CPT | Performed by: EMERGENCY MEDICINE

## 2022-11-28 PROCEDURE — 96374 THER/PROPH/DIAG INJ IV PUSH: CPT

## 2022-11-28 PROCEDURE — 84484 ASSAY OF TROPONIN QUANT: CPT | Performed by: EMERGENCY MEDICINE

## 2022-11-28 PROCEDURE — 93005 ELECTROCARDIOGRAM TRACING: CPT | Performed by: EMERGENCY MEDICINE

## 2022-11-28 RX ORDER — SODIUM CHLORIDE 0.9 % (FLUSH) 0.9 %
10 SYRINGE (ML) INJECTION AS NEEDED
Status: DISCONTINUED | OUTPATIENT
Start: 2022-11-28 | End: 2022-11-28 | Stop reason: HOSPADM

## 2022-11-28 RX ORDER — METHYLPREDNISOLONE 4 MG/1
TABLET ORAL
Qty: 21 TABLET | Refills: 0 | Status: SHIPPED | OUTPATIENT
Start: 2022-11-28 | End: 2023-02-02 | Stop reason: ALTCHOICE

## 2022-11-28 RX ORDER — BENZONATATE 100 MG/1
100 CAPSULE ORAL 3 TIMES DAILY PRN
Qty: 30 CAPSULE | Refills: 0 | Status: SHIPPED | OUTPATIENT
Start: 2022-11-28 | End: 2023-02-02 | Stop reason: ALTCHOICE

## 2022-11-28 RX ORDER — METHYLPREDNISOLONE SODIUM SUCCINATE 125 MG/2ML
125 INJECTION, POWDER, LYOPHILIZED, FOR SOLUTION INTRAMUSCULAR; INTRAVENOUS ONCE
Status: COMPLETED | OUTPATIENT
Start: 2022-11-28 | End: 2022-11-28

## 2022-11-28 RX ADMIN — SODIUM CHLORIDE 1000 ML: 9 INJECTION, SOLUTION INTRAVENOUS at 18:40

## 2022-11-28 RX ADMIN — METHYLPREDNISOLONE SODIUM SUCCINATE 125 MG: 125 INJECTION, POWDER, FOR SOLUTION INTRAMUSCULAR; INTRAVENOUS at 19:07

## 2023-01-09 RX ORDER — POTASSIUM CHLORIDE 750 MG/1
20 TABLET, FILM COATED, EXTENDED RELEASE ORAL DAILY
Qty: 60 TABLET | Refills: 3 | Status: SHIPPED | OUTPATIENT
Start: 2023-01-09

## 2023-01-09 RX ORDER — DILTIAZEM HYDROCHLORIDE 240 MG/1
CAPSULE, COATED, EXTENDED RELEASE ORAL
Qty: 30 CAPSULE | Refills: 1 | Status: SHIPPED | OUTPATIENT
Start: 2023-01-09 | End: 2023-03-02

## 2023-01-09 NOTE — TELEPHONE ENCOUNTER
No protocol    Nov-01/13/23-PAM  LOV-06/14/22-    Plan:          1. Valvular heart disease status post AVR. MVR, TV repair + left atrial appendage ligation 1/26/22.  Doing well.  2. Acute on chronic congestive heart failure due to severe valvular heart disease- Appears euvolemic, lungs CTA, no LE edema   3. Acute on chronic right sided heart failure  4. Cor pulmonale due to lung disease and left sided heart failure  5. Atrial fibrillation with RVR status post MAZE -after surgery she was in sinus rhythm and anticoagulation was stopped.  She was back in atrial fibrillation when she saw her primary care.  She had her metoprolol increased and was instructed to take 81 mg or aspirin twice daily.    7. Acute on chronic hypoxic respiratory failure, multifactorial but predominantly heart failure. -  resolved s/p valvular repair.    8. COPD -she has no longer requiring oxygen.  She is supposed to follow-up with pulmonology.  Her she states she does get short of breath but recovers more quickly and is less severe.     9.  Atrial flutter- status post MARGE and cardioversion with Dr. Vitale on 2/24/2020.  She is on amiodarone, metoprolol and Xarelto.       Doing well, RTO in 6 months with PAM

## 2023-01-09 NOTE — TELEPHONE ENCOUNTER
Rx Refill Note  Requested Prescriptions     Pending Prescriptions Disp Refills   • dilTIAZem CD (CARDIZEM CD) 240 MG 24 hr capsule [Pharmacy Med Name: DILTIAZEM 24H ER(CD) 240 MG CP] 30 capsule 11     Sig: TAKE 1 CAPSULE BY MOUTH EVERY DAY      Last office visit with prescribing clinician: 6/14/2022   Last telemedicine visit with prescribing clinician: 1/13/2023   Next office visit with prescribing clinician: Visit date not found                         Would you like a call back once the refill request has been completed: [] Yes [] No    If the office needs to give you a call back, can they leave a voicemail: [] Yes [] No    Cassandra Garcia MA  01/09/23, 08:40 EST

## 2023-02-02 ENCOUNTER — OFFICE VISIT (OUTPATIENT)
Dept: CARDIOLOGY | Facility: CLINIC | Age: 63
End: 2023-02-02
Payer: MEDICAID

## 2023-02-02 VITALS
HEIGHT: 66 IN | SYSTOLIC BLOOD PRESSURE: 110 MMHG | WEIGHT: 169 LBS | DIASTOLIC BLOOD PRESSURE: 60 MMHG | RESPIRATION RATE: 16 BRPM | OXYGEN SATURATION: 92 % | BODY MASS INDEX: 27.16 KG/M2 | HEART RATE: 70 BPM

## 2023-02-02 DIAGNOSIS — J43.9 PULMONARY EMPHYSEMA, UNSPECIFIED EMPHYSEMA TYPE: ICD-10-CM

## 2023-02-02 DIAGNOSIS — I50.33 ACUTE ON CHRONIC HEART FAILURE WITH PRESERVED EJECTION FRACTION: Primary | ICD-10-CM

## 2023-02-02 DIAGNOSIS — R63.4 UNINTENDED WEIGHT LOSS: ICD-10-CM

## 2023-02-02 DIAGNOSIS — I09.1 RHEUMATIC VALVULAR DISEASE: ICD-10-CM

## 2023-02-02 DIAGNOSIS — I48.0 PAF (PAROXYSMAL ATRIAL FIBRILLATION): ICD-10-CM

## 2023-02-02 PROCEDURE — 99214 OFFICE O/P EST MOD 30 MIN: CPT | Performed by: NURSE PRACTITIONER

## 2023-02-02 PROCEDURE — 93000 ELECTROCARDIOGRAM COMPLETE: CPT | Performed by: NURSE PRACTITIONER

## 2023-02-02 RX ORDER — ATORVASTATIN CALCIUM 40 MG/1
40 TABLET, FILM COATED ORAL NIGHTLY
Qty: 30 TABLET | Refills: 6 | Status: SHIPPED | OUTPATIENT
Start: 2023-02-02

## 2023-02-02 NOTE — PROGRESS NOTES
Date of Office Visit: 2023  Encounter Provider: TOYA Patel  Place of Service: [facility] CARDIOLOGY  Patient Name: Akila Amezcua  :1960  Primary Cardiologist: Dr. Vitale    CC:  6 month follow up    Dear Melly    HPI: Akila Amezcua is a pleasant 62 y.o. female who presents 2023 for cardiac follow up. I have reviewed her past medial records including notes, labs and testing I preparation for today's visit.    Ms. Amezcua is a 61-year-old woman who has been evaluated by my partners in 2019 and  for paroxysmal atrial fibrillation in the setting of acute COPD exacerbations.  She has had echocardiograms that have been suggestive of aortic stenosis.  She has not kept any follow-up appointments in our cardiology clinic.     She presented to the emergency department 2021 with acute exacerbation of COPD and aspirin respiratory infection.  She has been having symptoms for approximately 3 days.  She was very short of breath and coughing but could not cough up the sputum.  She is also very congested and could feel her heart beating rapidly.  She did not have any chest pain or chest discomfort.  She was noted to be in rapid atrial fibrillation.  She did receive IV diltiazem and was placed on a dill drip with little effect on her heart rate.  She was given a dose of digoxin 0.25 mg IV followed by another dose of 0.125 mg IV.  She was then started on IV amiodarone.  She was given prophylactic enoxaparin.  She also received IV fluids and IV steroids. She has been diagnosed with human metapneumovirus.  A CTA was performed and was negative for PE, but showed dense bilateral lower lobe consolidations, consistent with pneumonia.  Her proBNP was elevated at 2800.  Her troponin was negative.  Her echo shows normal left ventricular systolic function, indeterminate diastolic function, moderate to severe right ventricular dilation, moderate pulmonary hypertension, and what  appears to be rheumatic valvular heart disease affecting the aortic, mitral, and tricuspid valves.  The study is extremely technically difficult and the valves are not well visualized.  There is moderate aortic stenosis, moderate aortic regurgitation, mild-moderate mitral stenosis, moderate (if not moderate to severe) mitral regurgitation, and moderate tricuspid regurgitation.  The amiodarone drip was stopped, she was started on oral diltiazem and her heart rate responded well.  She was also switched to warfarin due to her valvular disease.  She was discharged home 12/3/2021.     She returns today in follow-up.  She can still feel her heart racing at times.  She can also feel palpitations especially if she is resting quietly.  She states about a week ago her feet and ankle started to swell and she saw you and you started her on Lasix 20 mg daily along with potassium.  She states it really has not made much of a difference in her feet remain edematous.  She is continuing to use oxygen and uses 2 L at rest and does not feel short of breath, however, she does have to turn it up to 4 L if she is up trying to be active and that does still make her feel winded.  She also notices with activity that her heart rate will respond.  At times she has a little bit of dizziness when first standing up.  She does have some chest soreness from coughing and states she now has a productive cough with some thick sputum.  She denies fatigue and states overall she is recuperating.  She states she has been monitoring her PT/INR and that it was a little too thin and you have decreased her dose recently.  I increased the diltiazem to 240 mg daily.    I increased her Lasix to 40 mg daily and potassium to 20 mg daily.  She is to have labs checked in 1 week with primary care.  I have asked her to call me next week if things have not improved.     Her lower extremity edema did not improve so I stopped the Lasix and switch her over to torsemide  "20 mg daily.  She did not have her labs checked as ask.  She then called our office and stated that her symptoms had not improved, she was continued to have shortness of breath and lower extremity edema.  An appointment was made.     I saw her 1/14/2022.  For follow-up.  She \"feels terrible and is willing to do anything even if that means admission to feel better\".  She states the changes in medication did not help and sometimes when she would check her oxygen sats at home they would be 70%.  She does remain on 3 L of oxygen.  She cannot basically breathe and talk at the same time without getting short of breath.  I asked her why she did not return to the hospital since she was feeling so poorly and she said \"I do not know I should have I just do not know why I did not\".  She is using accessory muscles, she has 2-3+ edema bilaterally up to her knees.  Her abdomen is very hard.  She states she has been taking her medications.  She did complain of orthopnea, worsening shortness of breath, intermittent dizziness and fatigue.  She states she cannot take a deep breath and sometimes it even hurts when she tries to do so.  She was directly admitted to [facility].      She was aggressively diuresed however she started having problem with hypotension, renal failure, and was on IV ionotrops.  She was going into progressive worsening renal failure, there was some question about renal artery stenosis versus cardiorenal syndrome, she was given some IV fluid, she was on incremental amount of oxygen, the decision was made to transfer to [facility] for further care.  Patient has underlying history of COPD and oxygen at home at 3 L/min at baseline,  she was admitted under our care for further management with the plan for cardiology to continue to follow-up on her. Patient was given 1.5 L of crystalloid prior to the transfer for concern about a possible prerenal azotemia.  She was admitted to the ICU and was at " one point on dobutamine.  She eventually underwent  AVR. MVR, TV repair + left atrial appendage ligation on 1/26/22 with Song.  She did well after surgery and progressed well with physical therapy.  She was able to be discharged and remained on 2 L of oxygen per nasal cannula.  She is to follow-up with pulmonary in 3 to 4 months, their goal is to wean her off of oxygen as her COPD was diagnosed is only mild and her chronic respiratory failure was mainly due to the ongoing chronic valvular heart disease.     I saw her 2/18/2022 in follow-up and looks so much better than when I previously saw her prior to her surgery.  She is able to walk on her own.  She denies any palpitations, shortness of breath, chest pain or chest pressure.  She has surgical discomfort but no angina.  She states she still is weak and fatigued but progressing.  She does have some intermittent dizziness.  Her blood pressure was low normal today.  She is still having home health.  She has a little bit of mild lower extremity edema.  She states she has not had any Lasix for 2 days because she ran out.  I have refilled that.  I have noted all of her labs and testing from her recent hospitalization.  Unfortunately, she is in atrial flutter.  When she saw you earlier this week, you had noted that and increased her metoprolol up to 25 mg twice daily and increased her aspirin up to 81 mg twice daily.     She was in the ED on 3/9/2022 because her HH RN felt she had a change in her lung sounds and SOA.  She was treated for pneumonia and was discharged with antibiotics.     I saw her June 2022 in follow-up.  She looks great.  She states she feels well.  She states she still gets a little short of breath but she recovers quickly to she still feels like she is recovering overall but has felt better than she has in quite a while.  She denies any palpitations, lower extremity edema, dizziness or lightheadedness.  She has not had any syncope or presyncopal  episodes.  She denies any chest pain or chest pressure.  She denies fatigue.  She is taking her medications as directed.  Her blood pressure is well controlled.  She does not have any unexplained bleeding, dark or tarry stools.  She states she has oxygen at home but has not had to use it during the day or in the evening.    She presents today for a 6 month follow up.  She is on oxygen 24/7 now.  She had influenza a and the end of November 2022 with a COPD exacerbation.  She was seen in the ED and received steroids and supportive treatment.  She states since then she has needed the oxygen 24/7.  I did review those labs and her proBNP was normal.  Since June 2022 when we last saw her, she has lost 55 pounds.  She looks really good.  She states she has not been doing anything to actively lose weight as a matter fact she is actually very sedentary secondary to her breathing.  She states she normally just eats about 1 meal a day.  She is currently living with her daughter.  She is clean and well presented.  She denies any palpitations, unless she is doing a lot of exertion.  She states she has been having some pedal edema but she also states again that she has been very sedentary and she does not prop her feet up when sitting.  She states they have been resolving at night.  She denies any dizziness, chest pain or chest pressure.  She is fatigued.  She states she has been coughing a lot and still has a productive cough with very thick brown-grayish sputum.  She states she is coughed so much that her chest actually hurts.  I wanted to get a chest x-ray today but she was with her son and he had to get to work.  She states she will call you tomorrow.  I would really like her to get a chest x-ray just to rule out any secondary pneumonia.  She is a little bit diminished in her right mid to lower lobe.  I do not hear any crackles or wheezes today.  She does have some trace pedal edema.  I told her I was also concerned about the  weight loss and just wanted her to see you for further evaluation.     Past Medical History:   Diagnosis Date   • Anxiety    • COPD (chronic obstructive pulmonary disease) (HCC)     LUNG BLEBS   • DJD (degenerative joint disease)    • Gallstones     SCHEDULED FOR SX   • GERD (gastroesophageal reflux disease)    • Injury of back     degenertive disc disease   • Panic attacks    • Paroxysmal atrial fibrillation (HCC)    • Rheumatoid arthritis (HCC)    • Seizures (HCC)     LAST ONE 2017       Past Surgical History:   Procedure Laterality Date   • AORTIC VALVE REPAIR/REPLACEMENT MITRAL VALVE REPAIR/REPLACEMENT N/A 2022    Procedure: MEDIAN STERNOTOMY, MITRAL VALVE REPLACEMENT AORTIC VALVE REPLACEMENT TRICUSPID VALVE REPAIR, PRP;  Surgeon: Kingsley Zuleta MD;  Location: [facility];  Service: Cardiothoracic;  Laterality: N/A;   • APPENDECTOMY     • CARDIAC CATHETERIZATION N/A 2022    Procedure: Right and Left Heart Cath;  Surgeon: Kolby Melissa MD;  Location: [facility] INVASIVE LOCATION;  Service: Cardiology;  Laterality: N/A;   •  SECTION     • CHOLECYSTECTOMY N/A 2017    Procedure: LAPAROSCOPIC CHOLECYSTECTOMY, laparoscopic adhesiolysis requiring 45 minutes of operative time;  Surgeon: Liliana Monroy MD;  Location: [facility];  Service:    • ESOPHAGEAL ATRESIA REPAIR     • INSERT CENTRAL LINE AT BEDSIDE  2022        • PLEURAL BIOPSY     • PLEURAL SCARIFICATION     • TRANSESOPHAGEAL ECHOCARDIOGRAM (MARGE) N/A 2022    Procedure: TRANSESOPHAGEAL ECHOCARDIOGRAM WITH ANESTHESIA;  Surgeon: Kingsley Zuleta MD;  Location: [facility];  Service: Cardiothoracic;  Laterality: N/A;       Social History     Socioeconomic History   • Marital status:    Tobacco Use   • Smoking status: Former     Packs/day: 1.00     Years: 35.00     Pack years: 35.00     Types: Cigarettes     Quit date: 2020     Years since quitting: 3.0   • Smokeless tobacco: Never   • Tobacco comments:     NO  CAFFIENE   Substance and Sexual Activity   • Alcohol use: No   • Drug use: No   • Sexual activity: Defer       Family History   Problem Relation Age of Onset   • Lung cancer Father        The following portion of the patient's history were reviewed and updated as appropriate: past medical history, past surgical history, past social history, past family history, allergies, current medications, and problem list.    Review of Systems   Constitutional: Positive for malaise/fatigue. Negative for diaphoresis and fever.   HENT: Negative for congestion, hearing loss, hoarse voice, nosebleeds and sore throat.    Eyes: Negative for photophobia, vision loss in left eye, vision loss in right eye and visual disturbance.   Cardiovascular: Positive for leg swelling (mild). Negative for chest pain, dyspnea on exertion, irregular heartbeat, near-syncope, orthopnea, palpitations, paroxysmal nocturnal dyspnea and syncope.   Respiratory: Positive for cough, shortness of breath and sputum production. Negative for hemoptysis, sleep disturbances due to breathing, snoring and wheezing.    Endocrine: Negative for cold intolerance, heat intolerance, polydipsia, polyphagia and polyuria.   Hematologic/Lymphatic: Negative for bleeding problem. Does not bruise/bleed easily.   Skin: Negative for color change, dry skin, poor wound healing, rash and suspicious lesions.   Musculoskeletal: Negative for arthritis, back pain, falls, gout, joint pain, joint swelling, muscle cramps, muscle weakness and myalgias.   Gastrointestinal: Negative for bloating, abdominal pain, constipation, diarrhea, dysphagia, melena, nausea and vomiting.   Neurological: Positive for weakness. Negative for excessive daytime sleepiness, dizziness, headaches, light-headedness, loss of balance, numbness, paresthesias, seizures and vertigo.   Psychiatric/Behavioral: Negative for depression, memory loss and substance abuse. The patient is not nervous/anxious.        Allergies  "  Allergen Reactions   • Penicillins Anaphylaxis   • Anesthetics, Amide Anxiety   • Latex Rash         Current Outpatient Medications:   •  amiodarone (PACERONE) 200 MG tablet, TAKE 1 TABLET BY MOUTH EVERY DAY, Disp: 30 tablet, Rfl: 6  •  Aspirin Low Dose 81 MG EC tablet, TAKE 1 TABLET BY MOUTH EVERY DAY, Disp: 30 tablet, Rfl: 3  •  atorvastatin (LIPITOR) 40 MG tablet, Take 1 tablet by mouth Every Night., Disp: 30 tablet, Rfl: 6  •  dilTIAZem CD (CARDIZEM CD) 240 MG 24 hr capsule, TAKE 1 CAPSULE BY MOUTH EVERY DAY, Disp: 30 capsule, Rfl: 1  •  furosemide (LASIX) 20 MG tablet, Take 1 tablet by mouth Daily., Disp: 90 tablet, Rfl: 3  •  ipratropium-albuterol (DUO-NEB) 0.5-2.5 mg/3 ml nebulizer, USE ONE VIAL (3 MLS)VIA NEBULIZER EVERY SIX HOURS, DO NOT USE WITH ALBUTEROL NEBULIZER, Disp: , Rfl:   •  metoprolol succinate XL (TOPROL-XL) 25 MG 24 hr tablet, Take 1 tablet by mouth 2 (Two) Times a Day., Disp: 60 tablet, Rfl: 3  •  O2 (OXYGEN), Inhale 2 L/min Continuous. can go up to 4lpm if needed, Disp: , Rfl:   •  potassium chloride 10 MEQ CR tablet, Take 2 tablets by mouth Daily., Disp: 60 tablet, Rfl: 3  •  Xarelto 20 MG tablet, TAKE 1 TABLET BY MOUTH EVERY DAY WITH DINNER, Disp: 90 tablet, Rfl: 1        Objective:     Vitals:    02/02/23 1458   BP: 110/60   Pulse: 70   Resp: 16   SpO2: 92%   Weight: 76.7 kg (169 lb)   Height: 167.6 cm (66\")     Body mass index is 27.28 kg/m².      Vitals reviewed.   Constitutional:       General: Not in acute distress.     Appearance: Well-developed and not in distress.   Eyes:      General:         Right eye: No discharge.         Left eye: No discharge.      Conjunctiva/sclera: Conjunctivae normal.   HENT:      Head: Normocephalic and atraumatic.      Right Ear: External ear normal.      Left Ear: External ear normal.      Nose: Nose normal.   Neck:      Thyroid: No thyromegaly.      Vascular: No JVD.      Trachea: No tracheal deviation.      Lymphadenopathy: No cervical adenopathy. "   Pulmonary:      Effort: Pulmonary effort is normal. No respiratory distress.      Breath sounds: Normal breath sounds. No wheezing. No rales.   Chest:      Chest wall: Not tender to palpatation.   Cardiovascular:      Normal rate. Regular rhythm.      No gallop.   Pulses:     Intact distal pulses.   Edema:     Peripheral edema present.     Ankle: bilateral trace edema of the ankle.     Feet: bilateral trace edema of the feet.  Abdominal:      General: There is no distension.      Palpations: Abdomen is soft.      Tenderness: There is no abdominal tenderness.   Musculoskeletal: Normal range of motion.         General: No tenderness or deformity.      Cervical back: Normal range of motion and neck supple. Skin:     General: Skin is warm and dry.      Findings: No erythema or rash.   Neurological:      Mental Status: Alert and oriented to person, place, and time.      Coordination: Coordination normal.   Psychiatric:         Attention and Perception: Attention normal.         Mood and Affect: Mood normal.         Speech: Speech normal.         Behavior: Behavior normal.         Thought Content: Thought content normal.         Cognition and Memory: Cognition normal.         Judgment: Judgment normal.               ECG 12 Lead    Date/Time: 2/2/2023 3:05 PM  Performed by: Lorie Guo APRN  Authorized by: Lorie Guo APRN   Comparison: compared with previous ECG from 11/28/2022  Similar to previous ECG  Rhythm: sinus rhythm  Rate: normal  Conduction: conduction normal  ST Segments: ST segments normal  T inversion: aVL  T flattening: V2 and V3  QRS axis: normal    Clinical impression: normal ECG and non-specific ECG              Assessment:       Diagnosis Plan   1. Acute on chronic heart failure with preserved ejection fraction ([facility])        2. PAF (paroxysmal atrial fibrillation) ([facility])        3. Rheumatic valvular disease        4. Pulmonary emphysema, unspecified emphysema type ([facility])        5. Unintended weight  loss               Plan:       1. Valvular heart disease status post AVR. MVR, TV repair + left atrial appendage ligation 1/26/22. Doing well  2. Acute on chronic congestive heart failure due to severe valvular heart disease-  Mild LE edema in feet and ankles.  She is pretty sedentary and not propping up her feet when sitting.  She states the edema does resolve at night.  I have instructed her if the LE edema does not resolve over night she can take an extra lasix the next day and to call if this happens frequently.  3. Cor pulmonale due to lung disease and left sided heart failure  4. . Atrial fibrillation with RVR status post MAZE -after surgery she was in sinus rhythm and anticoagulation was stopped. She has PAF and is back on Xarelto, denies unexplained bleeding, dark or tarry stools.  She is in sinus rhythm today.5. 5.  COPD -she had a COPD exacerbation at the end of November accompanying with influenza A.  She was given steroids and her sats were dropping so much that she had to go back on the oxygen.  She is pretty much on oxygen 0.47.  She does get short of breath with exertion.  If she takes her oxygen off for very long her sats will drop.  She is going to get back into see pulmonary.  6.  .  Atrial flutter- status post MARGE and cardioversion with Dr. Vitale on 2/24/2020.  She is on amiodarone, metoprolol and Xarelto.    7.  Productive cough.  She states she has had a productive cough basically since having the flu at the end of November.  She states is still very thick, brownish-grayish.  I wanted to get a chest x-ray today but she was unable to do so due to time constraints with her right.  I have asked her to call her primary care tomorrow to get in to be seen.  I would just feel better to rule out any secondary pneumonia.  She was mildly decreased right middle to lower lobe.  8.  Unintended weight loss.  When I saw her in June 2022 she weighed 224 pounds.  She is now down to 169 pounds which is a  weight loss of 55 pounds without trying.  I think she needs further evaluation as well.     F/U with PCP for continued productive cough and unintended weight loss  RTO in 6 months with RM      As always, it has been a pleasure to participate in your patient's care. Thank you.       Sincerely,       TOYA Patel      Current Outpatient Medications:   •  amiodarone (PACERONE) 200 MG tablet, TAKE 1 TABLET BY MOUTH EVERY DAY, Disp: 30 tablet, Rfl: 6  •  Aspirin Low Dose 81 MG EC tablet, TAKE 1 TABLET BY MOUTH EVERY DAY, Disp: 30 tablet, Rfl: 3  •  atorvastatin (LIPITOR) 40 MG tablet, Take 1 tablet by mouth Every Night., Disp: 30 tablet, Rfl: 6  •  dilTIAZem CD (CARDIZEM CD) 240 MG 24 hr capsule, TAKE 1 CAPSULE BY MOUTH EVERY DAY, Disp: 30 capsule, Rfl: 1  •  furosemide (LASIX) 20 MG tablet, Take 1 tablet by mouth Daily., Disp: 90 tablet, Rfl: 3  •  ipratropium-albuterol (DUO-NEB) 0.5-2.5 mg/3 ml nebulizer, USE ONE VIAL (3 MLS)VIA NEBULIZER EVERY SIX HOURS, DO NOT USE WITH ALBUTEROL NEBULIZER, Disp: , Rfl:   •  metoprolol succinate XL (TOPROL-XL) 25 MG 24 hr tablet, Take 1 tablet by mouth 2 (Two) Times a Day., Disp: 60 tablet, Rfl: 3  •  O2 (OXYGEN), Inhale 2 L/min Continuous. can go up to 4lpm if needed, Disp: , Rfl:   •  potassium chloride 10 MEQ CR tablet, Take 2 tablets by mouth Daily., Disp: 60 tablet, Rfl: 3  •  Xarelto 20 MG tablet, TAKE 1 TABLET BY MOUTH EVERY DAY WITH DINNER, Disp: 90 tablet, Rfl: 1      Dictated utilizing Dragon dictation

## 2023-02-10 RX ORDER — RIVAROXABAN 20 MG/1
TABLET, FILM COATED ORAL
Qty: 90 TABLET | Refills: 1 | Status: SHIPPED | OUTPATIENT
Start: 2023-02-10

## 2023-02-10 RX ORDER — FUROSEMIDE 20 MG/1
TABLET ORAL
Qty: 60 TABLET | Refills: 1 | Status: SHIPPED | OUTPATIENT
Start: 2023-02-10

## 2023-03-02 RX ORDER — DILTIAZEM HYDROCHLORIDE 240 MG/1
CAPSULE, COATED, EXTENDED RELEASE ORAL
Qty: 30 CAPSULE | Refills: 2 | Status: SHIPPED | OUTPATIENT
Start: 2023-03-02

## 2023-03-02 RX ORDER — METOPROLOL SUCCINATE 25 MG/1
TABLET, EXTENDED RELEASE ORAL
Qty: 60 TABLET | Refills: 2 | Status: SHIPPED | OUTPATIENT
Start: 2023-03-02

## 2023-04-17 RX ORDER — FUROSEMIDE 20 MG/1
TABLET ORAL
Qty: 60 TABLET | Refills: 1 | Status: SHIPPED | OUTPATIENT
Start: 2023-04-17

## 2023-05-15 RX ORDER — POTASSIUM CHLORIDE 750 MG/1
20 TABLET, FILM COATED, EXTENDED RELEASE ORAL DAILY
Qty: 60 TABLET | Refills: 3 | Status: SHIPPED | OUTPATIENT
Start: 2023-05-15

## (undated) DEVICE — NITINOL WIRE WITH HYDROPHILIC TIP: Brand: SENSOR

## (undated) DEVICE — DRSNG SURESITE WNDW 2.38X2.75

## (undated) DEVICE — SUT ETHIBOND 2/0 CV V5  30IN PXX52

## (undated) DEVICE — TEMP PACING WIRE: Brand: MYO/WIRE

## (undated) DEVICE — GW EMR FIX EXCHG J STD .035 3MM 260CM

## (undated) DEVICE — SYR LUERLOK 20CC

## (undated) DEVICE — SENSR CERBRL O2 PK/2

## (undated) DEVICE — RADIFOCUS GLIDEWIRE: Brand: GLIDEWIRE

## (undated) DEVICE — CANN VENI DLP SGL/STAGE RT/ANGL 22F 30.5TO38.1CM

## (undated) DEVICE — 8 FOOT DISPOSABLE EXTENSION CABLE WITH SAFE CONNECT / ALLIGATOR CLIP

## (undated) DEVICE — ENDOCUT SCISSOR TIP, DISPOSABLE: Brand: RENEW

## (undated) DEVICE — TROCARS: Brand: KII® BALLOON BLUNT TIP SYSTEM

## (undated) DEVICE — DECANT BG O JET

## (undated) DEVICE — OASIS DRAIN, SINGLE, INLINE & ATS COMPATIBLE: Brand: OASIS

## (undated) DEVICE — DECANTER: Brand: UNBRANDED

## (undated) DEVICE — CONN STR 1/2INX3/8IN

## (undated) DEVICE — PK ATS CUST W CARDIOTOMY RESEVOIR

## (undated) DEVICE — ORGANIZER SUT SHELIGH 3T 213013

## (undated) DEVICE — 2, DISPOSABLE SUCTION/IRRIGATOR WITH DISPOSABLE TIP: Brand: STRYKEFLOW

## (undated) DEVICE — PK PERFUS CUST W/CARDIOPLEGIA

## (undated) DEVICE — ENDOPATH XCEL UNIVERSAL TROCAR STABLILITY SLEEVES: Brand: ENDOPATH XCEL

## (undated) DEVICE — APPL HEMOS FOR DELIVERY FLOSEAL

## (undated) DEVICE — DRP C/ARM 41X74IN

## (undated) DEVICE — MYOCARDIAL PROBE NON-PYROGENIC: Brand: DEROYAL

## (undated) DEVICE — PROB CRYOABL CARD CARDIOBLATE/CRYOFLEX 25TO100MM 10CM 1P/U

## (undated) DEVICE — 28 FR STRAIGHT – SOFT PVC CATHETER: Brand: PVC THORACIC CATHETERS

## (undated) DEVICE — GLV SURG BIOGEL LTX PF 7 1/2

## (undated) DEVICE — CATH VENT MIV RADL PIG ST TIP 5F 110CM

## (undated) DEVICE — TBG ART PRESS 60 IN

## (undated) DEVICE — TROCAR SITE CLOSURE DEVICE: Brand: ENDO CLOSE

## (undated) DEVICE — ENDOPATH PNEUMONEEDLE INSUFFLATION NEEDLES WITH LUER LOCK CONNECTORS 120MM: Brand: ENDOPATH

## (undated) DEVICE — APPL CHLORAPREP W/TINT 26ML ORNG

## (undated) DEVICE — GLV SURG SENSICARE PI MIC PF SZ7.5 LF STRL

## (undated) DEVICE — BIOPATCH™ ANTIMICROBIAL DRESSING WITH CHLORHEXIDINE GLUCONATE IS A HYDROPHILLIC POLYURETHANE ABSORPTIVE FOAM WITH CHLORHEXIDINE GLUCONATE (CHG) WHICH INHIBITS BACTERIAL GROWTH UNDER THE DRESSING. THE DRESSING IS INTENDED TO BE USED TO ABSORB EXUDATE, COVER A WOUND CAUSED BY VASCULAR AND NONVASCULAR PERCUTANEOUS MEDICAL DEVICES DURING SURGERY, AS WELL AS REDUCE LOCAL INFECTION AND COLONIZATION OF MICROORGANISMS.: Brand: BIOPATCH

## (undated) DEVICE — SUT ETHIB 0/0 MO6 I8IN CX45D

## (undated) DEVICE — TR BAND RADIAL ARTERY COMPRESSION DEVICE: Brand: TR BAND

## (undated) DEVICE — PK CATH CARD 40

## (undated) DEVICE — SOL NACL 0.9PCT 1000ML

## (undated) DEVICE — 3M™ STERI-STRIP™ REINFORCED ADHESIVE SKIN CLOSURES, R1547, 1/2 IN X 4 IN (12 MM X 100 MM), 6 STRIPS/ENVELOPE: Brand: 3M™ STERI-STRIP™

## (undated) DEVICE — SOL IRR H2O BTL 1000ML STRL

## (undated) DEVICE — CATH DIAG IMPULSE FR4 5F 100CM

## (undated) DEVICE — PENCL E/S HNDSWCH PUSHBTN HOLSTR 10FT

## (undated) DEVICE — KT MANIFLD CARDIAC

## (undated) DEVICE — CLAMP INSERT: Brand: STEALTH® CLAMP INSERT

## (undated) DEVICE — CANN VESL CORNRY STR W/4MM BALN

## (undated) DEVICE — Device

## (undated) DEVICE — ST TOURNI COMPL A/ 7IN

## (undated) DEVICE — PK CYSTO-TUR BASIC 10

## (undated) DEVICE — CATH DIAG IMPULSE FL3.5 5F 100CM

## (undated) DEVICE — GLV SURG SENSICARE W/ALOE PF LF 6.5 STRL

## (undated) DEVICE — BALN PRESS WEDGE 5F 110CM

## (undated) DEVICE — CANN VENI DLP SGL/STAGE RT/ANGL MTL/TP 28F

## (undated) DEVICE — DRP SLUSH WARMR MACH RECTG 66X44IN

## (undated) DEVICE — ADHS LIQ MASTISOL 2/3ML

## (undated) DEVICE — CANN ART SOFTFLOW EXT W/SUT/RNG 7MM

## (undated) DEVICE — ENDOPATH 5MM ENDOSCOPIC BLUNT TIP DISSECTORS (12 POUCHES CONTAINING 3 DISSECTORS EACH): Brand: ENDOPATH

## (undated) DEVICE — HEMOCONCENTRATOR PERFUS LPS06

## (undated) DEVICE — SET CATH CHOLANG REDK W/SCOOP TIP INTRO 4F 50CM

## (undated) DEVICE — SPNG GZ 2S 2X2 8PLY STRL PK/2

## (undated) DEVICE — GLIDESHEATH BASIC HYDROPHILIC COATED INTRODUCER SHEATH: Brand: GLIDESHEATH

## (undated) DEVICE — SOL ISO/ALC RUB 70PCT 4OZ

## (undated) DEVICE — TOWEL,OR,DSP,ST,WHITE,DLX,4/PK,20PK/CS: Brand: MEDLINE

## (undated) DEVICE — ENDOPATH XCEL DILATING TIP TROCARS WITH STABILITY SLEEVES: Brand: ENDOPATH XCEL

## (undated) DEVICE — CANN AORT ROOT DLP VNT 14G 7F

## (undated) DEVICE — ST. SORBAVIEW ULTIMATE IJ SYSTEM A,C: Brand: CENTURION

## (undated) DEVICE — ENDOPATH XCEL BLADELESS TROCARS WITH STABILITY SLEEVES: Brand: ENDOPATH XCEL

## (undated) DEVICE — BG TRANSF W/COUPLER SPK 600ML

## (undated) DEVICE — 28 FR RIGHT ANGLE – SOFT PVC CATHETER: Brand: PVC THORACIC CATHETERS

## (undated) DEVICE — SUT ETHIB 2/0 CV V5 30IN 10X52

## (undated) DEVICE — CVR PROB 96IN LF STRL

## (undated) DEVICE — TBG INSUFL W FLTR STRL

## (undated) DEVICE — CATH DIAG IMPULSE AL2 5F 100CM

## (undated) DEVICE — SYS PERFUS SEP PLATLT W TIPS CUST

## (undated) DEVICE — PK HEART OPN 40

## (undated) DEVICE — SUT VIC 0/0 UR6 27IN DYED J603H

## (undated) DEVICE — FLOSEAL MATRIX IS INDICATED IN SURGICAL PROCEDURES (OTHER THAN IN OPHTHALMIC) AS AN ADJUNCT TO HEMOSTASIS WHEN CONTROL OF BLEEDING BY LIGATURE OR CONVENTIONAL PROCEDURES IS INEFFECTIVE OR IMPRACTICAL.: Brand: FLOSEAL HEMOSTATIC MATRIX

## (undated) DEVICE — CANN ART EOPA 3D NV W/CONN 20F

## (undated) DEVICE — CATH DIAG IMPULSE AL1 5F 100CM

## (undated) DEVICE — DRP Z/FRICTION 10X16IN

## (undated) DEVICE — TP UMB COTN 1/8X36 U12T

## (undated) DEVICE — CATH VENT DLP W/CONN MALL NOVNT SILICON 16FR 16IN

## (undated) DEVICE — PK LAP GEN 90

## (undated) DEVICE — CATHETER,URETHRAL,REDRUBBER,STERILE,20FR: Brand: MEDLINE

## (undated) DEVICE — SOL IRR NACL 0.9PCT BT 1000ML

## (undated) DEVICE — SUT MNCRYL 4/0 PS2 18 IN

## (undated) DEVICE — ENDOPOUCH RETRIEVER SPECIMEN RETRIEVAL BAGS: Brand: ENDOPOUCH RETRIEVER

## (undated) DEVICE — CANN RETRGR STYLET RSCP 15F

## (undated) DEVICE — CATHETER,FOLEY,100%SILICONE,20FR,10ML,LF: Brand: MEDLINE

## (undated) DEVICE — DRSNG SURESITE WNDW 4X4.5

## (undated) DEVICE — SYR LL TP 10ML STRL

## (undated) DEVICE — ADAPT ANTEGRADE RETRGR

## (undated) DEVICE — CONTAINER,SPECIMEN,OR STERILE,4OZ: Brand: MEDLINE

## (undated) DEVICE — SPNG GZ WOVN 4X4IN 12PLY 10/BX STRL